# Patient Record
Sex: FEMALE | Race: BLACK OR AFRICAN AMERICAN | NOT HISPANIC OR LATINO | Employment: OTHER | URBAN - METROPOLITAN AREA
[De-identification: names, ages, dates, MRNs, and addresses within clinical notes are randomized per-mention and may not be internally consistent; named-entity substitution may affect disease eponyms.]

---

## 2017-01-23 ENCOUNTER — OFFICE VISIT (OUTPATIENT)
Dept: INTERNAL MEDICINE | Facility: CLINIC | Age: 47
End: 2017-01-23
Payer: COMMERCIAL

## 2017-01-23 VITALS
DIASTOLIC BLOOD PRESSURE: 84 MMHG | BODY MASS INDEX: 47.09 KG/M2 | HEIGHT: 66 IN | RESPIRATION RATE: 18 BRPM | SYSTOLIC BLOOD PRESSURE: 130 MMHG | WEIGHT: 293 LBS | HEART RATE: 64 BPM

## 2017-01-23 DIAGNOSIS — R60.0 BILATERAL EDEMA OF LOWER EXTREMITY: ICD-10-CM

## 2017-01-23 DIAGNOSIS — G47.33 OSA ON CPAP: ICD-10-CM

## 2017-01-23 DIAGNOSIS — E66.01 MORBID OBESITY DUE TO EXCESS CALORIES: ICD-10-CM

## 2017-01-23 DIAGNOSIS — M54.50 CHRONIC BILATERAL LOW BACK PAIN WITHOUT SCIATICA: ICD-10-CM

## 2017-01-23 DIAGNOSIS — Z23 NEED FOR INFLUENZA VACCINATION: ICD-10-CM

## 2017-01-23 DIAGNOSIS — Z13.29 SCREENING FOR THYROID DISORDER: ICD-10-CM

## 2017-01-23 DIAGNOSIS — F32.0 MILD SINGLE CURRENT EPISODE OF MAJOR DEPRESSIVE DISORDER: ICD-10-CM

## 2017-01-23 DIAGNOSIS — G89.29 CHRONIC BILATERAL LOW BACK PAIN WITHOUT SCIATICA: ICD-10-CM

## 2017-01-23 DIAGNOSIS — I10 ESSENTIAL HYPERTENSION: Primary | ICD-10-CM

## 2017-01-23 PROCEDURE — 90688 IIV4 VACCINE SPLT 0.5 ML IM: CPT | Mod: S$GLB,,, | Performed by: INTERNAL MEDICINE

## 2017-01-23 PROCEDURE — 90471 IMMUNIZATION ADMIN: CPT | Mod: S$GLB,,, | Performed by: INTERNAL MEDICINE

## 2017-01-23 PROCEDURE — 99999 PR PBB SHADOW E&M-EST. PATIENT-LVL III: CPT | Mod: PBBFAC,,, | Performed by: INTERNAL MEDICINE

## 2017-01-23 PROCEDURE — 3075F SYST BP GE 130 - 139MM HG: CPT | Mod: S$GLB,,, | Performed by: INTERNAL MEDICINE

## 2017-01-23 PROCEDURE — 3079F DIAST BP 80-89 MM HG: CPT | Mod: S$GLB,,, | Performed by: INTERNAL MEDICINE

## 2017-01-23 PROCEDURE — 1159F MED LIST DOCD IN RCRD: CPT | Mod: S$GLB,,, | Performed by: INTERNAL MEDICINE

## 2017-01-23 PROCEDURE — 99203 OFFICE O/P NEW LOW 30 MIN: CPT | Mod: 25,S$GLB,, | Performed by: INTERNAL MEDICINE

## 2017-01-23 RX ORDER — CYCLOBENZAPRINE HCL 10 MG
10 TABLET ORAL 2 TIMES DAILY
Refills: 2 | COMMUNITY
Start: 2017-01-16 | End: 2017-07-25 | Stop reason: SDUPTHER

## 2017-01-23 RX ORDER — NEBIVOLOL 10 MG/1
10 TABLET ORAL DAILY
COMMUNITY
End: 2017-02-20

## 2017-01-23 RX ORDER — MIRTAZAPINE 7.5 MG/1
7.5 TABLET, FILM COATED ORAL NIGHTLY
Qty: 30 TABLET | Refills: 11 | Status: SHIPPED | OUTPATIENT
Start: 2017-01-23 | End: 2018-06-11

## 2017-01-23 NOTE — PROGRESS NOTES
Subjective:       Patient ID: Arron Ron is a 46 y.o. female.    Chief Complaint: Establish Care and Back Pain (radiating to hips)      HPI:  Patient is new to clinic and presents to establish care and with back pain. She has HTN for which she takes losartan, nebivolol, and torsemide. She follows with Dr. Reyes in [place].     She reports h/o elevated cholesterol. Was on medicine but not taking currently. Changed her diet and reports her cholesterol has improved.    Back pain: she reports b/l low back pain that radiates into the b/l hips. Worse with bending forward. Feels sore in quality. Difficulty sleeping at night due to pain. She was given tramadol which was not effective. She has been prescribed Flexeril but makes her sleepy. Ibuprofen and Aleve is not helpful. Pain is intermittent. Has had pain for several years but getting worse. She has never had MRI.     She also reports depression. Has had depressed mood for several months. Denies SI. Having family stressors, son is in some trouble. She has never been on medicine for her mood. She is not sleeping well.     RODRI: diagnosed about 1 year ago. Using CPAP nightly. Denies issues    FH: HTN, CAD/MI, DM. No FH of cancers    Tobacco: never smoker  EtOH: denies  Illicit drugs: denies    NKDA      Past Medical History   Diagnosis Date    Anemia     Hyperlipidemia     Hypertension     Sleep apnea        Family History   Problem Relation Age of Onset    Hypertension Mother     Heart attack Mother     Hypertension Brother     Heart disease Maternal Grandmother     Hypertension Maternal Grandmother     Diabetes Maternal Aunt     Breast cancer Neg Hx     Colon cancer Neg Hx     Ovarian cancer Neg Hx        Social History     Social History    Marital status: Single     Spouse name: N/A    Number of children: N/A    Years of education: N/A     Occupational History    Not on file.     Social History Main Topics    Smoking status: Never Smoker     Smokeless tobacco: Never Used    Alcohol use No    Drug use: No    Sexual activity: Yes     Partners: Male     Birth control/ protection: Surgical, See Surgical Hx      Comment: --BTL     Other Topics Concern    Not on file     Social History Narrative       Review of Systems   Constitutional: Negative for activity change, fatigue, fever and unexpected weight change.   HENT: Negative for congestion, ear pain, hearing loss, rhinorrhea and sore throat.    Eyes: Negative for pain, redness and visual disturbance.   Respiratory: Negative for cough, shortness of breath and wheezing.    Cardiovascular: Negative for chest pain, palpitations and leg swelling.   Gastrointestinal: Negative for abdominal pain, blood in stool, constipation, diarrhea, nausea and vomiting.   Genitourinary: Negative for dysuria, frequency, pelvic pain and urgency.   Musculoskeletal: Positive for back pain. Negative for joint swelling and neck pain.   Skin: Negative for color change, rash and wound.   Neurological: Negative for dizziness, tremors, weakness, light-headedness and headaches.   Psychiatric/Behavioral: Positive for dysphoric mood and sleep disturbance. Negative for suicidal ideas.         Objective:      Physical Exam   Constitutional: She is oriented to person, place, and time. She appears well-developed and well-nourished. No distress.   HENT:   Head: Normocephalic and atraumatic.   Right Ear: External ear normal.   Left Ear: External ear normal.   Eyes: Conjunctivae and EOM are normal. Pupils are equal, round, and reactive to light. No scleral icterus.   Neck: Neck supple. No tracheal deviation present. No thyromegaly present.   Cardiovascular: Normal rate and regular rhythm.  Exam reveals no gallop and no friction rub.    No murmur heard.  Pulmonary/Chest: Effort normal and breath sounds normal. No respiratory distress. She has no wheezes. She has no rales.   Abdominal: Soft. Bowel sounds are normal. She exhibits no  distension. There is no tenderness.   Musculoskeletal: She exhibits edema (trace b/l LE edema) and tenderness (b/l SI joints and lumbar paraspoinal muscles).   Neurological: She is alert and oriented to person, place, and time. No cranial nerve deficit.   Skin: Skin is warm and dry.   Psychiatric: She has a normal mood and affect. Her behavior is normal.   Vitals reviewed.      Assessment:       1. Essential hypertension    2. Morbid obesity due to excess calories    3. Mild single current episode of major depressive disorder    4. RODRI on CPAP    5. Bilateral edema of lower extremity    6. Screening for thyroid disorder    7. Chronic bilateral low back pain without sciatica    8. Need for influenza vaccination        Plan:       Arron was seen today for establish care and back pain.    Diagnoses and all orders for this visit:    Essential hypertension  -     CBC auto differential; Future  -     Comprehensive metabolic panel; Future  -     Hemoglobin A1c; Future  -     Lipid panel; Future  Chronic, well controlled  Follows with Dr. Reyes  Continue losartan and bystolic at same dose  Check BP and keep log  Low Na diet  Exercise, weight loss    Morbid obesity due to excess calories  -     CBC auto differential; Future  -     Comprehensive metabolic panel; Future  -     Hemoglobin A1c; Future  -     Lipid panel; Future  -     TSH; Future  Diet and exercise encouraged    Mild single current episode of major depressive disorder  -     TSH; Future  -     mirtazapine (REMERON) 7.5 MG Tab; Take 1 tablet (7.5 mg total) by mouth every evening.  Start remeron for mood and sleep  Side effects discussed voiced understanding  Understanding may take a few weeks to take effects  Call for side effets. Follow up 4 weeks    RODRI on CPAP  Wearing CPAP nightly    Bilateral edema of lower extremity  -     CBC auto differential; Future  -     Comprehensive metabolic panel; Future  On torsemid  Likely due to venous insufficiency  Follows  with Dr. Reyes  Check labs    Screening for thyroid disorder  -     TSH; Future    Chronic bilateral low back pain without sciatica  -     MRI Lumbar Spine Without Contrast; Future  Has failed NSAIDs, tramadol, muscles relaxers  Do MRI to see if surgical or can refer to pain management  Printed info on back care and exercises  Will try to avoid narcotics for chronic pain    Need for influenza vaccination  Done today          Health Maintenance:  -CRC: not due  -PAP/MMG: follows Dr. Caputo; MMG 12/16 BR 1  -Bone Density: not odue  -tobacco: never smoker  -Vaccines    Flu 1/2017    RTC 4 weeks follow up depression and labs and PRN

## 2017-01-23 NOTE — MR AVS SNAPSHOT
[facility]  [address] [facility] [address]  Phone: [phone]  Fax: [phone]                  Arron Ron   2017 1:00 PM   Office Visit    Description:  Female : 1970   Provider:  Mahi Rojas MD   Department:  [facility]           Reason for Visit     Establish Care     Back Pain           Diagnoses this Visit        Comments    Essential hypertension    -  Primary     Morbid obesity due to excess calories         Mild single current episode of major depressive disorder         RODRI on CPAP         Bilateral edema of lower extremity         Screening for thyroid disorder         Chronic bilateral low back pain without sciatica         Need for influenza vaccination                To Do List           Future Appointments        Provider Department Dept Phone    2017 10:45 AM [facility] MRI1 [facility] [phone]    2017 1:00 PM Mahi Rojas MD [facility] [phone]      Goals (5 Years of Data)     None      Follow-Up and Disposition     Return in about 4 weeks (around 2017).    Follow-up and Disposition History       These Medications        Disp Refills Start End    mirtazapine (REMERON) 7.5 MG Tab 30 tablet 11 2017    Take 1 tablet (7.5 mg total) by mouth every evening. - Oral    Pharmacy: [facility]/pharmacy #[id number] - [place] - [address] AT [facility] Ph #: [phone]         [facility]     [facility] -  Assistance  Registered nurses in the [facility] provide clinical advisement, health education, appointment booking, and other advisory services.  Call for this free service at [phone].             Medications           Message regarding Medications     Verify the changes and/or additions to your medication regime listed below are the same as discussed with your clinician today.  If any of these  "changes or additions are incorrect, please notify your healthcare provider.        START taking these NEW medications        Refills    mirtazapine (REMERON) 7.5 MG Tab 11    Sig: Take 1 tablet (7.5 mg total) by mouth every evening.    Class: Normal    Route: Oral      STOP taking these medications     tramadol (ULTRAM) 50 mg tablet Take 1 tablet (50 mg total) by mouth every 6 (six) hours as needed for Pain.    ibuprofen (ADVIL,MOTRIN) 400 MG tablet Take 800 mg by mouth 3 (three) times daily.           Verify that the below list of medications is an accurate representation of the medications you are currently taking.  If none reported, the list may be blank. If incorrect, please contact your healthcare provider. Carry this list with you in case of emergency.           Current Medications     aspirin (ECOTRIN) 81 MG EC tablet Take 81 mg by mouth once daily.    cyclobenzaprine (FLEXERIL) 10 MG tablet Take 10 mg by mouth 2 (two) times daily.    levocetirizine (XYZAL) 5 MG tablet     losartan (COZAAR) 50 MG tablet     nebivolol (BYSTOLIC) 10 MG Tab Take 10 mg by mouth once daily.    torsemide (DEMADEX) 20 MG Tab Take 20 mg by mouth 2 (two) times daily.    mirtazapine (REMERON) 7.5 MG Tab Take 1 tablet (7.5 mg total) by mouth every evening.           Clinical Reference Information           Vital Signs - Last Recorded  Most recent update: 1/23/2017  1:03 PM by Sherice Suarez LPN    BP Pulse Resp Ht Wt LMP    130/84 64 18 5' 6" (1.676 m) 135.2 kg (298 lb 1 oz) 09/21/2015    BMI                48.11 kg/m2          Blood Pressure          Most Recent Value    BP  130/84      Allergies as of 1/23/2017     No Known Allergies      Immunizations Administered on Date of Encounter - 1/23/2017     None      Orders Placed During Today's Visit     Future Labs/Procedures Expected by Expires    MRI Lumbar Spine Without Contrast  1/23/2017 1/23/2018    CBC auto differential  2/21/2017 1/23/2018    Comprehensive metabolic panel  " 2/21/2017 1/23/2018    Hemoglobin A1c  2/21/2017 1/23/2018    Lipid panel  2/21/2017 1/23/2018    TSH  2/21/2017 1/23/2018      Instructions      Back Care Tips    Caring for your back  These are things you can do to prevent a recurrence of acute back pain and to reduce symptoms from chronic back pain:  · Maintain a healthy weight. If you are overweight, losing weight will help most types of back pain.  · Exercise is an important part of recovery from most types of back pain. The muscles behind and in front of the spine support the back. This means strengthening both the back muscles and the abdominal muscles will provide better support for your spine.   · Swimming and brisk walking are good overall exercises to improve your fitness level.  · Practice safe lifting methods (below).  · Practice good posture when sitting, standing and walking. Avoid prolonged sitting. This puts more stress on the lower back than standing or walking.  · Wear quality shoes with sufficient arch support. Foot and ankle alignment can affect back symptoms. Women should avoid wearing high heels.  · Therapeutic massage can help relax the back muscles without stretching them.  · During the first 24 to 72 hours after an acute injury or flare-up of chronic back pain, apply an ice pack to the painful area for 20 minutes and then remove it for 20 minutes, over a period of 60 to 90 minutes, or several times a day. As a safety precaution, do not use a heating pad at bedtime. Sleeping on a heating pad can lead to skin burns or tissue damage.  · You can alternate ice and heat therapies.  Medications  Talk to your healthcare provider before using medicines, especially if you have other medical problems or are taking other medicines.  · You may use acetaminophen or ibuprofen to control pain, unless your healthcare provider prescribed other pain medicine. If you have chronic conditions like diabetes, liver or kidney disease, stomach ulcers, or  gastrointestinal bleeding, or are taking blood thinners, talk with your healthcare provider before taking any medicines.  · Be careful if you are given prescription pain medicines, narcotics, or medicine for muscle spasm. They can cause drowsiness, affect your coordination, reflexes, and judgment. Do not drive or operate heavy machinery while taking these types of medicines. Take prescription pain medicine only as prescribed by your healthcare provider.  Lumbar stretch  Here is a simple stretching exercise that will help relax muscle spasm and keep your back more limber. If exercise makes your back pain worse, dont do it.  · Lie on your back with your knees bent and both feet on the ground.  · Slowly raise your left knee to your chest as you flatten your lower back against the floor. Hold for 5 seconds.  · Relax and repeat the exercise with your right knee.  · Do 10 of these exercises for each leg.  Safe lifting method  · Dont bend over at the waist to lift an object off the floor.  Instead, bend your knees and hips in a squat.   · Keep your back and head upright  · Hold the object close to your body, directly in front of you.  · Straighten your legs to lift the object.   · Lower the object to the floor in the reverse fashion.  · If you must slide something across the floor, push it.  Posture tips  Sitting  Sit in chairs with straight backs or low-back support. Keep your knees lower than your hips, with your feet flat on the floor.  When driving, sit up straight. Adjust the seat forward so you are not leaning toward the steering wheel.  A small pillow or rolled towel behind your lower back may help if you are driving long distances.   Standing  When standing for long periods, shift most of your weight to one leg at a time. Alternate legs every few minutes.   Sleeping  The best way to sleep is on your side with your knees bent. Put a low pillow under your head to support your neck in a neutral spine position. Avoid  thick pillows that bend your neck to one side. Put a pillow between your legs to further relax your lower back. If you sleep on your back, put pillows under your knees to support your legs in a slightly flexed position. Use a firm mattress. If your mattress sags, replace it, or use a 1/2-inch plywood board under the mattress to add support.  Follow-up care  Follow up with your healthcare provider, or as advised.  If X-rays, a CT scan or an MRI scan were taken, they will be reviewed by a radiologist. You will be notified of any new findings that may affect your care.  Call 911  Seek emergency medical care if any of the following occur:  · Trouble breathing  · Confusion  · Very drowsy  · Fainting or loss of consciousness  · Rapid or very slow heart rate  · Loss of  bowel or bladder control  When to seek medical care  Call your healthcare provider if any of the following occur:  · Pain becomes worse or spreads to your arms or legs  · Weakness or numbness in one or both arms or legs  · Numbness in the groin area  © 0555-7742 [employer]. [address] [place] [address]. All rights reserved. This information is not intended as a substitute for professional medical care. Always follow your healthcare professional's instructions.

## 2017-01-23 NOTE — LETTER
January 23, 2017      [facility]  [address] [facility] [address]  Phone: [phone]  Fax: [phone]       Patient: Arron Ron   YOB: 1970  Date of Visit: 01/23/2017    To Whom It May Concern:    Arron was at [facility] on 01/23/2017. She may return to work/school on 1/24/17 with no restrictions. If you have any questions or concerns, or if I can be of further assistance, please do not hesitate to contact me.    Sincerely,    Mahi Rojas MD

## 2017-01-30 ENCOUNTER — HOSPITAL ENCOUNTER (OUTPATIENT)
Dept: RADIOLOGY | Facility: HOSPITAL | Age: 47
Discharge: HOME | End: 2017-01-30
Attending: INTERNAL MEDICINE
Payer: COMMERCIAL

## 2017-01-30 DIAGNOSIS — G89.29 CHRONIC BILATERAL LOW BACK PAIN WITHOUT SCIATICA: ICD-10-CM

## 2017-01-30 DIAGNOSIS — M54.50 CHRONIC BILATERAL LOW BACK PAIN WITHOUT SCIATICA: ICD-10-CM

## 2017-01-30 PROCEDURE — 72148 MRI LUMBAR SPINE W/O DYE: CPT | Mod: 26,,, | Performed by: RADIOLOGY

## 2017-01-30 PROCEDURE — 72148 MRI LUMBAR SPINE W/O DYE: CPT | Mod: TC

## 2017-02-06 ENCOUNTER — TELEPHONE (OUTPATIENT)
Dept: INTERNAL MEDICINE | Facility: CLINIC | Age: 47
End: 2017-02-06

## 2017-02-06 NOTE — TELEPHONE ENCOUNTER
Sent message via results already. She has multiple bulging discs and arthritis. She can be referred to ortho or pain management, which ever she prefers.

## 2017-02-06 NOTE — TELEPHONE ENCOUNTER
----- Message from Cris Mcclain sent at 2017  8:26 AM CST -----  Contact: self  Arron Ron  MRN: [id number]  : 1970  PCP: Primary Doctor No  Home Phone      [phone]  Work Phone      Not on file.  Mobile          [phone]      MESSAGE:   Calling to get the results of her mri.    Phone: [phone]

## 2017-02-20 ENCOUNTER — OFFICE VISIT (OUTPATIENT)
Dept: INTERNAL MEDICINE | Facility: CLINIC | Age: 47
End: 2017-02-20
Payer: COMMERCIAL

## 2017-02-20 VITALS
DIASTOLIC BLOOD PRESSURE: 96 MMHG | WEIGHT: 293 LBS | BODY MASS INDEX: 47.09 KG/M2 | SYSTOLIC BLOOD PRESSURE: 188 MMHG | HEIGHT: 66 IN | HEART RATE: 88 BPM | RESPIRATION RATE: 20 BRPM

## 2017-02-20 DIAGNOSIS — F32.0 MILD SINGLE CURRENT EPISODE OF MAJOR DEPRESSIVE DISORDER: ICD-10-CM

## 2017-02-20 DIAGNOSIS — M54.50 CHRONIC BILATERAL LOW BACK PAIN WITHOUT SCIATICA: ICD-10-CM

## 2017-02-20 DIAGNOSIS — E66.01 MORBID OBESITY DUE TO EXCESS CALORIES: ICD-10-CM

## 2017-02-20 DIAGNOSIS — R60.0 BILATERAL EDEMA OF LOWER EXTREMITY: ICD-10-CM

## 2017-02-20 DIAGNOSIS — I10 ESSENTIAL HYPERTENSION: Primary | ICD-10-CM

## 2017-02-20 DIAGNOSIS — G62.9 NEUROPATHY: ICD-10-CM

## 2017-02-20 DIAGNOSIS — G89.29 CHRONIC BILATERAL LOW BACK PAIN WITHOUT SCIATICA: ICD-10-CM

## 2017-02-20 DIAGNOSIS — G47.33 OSA ON CPAP: ICD-10-CM

## 2017-02-20 PROCEDURE — 3077F SYST BP >= 140 MM HG: CPT | Mod: S$GLB,,, | Performed by: INTERNAL MEDICINE

## 2017-02-20 PROCEDURE — 3080F DIAST BP >= 90 MM HG: CPT | Mod: S$GLB,,, | Performed by: INTERNAL MEDICINE

## 2017-02-20 PROCEDURE — 99999 PR PBB SHADOW E&M-EST. PATIENT-LVL III: CPT | Mod: PBBFAC,,, | Performed by: INTERNAL MEDICINE

## 2017-02-20 PROCEDURE — 99214 OFFICE O/P EST MOD 30 MIN: CPT | Mod: S$GLB,,, | Performed by: INTERNAL MEDICINE

## 2017-02-20 RX ORDER — ATORVASTATIN CALCIUM 10 MG/1
10 TABLET, FILM COATED ORAL DAILY
COMMUNITY
End: 2017-07-21 | Stop reason: CLARIF

## 2017-02-20 RX ORDER — GABAPENTIN 300 MG/1
300 CAPSULE ORAL 3 TIMES DAILY
Qty: 90 CAPSULE | Refills: 11 | Status: SHIPPED | OUTPATIENT
Start: 2017-02-20 | End: 2017-03-10 | Stop reason: DRUGHIGH

## 2017-02-20 RX ORDER — METOPROLOL SUCCINATE 50 MG/1
50 TABLET, EXTENDED RELEASE ORAL DAILY
COMMUNITY
End: 2017-07-25 | Stop reason: SDUPTHER

## 2017-02-20 RX ORDER — VALSARTAN 320 MG/1
320 TABLET ORAL DAILY
COMMUNITY
End: 2017-07-25 | Stop reason: SDUPTHER

## 2017-02-20 RX ORDER — OMEPRAZOLE 40 MG/1
40 CAPSULE, DELAYED RELEASE ORAL DAILY
COMMUNITY
End: 2017-07-25

## 2017-02-20 RX ORDER — SPIRONOLACTONE 25 MG/1
25 TABLET ORAL DAILY
Qty: 30 TABLET | Refills: 11 | Status: SHIPPED | OUTPATIENT
Start: 2017-02-20 | End: 2017-03-10 | Stop reason: DRUGHIGH

## 2017-02-20 NOTE — PATIENT INSTRUCTIONS
Exercises to Strengthen Your Lower Back  Strong lower back and abdominal muscles work together to support your spine. The exercises below will help strengthen the lower back. It is important that you begin exercising slowly and increase levels gradually.  Always begin any exercise program with stretching. If you feel pain while doing any of these exercises, stop and talk to your doctor about a more specific exercise program that better suits your condition.   Low back stretch  The point of stretching is to make you more flexible and increase your range of motion. Stretch only as much as you are able. Stretch slowly. Do not push your stretch to the limit. If at any point you feel pain while stretching, this is your (temporary) limit.  · Lie on your back with your knees bent and both feet on the ground.  · Slowly raise your left knee to your chest as you flatten your lower back against the floor. Hold for 5 seconds.  · Relax and repeat the exercise with your right knee.  · Do 10 of these exercises for each leg.  · Repeat hugging both knees to your chest at the same time.  Building lower back strength  Start your exercise routine with 10 to 30 minutes a day, 1 to 3 times a day.  Initial exercises  Lying on your back:  1. Ankle pumps: Move your foot up and down, towards your head, and then away. Repeat 10 times with each foot.  2. Heel slides: Slowly bend your knee, drawing the heel of your foot towards you. Then slide your heel/foot from you, straightening your knee. Do not lift your foot off the floor (this is not a leg lift).  3. Abdominal contraction: Bend your knees and put your hands on your stomach. Tighten your stomach muscles. Hold for 5 seconds, then relax. Repeat 10 times.  4. Straight leg raise: Bend one leg at the knee and keep the other leg straight. Tighten your stomach muscles. Slowly lift your straight leg 6 to 12 inches off the floor and hold for up to 5 seconds. Repeat 10 times on each  side.  Standin. Wall squats: Stand with your back against the wall. Move your feet about 12 inches away from the wall. Tighten your stomach muscles, and slowly bend your knees until they are at about a 45 degree angle. Do not go down too far. Hold about 5 seconds. Then slowly return to your starting position. Repeat 10 times.  2. Heel raises: Stand facing the wall. Slowly raise the heels of your feet up and down, while keeping your toes on the floor. If you have trouble balancing, you can touch the wall with your hands. Repeat 10 times.  More advanced exercises  When you feel comfortable enough, try these exercises.  1. Kneeling lumbar extension: Begin on your hands and knees. At the same time, raise and straighten your right arm and left leg until they are parallel to the ground. Hold for 2 seconds and come back slowly to a starting position. Repeat with left arm and right leg, alternating 10 times.  2. Prone lumbar extension: Lie face down, arms extended overhead, palms on the floor. At the same time, raise your right arm and left leg as high as comfortably possible. Hold for 10 seconds and slowly return to start. Repeat with left arm and right leg, alternating 10 times. Gradually build up to 20 times. (Advanced: Repeat this exercise raising both arms and both legs a few inches off the floor at the same time. Hold for 5 seconds and release.)  3. Pelvic tilt: Lie on the floor on your back with your knees bent at 90 degrees. Your feet should be flat on the floor. Inhale, exhale, then slowly contract your abdominal muscles bringing your navel toward your spine. Let your pelvis rock back until your lower back is flat on the floor. Hold for 10 seconds while breathing smoothly.  4. Abdominal crunch: Perform a pelvic tilt (above) flattening your lower back against the floor. Holding the tension in your abdominal muscles, take another breath and raise your shoulder blades off the ground (this is not a full sit-up).  Keep your head in line with your body (dont bend your neck forward). Hold for 2 seconds, then slowly lower.  Date Last Reviewed: 6/1/2016 © 2000-2016 [employer]. [address] [place] [address], [place] [address]. All rights reserved. This information is not intended as a substitute for professional medical care. Always follow your healthcare professional's instructions.        Taking Your Blood Pressure  Blood pressure is the force of blood against the artery wall as it moves from the heart through the blood vessels. You can take your own blood pressure reading using a digital monitor. Take readings as often as your healthcare provider instructs. Take each reading at the same time of day.  Step 1. Relax    · Take your blood pressure at the same time every day, such as in the morning or evening, or at the time your healthcare provider recommends.  · Wait at least a half-hour after smoking, eating, drinking caffeinated beverages, or exercising.  · Sit comfortably at a table with both feet on the floor. Do not cross your legs or feet. Place the monitor near you.  · Rest for a few minutes before you begin.  Step 2. Wrap the cuff    · Place your arm on the table, palm up. Your arm should be at the level of your heart. Wrap the cuff around your upper arm, just above your elbow. Its best done on bare skin, not over clothing. Most cuffs will indicate where the brachial artery (the blood vessel in the middle of the arm at the inner side of the elbow) should line up with the cuff. Look in your monitor's instruction booklet for an illustration. You can also bring your cuff to your healthcare provider and have them show you how to correctly place the cuff.  · Make sure your cuff fits. If it doesnt wrap around your upper arm, order a larger cuff.  Step 3. Inflate the cuff    · Pump the cuff until the scale reads 160. If you have a self-inflating cuff, push the button that starts the pump.  · The cuff will tighten,  then loosen.  · The numbers will change. When they stop changing, your blood pressure reading will appear.  · Take 2 or 3 readings one minute apart.  Step 4. Write down the results of each reading    · Write down your blood pressure numbers for each reading. Note the date and time. Keep your results in one place, such as a notebook. Even if your monitor has a built-in memory, keep a hard copy of the readings.  · Remove the cuff from your arm. Turn off the machine.  · Share your blood pressure records with your healthcare providers at each visit.  Date Last Reviewed: 4/27/2016  © 1301-6409 [employer]. [address] [place] [address], [place] [address]. All rights reserved. This information is not intended as a substitute for professional medical care. Always follow your healthcare professional's instructions.

## 2017-02-20 NOTE — PROGRESS NOTES
"Subjective:       Patient ID: Arron Rno is a 46 y.o. female.    Chief Complaint: Follow-up      HPI:  Patient is known to me and presents for follow up HTN, back pain and depression.    Today she reports some nausea and vomiting and worsening acid reflux. The reflux makes her vomit. + belching. Mild epigastric pain. She went to Dr. Reyes for this because she was having chest pains and he diagnosed GERD and started on omeprazole 40mg. PPI has helped her sx      HTN: follows with Dr. Reyes. Was on losartan and bystolic. BP is elevated today. Saw Dr. Reyes and was changed to metoprolol and valsartan. Has been on these new meds for almost 1 weeks. She thinks it due to stress. Not checking BP at home.     Depression. Started remeron at last visit for mood and complaints of insomnia. She never started taking it. She wanted to try without but she will start taking. Still has depressed mood.      Low back pain: She has failed NSAIDs, tramadol and muscle relaxers. Ordered MRI L-spine which showed:    "Transitional anatomy at the lumbosacral junction with lumbarization of the first sacral vertebra.  Please see sagittal T2 weighted images for vertebral body labeling.  It surgical intervention is contemplated, correlation with fluoroscopy is recommended.    Broad-based posterior disc bulge with facet arthropathy at L5-S1 contributing to mild bilateral neural foraminal narrowing, worse on the left.    Edema-like signal about the posterior elements bilaterally at L5 which can be a source of pain.  This is more prominent on the left."    She is having burning and tingling in b/l feet. Worse with walking. Has never tried gabapenting for pain relief.     Past Medical History   Diagnosis Date    Anemia     Hyperlipidemia     Hypertension     Sleep apnea        Family History   Problem Relation Age of Onset    Hypertension Mother     Heart attack Mother     Hypertension Brother     Heart disease Maternal Grandmother  "    Hypertension Maternal Grandmother     Diabetes Maternal Aunt     Breast cancer Neg Hx     Colon cancer Neg Hx     Ovarian cancer Neg Hx        Social History     Social History    Marital status: Single     Spouse name: N/A    Number of children: N/A    Years of education: N/A     Occupational History    Not on file.     Social History Main Topics    Smoking status: Never Smoker    Smokeless tobacco: Never Used    Alcohol use No    Drug use: No    Sexual activity: Yes     Partners: Male     Birth control/ protection: Surgical, See Surgical Hx      Comment: --BTL     Other Topics Concern    Not on file     Social History Narrative       Review of Systems   Constitutional: Negative for activity change, fatigue, fever and unexpected weight change.   HENT: Negative for congestion, ear pain, hearing loss, rhinorrhea and sore throat.    Eyes: Negative for pain, redness and visual disturbance.   Respiratory: Negative for cough, shortness of breath and wheezing.    Cardiovascular: Positive for leg swelling. Negative for chest pain and palpitations.   Gastrointestinal: Negative for abdominal pain, constipation, diarrhea, nausea and vomiting.   Genitourinary: Negative for dysuria, frequency, pelvic pain and urgency.   Musculoskeletal: Negative for back pain, joint swelling and neck pain.   Skin: Negative for color change, rash and wound.   Neurological: Positive for numbness. Negative for dizziness, tremors, weakness, light-headedness and headaches.   Psychiatric/Behavioral: Positive for dysphoric mood and sleep disturbance. Negative for suicidal ideas.         Objective:      Physical Exam   Constitutional: She is oriented to person, place, and time. She appears well-developed and well-nourished. No distress.   HENT:   Head: Normocephalic and atraumatic.   Right Ear: External ear normal.   Left Ear: External ear normal.   Eyes: Conjunctivae and EOM are normal. Pupils are equal, round, and reactive  to light. Right eye exhibits no discharge. Left eye exhibits no discharge.   Neck: Neck supple. No tracheal deviation present.   Cardiovascular: Normal rate and regular rhythm.  Exam reveals no gallop and no friction rub.    No murmur heard.  Pulmonary/Chest: Effort normal and breath sounds normal. No respiratory distress. She has no wheezes. She has no rales.   Abdominal: Soft. Bowel sounds are normal. She exhibits no distension. There is no tenderness.   Musculoskeletal: She exhibits edema. Tenderness: 1+ b/l LE edema.   Neurological: She is alert and oriented to person, place, and time. No cranial nerve deficit.   Skin: Skin is warm and dry.   Psychiatric: She has a normal mood and affect. Her behavior is normal.   Vitals reviewed.      Assessment:       1. Essential hypertension    2. Chronic bilateral low back pain without sciatica    3. Mild single current episode of major depressive disorder    4. RODRI on CPAP    5. Morbid obesity due to excess calories    6. Bilateral edema of lower extremity    7. Neuropathy        Plan:       Arron was seen today for follow-up.    Diagnoses and all orders for this visit:    Essential hypertension  -     spironolactone (ALDACTONE) 25 MG tablet; Take 1 tablet (25 mg total) by mouth once daily.  Uncontrolled. Chronic  Stay on losartan and metoprolol  Add aldactone today (had worsening LE edema with norvasc in the past). Consider another CCB in the future if can't get under control but started aldactone as still has some LE edema so this may help  Low Na diet  Check BP dialy and keep log  See me in 2 weeks with BP log    Chronic bilateral low back pain without sciatica  -     gabapentin (NEURONTIN) 300 MG capsule; Take 1 capsule (300 mg total) by mouth 3 (three) times daily.  OTC Tyelnol/Ibuprofen. Prefer tylenol due to gERD  MRI reviewed    Mild single current episode of major depressive disorder  She will start her remeron    RODRI on CPAP  weraing CPAP nightly    Morbid  obesity due to excess calories  Working on diet and exercise    Bilateral edema of lower extremity  Continue torsemide  Added aldactone  Likely due to venous insufficiency    Neuropathy  -     gabapentin (NEURONTIN) 300 MG capsule; Take 1 capsule (300 mg total) by mouth 3 (three) times daily.  New problem  liekly due to lumbar pathology       Health Maintenance:  -CRC: not due  -PAP/MMG: follows Dr. Caputo; MMG 12/16 BR 1  -Bone Density: not odue  -tobacco: never smoker  -Vaccines   Flu 1/2017    RTC 2 weeks follow up HTN and PRN

## 2017-02-20 NOTE — MR AVS SNAPSHOT
[facility]  [address] [address]  Phone: [phone]  Fax: [phone]                  Arron Ron   2017 1:00 PM   Office Visit    Description:  Female : 1970   Provider:  Mahi Rojas MD   Department:  [facility]           Reason for Visit     Follow-up           Diagnoses this Visit        Comments    Essential hypertension    -  Primary     Chronic bilateral low back pain without sciatica         Mild single current episode of major depressive disorder         RODRI on CPAP         Morbid obesity due to excess calories         Bilateral edema of lower extremity         Neuropathy                To Do List           Future Appointments        Provider Department Dept Phone    3/10/2017 1:00 PM Mahi Rojas MD [facility] [phone]      Goals (5 Years of Data)     None      Follow-Up and Disposition     Return in about 2 weeks (around 3/6/2017).       These Medications        Disp Refills Start End    gabapentin (NEURONTIN) 300 MG capsule 90 capsule 11 2017    Take 1 capsule (300 mg total) by mouth 3 (three) times daily. - Oral    Pharmacy: [facility] #[id number] - [place] - [address] [facility] Ph #: [phone]       spironolactone (ALDACTONE) 25 MG tablet 30 tablet 11 2017    Take 1 tablet (25 mg total) by mouth once daily. - Oral    Pharmacy: [facility] #[id number] - [place] - [address] AT [facility] Ph #: [phone]         [facility]     [facility]  Assistance  Registered nurses in the [facility] provide clinical advisement, health education, appointment booking, and other advisory services.  Call for this free service at [phone].             Medications           Message regarding Medications     Verify the changes and/or additions to your medication regime listed below are the  "same as discussed with your clinician today.  If any of these changes or additions are incorrect, please notify your healthcare provider.        START taking these NEW medications        Refills    gabapentin (NEURONTIN) 300 MG capsule 11    Sig: Take 1 capsule (300 mg total) by mouth 3 (three) times daily.    Class: Normal    Route: Oral    spironolactone (ALDACTONE) 25 MG tablet 11    Sig: Take 1 tablet (25 mg total) by mouth once daily.    Class: Normal    Route: Oral      STOP taking these medications     losartan (COZAAR) 50 MG tablet     nebivolol (BYSTOLIC) 10 MG Tab Take 10 mg by mouth once daily.           Verify that the below list of medications is an accurate representation of the medications you are currently taking.  If none reported, the list may be blank. If incorrect, please contact your healthcare provider. Carry this list with you in case of emergency.           Current Medications     aspirin (ECOTRIN) 81 MG EC tablet Take 81 mg by mouth once daily.    atorvastatin (LIPITOR) 10 MG tablet Take 10 mg by mouth once daily.    cyclobenzaprine (FLEXERIL) 10 MG tablet Take 10 mg by mouth 2 (two) times daily.    levocetirizine (XYZAL) 5 MG tablet     metoprolol succinate (TOPROL-XL) 50 MG 24 hr tablet Take 50 mg by mouth once daily.    mirtazapine (REMERON) 7.5 MG Tab Take 1 tablet (7.5 mg total) by mouth every evening.    omeprazole (PRILOSEC) 40 MG capsule Take 40 mg by mouth once daily.    torsemide (DEMADEX) 20 MG Tab Take 20 mg by mouth 2 (two) times daily.    valsartan (DIOVAN) 320 MG tablet Take 320 mg by mouth once daily.    gabapentin (NEURONTIN) 300 MG capsule Take 1 capsule (300 mg total) by mouth 3 (three) times daily.    spironolactone (ALDACTONE) 25 MG tablet Take 1 tablet (25 mg total) by mouth once daily.           Clinical Reference Information           Your Vitals Were     BP Pulse Resp Height Weight Last Period    188/96 88 20 5' 6" (1.676 m) 134.9 kg (297 lb 6.4 oz) 09/21/2015    BMI "                48 kg/m2          Blood Pressure          Most Recent Value    BP  (!)  188/96      Allergies as of 2/20/2017     No Known Allergies      Immunizations Administered on Date of Encounter - 2/20/2017     None      Instructions      Exercises to Strengthen Your Lower Back  Strong lower back and abdominal muscles work together to support your spine. The exercises below will help strengthen the lower back. It is important that you begin exercising slowly and increase levels gradually.  Always begin any exercise program with stretching. If you feel pain while doing any of these exercises, stop and talk to your doctor about a more specific exercise program that better suits your condition.   Low back stretch  The point of stretching is to make you more flexible and increase your range of motion. Stretch only as much as you are able. Stretch slowly. Do not push your stretch to the limit. If at any point you feel pain while stretching, this is your (temporary) limit.  · Lie on your back with your knees bent and both feet on the ground.  · Slowly raise your left knee to your chest as you flatten your lower back against the floor. Hold for 5 seconds.  · Relax and repeat the exercise with your right knee.  · Do 10 of these exercises for each leg.  · Repeat hugging both knees to your chest at the same time.  Building lower back strength  Start your exercise routine with 10 to 30 minutes a day, 1 to 3 times a day.  Initial exercises  Lying on your back:  1. Ankle pumps: Move your foot up and down, towards your head, and then away. Repeat 10 times with each foot.  2. Heel slides: Slowly bend your knee, drawing the heel of your foot towards you. Then slide your heel/foot from you, straightening your knee. Do not lift your foot off the floor (this is not a leg lift).  3. Abdominal contraction: Bend your knees and put your hands on your stomach. Tighten your stomach muscles. Hold for 5 seconds, then relax. Repeat 10  times.  4. Straight leg raise: Bend one leg at the knee and keep the other leg straight. Tighten your stomach muscles. Slowly lift your straight leg 6 to 12 inches off the floor and hold for up to 5 seconds. Repeat 10 times on each side.  Standin. Wall squats: Stand with your back against the wall. Move your feet about 12 inches away from the wall. Tighten your stomach muscles, and slowly bend your knees until they are at about a 45 degree angle. Do not go down too far. Hold about 5 seconds. Then slowly return to your starting position. Repeat 10 times.  2. Heel raises: Stand facing the wall. Slowly raise the heels of your feet up and down, while keeping your toes on the floor. If you have trouble balancing, you can touch the wall with your hands. Repeat 10 times.  More advanced exercises  When you feel comfortable enough, try these exercises.  1. Kneeling lumbar extension: Begin on your hands and knees. At the same time, raise and straighten your right arm and left leg until they are parallel to the ground. Hold for 2 seconds and come back slowly to a starting position. Repeat with left arm and right leg, alternating 10 times.  2. Prone lumbar extension: Lie face down, arms extended overhead, palms on the floor. At the same time, raise your right arm and left leg as high as comfortably possible. Hold for 10 seconds and slowly return to start. Repeat with left arm and right leg, alternating 10 times. Gradually build up to 20 times. (Advanced: Repeat this exercise raising both arms and both legs a few inches off the floor at the same time. Hold for 5 seconds and release.)  3. Pelvic tilt: Lie on the floor on your back with your knees bent at 90 degrees. Your feet should be flat on the floor. Inhale, exhale, then slowly contract your abdominal muscles bringing your navel toward your spine. Let your pelvis rock back until your lower back is flat on the floor. Hold for 10 seconds while breathing  smoothly.  4. Abdominal crunch: Perform a pelvic tilt (above) flattening your lower back against the floor. Holding the tension in your abdominal muscles, take another breath and raise your shoulder blades off the ground (this is not a full sit-up). Keep your head in line with your body (dont bend your neck forward). Hold for 2 seconds, then slowly lower.  Date Last Reviewed: 6/1/2016 © 2000-2016 [employer]. [address] [place] [address]. All rights reserved. This information is not intended as a substitute for professional medical care. Always follow your healthcare professional's instructions.        Taking Your Blood Pressure  Blood pressure is the force of blood against the artery wall as it moves from the heart through the blood vessels. You can take your own blood pressure reading using a digital monitor. Take readings as often as your healthcare provider instructs. Take each reading at the same time of day.  Step 1. Relax    · Take your blood pressure at the same time every day, such as in the morning or evening, or at the time your healthcare provider recommends.  · Wait at least a half-hour after smoking, eating, drinking caffeinated beverages, or exercising.  · Sit comfortably at a table with both feet on the floor. Do not cross your legs or feet. Place the monitor near you.  · Rest for a few minutes before you begin.  Step 2. Wrap the cuff    · Place your arm on the table, palm up. Your arm should be at the level of your heart. Wrap the cuff around your upper arm, just above your elbow. Its best done on bare skin, not over clothing. Most cuffs will indicate where the brachial artery (the blood vessel in the middle of the arm at the inner side of the elbow) should line up with the cuff. Look in your monitor's instruction booklet for an illustration. You can also bring your cuff to your healthcare provider and have them show you how to correctly place the cuff.  · Make sure  your cuff fits. If it doesnt wrap around your upper arm, order a larger cuff.  Step 3. Inflate the cuff    · Pump the cuff until the scale reads 160. If you have a self-inflating cuff, push the button that starts the pump.  · The cuff will tighten, then loosen.  · The numbers will change. When they stop changing, your blood pressure reading will appear.  · Take 2 or 3 readings one minute apart.  Step 4. Write down the results of each reading    · Write down your blood pressure numbers for each reading. Note the date and time. Keep your results in one place, such as a notebook. Even if your monitor has a built-in memory, keep a hard copy of the readings.  · Remove the cuff from your arm. Turn off the machine.  · Share your blood pressure records with your healthcare providers at each visit.  Date Last Reviewed: 4/27/2016  © 0185-3514 [employer]. [address] [place] [address]. All rights reserved. This information is not intended as a substitute for professional medical care. Always follow your healthcare professional's instructions.             Language Assistance Services     ATTENTION: Language assistance services are available, free of charge. Please call [phone].      ATENCIÓN: Si habla destineyañol, tiene a king disposición servicios gratuitos de asistencia lingüística. Llame al [phone].     KAITLYNN Ý: N?u b?n nói Ti?ng Vi?t, có các d?ch v? h? tr? ngôn ng? mi?n phí dành cho b?n. G?i s? [phone].         [facility] complies with applicable Federal civil rights laws and does not discriminate on the basis of race, color, national origin, age, disability, or sex.

## 2017-03-10 ENCOUNTER — OFFICE VISIT (OUTPATIENT)
Dept: INTERNAL MEDICINE | Facility: CLINIC | Age: 47
End: 2017-03-10
Payer: COMMERCIAL

## 2017-03-10 VITALS
RESPIRATION RATE: 18 BRPM | HEART RATE: 80 BPM | WEIGHT: 293 LBS | HEIGHT: 66 IN | BODY MASS INDEX: 47.09 KG/M2 | DIASTOLIC BLOOD PRESSURE: 94 MMHG | SYSTOLIC BLOOD PRESSURE: 128 MMHG

## 2017-03-10 DIAGNOSIS — I10 ESSENTIAL HYPERTENSION: Primary | ICD-10-CM

## 2017-03-10 DIAGNOSIS — M54.50 CHRONIC BILATERAL LOW BACK PAIN WITHOUT SCIATICA: ICD-10-CM

## 2017-03-10 DIAGNOSIS — G89.29 CHRONIC BILATERAL LOW BACK PAIN WITHOUT SCIATICA: ICD-10-CM

## 2017-03-10 DIAGNOSIS — L30.9 DERMATITIS: ICD-10-CM

## 2017-03-10 DIAGNOSIS — R60.0 BILATERAL EDEMA OF LOWER EXTREMITY: ICD-10-CM

## 2017-03-10 DIAGNOSIS — F32.0 MILD SINGLE CURRENT EPISODE OF MAJOR DEPRESSIVE DISORDER: ICD-10-CM

## 2017-03-10 PROCEDURE — 1160F RVW MEDS BY RX/DR IN RCRD: CPT | Mod: S$GLB,,, | Performed by: INTERNAL MEDICINE

## 2017-03-10 PROCEDURE — 99999 PR PBB SHADOW E&M-EST. PATIENT-LVL III: CPT | Mod: PBBFAC,,, | Performed by: INTERNAL MEDICINE

## 2017-03-10 PROCEDURE — 3080F DIAST BP >= 90 MM HG: CPT | Mod: S$GLB,,, | Performed by: INTERNAL MEDICINE

## 2017-03-10 PROCEDURE — 99214 OFFICE O/P EST MOD 30 MIN: CPT | Mod: S$GLB,,, | Performed by: INTERNAL MEDICINE

## 2017-03-10 PROCEDURE — 3074F SYST BP LT 130 MM HG: CPT | Mod: S$GLB,,, | Performed by: INTERNAL MEDICINE

## 2017-03-10 RX ORDER — SPIRONOLACTONE 50 MG/1
50 TABLET, FILM COATED ORAL DAILY
Qty: 30 TABLET | Refills: 11 | Status: SHIPPED | OUTPATIENT
Start: 2017-03-10 | End: 2018-02-19 | Stop reason: DRUGHIGH

## 2017-03-10 RX ORDER — TRIAMCINOLONE ACETONIDE 1 MG/G
CREAM TOPICAL 2 TIMES DAILY
Qty: 45 G | Refills: 1 | Status: SHIPPED | OUTPATIENT
Start: 2017-03-10 | End: 2018-02-06 | Stop reason: SDUPTHER

## 2017-03-10 RX ORDER — GABAPENTIN 600 MG/1
600 TABLET ORAL 3 TIMES DAILY
Qty: 90 TABLET | Refills: 11 | Status: SHIPPED | OUTPATIENT
Start: 2017-03-10 | End: 2018-01-23

## 2017-03-10 NOTE — PATIENT INSTRUCTIONS
Exercises to Strengthen Your Lower Back  Strong lower back and abdominal muscles work together to support your spine. The exercises below will help strengthen the lower back. It is important that you begin exercising slowly and increase levels gradually.  Always begin any exercise program with stretching. If you feel pain while doing any of these exercises, stop and talk to your doctor about a more specific exercise program that better suits your condition.   Low back stretch  The point of stretching is to make you more flexible and increase your range of motion. Stretch only as much as you are able. Stretch slowly. Do not push your stretch to the limit. If at any point you feel pain while stretching, this is your (temporary) limit.  · Lie on your back with your knees bent and both feet on the ground.  · Slowly raise your left knee to your chest as you flatten your lower back against the floor. Hold for 5 seconds.  · Relax and repeat the exercise with your right knee.  · Do 10 of these exercises for each leg.  · Repeat hugging both knees to your chest at the same time.  Building lower back strength  Start your exercise routine with 10 to 30 minutes a day, 1 to 3 times a day.  Initial exercises  Lying on your back:  1. Ankle pumps: Move your foot up and down, towards your head, and then away. Repeat 10 times with each foot.  2. Heel slides: Slowly bend your knee, drawing the heel of your foot towards you. Then slide your heel/foot from you, straightening your knee. Do not lift your foot off the floor (this is not a leg lift).  3. Abdominal contraction: Bend your knees and put your hands on your stomach. Tighten your stomach muscles. Hold for 5 seconds, then relax. Repeat 10 times.  4. Straight leg raise: Bend one leg at the knee and keep the other leg straight. Tighten your stomach muscles. Slowly lift your straight leg 6 to 12 inches off the floor and hold for up to 5 seconds. Repeat 10 times on each  side.  Standin. Wall squats: Stand with your back against the wall. Move your feet about 12 inches away from the wall. Tighten your stomach muscles, and slowly bend your knees until they are at about a 45 degree angle. Do not go down too far. Hold about 5 seconds. Then slowly return to your starting position. Repeat 10 times.  2. Heel raises: Stand facing the wall. Slowly raise the heels of your feet up and down, while keeping your toes on the floor. If you have trouble balancing, you can touch the wall with your hands. Repeat 10 times.  More advanced exercises  When you feel comfortable enough, try these exercises.  1. Kneeling lumbar extension: Begin on your hands and knees. At the same time, raise and straighten your right arm and left leg until they are parallel to the ground. Hold for 2 seconds and come back slowly to a starting position. Repeat with left arm and right leg, alternating 10 times.  2. Prone lumbar extension: Lie face down, arms extended overhead, palms on the floor. At the same time, raise your right arm and left leg as high as comfortably possible. Hold for 10 seconds and slowly return to start. Repeat with left arm and right leg, alternating 10 times. Gradually build up to 20 times. (Advanced: Repeat this exercise raising both arms and both legs a few inches off the floor at the same time. Hold for 5 seconds and release.)  3. Pelvic tilt: Lie on the floor on your back with your knees bent at 90 degrees. Your feet should be flat on the floor. Inhale, exhale, then slowly contract your abdominal muscles bringing your navel toward your spine. Let your pelvis rock back until your lower back is flat on the floor. Hold for 10 seconds while breathing smoothly.  4. Abdominal crunch: Perform a pelvic tilt (above) flattening your lower back against the floor. Holding the tension in your abdominal muscles, take another breath and raise your shoulder blades off the ground (this is not a full sit-up).  Keep your head in line with your body (dont bend your neck forward). Hold for 2 seconds, then slowly lower.  Date Last Reviewed: 6/1/2016 © 2000-2016 [employer]. [address] [place] [address], [place] [address]. All rights reserved. This information is not intended as a substitute for professional medical care. Always follow your healthcare professional's instructions.        Established High Blood Pressure    High blood pressure (hypertension) is a chronic disease. Often health care providers dont know what causes it. But it can be caused by certain health conditions and medicines.  If you have high blood pressure, you may not have any symptoms. If you do have symptoms, they may include headache, dizziness, changes in your vision, chest pain, and shortness of breath. But even without symptoms, high blood pressure thats not treated raises your risk for heart attack and stroke. High blood pressure is a serious health risk and shouldnt be ignored.  A blood pressure reading is made up of two numbers: a higher number over a lower number. The top number is the systolic pressure. The bottom number is the diastolic pressure. A normal blood pressure is less than 120 over less than 80.  High blood pressure is when either the top number is 140 or higher, or the bottom number is 90 or higher. This must be the result when taking your blood pressure a number of times. The blood pressures between normal and high are called prehypertension.  Home care  If you have high blood pressure, you should do what is listed below to lower your blood pressure. If you are taking medicines for high blood pressure, these methods may reduce or end your need for medicines in the future.  · Begin a weight-loss program if you are overweight.  · Cut back on how much salt you get in your diet. Heres how to do this:  ¨ Dont eat foods that have a lot of salt. These include olives, pickles, smoked meats, and salted potato chips.  ¨ Dont  add salt to your food at the table.  ¨ Use only small amounts of salt when cooking.  · Begin an exercise program. Talk with your health care provider about the type of exercise program that would be best for you. It doesn't have to be hard. Even brisk walking for 20 minutes 3 times a week is a good form of exercise.  · Dont take medicines that have heart stimulants. This includes many cold and sinus decongestant pills and sprays, as well as diet pills. Check the warnings about hypertension on the label. Stimulants such as amphetamine or cocaine could be lethal for someone with high blood pressure. Never take these.  · Limit how much caffeine you get in your diet. Switch to caffeine-free products.  · Stop smoking. If you are a long-time smoker, this can be hard. Enroll in a stop-smoking program to make it more likely that you will quit for good.  · Learn how to handle stress. This is an important part of any program to lower blood pressure. Learn about relaxation methods like meditation, yoga, or biofeedback.  · If your provider prescribed medicines, take them exactly as directed. Missing doses may cause your blood pressure get out of control.  · Consider buying an automatic blood pressure machine. You can get one of these at most pharmacies. Use this to watch your blood pressure at home. Give the results to your provider.  Follow-up care  You will need to make regular visits to your health care provider. This is to check your blood pressure and to make changes to your medicines. Make a follow-up appointment as directed.  When to seek medical advice  Call your health care provider right away if any of these occur:  · Chest pain or shortness of breath  · Severe headache  · Throbbing or rushing sound in the ears  · Nosebleed  · Sudden severe pain in your belly (abdomen)  · Extreme drowsiness, confusion, or fainting  · Dizziness or dizziness with a spinning sensation (vertigo)  · Weakness of an arm or leg or one side  of the face  · You have problems speaking or seeing   Date Last Reviewed: 11/25/2014  © 5302-3595 [employer]. [address] [place] [address], [place] [address]. All rights reserved. This information is not intended as a substitute for professional medical care. Always follow your healthcare professional's instructions.

## 2017-03-10 NOTE — PROGRESS NOTES
"Subjective:       Patient ID: Arron Ron is a 46 y.o. female.    Chief Complaint: Follow-up      HPI:  Patient is known to me and presents for follow up HTN, back pain and depression.    HTN: follows with Dr. Reyes. Was on losartan and bystolic. BP was elevated and added alcatone at last visit. Home /70s-140s/90s. Denies side effects. She does note she is still swelling in her legs b/l.      Depression. Started remeron at last visit for mood and complaints of insomnia. She started taking it at last visit. She does find it helps her sleep better. Pain sometimes wakes her up.     Low back pain: She has failed NSAIDs, tramadol and muscle relaxers. Ordered MRI L-spine which showed:     "Transitional anatomy at the lumbosacral junction with lumbarization of the first sacral vertebra.  Please see sagittal T2 weighted images for vertebral body labeling.  It surgical intervention is contemplated, correlation with fluoroscopy is recommended.    Broad-based posterior disc bulge with facet arthropathy at L5-S1 contributing to mild bilateral neural foraminal narrowing, worse on the left.    Edema-like signal about the posterior elements bilaterally at L5 which can be a source of pain.  This is more prominent on the left."     She is having burning and tingling in b/l feet. Worse with walking. We started gabapentin at last visit, not helping very much.       Past Medical History:   Diagnosis Date    Anemia     Hyperlipidemia     Hypertension     Sleep apnea        Family History   Problem Relation Age of Onset    Hypertension Mother     Heart attack Mother     Hypertension Brother     Heart disease Maternal Grandmother     Hypertension Maternal Grandmother     Diabetes Maternal Aunt     Breast cancer Neg Hx     Colon cancer Neg Hx     Ovarian cancer Neg Hx        Social History     Social History    Marital status: Single     Spouse name: N/A    Number of children: N/A    Years of education: N/A "     Occupational History    Not on file.     Social History Main Topics    Smoking status: Never Smoker    Smokeless tobacco: Never Used    Alcohol use No    Drug use: No    Sexual activity: Yes     Partners: Male     Birth control/ protection: Surgical, See Surgical Hx      Comment: --BTL     Other Topics Concern    Not on file     Social History Narrative       Review of Systems   Constitutional: Negative for activity change, fatigue, fever and unexpected weight change.   HENT: Negative for congestion, ear pain, hearing loss, rhinorrhea and sore throat.    Eyes: Negative for pain, redness and visual disturbance.   Respiratory: Negative for cough, shortness of breath and wheezing.    Cardiovascular: Negative for chest pain, palpitations and leg swelling.   Gastrointestinal: Negative for abdominal pain, constipation, diarrhea, nausea and vomiting.   Genitourinary: Negative for dysuria, frequency and urgency.   Musculoskeletal: Positive for back pain. Negative for joint swelling and neck pain.   Skin: Positive for rash. Negative for color change and wound.   Neurological: Positive for numbness. Negative for dizziness, tremors, weakness, light-headedness and headaches.         Objective:      Physical Exam   Constitutional: She is oriented to person, place, and time. She appears well-developed and well-nourished. No distress.   HENT:   Head: Normocephalic and atraumatic.   Right Ear: External ear normal.   Left Ear: External ear normal.   Eyes: Conjunctivae and EOM are normal. Pupils are equal, round, and reactive to light. Right eye exhibits no discharge. Left eye exhibits no discharge.   Neck: Neck supple. No tracheal deviation present.   Cardiovascular: Normal rate and regular rhythm.  Exam reveals no gallop and no friction rub.    No murmur heard.  Pulmonary/Chest: Effort normal and breath sounds normal. No respiratory distress. She has no wheezes. She has no rales.   Abdominal: Soft. Bowel sounds  are normal. She exhibits no distension. There is no tenderness.   Musculoskeletal: She exhibits edema (1+ b/l edema).   Neurological: She is alert and oriented to person, place, and time. No cranial nerve deficit.   Skin: Skin is warm and dry. Rash (scan amcular papuler rash on b/l LE) noted.   Psychiatric: She has a normal mood and affect. Her behavior is normal.   Vitals reviewed.      Assessment:       1. Essential hypertension    2. Chronic bilateral low back pain without sciatica    3. Mild single current episode of major depressive disorder    4. Bilateral edema of lower extremity    5. Dermatitis        Plan:       Arron was seen today for follow-up.    Diagnoses and all orders for this visit:    Essential hypertension  -     spironolactone (ALDACTONE) 50 MG tablet; Take 1 tablet (50 mg total) by mouth once daily.  Uncontrolled  Increase aldactone 25 to 50mg daily  Continue motoprolol and valsartan and toresemide  Low Na diet  Exercise, weight loss    Chronic bilateral low back pain without sciatica  -     gabapentin (NEURONTIN) 600 MG tablet; Take 1 tablet (600 mg total) by mouth 3 (three) times daily.  -     Ambulatory Referral to Orthopedics  Uncontrolled  Increase dose gabapentin  See if ortho has anything to offer  Consider pain management  Has failed PT    Mild single current episode of major depressive disorder  On remeron  Working well, mood controlled  sleepiong much better    Bilateral edema of lower extremity  -     spironolactone (ALDACTONE) 50 MG tablet; Take 1 tablet (50 mg total) by mouth once daily.  Uncontrolled  Dose increased  Sees cardiology, no CHF that we know of, likely venous insufficiency  Will get records to ensure    Dermatitis  -     triamcinolone acetonide 0.1% (KENALOG) 0.1 % cream; Apply topically 2 (two) times daily.  New problem  Call if not getting better       Health Maintenance:  -CRC: not due  -PAP/MMG: follows Dr. Caputo; MMG 12/16 BR 1  -Bone Density: not odue  -tobacco:  never smoker  -Vaccines   Flu 1/2017    RTC 4 weeks for HTN and PNR

## 2017-03-10 NOTE — MR AVS SNAPSHOT
[facility]  [address] [facility] [address]  Phone: [phone]  Fax: [phone]                  Arron Ron   3/10/2017 1:00 PM   Office Visit    Description:  Female : 1970   Provider:  Mahi Rojas MD   Department:  [facility]           Reason for Visit     Follow-up           Diagnoses this Visit        Comments    Essential hypertension    -  Primary     Chronic bilateral low back pain without sciatica         Mild single current episode of major depressive disorder         Bilateral edema of lower extremity         Dermatitis                To Do List           Future Appointments        Provider Department Dept Phone    2017 1:00 PM Mahi Rojas MD [facility] [phone]      Goals (5 Years of Data)     None      Follow-Up and Disposition     Return in about 4 weeks (around 2017).       These Medications        Disp Refills Start End    spironolactone (ALDACTONE) 50 MG tablet 30 tablet 11 3/10/2017 3/10/2018    Take 1 tablet (50 mg total) by mouth once daily. - Oral    Pharmacy: [facility]/pharmacy #[id number] - [place] - [address] AT [facility] Ph #: [phone]       gabapentin (NEURONTIN) 600 MG tablet 90 tablet 11 3/10/2017 3/10/2018    Take 1 tablet (600 mg total) by mouth 3 (three) times daily. - Oral    Pharmacy: [facility]/pharmacy #[id number] - [place] - [address] AT [facility] Ph #: [phone]       triamcinolone acetonide 0.1% (KENALOG) 0.1 % cream 45 g 1 3/10/2017 3/20/2017    Apply topically 2 (two) times daily. - Topical (Top)    Pharmacy: [facility]/pharmacy #[id number] - [place] - [address] AT [facility] Ph #: [phone]         [facility]     [facility] On Call Nurse Care [facility] -  Assistance  Registered nurses in the [facility] provide clinical advisement, health education, appointment booking, and other advisory services.  Call for this free  service at [phone].             Medications           Message regarding Medications     Verify the changes and/or additions to your medication regime listed below are the same as discussed with your clinician today.  If any of these changes or additions are incorrect, please notify your healthcare provider.        START taking these NEW medications        Refills    spironolactone (ALDACTONE) 50 MG tablet 11    Sig: Take 1 tablet (50 mg total) by mouth once daily.    Class: Normal    Route: Oral    gabapentin (NEURONTIN) 600 MG tablet 11    Sig: Take 1 tablet (600 mg total) by mouth 3 (three) times daily.    Class: Normal    Route: Oral    triamcinolone acetonide 0.1% (KENALOG) 0.1 % cream 1    Sig: Apply topically 2 (two) times daily.    Class: Normal    Route: Topical (Top)      STOP taking these medications     gabapentin (NEURONTIN) 300 MG capsule Take 1 capsule (300 mg total) by mouth 3 (three) times daily.           Verify that the below list of medications is an accurate representation of the medications you are currently taking.  If none reported, the list may be blank. If incorrect, please contact your healthcare provider. Carry this list with you in case of emergency.           Current Medications     aspirin (ECOTRIN) 81 MG EC tablet Take 81 mg by mouth once daily.    atorvastatin (LIPITOR) 10 MG tablet Take 10 mg by mouth once daily.    cyclobenzaprine (FLEXERIL) 10 MG tablet Take 10 mg by mouth 2 (two) times daily.    levocetirizine (XYZAL) 5 MG tablet     metoprolol succinate (TOPROL-XL) 50 MG 24 hr tablet Take 50 mg by mouth once daily.    mirtazapine (REMERON) 7.5 MG Tab Take 1 tablet (7.5 mg total) by mouth every evening.    omeprazole (PRILOSEC) 40 MG capsule Take 40 mg by mouth once daily.    torsemide (DEMADEX) 20 MG Tab Take 20 mg by mouth 2 (two) times daily.    valsartan (DIOVAN) 320 MG tablet Take 320 mg by mouth once daily.    gabapentin (NEURONTIN) 600 MG tablet Take 1 tablet (600 mg  "total) by mouth 3 (three) times daily.    spironolactone (ALDACTONE) 50 MG tablet Take 1 tablet (50 mg total) by mouth once daily.    triamcinolone acetonide 0.1% (KENALOG) 0.1 % cream Apply topically 2 (two) times daily.           Clinical Reference Information           Your Vitals Were     BP Pulse Resp Height Weight Last Period    128/94 80 18 5' 6" (1.676 m) 135.4 kg (298 lb 8.1 oz) 09/21/2015    BMI                48.18 kg/m2          Blood Pressure          Most Recent Value    BP  (!)  128/94      Allergies as of 3/10/2017     No Known Allergies      Immunizations Administered on Date of Encounter - 3/10/2017     None      Orders Placed During Today's Visit      Normal Orders This Visit    Ambulatory Referral to Orthopedics       Instructions      Exercises to Strengthen Your Lower Back  Strong lower back and abdominal muscles work together to support your spine. The exercises below will help strengthen the lower back. It is important that you begin exercising slowly and increase levels gradually.  Always begin any exercise program with stretching. If you feel pain while doing any of these exercises, stop and talk to your doctor about a more specific exercise program that better suits your condition.   Low back stretch  The point of stretching is to make you more flexible and increase your range of motion. Stretch only as much as you are able. Stretch slowly. Do not push your stretch to the limit. If at any point you feel pain while stretching, this is your (temporary) limit.  · Lie on your back with your knees bent and both feet on the ground.  · Slowly raise your left knee to your chest as you flatten your lower back against the floor. Hold for 5 seconds.  · Relax and repeat the exercise with your right knee.  · Do 10 of these exercises for each leg.  · Repeat hugging both knees to your chest at the same time.  Building lower back strength  Start your exercise routine with 10 to 30 minutes a day, 1 to 3 " times a day.  Initial exercises  Lying on your back:  1. Ankle pumps: Move your foot up and down, towards your head, and then away. Repeat 10 times with each foot.  2. Heel slides: Slowly bend your knee, drawing the heel of your foot towards you. Then slide your heel/foot from you, straightening your knee. Do not lift your foot off the floor (this is not a leg lift).  3. Abdominal contraction: Bend your knees and put your hands on your stomach. Tighten your stomach muscles. Hold for 5 seconds, then relax. Repeat 10 times.  4. Straight leg raise: Bend one leg at the knee and keep the other leg straight. Tighten your stomach muscles. Slowly lift your straight leg 6 to 12 inches off the floor and hold for up to 5 seconds. Repeat 10 times on each side.  Standin. Wall squats: Stand with your back against the wall. Move your feet about 12 inches away from the wall. Tighten your stomach muscles, and slowly bend your knees until they are at about a 45 degree angle. Do not go down too far. Hold about 5 seconds. Then slowly return to your starting position. Repeat 10 times.  2. Heel raises: Stand facing the wall. Slowly raise the heels of your feet up and down, while keeping your toes on the floor. If you have trouble balancing, you can touch the wall with your hands. Repeat 10 times.  More advanced exercises  When you feel comfortable enough, try these exercises.  1. Kneeling lumbar extension: Begin on your hands and knees. At the same time, raise and straighten your right arm and left leg until they are parallel to the ground. Hold for 2 seconds and come back slowly to a starting position. Repeat with left arm and right leg, alternating 10 times.  2. Prone lumbar extension: Lie face down, arms extended overhead, palms on the floor. At the same time, raise your right arm and left leg as high as comfortably possible. Hold for 10 seconds and slowly return to start. Repeat with left arm and right leg, alternating 10 times.  Gradually build up to 20 times. (Advanced: Repeat this exercise raising both arms and both legs a few inches off the floor at the same time. Hold for 5 seconds and release.)  3. Pelvic tilt: Lie on the floor on your back with your knees bent at 90 degrees. Your feet should be flat on the floor. Inhale, exhale, then slowly contract your abdominal muscles bringing your navel toward your spine. Let your pelvis rock back until your lower back is flat on the floor. Hold for 10 seconds while breathing smoothly.  4. Abdominal crunch: Perform a pelvic tilt (above) flattening your lower back against the floor. Holding the tension in your abdominal muscles, take another breath and raise your shoulder blades off the ground (this is not a full sit-up). Keep your head in line with your body (dont bend your neck forward). Hold for 2 seconds, then slowly lower.  Date Last Reviewed: 6/1/2016 © 2000-2016 [employer]. [address] [place] [address], [place] [address]. All rights reserved. This information is not intended as a substitute for professional medical care. Always follow your healthcare professional's instructions.        Established High Blood Pressure    High blood pressure (hypertension) is a chronic disease. Often health care providers dont know what causes it. But it can be caused by certain health conditions and medicines.  If you have high blood pressure, you may not have any symptoms. If you do have symptoms, they may include headache, dizziness, changes in your vision, chest pain, and shortness of breath. But even without symptoms, high blood pressure thats not treated raises your risk for heart attack and stroke. High blood pressure is a serious health risk and shouldnt be ignored.  A blood pressure reading is made up of two numbers: a higher number over a lower number. The top number is the systolic pressure. The bottom number is the diastolic pressure. A normal blood pressure is less than 120 over  less than 80.  High blood pressure is when either the top number is 140 or higher, or the bottom number is 90 or higher. This must be the result when taking your blood pressure a number of times. The blood pressures between normal and high are called prehypertension.  Home care  If you have high blood pressure, you should do what is listed below to lower your blood pressure. If you are taking medicines for high blood pressure, these methods may reduce or end your need for medicines in the future.  · Begin a weight-loss program if you are overweight.  · Cut back on how much salt you get in your diet. Heres how to do this:  ¨ Dont eat foods that have a lot of salt. These include olives, pickles, smoked meats, and salted potato chips.  ¨ Dont add salt to your food at the table.  ¨ Use only small amounts of salt when cooking.  · Begin an exercise program. Talk with your health care provider about the type of exercise program that would be best for you. It doesn't have to be hard. Even brisk walking for 20 minutes 3 times a week is a good form of exercise.  · Dont take medicines that have heart stimulants. This includes many cold and sinus decongestant pills and sprays, as well as diet pills. Check the warnings about hypertension on the label. Stimulants such as amphetamine or cocaine could be lethal for someone with high blood pressure. Never take these.  · Limit how much caffeine you get in your diet. Switch to caffeine-free products.  · Stop smoking. If you are a long-time smoker, this can be hard. Enroll in a stop-smoking program to make it more likely that you will quit for good.  · Learn how to handle stress. This is an important part of any program to lower blood pressure. Learn about relaxation methods like meditation, yoga, or biofeedback.  · If your provider prescribed medicines, take them exactly as directed. Missing doses may cause your blood pressure get out of control.  · Consider buying an automatic  blood pressure machine. You can get one of these at most pharmacies. Use this to watch your blood pressure at home. Give the results to your provider.  Follow-up care  You will need to make regular visits to your health care provider. This is to check your blood pressure and to make changes to your medicines. Make a follow-up appointment as directed.  When to seek medical advice  Call your health care provider right away if any of these occur:  · Chest pain or shortness of breath  · Severe headache  · Throbbing or rushing sound in the ears  · Nosebleed  · Sudden severe pain in your belly (abdomen)  · Extreme drowsiness, confusion, or fainting  · Dizziness or dizziness with a spinning sensation (vertigo)  · Weakness of an arm or leg or one side of the face  · You have problems speaking or seeing   Date Last Reviewed: 11/25/2014  © 3738-9022 [employer]. [address] [place] [address]. All rights reserved. This information is not intended as a substitute for professional medical care. Always follow your healthcare professional's instructions.             Language Assistance Services     ATTENTION: Language assistance services are available, free of charge. Please call [phone].      ATENCIÓN: Si habla español, tiene a king disposición servicios gratuitos de asistencia lingüística. Llbrent al [phone].     KAITLYNN Ý: N?u b?n nói Ti?ng Vi?t, có các d?ch v? h? tr? ngôn ng? mi?n phí dành cho b?n. G?i s? [phone].         [facility] complies with applicable Federal civil rights laws and does not discriminate on the basis of race, color, national origin, age, disability, or sex.

## 2017-03-15 ENCOUNTER — HOSPITAL ENCOUNTER (OUTPATIENT)
Dept: RADIOLOGY | Facility: HOSPITAL | Age: 47
Discharge: HOME | End: 2017-03-15
Attending: ORTHOPAEDIC SURGERY
Payer: COMMERCIAL

## 2017-03-15 DIAGNOSIS — M54.50 LOW BACK PAIN: ICD-10-CM

## 2017-03-15 PROCEDURE — 72110 X-RAY EXAM L-2 SPINE 4/>VWS: CPT | Mod: TC

## 2017-03-15 PROCEDURE — 72110 X-RAY EXAM L-2 SPINE 4/>VWS: CPT | Mod: 26,,, | Performed by: RADIOLOGY

## 2017-03-15 PROCEDURE — 72170 X-RAY EXAM OF PELVIS: CPT | Mod: 26,,, | Performed by: RADIOLOGY

## 2017-03-15 PROCEDURE — 72170 X-RAY EXAM OF PELVIS: CPT | Mod: TC

## 2017-07-14 PROBLEM — R11.2 VOMITING WITH NAUSEA, NOT INTRACTABLE: Status: ACTIVE | Noted: 2017-07-14

## 2017-07-24 PROBLEM — R11.11 VOMITING WITHOUT NAUSEA: Status: ACTIVE | Noted: 2017-07-24

## 2017-07-25 ENCOUNTER — OFFICE VISIT (OUTPATIENT)
Dept: INTERNAL MEDICINE | Facility: CLINIC | Age: 47
End: 2017-07-25
Payer: COMMERCIAL

## 2017-07-25 VITALS
DIASTOLIC BLOOD PRESSURE: 102 MMHG | BODY MASS INDEX: 47.09 KG/M2 | HEIGHT: 66 IN | WEIGHT: 293 LBS | HEART RATE: 86 BPM | RESPIRATION RATE: 18 BRPM | SYSTOLIC BLOOD PRESSURE: 152 MMHG

## 2017-07-25 DIAGNOSIS — Z76.0 MEDICATION REFILL: ICD-10-CM

## 2017-07-25 DIAGNOSIS — I10 ESSENTIAL HYPERTENSION: Primary | ICD-10-CM

## 2017-07-25 DIAGNOSIS — G89.29 CHRONIC BILATERAL LOW BACK PAIN WITHOUT SCIATICA: ICD-10-CM

## 2017-07-25 DIAGNOSIS — M54.50 CHRONIC BILATERAL LOW BACK PAIN WITHOUT SCIATICA: ICD-10-CM

## 2017-07-25 DIAGNOSIS — R11.11 VOMITING WITHOUT NAUSEA, INTRACTABILITY OF VOMITING NOT SPECIFIED, UNSPECIFIED VOMITING TYPE: ICD-10-CM

## 2017-07-25 PROCEDURE — 99999 PR PBB SHADOW E&M-EST. PATIENT-LVL III: CPT | Mod: PBBFAC,,, | Performed by: INTERNAL MEDICINE

## 2017-07-25 PROCEDURE — 99214 OFFICE O/P EST MOD 30 MIN: CPT | Mod: S$GLB,,, | Performed by: INTERNAL MEDICINE

## 2017-07-25 RX ORDER — PANTOPRAZOLE SODIUM 40 MG/1
40 TABLET, DELAYED RELEASE ORAL DAILY
Qty: 30 TABLET | Refills: 11 | Status: SHIPPED | OUTPATIENT
Start: 2017-07-25 | End: 2018-01-23

## 2017-07-25 RX ORDER — CYCLOBENZAPRINE HCL 10 MG
10 TABLET ORAL 2 TIMES DAILY
Qty: 90 TABLET | Refills: 2 | Status: SHIPPED | OUTPATIENT
Start: 2017-07-25 | End: 2019-09-05

## 2017-07-25 RX ORDER — METOPROLOL SUCCINATE 50 MG/1
50 TABLET, EXTENDED RELEASE ORAL DAILY
Qty: 30 TABLET | Refills: 5 | Status: SHIPPED | OUTPATIENT
Start: 2017-07-25 | End: 2018-02-19

## 2017-07-25 RX ORDER — VALSARTAN 320 MG/1
320 TABLET ORAL DAILY
Qty: 30 TABLET | Refills: 5 | Status: SHIPPED | OUTPATIENT
Start: 2017-07-25 | End: 2018-02-28 | Stop reason: SDUPTHER

## 2017-07-25 RX ORDER — ONDANSETRON 4 MG/1
4 TABLET, ORALLY DISINTEGRATING ORAL EVERY 8 HOURS PRN
Qty: 30 TABLET | Refills: 0 | Status: SHIPPED | OUTPATIENT
Start: 2017-07-25 | End: 2018-01-23

## 2017-07-25 NOTE — PROGRESS NOTES
"Subjective:       Patient ID: Arron Ron is a 46 y.o. female.    Chief Complaint: Follow-up      HPI:  Patient is known to me and presents for HTN follow up.     Was on aldactone, valsartan and bystolic previously. Dr. Reyes manages most of her BP meds. She is not taking naything right now due to persistent nausea nad vomiting. She has apparently had this for quite some time but never told me about it. Told Dr. Reyes who started prilosec in the past but she can't afford right now. Yesterday she had EGD for persistent vomiting. Was found to have "hernia"--is suspect hiatal hernia but I don't see results just yet. Pathology is pending.     She is taking her torsemide for her swelling and taking her atorvastatin. Otherwise she is not taking any of her medications. BP is elevated today in clinic.         Past Medical History:   Diagnosis Date    Acid reflux     Anemia     Back pain     was under therapy    Constipation     Elevated cholesterol     Encounter for blood transfusion 1987    Endometriosis     Hyperlipidemia     Hypertension     Nausea & vomiting     Sleep apnea        Family History   Problem Relation Age of Onset    Hypertension Mother     Heart attack Mother     Hypertension Brother     Heart disease Maternal Grandmother     Hypertension Maternal Grandmother     Diabetes Maternal Aunt     Breast cancer Neg Hx     Colon cancer Neg Hx     Ovarian cancer Neg Hx        Social History     Social History    Marital status: Single     Spouse name: N/A    Number of children: N/A    Years of education: N/A     Occupational History    Not on file.     Social History Main Topics    Smoking status: Never Smoker    Smokeless tobacco: Never Used    Alcohol use Yes      Comment: rare    Drug use: No    Sexual activity: Yes     Partners: Male     Birth control/ protection: Surgical, See Surgical Hx      Comment: --BTL     Other Topics Concern    Not on file     Social History " Narrative    No narrative on file       Review of Systems   Constitutional: Positive for fatigue. Negative for activity change, fever and unexpected weight change.   HENT: Negative for congestion, ear pain, hearing loss, rhinorrhea and sore throat.    Eyes: Negative for pain, redness and visual disturbance.   Respiratory: Negative for cough, shortness of breath and wheezing.    Cardiovascular: Negative for chest pain, palpitations and leg swelling.   Gastrointestinal: Positive for nausea and vomiting. Negative for abdominal pain, constipation and diarrhea.   Genitourinary: Negative for dysuria, frequency and urgency.   Musculoskeletal: Negative for back pain, joint swelling and neck pain.   Skin: Negative for color change, rash and wound.   Neurological: Negative for dizziness, tremors, weakness, light-headedness and headaches.         Objective:      Physical Exam   Constitutional: She is oriented to person, place, and time. She appears well-developed and well-nourished. No distress.   HENT:   Head: Normocephalic and atraumatic.   Right Ear: External ear normal.   Left Ear: External ear normal.   Eyes: Conjunctivae and EOM are normal. Pupils are equal, round, and reactive to light.   Neck: Neck supple. No tracheal deviation present.   Cardiovascular: Normal rate and regular rhythm.  Exam reveals no gallop and no friction rub.    No murmur heard.  Pulmonary/Chest: Effort normal and breath sounds normal. No respiratory distress. She has no wheezes. She has no rales.   Abdominal: Soft. Bowel sounds are normal. She exhibits no distension. There is no tenderness.   Neurological: She is alert and oriented to person, place, and time. No cranial nerve deficit.   Skin: Skin is warm and dry.   Psychiatric: She has a normal mood and affect. Her behavior is normal.   Vitals reviewed.      Assessment:       1. Essential hypertension    2. Vomiting without nausea, intractability of vomiting not specified, unspecified vomiting  type    3. Chronic bilateral low back pain without sciatica    4. Medication refill        Plan:       Arron was seen today for follow-up.    Diagnoses and all orders for this visit:    Essential hypertension  -     valsartan (DIOVAN) 320 MG tablet; Take 1 tablet (320 mg total) by mouth once daily.  -     metoprolol succinate (TOPROL-XL) 50 MG 24 hr tablet; Take 1 tablet (50 mg total) by mouth once daily.  Chronic uncontrolled  Right now not able to keep down her meds  Discussed I'd like to control her sx and restart her PO  meds if possible first. If not, can start clonidine patch to get BP down while working on the vomiting issue  Restart valsartan and metoprolol for now  Cont torsemide  Hold aldactone for now and will see me in 4 weeks for BP check  Check BP at home and keep log    Vomiting without nausea, intractability of vomiting not specified, unspecified vomiting type  -     pantoprazole (PROTONIX) 40 MG tablet; Take 1 tablet (40 mg total) by mouth once daily.  -     ondansetron (ZOFRAN-ODT) 4 MG TbDL; Take 1 tablet (4 mg total) by mouth every 8 (eight) hours as needed.  New problem  Sounds like could be GERD with regurgitation  S/p EGD yesterday, full results not yet available in EPIC. Will follow up  Restart PPI as had success with this in the past  Restart PO BP meds    Chronic bilateral low back pain without sciatica  -     cyclobenzaprine (FLEXERIL) 10 MG tablet; Take 1 tablet (10 mg total) by mouth 2 (two) times daily.  Chronic, controlled  Cont meds at same dose    Medication refill  -     valsartan (DIOVAN) 320 MG tablet; Take 1 tablet (320 mg total) by mouth once daily.  -     metoprolol succinate (TOPROL-XL) 50 MG 24 hr tablet; Take 1 tablet (50 mg total) by mouth once daily.  -     cyclobenzaprine (FLEXERIL) 10 MG tablet; Take 1 tablet (10 mg total) by mouth 2 (two) times daily.         Health Maintenance:  -CRC: not due  -PAP/MMG: follows Dr. Caputo; MMG 12/16 BR 1  -Bone Density: not  odue  -tobacco: never smoker  -Vaccines   Flu 1/2017    RTC 4 weeks for follow up BP. Call if can't control vomiting and restart BP meds

## 2017-07-25 NOTE — PATIENT INSTRUCTIONS
Taking Your Blood Pressure  Blood pressure is the force of blood against the artery wall as it moves from the heart through the blood vessels. You can take your own blood pressure reading using a digital monitor. Take readings as often as your healthcare provider instructs. Take each reading at the same time of day.  Step 1. Relax    · Take your blood pressure at the same time every day, such as in the morning or evening, or at the time your healthcare provider recommends.  · Wait at least a half-hour after smoking, eating, drinking caffeinated beverages, or exercising.  · Sit comfortably at a table with both feet on the floor. Do not cross your legs or feet. Place the monitor near you.  · Rest for a few minutes before you begin.  Step 2. Wrap the cuff    · Place your arm on the table, palm up. Your arm should be at the level of your heart. Wrap the cuff around your upper arm, just above your elbow. Its best done on bare skin, not over clothing. Most cuffs will indicate where the brachial artery (the blood vessel in the middle of the arm at the inner side of the elbow) should line up with the cuff. Look in your monitor's instruction booklet for an illustration. You can also bring your cuff to your healthcare provider and have them show you how to correctly place the cuff.  · Make sure your cuff fits. If it doesnt wrap around your upper arm, order a larger cuff.  Step 3. Inflate the cuff    · Pump the cuff until the scale reads 160. If you have a self-inflating cuff, push the button that starts the pump.  · The cuff will tighten, then loosen.  · The numbers will change. When they stop changing, your blood pressure reading will appear.  · Take 2 or 3 readings one minute apart.  Step 4. Write down the results of each reading    · Write down your blood pressure numbers for each reading. Note the date and time. Keep your results in one place, such as a notebook. Even if your monitor has a built-in memory, keep a hard  copy of the readings.  · Remove the cuff from your arm. Turn off the machine.  · Share your blood pressure records with your healthcare providers at each visit.  Date Last Reviewed: 4/27/2016  © 2433-9520 [employer]. [address] [place] [address], [place] [address]. All rights reserved. This information is not intended as a substitute for professional medical care. Always follow your healthcare professional's instructions.

## 2017-08-22 ENCOUNTER — OFFICE VISIT (OUTPATIENT)
Dept: INTERNAL MEDICINE | Facility: CLINIC | Age: 47
End: 2017-08-22
Payer: COMMERCIAL

## 2017-08-22 VITALS
BODY MASS INDEX: 47.09 KG/M2 | SYSTOLIC BLOOD PRESSURE: 144 MMHG | HEIGHT: 66 IN | DIASTOLIC BLOOD PRESSURE: 94 MMHG | RESPIRATION RATE: 18 BRPM | WEIGHT: 293 LBS | HEART RATE: 75 BPM

## 2017-08-22 DIAGNOSIS — Z23 NEED FOR TETANUS BOOSTER: ICD-10-CM

## 2017-08-22 DIAGNOSIS — K21.9 GASTROESOPHAGEAL REFLUX DISEASE, ESOPHAGITIS PRESENCE NOT SPECIFIED: ICD-10-CM

## 2017-08-22 DIAGNOSIS — I10 ESSENTIAL HYPERTENSION: Primary | ICD-10-CM

## 2017-08-22 DIAGNOSIS — K44.9 HIATAL HERNIA: ICD-10-CM

## 2017-08-22 DIAGNOSIS — G89.29 CHRONIC BILATERAL LOW BACK PAIN WITHOUT SCIATICA: ICD-10-CM

## 2017-08-22 DIAGNOSIS — E66.01 MORBID OBESITY DUE TO EXCESS CALORIES: ICD-10-CM

## 2017-08-22 DIAGNOSIS — M54.50 CHRONIC BILATERAL LOW BACK PAIN WITHOUT SCIATICA: ICD-10-CM

## 2017-08-22 PROBLEM — R11.2 VOMITING WITH NAUSEA, NOT INTRACTABLE: Status: RESOLVED | Noted: 2017-07-14 | Resolved: 2017-08-22

## 2017-08-22 PROBLEM — R11.11 VOMITING WITHOUT NAUSEA: Status: RESOLVED | Noted: 2017-07-24 | Resolved: 2017-08-22

## 2017-08-22 PROCEDURE — 99214 OFFICE O/P EST MOD 30 MIN: CPT | Mod: 25,S$GLB,, | Performed by: INTERNAL MEDICINE

## 2017-08-22 PROCEDURE — 3008F BODY MASS INDEX DOCD: CPT | Mod: S$GLB,,, | Performed by: INTERNAL MEDICINE

## 2017-08-22 PROCEDURE — 3080F DIAST BP >= 90 MM HG: CPT | Mod: S$GLB,,, | Performed by: INTERNAL MEDICINE

## 2017-08-22 PROCEDURE — 90471 IMMUNIZATION ADMIN: CPT | Mod: S$GLB,,, | Performed by: INTERNAL MEDICINE

## 2017-08-22 PROCEDURE — 3077F SYST BP >= 140 MM HG: CPT | Mod: S$GLB,,, | Performed by: INTERNAL MEDICINE

## 2017-08-22 PROCEDURE — 90714 TD VACC NO PRESV 7 YRS+ IM: CPT | Mod: S$GLB,,, | Performed by: INTERNAL MEDICINE

## 2017-08-22 PROCEDURE — 99999 PR PBB SHADOW E&M-EST. PATIENT-LVL III: CPT | Mod: PBBFAC,,, | Performed by: INTERNAL MEDICINE

## 2017-08-22 NOTE — PROGRESS NOTES
Subjective:       Patient ID: Arron Ron is a 46 y.o. female.    Chief Complaint: Follow-up      HPI:  Patient is known to me and presents for HTN follow up. She is taking her valsartan 320mg, aldactone 50mg, torsemide 20mg, metoprolol 50mg daily.   She is back on her protonix for her GERD. Her vomiting is better and she is keeping down her blood pressure pills. Not checking BP regularly at home. Denies chest pains, SOB, LE edema. Had EGD which showed hiatal hernia.     She has been seen by Dr. Herbert for her low back pain. She was suggested to have surgery but declined and did PT instead. It did improve her pain some but pain is returning. She is working on weight loss.     Past Medical History:   Diagnosis Date    Acid reflux     Anemia     Back pain     was under therapy    Constipation     Elevated cholesterol     Encounter for blood transfusion 1987    Endometriosis     Hyperlipidemia     Hypertension     Nausea & vomiting     Sleep apnea        Family History   Problem Relation Age of Onset    Hypertension Mother     Heart attack Mother     Hypertension Brother     Heart disease Maternal Grandmother     Hypertension Maternal Grandmother     Diabetes Maternal Aunt     Breast cancer Neg Hx     Colon cancer Neg Hx     Ovarian cancer Neg Hx        Social History     Social History    Marital status: Single     Spouse name: N/A    Number of children: N/A    Years of education: N/A     Occupational History    Not on file.     Social History Main Topics    Smoking status: Never Smoker    Smokeless tobacco: Never Used    Alcohol use Yes      Comment: rare    Drug use: No    Sexual activity: Yes     Partners: Male     Birth control/ protection: Surgical, See Surgical Hx      Comment: --BTL     Other Topics Concern    Not on file     Social History Narrative    No narrative on file       Review of Systems   Constitutional: Negative for activity change, fatigue, fever and  unexpected weight change.   HENT: Negative for congestion, ear pain, hearing loss, rhinorrhea and sore throat.    Eyes: Negative for pain, redness and visual disturbance.   Respiratory: Negative for cough, shortness of breath and wheezing.    Cardiovascular: Negative for chest pain, palpitations and leg swelling.   Gastrointestinal: Negative for abdominal pain, constipation, diarrhea, nausea and vomiting.   Genitourinary: Negative for dysuria, frequency, pelvic pain and urgency.   Musculoskeletal: Positive for back pain. Negative for joint swelling and neck pain.   Skin: Negative for color change, rash and wound.   Neurological: Negative for dizziness, tremors, weakness, light-headedness and headaches.         Objective:      Physical Exam   Constitutional: She is oriented to person, place, and time. She appears well-developed and well-nourished. No distress.   HENT:   Head: Normocephalic and atraumatic.   Right Ear: External ear normal.   Left Ear: External ear normal.   Eyes: Conjunctivae and EOM are normal. Pupils are equal, round, and reactive to light. Right eye exhibits no discharge. Left eye exhibits no discharge.   Neck: Neck supple. No tracheal deviation present.   Cardiovascular: Normal rate and regular rhythm.  Exam reveals no gallop and no friction rub.    No murmur heard.  Pulmonary/Chest: Effort normal and breath sounds normal. No respiratory distress. She has no wheezes. She has no rales.   Abdominal: Soft. Bowel sounds are normal. She exhibits no distension. There is no tenderness.   Musculoskeletal: She exhibits no deformity.   Neurological: She is alert and oriented to person, place, and time. No cranial nerve deficit.   Skin: Skin is warm and dry.   Psychiatric: She has a normal mood and affect. Her behavior is normal.   Vitals reviewed.      Assessment:       1. Essential hypertension    2. Morbid obesity due to excess calories    3. Gastroesophageal reflux disease, esophagitis presence not  specified    4. Chronic bilateral low back pain without sciatica    5. Need for tetanus booster    6. Hiatal hernia        Plan:       Arron was seen today for follow-up.    Diagnoses and all orders for this visit:    Essential hypertension  -     CBC auto differential; Future  -     Comprehensive metabolic panel; Future  -     TSH; Future  -     Lipid panel; Future  Chronic, better controlled  Cont meds at same dose  Low Na diet  Exercise, weight loss  Check BP and keep log    Morbid obesity due to excess calories  -     CBC auto differential; Future  -     Comprehensive metabolic panel; Future  -     TSH; Future  -     Lipid panel; Future  Diet and exercise discussed    Gastroesophageal reflux disease, esophagitis presence not specified  Chronic controlled  Cont PPI at same dose    Chronic bilateral low back pain without sciatica  Saw Dr. Herbert, recommended surgery but she declines for now  Did PT  Cont meds at same dose  Exercise  Weight loss    Need for tetanus booster  -     (In Office Administered) Td Vaccine - Preservative Free    Hiatal hernia  Chronic controlled  Cont PPI at same dose       Health Maintenance:  -CRC: not due  -PAP/MMG: follows Dr. Caputo; MMG 12/16 BR 1  -Bone Density: not odue  -tobacco: never smoker  -Vaccines   Flu 1/2017    RTC 6 months with labs and PRN

## 2017-11-08 ENCOUNTER — HOSPITAL ENCOUNTER (OUTPATIENT)
Dept: PULMONOLOGY | Facility: HOSPITAL | Age: 47
Discharge: HOME | End: 2017-11-08
Attending: INTERNAL MEDICINE
Payer: COMMERCIAL

## 2017-11-08 ENCOUNTER — HOSPITAL ENCOUNTER (OUTPATIENT)
Dept: RADIOLOGY | Facility: HOSPITAL | Age: 47
Discharge: HOME | End: 2017-11-08
Attending: INTERNAL MEDICINE
Payer: COMMERCIAL

## 2017-11-08 DIAGNOSIS — R06.02 SHORTNESS OF BREATH: Primary | ICD-10-CM

## 2017-11-08 DIAGNOSIS — R06.02 SHORTNESS OF BREATH: ICD-10-CM

## 2017-11-08 PROCEDURE — 71020 XR CHEST PA AND LATERAL: CPT | Mod: TC

## 2017-11-08 PROCEDURE — 94729 DIFFUSING CAPACITY: CPT

## 2017-11-08 PROCEDURE — 94060 EVALUATION OF WHEEZING: CPT

## 2017-11-08 PROCEDURE — 99900031 HC PATIENT EDUCATION (STAT)

## 2017-11-08 PROCEDURE — 71020 XR CHEST PA AND LATERAL: CPT | Mod: 26,,, | Performed by: RADIOLOGY

## 2017-11-08 PROCEDURE — 94727 GAS DIL/WSHOT DETER LNG VOL: CPT

## 2018-01-23 ENCOUNTER — OFFICE VISIT (OUTPATIENT)
Dept: FAMILY MEDICINE | Facility: CLINIC | Age: 48
End: 2018-01-23
Payer: COMMERCIAL

## 2018-01-23 VITALS
BODY MASS INDEX: 47.09 KG/M2 | HEIGHT: 66 IN | RESPIRATION RATE: 18 BRPM | DIASTOLIC BLOOD PRESSURE: 70 MMHG | HEART RATE: 72 BPM | WEIGHT: 293 LBS | SYSTOLIC BLOOD PRESSURE: 120 MMHG

## 2018-01-23 DIAGNOSIS — M17.12 PRIMARY OSTEOARTHRITIS OF LEFT KNEE: ICD-10-CM

## 2018-01-23 DIAGNOSIS — K21.9 GASTROESOPHAGEAL REFLUX DISEASE, ESOPHAGITIS PRESENCE NOT SPECIFIED: Primary | ICD-10-CM

## 2018-01-23 DIAGNOSIS — K44.9 HIATAL HERNIA: ICD-10-CM

## 2018-01-23 PROCEDURE — 99213 OFFICE O/P EST LOW 20 MIN: CPT | Mod: S$GLB,,, | Performed by: FAMILY MEDICINE

## 2018-01-23 PROCEDURE — 99999 PR PBB SHADOW E&M-EST. PATIENT-LVL III: CPT | Mod: PBBFAC,,, | Performed by: FAMILY MEDICINE

## 2018-01-23 RX ORDER — ATORVASTATIN CALCIUM 10 MG/1
TABLET, FILM COATED ORAL
COMMUNITY
Start: 2018-01-12 | End: 2020-01-23

## 2018-01-23 RX ORDER — HYDROCODONE BITARTRATE AND ACETAMINOPHEN 5; 325 MG/1; MG/1
TABLET ORAL
COMMUNITY
Start: 2017-12-28 | End: 2018-08-17

## 2018-01-23 RX ORDER — CIPROFLOXACIN 500 MG/1
TABLET ORAL
COMMUNITY
Start: 2018-01-12 | End: 2018-01-23

## 2018-01-23 RX ORDER — METOCLOPRAMIDE 10 MG/1
10 TABLET ORAL 4 TIMES DAILY
Qty: 60 TABLET | Refills: 2 | Status: SHIPPED | OUTPATIENT
Start: 2018-01-23 | End: 2018-02-19

## 2018-01-23 RX ORDER — PROMETHAZINE HYDROCHLORIDE AND CODEINE PHOSPHATE 6.25; 1 MG/5ML; MG/5ML
SOLUTION ORAL
COMMUNITY
Start: 2017-12-14 | End: 2018-02-19

## 2018-01-23 RX ORDER — AMOXICILLIN 500 MG/1
CAPSULE ORAL
COMMUNITY
Start: 2018-01-03 | End: 2018-01-23

## 2018-01-23 RX ORDER — AMLODIPINE BESYLATE 5 MG/1
TABLET ORAL
COMMUNITY
Start: 2018-01-17 | End: 2018-02-06 | Stop reason: SDUPTHER

## 2018-01-23 RX ORDER — DEXLANSOPRAZOLE 60 MG/1
60 CAPSULE, DELAYED RELEASE ORAL DAILY
Qty: 30 CAPSULE | Refills: 2 | Status: SHIPPED | OUTPATIENT
Start: 2018-01-23 | End: 2018-03-05 | Stop reason: SDUPTHER

## 2018-01-23 RX ORDER — CELECOXIB 200 MG/1
200 CAPSULE ORAL DAILY
Qty: 30 CAPSULE | Refills: 5 | Status: SHIPPED | OUTPATIENT
Start: 2018-01-23 | End: 2018-02-22

## 2018-01-23 RX ORDER — ALPRAZOLAM 2 MG/1
2 TABLET ORAL NIGHTLY
COMMUNITY
Start: 2018-01-15

## 2018-01-23 RX ORDER — METHYLPREDNISOLONE 4 MG/1
TABLET ORAL
COMMUNITY
Start: 2018-01-16 | End: 2018-01-23

## 2018-01-23 RX ORDER — SULFAMETHOXAZOLE AND TRIMETHOPRIM 800; 160 MG/1; MG/1
TABLET ORAL
COMMUNITY
Start: 2017-11-03 | End: 2018-01-23

## 2018-01-23 RX ORDER — AZITHROMYCIN 250 MG/1
TABLET, FILM COATED ORAL
COMMUNITY
Start: 2017-10-31 | End: 2018-01-23

## 2018-01-23 NOTE — PROGRESS NOTES
Subjective:       Patient ID: Arron Ron is a 47 y.o. female.    Chief Complaint: Gastroesophageal Reflux    Patient has a history of GERD, hiatal hernia diagnosed by EGD last year.  She also has obstructive sleep apnea.  She is morbidly obese.  Over the past week she's been complaining of water brash type symptoms.  She is no longer taking her heartburn medication.  Food easily comes back up after she swallows.  She is also complaining of left knee pain.  She is trying to exercise to lose weight.  Patient was seen by Dr. Tello last week for an upper respiratory infection.  She finished a course of azithromycin.      Review of Systems   Constitutional: Negative for activity change, chills, fatigue, fever and unexpected weight change.   HENT: Negative for sore throat and trouble swallowing.    Respiratory: Negative for cough, chest tightness and shortness of breath.    Cardiovascular: Negative for chest pain and leg swelling.   Gastrointestinal: Positive for abdominal pain and vomiting. Negative for constipation and diarrhea.   Endocrine: Negative for cold intolerance and heat intolerance.   Genitourinary: Negative for difficulty urinating.   Musculoskeletal: Positive for arthralgias. Negative for back pain and joint swelling.   Skin: Negative for rash.   Neurological: Negative for numbness.   Hematological: Negative for adenopathy.   Psychiatric/Behavioral: Negative for decreased concentration.       Objective:      Vitals:    01/23/18 1553   BP: 120/70   Pulse: 72   Resp: 18     Physical Exam   Constitutional:   Obese   HENT:   Head: Normocephalic and atraumatic.   Right Ear: Tympanic membrane normal.   Left Ear: Tympanic membrane normal.   Nose: Nose normal. No mucosal edema or rhinorrhea.   Mouth/Throat: Uvula is midline, oropharynx is clear and moist and mucous membranes are normal.   Eyes: EOM are normal. Pupils are equal, round, and reactive to light.   Neck: Normal range of motion. Neck supple.    Cardiovascular: Normal rate, regular rhythm, normal heart sounds and intact distal pulses.  Exam reveals no gallop and no friction rub.    No murmur heard.  Pulmonary/Chest: Effort normal and breath sounds normal.   Abdominal: Bowel sounds are normal. There is tenderness in the epigastric area.   Musculoskeletal: Normal range of motion. She exhibits tenderness. She exhibits no edema.        Left knee: She exhibits normal range of motion, no swelling, no ecchymosis, no deformity, no erythema, normal alignment, no LCL laxity, normal patellar mobility, no bony tenderness, normal meniscus and no MCL laxity. Tenderness found. No medial joint line, no lateral joint line, no MCL, no LCL and no patellar tendon tenderness noted.       Assessment:       1. Gastroesophageal reflux disease, esophagitis presence not specified    2. Primary osteoarthritis of left knee    3. Hiatal hernia        Plan:   Arron was seen today for gastroesophageal reflux.    Diagnoses and all orders for this visit:    Gastroesophageal reflux disease, esophagitis presence not specified  -     celecoxib (CELEBREX) 200 MG capsule; Take 1 capsule (200 mg total) by mouth once daily.    Primary osteoarthritis of left knee  -     dexlansoprazole (DEXILANT) 60 mg capsule; Take 1 capsule (60 mg total) by mouth once daily.    Hiatal hernia  -     dexlansoprazole (DEXILANT) 60 mg capsule; Take 1 capsule (60 mg total) by mouth once daily.  -     metoclopramide HCl (REGLAN) 10 MG tablet; Take 1 tablet (10 mg total) by mouth 4 (four) times daily.    I will try patient on Dexilant and metoclopramide for her GERD and water brash.  Explained the importance of weight loss.  I will try patient on Celebrex for her knee pain.  I'm sure she has osteoarthritis because she so heavy.  I will send her back to her PCP in 1 or 2 weeks to see how these medications are doing.  Appointment made with Dr. Rojas.

## 2018-01-23 NOTE — LETTER
January 23, 2018      Arron Ron   [address] [facility] [address]             [address] [address]  Phone: [phone]  Fax: [phone] Arron Ruthcata Asaf    Was treated here on 01/23/2018    May Return to work/school on 1/24/2018                 Mehdi Smith MD

## 2018-01-24 ENCOUNTER — HOSPITAL ENCOUNTER (EMERGENCY)
Facility: HOSPITAL | Age: 48
Discharge: HOME | End: 2018-01-24
Attending: EMERGENCY MEDICINE
Payer: COMMERCIAL

## 2018-01-24 VITALS
TEMPERATURE: 97 F | DIASTOLIC BLOOD PRESSURE: 74 MMHG | SYSTOLIC BLOOD PRESSURE: 126 MMHG | RESPIRATION RATE: 16 BRPM | HEART RATE: 74 BPM

## 2018-01-24 DIAGNOSIS — Z04.1 MOTOR VEHICLE ACCIDENT WITH NO SIGNIFICANT INJURY: ICD-10-CM
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
## 2018-01-24 PROCEDURE — 99283 EMERGENCY DEPT VISIT LOW MDM: CPT | Mod: 25

## 2018-01-24 PROCEDURE — 96372 THER/PROPH/DIAG INJ SC/IM: CPT

## 2018-01-24 PROCEDURE — 63600175 PHARM REV CODE 636 W HCPCS: Performed by: EMERGENCY MEDICINE

## 2018-01-24 RX ORDER — KETOROLAC TROMETHAMINE 10 MG/1
10 TABLET, FILM COATED ORAL EVERY 6 HOURS PRN
Qty: 30 TABLET | Refills: 2 | Status: SHIPPED | OUTPATIENT
Start: 2018-01-24 | End: 2018-06-11

## 2018-01-24 RX ORDER — KETOROLAC TROMETHAMINE 30 MG/ML
30 INJECTION, SOLUTION INTRAMUSCULAR; INTRAVENOUS
Status: COMPLETED | OUTPATIENT
Start: 2018-01-24 | End: 2018-01-24

## 2018-01-24 RX ADMIN — KETOROLAC TROMETHAMINE 30 MG: 30 INJECTION, SOLUTION INTRAMUSCULAR at 10:01
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
## 2018-02-06 ENCOUNTER — OFFICE VISIT (OUTPATIENT)
Dept: INTERNAL MEDICINE | Facility: CLINIC | Age: 48
End: 2018-02-06
Payer: COMMERCIAL

## 2018-02-06 VITALS
SYSTOLIC BLOOD PRESSURE: 146 MMHG | DIASTOLIC BLOOD PRESSURE: 90 MMHG | WEIGHT: 293 LBS | HEIGHT: 66 IN | BODY MASS INDEX: 47.09 KG/M2 | HEART RATE: 69 BPM | RESPIRATION RATE: 18 BRPM

## 2018-02-06 DIAGNOSIS — K44.9 HIATAL HERNIA: ICD-10-CM

## 2018-02-06 DIAGNOSIS — I10 ESSENTIAL HYPERTENSION: ICD-10-CM

## 2018-02-06 DIAGNOSIS — K21.9 GASTROESOPHAGEAL REFLUX DISEASE, ESOPHAGITIS PRESENCE NOT SPECIFIED: Primary | ICD-10-CM
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
## 2018-02-06 PROCEDURE — 3008F BODY MASS INDEX DOCD: CPT | Mod: S$GLB,,, | Performed by: INTERNAL MEDICINE

## 2018-02-06 PROCEDURE — 99214 OFFICE O/P EST MOD 30 MIN: CPT | Mod: S$GLB,,, | Performed by: INTERNAL MEDICINE

## 2018-02-06 PROCEDURE — 99999 PR PBB SHADOW E&M-EST. PATIENT-LVL III: CPT | Mod: PBBFAC,,, | Performed by: INTERNAL MEDICINE

## 2018-02-06 RX ORDER — TRIAMCINOLONE ACETONIDE 1 MG/G
CREAM TOPICAL 2 TIMES DAILY
Qty: 45 G | Refills: 1 | Status: SHIPPED | OUTPATIENT
Start: 2018-02-06 | End: 2018-06-11 | Stop reason: ALTCHOICE

## 2018-02-06 RX ORDER — AMLODIPINE BESYLATE 5 MG/1
10 TABLET ORAL DAILY
Qty: 60 TABLET | Refills: 1 | Status: SHIPPED | OUTPATIENT
Start: 2018-02-06 | End: 2018-02-06 | Stop reason: SDUPTHER

## 2018-02-06 RX ORDER — AMLODIPINE BESYLATE 5 MG/1
10 TABLET ORAL DAILY
Qty: 60 TABLET | Refills: 1 | Status: SHIPPED | OUTPATIENT
Start: 2018-02-06 | End: 2018-07-25 | Stop reason: ALTCHOICE

## 2018-02-06 NOTE — PROGRESS NOTES
Subjective:       Patient ID: Arron Ron is a 47 y.o. female.    Chief Complaint: Follow-up      HPI:  Patient is known to me and presents for follow up GERD and HTN. She saw Dr. Harding for reflux sx, she had stopped taking her Prilosec. She was started on dexilant and reglan. She reports significant improvement in her sx. She is not taking reglan daily. Denies abd pain, n/v/d/c, frequent belching.     She reports elevated BP for several weeks. As high as 200 systolic. Never notified me but was sent home from work. She is taking valsartan, metoprolol, amlodipine and torsemide. Unsure if she is taking the aldactone. Her SBP is now down to 140s more consistently but having intermittent headaches.     She also has rash on b/l feet and ankle. + itching. Has been there for a few weeks. Not getting better. Has not tried anything topical. No one else in the home with the rash.     Past Medical History:   Diagnosis Date    Acid reflux     Anemia     Back pain     was under therapy    Constipation     Elevated cholesterol     Encounter for blood transfusion 1987    Endometriosis     Hyperlipidemia     Hypertension     Nausea & vomiting     Sleep apnea        Family History   Problem Relation Age of Onset    Hypertension Mother     Heart attack Mother     Hypertension Brother     Heart disease Maternal Grandmother     Hypertension Maternal Grandmother     Diabetes Maternal Aunt     Breast cancer Neg Hx     Colon cancer Neg Hx     Ovarian cancer Neg Hx        Social History     Social History    Marital status: Single     Spouse name: N/A    Number of children: N/A    Years of education: N/A     Occupational History    Not on file.     Social History Main Topics    Smoking status: Never Smoker    Smokeless tobacco: Never Used    Alcohol use Yes      Comment: rare    Drug use: No    Sexual activity: Yes     Partners: Male     Birth control/ protection: Surgical, See Surgical Hx      Comment:  --BTL     Other Topics Concern    Not on file     Social History Narrative    No narrative on file       Review of Systems   Constitutional: Negative for activity change, fatigue, fever and unexpected weight change.   HENT: Negative for congestion, ear pain, hearing loss, rhinorrhea and sore throat.    Eyes: Negative for pain, redness and visual disturbance.   Respiratory: Negative for cough, shortness of breath and wheezing.    Cardiovascular: Negative for chest pain, palpitations and leg swelling.   Gastrointestinal: Negative for abdominal pain, blood in stool, constipation, diarrhea, nausea and vomiting.   Genitourinary: Negative for dysuria, frequency, pelvic pain and urgency.   Musculoskeletal: Negative for back pain, joint swelling and neck pain.   Skin: Positive for rash. Negative for color change and wound.   Neurological: Positive for headaches. Negative for dizziness, tremors, weakness and light-headedness.         Objective:      Physical Exam   Constitutional: She is oriented to person, place, and time. She appears well-developed and well-nourished. No distress.   HENT:   Head: Normocephalic and atraumatic.   Right Ear: External ear normal.   Left Ear: External ear normal.   Eyes: Conjunctivae and EOM are normal. Pupils are equal, round, and reactive to light. Right eye exhibits no discharge. Left eye exhibits no discharge.   Neck: Neck supple. No tracheal deviation present.   Cardiovascular: Normal rate and regular rhythm.    Pulmonary/Chest: Effort normal. No respiratory distress.   Abdominal: Soft. Bowel sounds are normal. She exhibits no distension. There is no tenderness.   Lymphadenopathy:     She has no cervical adenopathy.   Neurological: She is alert and oriented to person, place, and time. No cranial nerve deficit.   Skin: Skin is warm and dry.   Bug bites b/l feet and ankles   Psychiatric: She has a normal mood and affect. Her behavior is normal.   Vitals reviewed.      Assessment:        1. Gastroesophageal reflux disease, esophagitis presence not specified    2. Hiatal hernia    3. Bug bite, initial encounter    4. Essential hypertension        Plan:       Arron was seen today for follow-up.    Diagnoses and all orders for this visit:    Gastroesophageal reflux disease, esophagitis presence not specified  Hiatal hernia  Chronic controlled  Cont dexilant at same dose  Discussed weaning down off reglan due to side effect profile, she is not taking daily right now but doesn't want to stop. Will readdress again net visit    Bug bite, initial encounter  -     triamcinolone acetonide 0.1% (KENALOG) 0.1 % cream; Apply topically 2 (two) times daily.  New problem  Possibly bed bug  Discussed hygiene and cleaning techniques    Essential hypertension  -     amLODIPine (NORVASC) 5 MG tablet; Take 2 tablets (10 mg total) by mouth once daily.  Chronic uncontrolled  Increase amlodipoine from 5 to 10mg  Will see if she has the aldactone at home  Cont valsartan, metoprolol and torsemide  Compliance has been an issue in the past with her       Health Maintenance:  -CRC: not due  -PAP/MMG: follows Dr. Caputo; MMG 12/16 BR 1  -Bone Density: not odue  -tobacco: never smoker  -Vaccines   Flu 1/2017    RTC as scheduled with labs next months and PRN

## 2018-02-14 ENCOUNTER — TELEPHONE (OUTPATIENT)
Dept: INTERNAL MEDICINE | Facility: CLINIC | Age: 48
End: 2018-02-14

## 2018-02-14 NOTE — TELEPHONE ENCOUNTER
----- Message from Bee Hurst sent at 2018  8:54 AM CST -----  Contact: SELF  Arron Ron  MRN: [id number]  : 1970  PCP: Mahi Rojas  Home Phone      [phone]  Work Phone      Not on file.  Mobile          [phone]      MESSAGE: PT WOULD LIKE TO SPEAK WITH NURSE REGARDING BP AND MEDICATIONS.     PHONE: [phone]

## 2018-02-16 ENCOUNTER — LAB VISIT (OUTPATIENT)
Dept: LAB | Facility: HOSPITAL | Age: 48
End: 2018-02-16
Attending: INTERNAL MEDICINE
Payer: COMMERCIAL

## 2018-02-16 DIAGNOSIS — I10 ESSENTIAL HYPERTENSION: ICD-10-CM

## 2018-02-16 DIAGNOSIS — E66.01 MORBID OBESITY DUE TO EXCESS CALORIES: ICD-10-CM

## 2018-02-16 LAB
ALBUMIN SERPL BCP-MCNC: 3.6 G/DL
ALP SERPL-CCNC: 110 U/L
ALT SERPL W/O P-5'-P-CCNC: 14 U/L
ANION GAP SERPL CALC-SCNC: 10 MMOL/L
AST SERPL-CCNC: 15 U/L
BASOPHILS # BLD AUTO: 0.06 K/UL
BASOPHILS NFR BLD: 0.7 %
BILIRUB SERPL-MCNC: 0.3 MG/DL
BUN SERPL-MCNC: 17 MG/DL
CALCIUM SERPL-MCNC: 9.4 MG/DL
CHLORIDE SERPL-SCNC: 100 MMOL/L
CHOLEST SERPL-MCNC: 183 MG/DL
CHOLEST/HDLC SERPL: 3.3 {RATIO}
CO2 SERPL-SCNC: 31 MMOL/L
CREAT SERPL-MCNC: 1 MG/DL
DIFFERENTIAL METHOD: ABNORMAL
EOSINOPHIL # BLD AUTO: 0.1 K/UL
EOSINOPHIL NFR BLD: 1.2 %
ERYTHROCYTE [DISTWIDTH] IN BLOOD BY AUTOMATED COUNT: 15.2 %
EST. GFR  (AFRICAN AMERICAN): >60 ML/MIN/1.73 M^2
EST. GFR  (NON AFRICAN AMERICAN): >60 ML/MIN/1.73 M^2
GLUCOSE SERPL-MCNC: 95 MG/DL
HCT VFR BLD AUTO: 34.3 %
HDLC SERPL-MCNC: 56 MG/DL
HDLC SERPL: 30.6 %
HGB BLD-MCNC: 11.3 G/DL
LDLC SERPL CALC-MCNC: 117.8 MG/DL
LYMPHOCYTES # BLD AUTO: 2.9 K/UL
LYMPHOCYTES NFR BLD: 33.4 %
MCH RBC QN AUTO: 28 PG
MCHC RBC AUTO-ENTMCNC: 32.9 G/DL
MCV RBC AUTO: 85 FL
MONOCYTES # BLD AUTO: 0.7 K/UL
MONOCYTES NFR BLD: 7.9 %
NEUTROPHILS # BLD AUTO: 4.9 K/UL
NEUTROPHILS NFR BLD: 56.8 %
NONHDLC SERPL-MCNC: 127 MG/DL
PLATELET # BLD AUTO: 361 K/UL
PMV BLD AUTO: 10.2 FL
POTASSIUM SERPL-SCNC: 3.6 MMOL/L
PROT SERPL-MCNC: 7.9 G/DL
RBC # BLD AUTO: 4.03 M/UL
SODIUM SERPL-SCNC: 141 MMOL/L
TRIGL SERPL-MCNC: 46 MG/DL
TSH SERPL DL<=0.005 MIU/L-ACNC: 1.54 UIU/ML
WBC # BLD AUTO: 8.59 K/UL

## 2018-02-16 PROCEDURE — 80053 COMPREHEN METABOLIC PANEL: CPT

## 2018-02-16 PROCEDURE — 84443 ASSAY THYROID STIM HORMONE: CPT

## 2018-02-16 PROCEDURE — 36415 COLL VENOUS BLD VENIPUNCTURE: CPT

## 2018-02-16 PROCEDURE — 80061 LIPID PANEL: CPT

## 2018-02-16 PROCEDURE — 85025 COMPLETE CBC W/AUTO DIFF WBC: CPT

## 2018-02-19 ENCOUNTER — OFFICE VISIT (OUTPATIENT)
Dept: INTERNAL MEDICINE | Facility: CLINIC | Age: 48
End: 2018-02-19
Payer: COMMERCIAL

## 2018-02-19 VITALS
DIASTOLIC BLOOD PRESSURE: 90 MMHG | HEIGHT: 66 IN | WEIGHT: 293 LBS | SYSTOLIC BLOOD PRESSURE: 130 MMHG | HEART RATE: 82 BPM | RESPIRATION RATE: 20 BRPM | BODY MASS INDEX: 47.09 KG/M2

## 2018-02-19 DIAGNOSIS — I10 ESSENTIAL HYPERTENSION: Primary | ICD-10-CM

## 2018-02-19 DIAGNOSIS — E66.01 MORBID OBESITY: ICD-10-CM

## 2018-02-19 DIAGNOSIS — E78.5 HYPERLIPIDEMIA, UNSPECIFIED HYPERLIPIDEMIA TYPE: ICD-10-CM

## 2018-02-19 DIAGNOSIS — K21.9 GASTROESOPHAGEAL REFLUX DISEASE, ESOPHAGITIS PRESENCE NOT SPECIFIED: ICD-10-CM

## 2018-02-19 PROCEDURE — 3008F BODY MASS INDEX DOCD: CPT | Mod: S$GLB,,, | Performed by: INTERNAL MEDICINE

## 2018-02-19 PROCEDURE — 99214 OFFICE O/P EST MOD 30 MIN: CPT | Mod: S$GLB,,, | Performed by: INTERNAL MEDICINE

## 2018-02-19 PROCEDURE — 99999 PR PBB SHADOW E&M-EST. PATIENT-LVL III: CPT | Mod: PBBFAC,,, | Performed by: INTERNAL MEDICINE

## 2018-02-19 RX ORDER — CARVEDILOL 25 MG/1
TABLET ORAL
COMMUNITY
Start: 2018-02-12 | End: 2019-02-26

## 2018-02-19 RX ORDER — SPIRONOLACTONE 50 MG/1
50 TABLET, FILM COATED ORAL DAILY
Qty: 30 TABLET | Refills: 11 | Status: SHIPPED | OUTPATIENT
Start: 2018-02-19 | End: 2018-09-10

## 2018-02-19 NOTE — PATIENT INSTRUCTIONS
Taking Your Blood Pressure  Blood pressure is the force of blood against the artery wall as it moves from the heart through the blood vessels. You can take your own blood pressure reading using a digital monitor. Take your readings the same each time, using the same arm. Take readings as often as your healthcare provider instructs.  About blood pressure monitors  Blood pressure monitors are designed for certain ages and cases. You can find monitors for older adults, for pregnant women, and for children. Make sure the one you choose is the right one for your age and situation.  The American Heart Association recommends an automatic cuff monitor that fits on your upper arm (bicep). The cuff should fit your arm size. A cuff thats too large or too small will not give an accurate reading. Measure around your upper arm to find your size.  Monitors that attach to your finger or wrist are not as accurate as monitors for your upper arm.  Ask your healthcare provider for help in choosing a monitor. Bring your monitor to your next provider visit if you need help in using it the correct way.  The steps below are general instructions for using an automatic digital monitor.  Step 1. Relax    · Take your blood pressure at the same time every day, such as in the morning or evening, or at the time your healthcare provider recommends.  · Wait at least a half-hour after smoking, eating, or exercising. Don't drink coffee, tea, soda, or other caffeinated beverages before checking your blood pressure.  · Sit comfortably at a table with both feet on the floor. Do not cross your legs or feet. Place the monitor near you.  · Rest for a few minutes before you begin.  Step 2. Wrap the cuff    · Place your arm on the table, palm up. Your arm should be at the level of your heart. Wrap the cuff around your upper arm, just above your elbow. Its best done on bare skin, not over clothing. Most cuffs will indicate where the brachial artery (the  blood vessel in the middle of the arm at the inner side of the elbow) should line up with the cuff. Look in your monitor's instruction booklet for an illustration. You can also bring your cuff to your healthcare provider and have them show you how to correctly place the cuff.  Step 3. Inflate the cuff    · Push the button that starts the pump.  · The cuff will tighten, then loosen.  · The numbers will change. When they stop changing, your blood pressure reading will appear.  · Take 2 or 3 readings one minute apart.  Step 4. Write down the results of each reading    · Write down your blood pressure numbers for each reading. Note the date and time. Keep your results in one place, such as a notebook. Even if your monitor has a built-in memory, keep a hard copy of the readings.  · Remove the cuff from your arm. Turn off the machine.  · Bring your blood pressure records with your healthcare providers at each visit.  · If you start a new blood pressure medicine, note the day you started the new medicine. Also note the day if you change the dose of your medicine. This information goes on your blood pressure recording sheet. This will help your healthcare provider monitor how well the medicine changes are working.  · Ask your healthcare provider what numbers should prompt you to call him or her. Also ask what numbers should prompt you to get help right away.  Date Last Reviewed: 11/1/2016  © 1431-2613 [employer]. [address] [place] [address], [place] [address]. All rights reserved. This information is not intended as a substitute for professional medical care. Always follow your healthcare professional's instructions.

## 2018-02-20 ENCOUNTER — LAB VISIT (OUTPATIENT)
Dept: LAB | Facility: HOSPITAL | Age: 48
End: 2018-02-20
Attending: INTERNAL MEDICINE
Payer: COMMERCIAL

## 2018-02-20 DIAGNOSIS — I10 ESSENTIAL HYPERTENSION: ICD-10-CM

## 2018-02-20 LAB
ANION GAP SERPL CALC-SCNC: 7 MMOL/L
BUN SERPL-MCNC: 16 MG/DL
CALCIUM SERPL-MCNC: 9.3 MG/DL
CHLORIDE SERPL-SCNC: 105 MMOL/L
CO2 SERPL-SCNC: 30 MMOL/L
CREAT SERPL-MCNC: 1 MG/DL
EST. GFR  (AFRICAN AMERICAN): >60 ML/MIN/1.73 M^2
EST. GFR  (NON AFRICAN AMERICAN): >60 ML/MIN/1.73 M^2
GLUCOSE SERPL-MCNC: 91 MG/DL
POTASSIUM SERPL-SCNC: 4 MMOL/L
SODIUM SERPL-SCNC: 142 MMOL/L

## 2018-02-20 PROCEDURE — 82088 ASSAY OF ALDOSTERONE: CPT

## 2018-02-20 PROCEDURE — 36415 COLL VENOUS BLD VENIPUNCTURE: CPT

## 2018-02-20 PROCEDURE — 80048 BASIC METABOLIC PNL TOTAL CA: CPT

## 2018-02-23 LAB
ALDOST SERPL-MCNC: 10.9 NG/DL
ALDOST/RENIN PLAS-RTO: 1.9 RATIO
RENIN PLAS-CCNC: 5.7 NG/ML/HR

## 2018-02-28 ENCOUNTER — TELEPHONE (OUTPATIENT)
Dept: INTERNAL MEDICINE | Facility: CLINIC | Age: 48
End: 2018-02-28

## 2018-02-28 DIAGNOSIS — I10 ESSENTIAL HYPERTENSION: ICD-10-CM

## 2018-02-28 DIAGNOSIS — Z76.0 MEDICATION REFILL: ICD-10-CM

## 2018-02-28 RX ORDER — VALSARTAN 320 MG/1
320 TABLET ORAL DAILY
Qty: 30 TABLET | Refills: 5 | Status: SHIPPED | OUTPATIENT
Start: 2018-02-28 | End: 2018-08-06 | Stop reason: RX

## 2018-03-05 ENCOUNTER — OFFICE VISIT (OUTPATIENT)
Dept: INTERNAL MEDICINE | Facility: CLINIC | Age: 48
End: 2018-03-05
Payer: COMMERCIAL

## 2018-03-05 VITALS
DIASTOLIC BLOOD PRESSURE: 76 MMHG | WEIGHT: 293 LBS | HEIGHT: 66 IN | HEART RATE: 85 BPM | RESPIRATION RATE: 20 BRPM | SYSTOLIC BLOOD PRESSURE: 124 MMHG | BODY MASS INDEX: 47.09 KG/M2

## 2018-03-05 DIAGNOSIS — I10 ESSENTIAL HYPERTENSION: Primary | ICD-10-CM

## 2018-03-05 DIAGNOSIS — M17.12 PRIMARY OSTEOARTHRITIS OF LEFT KNEE: ICD-10-CM

## 2018-03-05 DIAGNOSIS — G47.33 OSA ON CPAP: ICD-10-CM

## 2018-03-05 DIAGNOSIS — K21.9 GASTROESOPHAGEAL REFLUX DISEASE, ESOPHAGITIS PRESENCE NOT SPECIFIED: ICD-10-CM

## 2018-03-05 DIAGNOSIS — K44.9 HIATAL HERNIA: ICD-10-CM

## 2018-03-05 PROCEDURE — 99999 PR PBB SHADOW E&M-EST. PATIENT-LVL III: CPT | Mod: PBBFAC,,, | Performed by: INTERNAL MEDICINE

## 2018-03-05 PROCEDURE — 3078F DIAST BP <80 MM HG: CPT | Mod: S$GLB,,, | Performed by: INTERNAL MEDICINE

## 2018-03-05 PROCEDURE — 3074F SYST BP LT 130 MM HG: CPT | Mod: S$GLB,,, | Performed by: INTERNAL MEDICINE

## 2018-03-05 PROCEDURE — 99214 OFFICE O/P EST MOD 30 MIN: CPT | Mod: S$GLB,,, | Performed by: INTERNAL MEDICINE

## 2018-03-05 RX ORDER — DEXLANSOPRAZOLE 60 MG/1
60 CAPSULE, DELAYED RELEASE ORAL DAILY
Qty: 30 CAPSULE | Refills: 2 | Status: SHIPPED | OUTPATIENT
Start: 2018-03-05 | End: 2018-06-11

## 2018-03-05 NOTE — PROGRESS NOTES
Subjective:       Patient ID: Arron Ron is a 47 y.o. female.    Chief Complaint: Follow-up      HPI:  Patient is known to me and presents for follow up HTN, HLD and GERD. At last visit she was still uncontrolled. She was on valsartan, coreg, amlodipine and torsemide. Aldactone added. Renin/aldosterone ratio < 20. K normal at F. Cr normal at 1; GFR >60. Her BP is much better today. Her home BP is < 140/90. She is wearing her CPAP for RODRI.    She needs more dexilant for her GERD. Without it she feels her sx are worsening. She did stop the reglan as I suggested due to concern for side effects.       HLD: on atorvastatin but both 20mg and 10mg doses listed. She is unsure which she is taking. Does have meds with her today. LDL is good    Past Medical History:   Diagnosis Date    Acid reflux     Anemia     Back pain     was under therapy    Constipation     Elevated cholesterol     Encounter for blood transfusion 1987    Endometriosis     Hyperlipidemia     Hypertension     Nausea & vomiting     Sleep apnea        Family History   Problem Relation Age of Onset    Hypertension Mother     Heart attack Mother     Hypertension Brother     Heart disease Maternal Grandmother     Hypertension Maternal Grandmother     Diabetes Maternal Aunt     Breast cancer Neg Hx     Colon cancer Neg Hx     Ovarian cancer Neg Hx        Social History     Social History    Marital status: Single     Spouse name: N/A    Number of children: N/A    Years of education: N/A     Occupational History    Not on file.     Social History Main Topics    Smoking status: Never Smoker    Smokeless tobacco: Never Used    Alcohol use Yes      Comment: rare    Drug use: No    Sexual activity: Yes     Partners: Male     Birth control/ protection: Surgical, See Surgical Hx      Comment: --BTL     Other Topics Concern    Not on file     Social History Narrative    No narrative on file       Review of Systems    Constitutional: Negative for activity change, fatigue, fever and unexpected weight change.   HENT: Negative for congestion, ear pain, hearing loss, rhinorrhea and sore throat.    Eyes: Negative for redness and visual disturbance.   Respiratory: Negative for cough, shortness of breath and wheezing.    Cardiovascular: Negative for chest pain, palpitations and leg swelling.   Gastrointestinal: Positive for nausea. Negative for abdominal pain, blood in stool, constipation, diarrhea and vomiting.        Reflex   Genitourinary: Negative for dysuria, frequency and urgency.   Musculoskeletal: Negative for back pain, joint swelling and neck pain.   Skin: Negative for color change, rash and wound.   Neurological: Negative for dizziness, light-headedness and headaches.         Objective:      Physical Exam   Constitutional: She is oriented to person, place, and time. She appears well-developed and well-nourished. No distress.   HENT:   Head: Normocephalic and atraumatic.   Right Ear: External ear normal.   Left Ear: External ear normal.   Eyes: Conjunctivae and EOM are normal. Pupils are equal, round, and reactive to light. Right eye exhibits no discharge. Left eye exhibits no discharge.   Neck: Neck supple. No tracheal deviation present.   Cardiovascular: Normal rate and regular rhythm.    No murmur heard.  Pulmonary/Chest: Effort normal and breath sounds normal. No respiratory distress.   Abdominal: Soft. Bowel sounds are normal. She exhibits no distension. There is no tenderness.   Neurological: She is alert and oriented to person, place, and time. No cranial nerve deficit.   Skin: Skin is warm and dry.   Psychiatric: She has a normal mood and affect. Her behavior is normal.   Vitals reviewed.      Assessment:       1. Essential hypertension    2. Gastroesophageal reflux disease, esophagitis presence not specified    3. RODRI on CPAP    4. Primary osteoarthritis of left knee    5. Hiatal hernia        Plan:       Arron  was seen today for follow-up.    Diagnoses and all orders for this visit:    Essential hypertension  -     CBC auto differential; Future  -     Comprehensive metabolic panel; Future  -     TSH; Future  -     Lipid panel; Future  Chronic controlled  Cont current regimen-seeing Dr. Mays this week. Asked her to BRING ALL HER MEDS TO HIM. Voiced understanding  Renin/mao ratio noraml  Low Na diet  Discussed need for medication and diet compliance    Gastroesophageal reflux disease, esophagitis presence not specified  Hiatal hernia  -     dexlansoprazole (DEXILANT) 60 mg capsule; Take 1 capsule (60 mg total) by mouth once daily.  meds refilled  Should help her sx settle back down again, call if not working but iwll have to send back to GI at that point    RODRI on CPAP  wearing CPAP nightly          RTC 6 months with labs and PNR

## 2018-03-05 NOTE — PATIENT INSTRUCTIONS
Taking Your Blood Pressure  Blood pressure is the force of blood against the artery wall as it moves from the heart through the blood vessels. You can take your own blood pressure reading using a digital monitor. Take your readings the same each time, using the same arm. Take readings as often as your healthcare provider instructs.  About blood pressure monitors  Blood pressure monitors are designed for certain ages and cases. You can find monitors for older adults, for pregnant women, and for children. Make sure the one you choose is the right one for your age and situation.  The American Heart Association recommends an automatic cuff monitor that fits on your upper arm (bicep). The cuff should fit your arm size. A cuff thats too large or too small will not give an accurate reading. Measure around your upper arm to find your size.  Monitors that attach to your finger or wrist are not as accurate as monitors for your upper arm.  Ask your healthcare provider for help in choosing a monitor. Bring your monitor to your next provider visit if you need help in using it the correct way.  The steps below are general instructions for using an automatic digital monitor.  Step 1. Relax    · Take your blood pressure at the same time every day, such as in the morning or evening, or at the time your healthcare provider recommends.  · Wait at least a half-hour after smoking, eating, or exercising. Don't drink coffee, tea, soda, or other caffeinated beverages before checking your blood pressure.  · Sit comfortably at a table with both feet on the floor. Do not cross your legs or feet. Place the monitor near you.  · Rest for a few minutes before you begin.  Step 2. Wrap the cuff    · Place your arm on the table, palm up. Your arm should be at the level of your heart. Wrap the cuff around your upper arm, just above your elbow. Its best done on bare skin, not over clothing. Most cuffs will indicate where the brachial artery (the  blood vessel in the middle of the arm at the inner side of the elbow) should line up with the cuff. Look in your monitor's instruction booklet for an illustration. You can also bring your cuff to your healthcare provider and have them show you how to correctly place the cuff.  Step 3. Inflate the cuff    · Push the button that starts the pump.  · The cuff will tighten, then loosen.  · The numbers will change. When they stop changing, your blood pressure reading will appear.  · Take 2 or 3 readings one minute apart.  Step 4. Write down the results of each reading    · Write down your blood pressure numbers for each reading. Note the date and time. Keep your results in one place, such as a notebook. Even if your monitor has a built-in memory, keep a hard copy of the readings.  · Remove the cuff from your arm. Turn off the machine.  · Bring your blood pressure records with your healthcare providers at each visit.  · If you start a new blood pressure medicine, note the day you started the new medicine. Also note the day if you change the dose of your medicine. This information goes on your blood pressure recording sheet. This will help your healthcare provider monitor how well the medicine changes are working.  · Ask your healthcare provider what numbers should prompt you to call him or her. Also ask what numbers should prompt you to get help right away.  Date Last Reviewed: 11/1/2016  © 3653-6512 [employer]. [address] [place] [address], [place] [address]. All rights reserved. This information is not intended as a substitute for professional medical care. Always follow your healthcare professional's instructions.

## 2018-06-11 ENCOUNTER — OFFICE VISIT (OUTPATIENT)
Dept: INTERNAL MEDICINE | Facility: CLINIC | Age: 48
End: 2018-06-11
Payer: MEDICAID

## 2018-06-11 VITALS
BODY MASS INDEX: 47.09 KG/M2 | SYSTOLIC BLOOD PRESSURE: 118 MMHG | HEIGHT: 66 IN | OXYGEN SATURATION: 96 % | DIASTOLIC BLOOD PRESSURE: 68 MMHG | TEMPERATURE: 97 F | RESPIRATION RATE: 18 BRPM | HEART RATE: 82 BPM | WEIGHT: 293 LBS

## 2018-06-11 DIAGNOSIS — K21.00 GASTROESOPHAGEAL REFLUX DISEASE WITH ESOPHAGITIS: Primary | ICD-10-CM

## 2018-06-11 DIAGNOSIS — K44.9 HIATAL HERNIA: ICD-10-CM

## 2018-06-11 DIAGNOSIS — K05.6 PERIODONTAL DISEASE: ICD-10-CM

## 2018-06-11 DIAGNOSIS — K08.9 POOR DENTITION: ICD-10-CM

## 2018-06-11 DIAGNOSIS — Z87.19 HISTORY OF RECTAL BLEEDING: ICD-10-CM

## 2018-06-11 DIAGNOSIS — K29.31 CHRONIC SUPERFICIAL GASTRITIS WITH BLEEDING: ICD-10-CM

## 2018-06-11 DIAGNOSIS — D64.9 ANEMIA, UNSPECIFIED TYPE: ICD-10-CM

## 2018-06-11 PROBLEM — K29.51 CHRONIC GASTRITIS WITH BLEEDING: Status: ACTIVE | Noted: 2018-06-11

## 2018-06-11 PROCEDURE — 99214 OFFICE O/P EST MOD 30 MIN: CPT | Mod: S$PBB,,, | Performed by: NURSE PRACTITIONER

## 2018-06-11 PROCEDURE — 99215 OFFICE O/P EST HI 40 MIN: CPT | Mod: PBBFAC | Performed by: NURSE PRACTITIONER

## 2018-06-11 PROCEDURE — 99999 PR PBB SHADOW E&M-EST. PATIENT-LVL V: CPT | Mod: PBBFAC,,, | Performed by: NURSE PRACTITIONER

## 2018-06-11 RX ORDER — PANTOPRAZOLE SODIUM 40 MG/1
40 TABLET, DELAYED RELEASE ORAL DAILY
Qty: 30 TABLET | Refills: 5 | Status: SHIPPED | OUTPATIENT
Start: 2018-06-11 | End: 2019-09-05

## 2018-06-11 NOTE — PATIENT INSTRUCTIONS
"    GERD (Adult)    The esophagus is a tube that carries food from the mouth to the stomach. A valve at the lower end of the esophagus prevents stomach acid from flowing upward. When this valve doesn't work properly, stomach contents may repeatedly flow back up (reflux) into the esophagus. This is called gastroesophageal reflux disease (GERD). GERD can irritate the esophagus. It can cause problems with swallowing or breathing. In severe cases, GERD can cause recurrent pneumonia or other serious problems.  Symptoms of reflux include burning, pressure or sharp pain in the upper abdomen or mid to lower chest. The pain can spread to the neck, back, or shoulder. There may be belching, an acid taste in the back of the throat, chronic cough, or sore throat or hoarseness. GERD symptoms often occur during the day after a big meal. They can also occur at night when lying down.   Home care  Lifestyle changes can help reduce symptoms. If needed, medicines may be prescribed. Symptoms often improve with treatment, but if treatment is stopped, the symptoms often return after a few months. So most persons with GERD will need to continue treatment.  Lifestyle changes  · Limit or avoid fatty, fried, and spicy foods, as well as coffee, chocolate, mint, and foods with high acid content such as tomatoes and citrus fruit and juices (orange, grapefruit, lemon).  · Dont eat large meals, especially at night. Frequent, smaller meals are best. Do not lie down right after eating. And dont eat anything 3 hours before going to bed.  · Avoid drinking alcohol and smoking. As much as possible, stay away from second hand smoke.  · If you are overweight, losing weight will reduce symptoms.   · Avoid wearing tight clothing around your stomach area.  · If your symptoms occur during sleep, use a foam wedge to elevate your upper body (not just your head.) Or, place 4" blocks under the head of your bed.  Medicines  If needed, medicines can help relieve " the symptoms of GERD and prevent damage to the esophagus. Discuss a medicine plan with your healthcare provider. This may include one or more of the following medicines:  · Antacids to help neutralize the normal acids in your stomach.  · Acid blockers (H2 blockers) to decrease acid production.  · Acid inhibitors (PPIs) to decrease acid production in a different way than the blockers. They may work better, but can take a little longer to take effect.  Take an antacid 30-60 minutes after eating and at bedtime, but not at the same time as an acid blocker.  Try not to take medicines such as ibuprofen and aspirin. If you are taking aspirin for your heart or other medical reasons, talk to your healthcare provider about stopping it.  Follow-up care  Follow up with your healthcare provider or as advised by our staff.  When to seek medical advice  Call your healthcare provider if any of the following occur:  · Stomach pain gets worse or moves to the lower right abdomen (appendix area)  · Chest pain appears or gets worse, or spreads to the back, neck, shoulder, or arm  · Frequent vomiting (cant keep down liquids)  · Blood in the stool or vomit (red or black in color)  · Feeling weak or dizzy  · Fever of 100.4ºF (38ºC) or higher, or as directed by your healthcare provider  Date Last Reviewed: 6/23/2015 © 2000-2017 [employer]. [address] [place] [address], [place] [address]. All rights reserved. This information is not intended as a substitute for professional medical care. Always follow your healthcare professional's instructions.        Discharge Instructions for Gastroesophageal Reflux Disease (GERD)  Gastroesophageal reflux disease (GERD) is a backflow of acid from the stomach into the swallowing tube (esophagus).  Home care  These home care steps can help you manage GERD:  · Maintain a healthy weight. Get help to lose any extra pounds.  · Avoid lying down after meals.  · Avoid eating late at night.  · Elevate  the head of your bed by 6 inches. You can do this by placing wooden blocks or bed risers under the head of your bed.  · Avoid wearing tight-fitting clothes.  · Avoid foods that might irritate your stomach, such as the following:  ¨ Alcohol  ¨ Fat  ¨ Chocolate  ¨ Caffeine  ¨ Spearmint or peppermint  · Talk to your healthcare provider if you are taking any of the following medicines. These medicines can make GERD symptoms worse:  ¨ Calcium channel blockers  ¨ Theophylline  ¨ Anticholinergic medicines, such as oxybutynin and benzatropine  · Begin an exercise program. Ask your healthcare provider how to get started. You can benefit from simple activities, such as walking or gardening.  · Break the smoking habit. Enroll in a stop-smoking program to improve your chances of success.  · Limit alcohol intake to no more than 2 drinks a day.  · Take your medicines exactly as directed. Dont skip doses.  · Avoid over-the-counter nonsteroidal anti-inflammatory medicines, such as aspirin and ibuprofen, unless recommended by your healthcare provider for certain conditions.   · If possible, avoid nitrates (heart medicines, such as nitroglycerin and isosorbide dinitrate ).  Follow-up care  Make a follow-up appointment as directed by our staff.     When to call the healthcare provider  Call your healthcare provider immediately if you have any of the following:  · Trouble swallowing  · Pain when swallowing  · Feeling of food caught in your chest or throat  · Pain in the neck, chest, or back  · Heartburn that causes you to vomit  · Vomiting blood  · Black or tarry stools (from digested blood)  · More saliva (watering of the mouth) than usual  · Weight loss of more than 3% to 5% of your total body weight in a month  · Hoarseness or sore throat that wont go away  · Choking, coughing, or wheezing   Date Last Reviewed: 7/1/2016  © 8151-9293 [employer]. [address] [place] [address], [place] [address]. All rights reserved. This  information is not intended as a substitute for professional medical care. Always follow your healthcare professional's instructions.          How Acid Reflux Affects Your Throat    Do you have to clear your throat or cough often? Are you hoarse? Do you have trouble swallowing? If you have these or other throat symptoms, you may have acid reflux. This occurs when stomach acid flows back up and irritates your throat.  Why you have throat symptoms  There are muscles (esophageal sphincters) at both ends of the tube that carries food to your stomach (the esophagus). These muscles relax to let food pass. Then they tighten to keep stomach acid down. When the lower esophageal sphincter (LES) doesnt tighten enough, acid can flow back (reflux) from your stomach into your esophagus. This may cause heartburn. In some cases the upper esophageal sphincter (UES) also doesnt work well. Then acid can travel higher and enter your throat (pharynx). In many cases, this causes throat symptoms.  Common throat symptoms  · Need to clear your throat often  · Feeling like youre choking  · Long-term (chronic) cough  · Hoarseness  · Trouble swallowing  · Feel like you have a lump in your throat  · Sour or acid taste  · Sore throat that keeps coming back   Date Last Reviewed: 7/1/2016  © 0763-5243 [employer]. [address] [place] [address], [place] [address]. All rights reserved. This information is not intended as a substitute for professional medical care. Always follow your healthcare professional's instructions.

## 2018-06-11 NOTE — PROGRESS NOTES
Subjective:       Patient ID: Arron Ron is a 47 y.o. female.    Chief Complaint: Follow-up (er)    New to me but seen previously in clinic by a fellow provider. Here for ED f/u. Reports long hx of GERD with gastritis.  Reports worsening essophageal tightness and throat fullness which prompted ED visit on 6/6/18. Today reports mild improvement after starting omeprazole. She was previously under the care of  for this but can no longer see him due to change of insurance. She would like to see GI again. She is also requesting dental referral for chronic gum disease and multiple broken teeth.       Gastroesophageal Reflux   She complains of belching, chest pain, choking, dysphagia, early satiety, globus sensation, heartburn, nausea, a sore throat, tooth decay and water brash. She reports no abdominal pain, no coughing, no hoarse voice, no stridor or no wheezing. This is a chronic problem. The problem occurs constantly. The problem has been waxing and waning. The heartburn is located in the substernum. The heartburn is of severe intensity. The heartburn wakes her from sleep. The heartburn limits her activity. The heartburn doesn't change with position. The symptoms are aggravated by tight clothes, stress, caffeine, certain foods, lying down, medications and exertion. Associated symptoms include anemia, fatigue and melena. Pertinent negatives include no muscle weakness, orthopnea or weight loss. Risk factors include hiatal hernia, lack of exercise, NSAIDs and obesity. She has tried a PPI for the symptoms. The treatment provided mild relief. Past procedures include an EGD and a UGI. Past procedures do not include an abdominal ultrasound, esophageal manometry, esophageal pH monitoring or H. pylori antibody titer.   Dental Pain    This is a chronic problem. The problem occurs constantly. The problem has been gradually worsening. The pain is at a severity of 6/10. The pain is moderate. Associated symptoms  include difficulty swallowing, facial pain and thermal sensitivity. Pertinent negatives include no fever, oral bleeding or sinus pressure. She has tried rest for the symptoms. The treatment provided no relief.     Review of Systems   Constitutional: Positive for appetite change (decreased) and fatigue. Negative for activity change, chills, diaphoresis, fever, unexpected weight change and weight loss.   HENT: Positive for dental problem, sore throat and trouble swallowing. Negative for congestion, drooling, facial swelling, hoarse voice, nosebleeds, postnasal drip, rhinorrhea, sinus pain, sinus pressure, sneezing, tinnitus and voice change.    Respiratory: Positive for choking. Negative for cough, chest tightness, shortness of breath, wheezing and stridor.    Cardiovascular: Positive for chest pain. Negative for palpitations and leg swelling.   Gastrointestinal: Positive for abdominal distention, blood in stool, dysphagia, heartburn, melena and nausea. Negative for abdominal pain, anal bleeding, constipation, diarrhea, rectal pain and vomiting.   Genitourinary: Negative for difficulty urinating and dysuria.   Musculoskeletal: Positive for myalgias. Negative for arthralgias, back pain, gait problem, joint swelling, muscle weakness, neck pain and neck stiffness.   Skin: Negative for rash.   Neurological: Negative for headaches.       Objective:      Physical Exam   Constitutional: She is oriented to person, place, and time. Vital signs are normal. She appears well-developed and well-nourished. She is active and cooperative.   HENT:   Head: Normocephalic and atraumatic.   Right Ear: Tympanic membrane, external ear and ear canal normal.   Left Ear: Tympanic membrane, external ear and ear canal normal.   Nose: Nose normal.   Mouth/Throat: Uvula is midline and mucous membranes are normal. No oral lesions. No trismus in the jaw. Abnormal dentition. Dental caries present. No dental abscesses or uvula swelling. Posterior  oropharyngeal erythema present. No oropharyngeal exudate, posterior oropharyngeal edema or tonsillar abscesses. No tonsillar exudate.   Multiple broken and absent teeth   Eyes: Conjunctivae and lids are normal. Pupils are equal, round, and reactive to light.   Neck: Trachea normal, normal range of motion and phonation normal. Neck supple.   Cardiovascular: Normal rate, regular rhythm, normal heart sounds and intact distal pulses.    Pulmonary/Chest: Effort normal and breath sounds normal. She has no wheezes. She has no rales.   Abdominal: Soft. Bowel sounds are normal. She exhibits no distension and no mass. There is no tenderness. There is no rigidity, no rebound, no guarding, no CVA tenderness, no tenderness at McBurney's point and negative Hughes's sign.   Neurological: She is alert and oriented to person, place, and time. No cranial nerve deficit.   Skin: Skin is warm, dry and intact. No rash noted.   Psychiatric: She has a normal mood and affect. Her speech is normal and behavior is normal. Judgment and thought content normal. Cognition and memory are normal.   Nursing note and vitals reviewed.      Assessment:       1. Gastroesophageal reflux disease with esophagitis    2. Chronic superficial gastritis with bleeding    3. Hiatal hernia    4. Anemia, unspecified type    5. History of rectal bleeding    6. Periodontal disease    7. Poor dentition        Plan:       1. Gastroesophageal reflux disease with esophagitis  The pathophysiology of reflux is discussed.  Anti-reflux measures such as raising the head of the bed, avoiding tight clothing or belts, avoiding eating late at night and not lying down shortly after mealtime and achieving weight loss are discussed. Avoid ASA, NSAID's, caffeine, peppermints, alcohol and tobacco. OTC H2 blockers and/or antacids are often very helpful for PRN use. However, for persisting chronic or daily symptoms, prescription strength H2 blockers or a trial of PPI's are often used.  Further recommendations to her: Rx for PPI is written, medication side effects fully discussed, GI consult requested to consider EGD. She should alert me if there are persistent symptoms, dysphagia, weight loss or GI bleeding.  - pantoprazole (PROTONIX) 40 MG tablet; Take 1 tablet (40 mg total) by mouth once daily.  Dispense: 30 tablet; Refill: 5  - Ambulatory Referral to Gastroenterology  - EGD: Marty (GI)  - Screening Colonoscopy: Marty (GI)    2. Chronic superficial gastritis with bleeding  Hx of GI bleed with worsening anemia s/p hysterectomy. No active bleeding noted. Pt to GI for EGD  - pantoprazole (PROTONIX) 40 MG tablet; Take 1 tablet (40 mg total) by mouth once daily.  Dispense: 30 tablet; Refill: 5  - Ambulatory Referral to Gastroenterology  - EGD: Marty (GI)  - Screening Colonoscopy: Marty (GI)    3. Hiatal hernia  Same as above  - pantoprazole (PROTONIX) 40 MG tablet; Take 1 tablet (40 mg total) by mouth once daily.  Dispense: 30 tablet; Refill: 5  - Ambulatory Referral to Gastroenterology  - EGD: Marty (GI)  - Screening Colonoscopy: Marty (GI)    4. Anemia, unspecified type  The diagnosis was discussed with the patient.  Arrange EGD  Follow up in 1 month  Arrange Colonoscopy   - pantoprazole (PROTONIX) 40 MG tablet; Take 1 tablet (40 mg total) by mouth once daily.  Dispense: 30 tablet; Refill: 5  - Ambulatory Referral to Gastroenterology  - EGD: Marty (GI)  - Screening Colonoscopy: Marty (GI)    5. History of rectal bleeding  Schedule for colonoscopy.   Follow up in 1 month, or sooner should symptoms worsen  - pantoprazole (PROTONIX) 40 MG tablet; Take 1 tablet (40 mg total) by mouth once daily.  Dispense: 30 tablet; Refill: 5  - Ambulatory Referral to Gastroenterology  - EGD: Marty (GI)  - Screening Colonoscopy: Marty (GI)    6. Periodontal disease  No active abscess or infection noted  - Ambulatory referral to Dentistry (Marty)    7. Poor dentition  Same as above  - Ambulatory  referral to Dentistry (Marty)       Follow-up if symptoms worsen or fail to improve.  JERRY Knapp, FNP-C  Family/Internal Medicine  [facility]

## 2018-07-02 ENCOUNTER — TELEPHONE (OUTPATIENT)
Dept: INTERNAL MEDICINE | Facility: CLINIC | Age: 48
End: 2018-07-02

## 2018-07-02 NOTE — TELEPHONE ENCOUNTER
----- Message from Bee Hurst sent at 2018 12:14 PM CDT -----  Contact: SELF  Arron Ron  MRN: [id number]  : 1970  PCP: Mahi Rojas  Home Phone      [phone]  Work Phone      Not on file.  Mobile          [phone]      MESSAGE: PT SAID SHE IS WAITING TO HEAR FROM NURSE ABOUT SCHEDULING SCOPE PROCEDURE AT [facility]. PLEASE CALL      PHONE: [phone]

## 2018-07-02 NOTE — TELEPHONE ENCOUNTER
Notified pt explained to her referral was sent on 6/2/2018 for gastroenterology they will contact her with appt she did received phone number to follow up with suly with appt I told pt if she have any problems please call office back verbalized understanding

## 2018-07-10 ENCOUNTER — OFFICE VISIT (OUTPATIENT)
Dept: INTERNAL MEDICINE | Facility: CLINIC | Age: 48
End: 2018-07-10
Payer: MEDICAID

## 2018-07-10 VITALS
BODY MASS INDEX: 47.09 KG/M2 | WEIGHT: 293 LBS | SYSTOLIC BLOOD PRESSURE: 138 MMHG | DIASTOLIC BLOOD PRESSURE: 80 MMHG | HEART RATE: 87 BPM | RESPIRATION RATE: 20 BRPM | HEIGHT: 66 IN

## 2018-07-10 DIAGNOSIS — M54.42 CHRONIC BILATERAL LOW BACK PAIN WITH BILATERAL SCIATICA: ICD-10-CM

## 2018-07-10 DIAGNOSIS — R60.0 BILATERAL EDEMA OF LOWER EXTREMITY: ICD-10-CM

## 2018-07-10 DIAGNOSIS — I10 ESSENTIAL HYPERTENSION: Primary | ICD-10-CM

## 2018-07-10 DIAGNOSIS — M54.41 CHRONIC BILATERAL LOW BACK PAIN WITH BILATERAL SCIATICA: ICD-10-CM

## 2018-07-10 DIAGNOSIS — G89.29 CHRONIC BILATERAL LOW BACK PAIN WITH BILATERAL SCIATICA: ICD-10-CM

## 2018-07-10 PROCEDURE — 99999 PR PBB SHADOW E&M-EST. PATIENT-LVL III: CPT | Mod: PBBFAC,,, | Performed by: INTERNAL MEDICINE

## 2018-07-10 PROCEDURE — 99213 OFFICE O/P EST LOW 20 MIN: CPT | Mod: PBBFAC | Performed by: INTERNAL MEDICINE

## 2018-07-10 PROCEDURE — 99214 OFFICE O/P EST MOD 30 MIN: CPT | Mod: S$PBB,,, | Performed by: INTERNAL MEDICINE

## 2018-07-10 RX ORDER — FERROUS SULFATE 325(65) MG
325 TABLET, DELAYED RELEASE (ENTERIC COATED) ORAL
COMMUNITY
End: 2019-08-27

## 2018-07-10 NOTE — PROGRESS NOTES
Subjective:       Patient ID: Arron Ron is a 47 y.o. female.    Chief Complaint: Follow-up (SS Paperwork)      HPI:  Patient is known to me and presents for chronic low back pain and LE edema. She had lumbar DDD. Sx are worsening and now havin gmore sciatic symptoms. Bl LE numbness and tingling to the feet. Sx are limiting her ability to sit or stand for prolonged periods without pain and tingling. MRI reviewed.     She also reports b/l LE edema. BNP recently checked on ER visit, normal. No h/o cardiomyopathy. Renal fxn was noramal. She follows with Dr. Mays who was managing her BP meds. She is taking amlodipine, coreg aldactone and valsartan. BP controlled today      Past Medical History:   Diagnosis Date    Acid reflux     Anemia     Back pain     was under therapy    Chronic back pain     Constipation     Elevated cholesterol     Encounter for blood transfusion 1987    Endometriosis     Hyperlipidemia     Hypertension     Nausea & vomiting     Sleep apnea        Family History   Problem Relation Age of Onset    Hypertension Mother     Heart attack Mother     Hypertension Brother     Heart disease Maternal Grandmother     Hypertension Maternal Grandmother     Diabetes Maternal Aunt     Breast cancer Neg Hx     Colon cancer Neg Hx     Ovarian cancer Neg Hx        Social History     Social History    Marital status: Single     Spouse name: N/A    Number of children: N/A    Years of education: N/A     Occupational History    Not on file.     Social History Main Topics    Smoking status: Never Smoker    Smokeless tobacco: Never Used    Alcohol use Yes      Comment: rare    Drug use: No    Sexual activity: Yes     Partners: Male     Birth control/ protection: Surgical, See Surgical Hx      Comment: --BTL     Other Topics Concern    Not on file     Social History Narrative    No narrative on file       Review of Systems   Constitutional: Negative for activity change,  fatigue, fever and unexpected weight change.   HENT: Negative for congestion, ear pain, hearing loss, rhinorrhea and sore throat.    Eyes: Negative for pain, redness and visual disturbance.   Respiratory: Negative for cough, shortness of breath and wheezing.    Cardiovascular: Positive for leg swelling. Negative for chest pain and palpitations.   Gastrointestinal: Negative for abdominal pain, constipation, diarrhea, nausea and vomiting.   Genitourinary: Negative for dysuria, frequency, pelvic pain and urgency.   Musculoskeletal: Positive for back pain. Negative for joint swelling and neck pain.   Skin: Negative for color change, rash and wound.   Neurological: Negative for dizziness, tremors, weakness, light-headedness and headaches.         Objective:      Physical Exam   Constitutional: She is oriented to person, place, and time. She appears well-developed and well-nourished. No distress.   HENT:   Head: Normocephalic and atraumatic.   Right Ear: External ear normal.   Left Ear: External ear normal.   Eyes: Conjunctivae and EOM are normal. Pupils are equal, round, and reactive to light. Right eye exhibits no discharge. Left eye exhibits no discharge.   Neck: Neck supple. No tracheal deviation present.   Cardiovascular: Normal rate and regular rhythm.  Exam reveals no gallop and no friction rub.    No murmur heard.  Pulmonary/Chest: Effort normal and breath sounds normal. No respiratory distress. She has no wheezes. She has no rales.   Abdominal: Soft. Bowel sounds are normal. She exhibits no distension. There is no tenderness.   Musculoskeletal: She exhibits edema (1+ b/l LE edema) and tenderness (b/l lubmar paraspoinal and SI joints).   Neurological: She is alert and oriented to person, place, and time. No cranial nerve deficit.   Skin: Skin is warm and dry.   Psychiatric: She has a normal mood and affect. Her behavior is normal.   Vitals reviewed.      Assessment:       1. Essential hypertension    2. Bilateral  edema of lower extremity    3. Chronic bilateral low back pain with bilateral sciatica        Plan:       Arron was seen today for follow-up.    Diagnoses and all orders for this visit:    Essential hypertension  Bilateral edema of lower extremity  New problem  Stop amlodipine due to LE edema  Return 2 weeks for f/u edema nad BP check  Recent labs reviewed, BNP noraml, renal fxn noraml  Cont other BP meds    Chronic bilateral low back pain with bilateral sciatica  Worsening  Paperwork filled out  MRI reviewed    RTC 2 weeks and PRN

## 2018-07-25 PROBLEM — K59.00 CONSTIPATION: Status: ACTIVE | Noted: 2018-07-25

## 2018-08-03 ENCOUNTER — OFFICE VISIT (OUTPATIENT)
Dept: INTERNAL MEDICINE | Facility: CLINIC | Age: 48
End: 2018-08-03
Payer: MEDICAID

## 2018-08-03 VITALS
WEIGHT: 293 LBS | BODY MASS INDEX: 47.09 KG/M2 | DIASTOLIC BLOOD PRESSURE: 78 MMHG | RESPIRATION RATE: 20 BRPM | HEIGHT: 66 IN | HEART RATE: 79 BPM | SYSTOLIC BLOOD PRESSURE: 124 MMHG

## 2018-08-03 DIAGNOSIS — R60.0 BILATERAL EDEMA OF LOWER EXTREMITY: ICD-10-CM

## 2018-08-03 DIAGNOSIS — E78.5 HYPERLIPIDEMIA, UNSPECIFIED HYPERLIPIDEMIA TYPE: ICD-10-CM

## 2018-08-03 DIAGNOSIS — I10 ESSENTIAL HYPERTENSION: Primary | ICD-10-CM

## 2018-08-03 DIAGNOSIS — Z12.39 BREAST CANCER SCREENING: ICD-10-CM

## 2018-08-03 PROCEDURE — 99999 PR PBB SHADOW E&M-EST. PATIENT-LVL III: CPT | Mod: PBBFAC,,, | Performed by: INTERNAL MEDICINE

## 2018-08-03 PROCEDURE — 99213 OFFICE O/P EST LOW 20 MIN: CPT | Mod: PBBFAC | Performed by: INTERNAL MEDICINE

## 2018-08-03 PROCEDURE — 99213 OFFICE O/P EST LOW 20 MIN: CPT | Mod: S$PBB,,, | Performed by: INTERNAL MEDICINE

## 2018-08-03 RX ORDER — FLUTICASONE FUROATE AND VILANTEROL 100; 25 UG/1; UG/1
1 POWDER RESPIRATORY (INHALATION) DAILY
COMMUNITY
End: 2019-08-27

## 2018-08-03 NOTE — PROGRESS NOTES
Subjective:       Patient ID: Arron Ron is a 47 y.o. female.    Chief Complaint: Follow-up      HPI:  Patient is known to me and presents for follow up HTN and LE edema. At last visit we stopped amlodipine and remains on aldactone, valsartan and demadex and coreg. Today she still has some LE edema but worsneing. She had labs since I saw her last and her renal fxn is worsening. GFR in the 30s, when I last checked in February it was normal.      Past Medical History:   Diagnosis Date    Acid reflux     Anemia     Back pain     was under therapy    Chronic back pain     Constipation     Elevated cholesterol     Encounter for blood transfusion 1987    Endometriosis     Hyperlipidemia     Hypertension     Nausea & vomiting     Sleep apnea        Family History   Problem Relation Age of Onset    Hypertension Mother     Heart attack Mother     Hypertension Brother     Heart disease Maternal Grandmother     Hypertension Maternal Grandmother     Diabetes Maternal Aunt     Breast cancer Neg Hx     Colon cancer Neg Hx     Ovarian cancer Neg Hx        Social History     Social History    Marital status: Single     Spouse name: N/A    Number of children: N/A    Years of education: N/A     Occupational History    Not on file.     Social History Main Topics    Smoking status: Never Smoker    Smokeless tobacco: Never Used    Alcohol use Yes      Comment: rare    Drug use: No    Sexual activity: Yes     Partners: Male     Birth control/ protection: Surgical, See Surgical Hx      Comment: --BTL     Other Topics Concern    Not on file     Social History Narrative    No narrative on file       Review of Systems   Constitutional: Negative for activity change, fatigue, fever and unexpected weight change.   HENT: Negative for congestion, ear pain, hearing loss, rhinorrhea and sore throat.    Eyes: Negative for pain, redness and visual disturbance.   Respiratory: Negative for cough,  shortness of breath and wheezing.    Cardiovascular: Positive for leg swelling. Negative for chest pain and palpitations.   Gastrointestinal: Negative for abdominal pain, constipation, diarrhea, nausea and vomiting.   Genitourinary: Negative for dysuria, frequency, pelvic pain and urgency.   Musculoskeletal: Negative for back pain, joint swelling and neck pain.   Skin: Negative for color change, rash and wound.   Neurological: Negative for dizziness, tremors, weakness, light-headedness and headaches.         Objective:      Physical Exam   Constitutional: She is oriented to person, place, and time. She appears well-developed and well-nourished. No distress.   HENT:   Head: Normocephalic and atraumatic.   Right Ear: External ear normal.   Left Ear: External ear normal.   Eyes: Conjunctivae and EOM are normal. Pupils are equal, round, and reactive to light. Right eye exhibits no discharge. Left eye exhibits no discharge.   Neck: Neck supple. No tracheal deviation present.   Cardiovascular: Normal rate and regular rhythm.  Exam reveals no gallop and no friction rub.    No murmur heard.  Pulmonary/Chest: Effort normal and breath sounds normal. No respiratory distress. She has no wheezes. She has no rales.   Abdominal: Soft. Bowel sounds are normal. She exhibits no distension. There is no tenderness.   Neurological: She is alert and oriented to person, place, and time. No cranial nerve deficit.   Skin: Skin is warm and dry.   Psychiatric: She has a normal mood and affect. Her behavior is normal.   Vitals reviewed.      Assessment:       1. Essential hypertension    2. Bilateral edema of lower extremity    3. Hyperlipidemia, unspecified hyperlipidemia type    4. Breast cancer screening        Plan:       Arron was seen today for follow-up.    Diagnoses and all orders for this visit:    Essential hypertension  -     Basic metabolic panel; Future    Bilateral edema of lower extremity  -     Basic metabolic panel;  Future    Hyperlipidemia, unspecified hyperlipidemia type    Breast cancer screening  -     Mammo Digital Screening Bilat with Michael without CAD; Future        She is no longer following with Dr. Tello She understands I am not prescribing narcotics for chronic pain. She is OK with getting off [place]. MRI scanned to chart, DDD of C-spine is noted.     Her BP is well controlled today  I'd like her to cut back on the demedex, stop it for now and see if GFR recovers  She does have mild LE edema but I think we are overdoing it with medications and she has been seeing multiple MDs who are adjusting and adding and taking away and we need to just find a good regimen for her.    She is feeling sad today as it is almost the 1 year anniversary of her son's death. She was prescribed Xanax by Dr. Tello and if she stays with me as PCP I will assume responsibility for that prescription given her circumstances.

## 2018-08-06 ENCOUNTER — TELEPHONE (OUTPATIENT)
Dept: INTERNAL MEDICINE | Facility: CLINIC | Age: 48
End: 2018-08-06

## 2018-08-06 RX ORDER — LOSARTAN POTASSIUM 100 MG/1
100 TABLET ORAL DAILY
Qty: 90 TABLET | Refills: 3 | Status: SHIPPED | OUTPATIENT
Start: 2018-08-06 | End: 2018-09-10

## 2018-08-06 NOTE — TELEPHONE ENCOUNTER
----- Message from Bee Hurst sent at 2018  2:25 PM CDT -----  Contact: SELF  Arron Ron  MRN: [id number]  : 1970  PCP: Mahi Rojas  Home Phone      [phone]  Work Phone      Not on file.  Mobile          [phone]      MESSAGE: SAID SHE FOUND OUT THAT HER VALSARTAN NEEDS TO BE CHANGED    PHONE: [phone]    PHARMACY: [facility]

## 2018-08-10 ENCOUNTER — TELEPHONE (OUTPATIENT)
Dept: INTERNAL MEDICINE | Facility: CLINIC | Age: 48
End: 2018-08-10

## 2018-08-10 NOTE — TELEPHONE ENCOUNTER
Pt c/o still holding fluid with spironolactone and demadex. She is requesting something more be sent in. Please advise.

## 2018-08-10 NOTE — TELEPHONE ENCOUNTER
She needs to repeat her BMP before I can decide what medication to start as her kidney function was not good last check.

## 2018-08-10 NOTE — TELEPHONE ENCOUNTER
----- Message from Bee Hurst sent at 8/10/2018  9:06 AM CDT -----  Contact: SELF  Arron Ron  MRN: [id number]  : 1970  PCP: Mahi Rojas  Home Phone      [phone]  Work Phone      Not on file.  Mobile          [phone]      MESSAGE: NEEDS TO SPEAK WITH THE NURSE ABOUT FLUID PILL. SAID SHE IS HAVING LOT OF ISSUES WITH SWELLING. MAY NEED SOMETHING ELSE CALLED IN. PLEASE CALL     PHONE: [phone]    PHARMACY: [facility]

## 2018-08-14 ENCOUNTER — LAB VISIT (OUTPATIENT)
Dept: LAB | Facility: HOSPITAL | Age: 48
End: 2018-08-14
Attending: INTERNAL MEDICINE
Payer: MEDICAID

## 2018-08-14 DIAGNOSIS — I10 ESSENTIAL HYPERTENSION: ICD-10-CM

## 2018-08-14 DIAGNOSIS — R60.0 BILATERAL EDEMA OF LOWER EXTREMITY: ICD-10-CM

## 2018-08-14 LAB
ANION GAP SERPL CALC-SCNC: 6 MMOL/L
BUN SERPL-MCNC: 14 MG/DL
CALCIUM SERPL-MCNC: 9.4 MG/DL
CHLORIDE SERPL-SCNC: 106 MMOL/L
CO2 SERPL-SCNC: 28 MMOL/L
CREAT SERPL-MCNC: 1.2 MG/DL
EST. GFR  (AFRICAN AMERICAN): >60 ML/MIN/1.73 M^2
EST. GFR  (NON AFRICAN AMERICAN): 54 ML/MIN/1.73 M^2
GLUCOSE SERPL-MCNC: 100 MG/DL
POTASSIUM SERPL-SCNC: 4.1 MMOL/L
SODIUM SERPL-SCNC: 140 MMOL/L

## 2018-08-14 PROCEDURE — 36415 COLL VENOUS BLD VENIPUNCTURE: CPT

## 2018-08-14 PROCEDURE — 80048 BASIC METABOLIC PNL TOTAL CA: CPT

## 2018-08-17 ENCOUNTER — HOSPITAL ENCOUNTER (OUTPATIENT)
Dept: RADIOLOGY | Facility: HOSPITAL | Age: 48
Discharge: HOME | End: 2018-08-17
Attending: INTERNAL MEDICINE
Payer: MEDICAID

## 2018-08-17 ENCOUNTER — OFFICE VISIT (OUTPATIENT)
Dept: INTERNAL MEDICINE | Facility: CLINIC | Age: 48
End: 2018-08-17
Payer: MEDICAID

## 2018-08-17 VITALS — WEIGHT: 293 LBS | HEIGHT: 66 IN | BODY MASS INDEX: 47.09 KG/M2

## 2018-08-17 VITALS
HEART RATE: 82 BPM | WEIGHT: 293 LBS | DIASTOLIC BLOOD PRESSURE: 80 MMHG | RESPIRATION RATE: 16 BRPM | HEIGHT: 66 IN | OXYGEN SATURATION: 98 % | BODY MASS INDEX: 47.09 KG/M2 | SYSTOLIC BLOOD PRESSURE: 128 MMHG

## 2018-08-17 DIAGNOSIS — E78.5 HYPERLIPIDEMIA, UNSPECIFIED HYPERLIPIDEMIA TYPE: ICD-10-CM

## 2018-08-17 DIAGNOSIS — G89.29 CHRONIC BILATERAL LOW BACK PAIN WITH BILATERAL SCIATICA: ICD-10-CM

## 2018-08-17 DIAGNOSIS — R60.0 BILATERAL EDEMA OF LOWER EXTREMITY: ICD-10-CM

## 2018-08-17 DIAGNOSIS — Z12.39 BREAST CANCER SCREENING: ICD-10-CM

## 2018-08-17 DIAGNOSIS — I10 ESSENTIAL HYPERTENSION: Primary | ICD-10-CM

## 2018-08-17 DIAGNOSIS — F32.0 MILD SINGLE CURRENT EPISODE OF MAJOR DEPRESSIVE DISORDER: ICD-10-CM

## 2018-08-17 DIAGNOSIS — M54.42 CHRONIC BILATERAL LOW BACK PAIN WITH BILATERAL SCIATICA: ICD-10-CM

## 2018-08-17 DIAGNOSIS — M54.41 CHRONIC BILATERAL LOW BACK PAIN WITH BILATERAL SCIATICA: ICD-10-CM

## 2018-08-17 DIAGNOSIS — E66.01 MORBID OBESITY: ICD-10-CM

## 2018-08-17 PROCEDURE — 77067 SCR MAMMO BI INCL CAD: CPT | Mod: 26,,, | Performed by: RADIOLOGY

## 2018-08-17 PROCEDURE — 77063 BREAST TOMOSYNTHESIS BI: CPT | Mod: TC

## 2018-08-17 PROCEDURE — 99214 OFFICE O/P EST MOD 30 MIN: CPT | Mod: S$PBB,,, | Performed by: INTERNAL MEDICINE

## 2018-08-17 PROCEDURE — 99999 PR PBB SHADOW E&M-EST. PATIENT-LVL III: CPT | Mod: PBBFAC,,, | Performed by: INTERNAL MEDICINE

## 2018-08-17 PROCEDURE — 99213 OFFICE O/P EST LOW 20 MIN: CPT | Mod: PBBFAC | Performed by: INTERNAL MEDICINE

## 2018-08-17 PROCEDURE — 77063 BREAST TOMOSYNTHESIS BI: CPT | Mod: 26,,, | Performed by: RADIOLOGY

## 2018-08-17 NOTE — Clinical Note
Epic is showing she has not filled her BP meds since May. Can we call [facility] and verify if she has been filling her meds. I supsect she has since her BP is controlled today but want to confirm. Thanks!

## 2018-08-17 NOTE — PROGRESS NOTES
Subjective:       Patient ID: Arron Ron is a 47 y.o. female.    Chief Complaint: Hypertension (2 week f/u)      HPI:  Patient is known to me and presents for follow up HTN and LE edema. Last visit she also had LULU with GFR in 30s, previously normal. We stopped amlodipine and demadex due to her GFR at last visit. She remains on aldactone, losartan, coreg for BP control. Today Cr back up to 1.2 and GFR 54 (baseline Cr 1 and GFR > 60). Since I saw her last saw [facility] who did LE US, trying to get results but she reports no DVT.     Review of her medicatoin adherence however shows that aldactone has not been filled since 5/2018, losartan has not been filled at all, coreg has not been filled since 5/2018. She reports she has all 3 meds. I suspect she is taking something as her BP is perfect so will call pharmacy to verify.     She will establish with pain management of her chronic low back pain to discuss narcotic use and possibly do injections for pain management. She knows her weight is contributing to her pain and working on weight loss    Anxiety sx under control Using Xanax PRN. The anniversary of her son's death just passed which is causing some sadness and stress.   Past Medical History:   Diagnosis Date    Acid reflux     Anemia     Back pain     was under therapy    Chronic back pain     Constipation     Elevated cholesterol     Encounter for blood transfusion 1987    Endometriosis     Hyperlipidemia     Hypertension     Nausea & vomiting     Sleep apnea        Family History   Problem Relation Age of Onset    Hypertension Mother     Heart attack Mother     Hypertension Brother     Heart disease Maternal Grandmother     Hypertension Maternal Grandmother     Diabetes Maternal Aunt     Breast cancer Neg Hx     Colon cancer Neg Hx     Ovarian cancer Neg Hx        Social History     Socioeconomic History    Marital status: Single     Spouse name: Not on file    Number of children: Not  on file    Years of education: Not on file    Highest education level: Not on file   Social Needs    Financial resource strain: Not on file    Food insecurity - worry: Not on file    Food insecurity - inability: Not on file    Transportation needs - medical: Not on file    Transportation needs - non-medical: Not on file   Occupational History    Not on file   Tobacco Use    Smoking status: Never Smoker    Smokeless tobacco: Never Used   Substance and Sexual Activity    Alcohol use: Yes     Comment: rare    Drug use: No    Sexual activity: Yes     Partners: Male     Birth control/protection: Surgical, See Surgical Hx     Comment: --BTL   Other Topics Concern    Not on file   Social History Narrative    Not on file       Review of Systems   Constitutional: Negative for activity change, fatigue, fever and unexpected weight change.   HENT: Negative for congestion, ear pain, hearing loss, rhinorrhea and sore throat.    Eyes: Negative for pain, redness and visual disturbance.   Respiratory: Negative for cough, shortness of breath and wheezing.    Cardiovascular: Positive for leg swelling. Negative for chest pain and palpitations.   Gastrointestinal: Negative for abdominal pain, constipation, diarrhea, nausea and vomiting.   Genitourinary: Negative for dysuria, frequency and urgency.   Musculoskeletal: Positive for back pain. Negative for joint swelling and neck pain.   Skin: Negative for color change, rash and wound.   Neurological: Negative for dizziness, tremors, weakness, light-headedness and headaches.         Objective:      Physical Exam   Constitutional: She is oriented to person, place, and time. She appears well-developed and well-nourished. No distress.   HENT:   Head: Normocephalic and atraumatic.   Right Ear: External ear normal.   Left Ear: External ear normal.   Eyes: Conjunctivae and EOM are normal. Pupils are equal, round, and reactive to light. Right eye exhibits no discharge. Left  eye exhibits no discharge.   Neck: Neck supple. No tracheal deviation present.   Cardiovascular: Normal rate and regular rhythm.   No murmur heard.  Pulmonary/Chest: Effort normal and breath sounds normal. No respiratory distress. She has no wheezes. She has no rales.   Abdominal: Soft. Bowel sounds are normal. She exhibits no distension. There is no tenderness.   Musculoskeletal: She exhibits edema (trace b/l LE edema, improved).   Neurological: She is alert and oriented to person, place, and time. No cranial nerve deficit.   Skin: Skin is warm and dry.   Psychiatric: She has a normal mood and affect. Her behavior is normal.   Vitals reviewed.      Assessment:       1. Essential hypertension    2. Morbid obesity    3. Bilateral edema of lower extremity    4. Chronic bilateral low back pain with bilateral sciatica    5. Mild single current episode of major depressive disorder    6. Hyperlipidemia, unspecified hyperlipidemia type        Plan:       Arron was seen today for hypertension.    Diagnoses and all orders for this visit:    Essential hypertension  -     CBC auto differential; Future  -     Comprehensive metabolic panel; Future  -     TSH; Future  -     Lipid panel; Future  Chronic controlled  Will confirm with [facility] she is taking these medications  Continue medications at same dose  Low Na diet  Exercise, weight loss  Check BP and keep log for next visit    Morbid obesity  -     CBC auto differential; Future  -     Comprehensive metabolic panel; Future  -     TSH; Future  -     Lipid panel; Future  Spent > 10 minutes on diet and exercise counseling    Bilateral edema of lower extremity  -     CBC auto differential; Future  -     Comprehensive metabolic panel; Future  -     TSH; Future  -     Lipid panel; Future  Improved  Stay off demadex and amlodipine  Cont losartan, aldactone and coreg  Having TTE with Dr. Mays, will follow up    Chronic bilateral low back pain with bilateral  sciatica  Made herself an appoitment with outside pain management. Great job being proactive!  Has DDD    Mild single current episode of major depressive disorder  -     TSH; Future  Chronic controlled  Uses Xanax PRN for panic attacks, will refill for now. If worsening sx will need to discuss SSRI    Hyperlipidemia, unspecified hyperlipidemia type  -     CBC auto differential; Future  -     Comprehensive metabolic panel; Future  -     Lipid panel; Future  Chronic controlled  Cont statin at same dose    Note: sees Dr. Ocampo for astham who prescribed the Breo recently.     RTC 6 months with labs and PRN

## 2018-08-29 ENCOUNTER — HOSPITAL ENCOUNTER (EMERGENCY)
Facility: HOSPITAL | Age: 48
Discharge: HOME | End: 2018-08-29
Attending: SURGERY
Payer: MEDICAID

## 2018-08-29 VITALS
HEART RATE: 73 BPM | WEIGHT: 293 LBS | OXYGEN SATURATION: 98 % | RESPIRATION RATE: 18 BRPM | SYSTOLIC BLOOD PRESSURE: 143 MMHG | TEMPERATURE: 98 F | BODY MASS INDEX: 48.1 KG/M2 | DIASTOLIC BLOOD PRESSURE: 92 MMHG

## 2018-08-29 DIAGNOSIS — R51.9 NONINTRACTABLE EPISODIC HEADACHE, UNSPECIFIED HEADACHE TYPE: Primary | ICD-10-CM

## 2018-08-29 PROCEDURE — 63600175 PHARM REV CODE 636 W HCPCS: Performed by: SURGERY

## 2018-08-29 PROCEDURE — 96372 THER/PROPH/DIAG INJ SC/IM: CPT

## 2018-08-29 PROCEDURE — 25000003 PHARM REV CODE 250: Performed by: SURGERY

## 2018-08-29 PROCEDURE — 99283 EMERGENCY DEPT VISIT LOW MDM: CPT | Mod: 25

## 2018-08-29 RX ORDER — PROMETHAZINE HYDROCHLORIDE 25 MG/ML
25 INJECTION, SOLUTION INTRAMUSCULAR; INTRAVENOUS
Status: COMPLETED | OUTPATIENT
Start: 2018-08-29 | End: 2018-08-29

## 2018-08-29 RX ORDER — KETOROLAC TROMETHAMINE 30 MG/ML
60 INJECTION, SOLUTION INTRAMUSCULAR; INTRAVENOUS
Status: COMPLETED | OUTPATIENT
Start: 2018-08-29 | End: 2018-08-29

## 2018-08-29 RX ORDER — CLONIDINE HYDROCHLORIDE 0.1 MG/1
0.2 TABLET ORAL
Status: DISCONTINUED | OUTPATIENT
Start: 2018-08-29 | End: 2018-08-29 | Stop reason: HOSPADM

## 2018-08-29 RX ORDER — LORAZEPAM 1 MG/1
2 TABLET ORAL
Status: COMPLETED | OUTPATIENT
Start: 2018-08-29 | End: 2018-08-29

## 2018-08-29 RX ADMIN — LORAZEPAM 2 MG: 1 TABLET ORAL at 09:08

## 2018-08-29 RX ADMIN — PROMETHAZINE HYDROCHLORIDE 25 MG: 25 INJECTION INTRAMUSCULAR; INTRAVENOUS at 09:08

## 2018-08-29 RX ADMIN — KETOROLAC TROMETHAMINE 60 MG: 30 INJECTION, SOLUTION INTRAMUSCULAR at 09:08

## 2018-08-29 NOTE — ED PROVIDER NOTES
Encounter Date: 8/29/2018       History     Chief Complaint   Patient presents with    Headache     Patient is 48-year-old black female who presents with complaint of frontal headache, which she has had previously, but which has continued for 3 days without relief.  She has tried over-the-counter medications.  She also has history of hypertension, and was noted to have elevated blood pressure it in triage to date.  She also tells me that she has had some unfortunate events in the family causing a lot of stress and anxiety, which may be contributing to her headache.          Review of patient's allergies indicates:  No Known Allergies  Past Medical History:   Diagnosis Date    Acid reflux     Anemia     Back pain     was under therapy    Chronic back pain     Constipation     Elevated cholesterol     Encounter for blood transfusion 1987    Endometriosis     Hyperlipidemia     Hypertension     Nausea & vomiting     Sleep apnea      Past Surgical History:   Procedure Laterality Date    BREAST BIOPSY      left , benign    CHOLECYSTECTOMY      COLONOSCOPY  2017        DILATION AND CURETTAGE OF UTERUS      ENDOMETRIAL ABLATION      gallstone      HYSTERECTOMY  10/1/15    [facility]- bleeding/ endometriosis    OOPHORECTOMY      TUBAL LIGATION      UPPER GASTROINTESTINAL ENDOSCOPY  2017     hh      Family History   Problem Relation Age of Onset    Hypertension Mother     Heart attack Mother     Hypertension Brother     Heart disease Maternal Grandmother     Hypertension Maternal Grandmother     Diabetes Maternal Aunt     Breast cancer Neg Hx     Colon cancer Neg Hx     Ovarian cancer Neg Hx      Social History     Tobacco Use    Smoking status: Never Smoker    Smokeless tobacco: Never Used   Substance Use Topics    Alcohol use: Yes     Comment: rare    Drug use: No     Review of Systems   Neurological: Positive for headaches. Negative for numbness.   All other systems reviewed and are  negative.      Physical Exam     Initial Vitals   BP Pulse Resp Temp SpO2   08/29/18 0920 08/29/18 0920 08/29/18 0920 08/29/18 0920 08/29/18 0922   (!) 193/106 77 18 97.6 °F (36.4 °C) 98 %      MAP       --                Physical Exam    Nursing note and vitals reviewed.  Constitutional: She appears well-developed.   Morbid Obesity.   HENT:   Head: Normocephalic and atraumatic.   Eyes: EOM are normal. Pupils are equal, round, and reactive to light.   Neck: Normal range of motion. Neck supple.   Cardiovascular: Normal rate.   Pulmonary/Chest: No respiratory distress. She has no wheezes.   Abdominal: She exhibits no distension. There is no tenderness.   Musculoskeletal: Normal range of motion.   Neurological: She is alert and oriented to person, place, and time.   Psychiatric:   Depressed Affect.         ED Course   Procedures  Labs Reviewed - No data to display       Imaging Results    None         BP improved.                       Clinical Impression:   The encounter diagnosis was Nonintractable episodic headache, unspecified headache type.      Disposition:   Disposition: Discharged  Condition: Stable                        Bora Trujillo Jr., MD  08/29/18 1020

## 2018-09-10 ENCOUNTER — OFFICE VISIT (OUTPATIENT)
Dept: OBSTETRICS AND GYNECOLOGY | Facility: CLINIC | Age: 48
End: 2018-09-10
Payer: MEDICAID

## 2018-09-10 VITALS
RESPIRATION RATE: 10 BRPM | HEIGHT: 66 IN | SYSTOLIC BLOOD PRESSURE: 126 MMHG | DIASTOLIC BLOOD PRESSURE: 82 MMHG | WEIGHT: 293 LBS | BODY MASS INDEX: 47.09 KG/M2 | HEART RATE: 88 BPM

## 2018-09-10 DIAGNOSIS — Z90.722 HISTORY OF TOTAL HYSTERECTOMY WITH BILATERAL SALPINGO-OOPHORECTOMY (BSO): ICD-10-CM

## 2018-09-10 DIAGNOSIS — Z78.0 POSTMENOPAUSAL STATUS: ICD-10-CM

## 2018-09-10 DIAGNOSIS — Z90.710 HISTORY OF TOTAL HYSTERECTOMY WITH BILATERAL SALPINGO-OOPHORECTOMY (BSO): ICD-10-CM

## 2018-09-10 DIAGNOSIS — Z01.419 WELL WOMAN EXAM WITH ROUTINE GYNECOLOGICAL EXAM: Primary | ICD-10-CM

## 2018-09-10 DIAGNOSIS — Z90.79 HISTORY OF TOTAL HYSTERECTOMY WITH BILATERAL SALPINGO-OOPHORECTOMY (BSO): ICD-10-CM

## 2018-09-10 PROCEDURE — 99396 PREV VISIT EST AGE 40-64: CPT | Mod: S$PBB,,, | Performed by: OBSTETRICS & GYNECOLOGY

## 2018-09-10 PROCEDURE — 99213 OFFICE O/P EST LOW 20 MIN: CPT | Mod: PBBFAC | Performed by: OBSTETRICS & GYNECOLOGY

## 2018-09-10 PROCEDURE — 99999 PR PBB SHADOW E&M-EST. PATIENT-LVL III: CPT | Mod: PBBFAC,,, | Performed by: OBSTETRICS & GYNECOLOGY

## 2018-09-11 NOTE — PROGRESS NOTES
Subjective:    Patient ID: Arron Ron is a 48 y.o. y.o. female.     Chief Complaint: Annual Well Woman Exam     History of Present Illness:  Arron presents today for Annual Well Woman exam.  She has had hysterectomy with BSO.    She is currently using no hormone replacement therapy and she reports that she does experience menopausal symptoms but not bothersome enough for medication.  She denies breast tenderness, masses, nipple discharge. She had negative screening mammogram last month.  She denies difficulty with urination. She occasionally has constipation.  She is current with colonoscopy.  She takes supplemental calcium and vitamin D.  She has no complaints today.    Past Medical History:   Diagnosis Date    Acid reflux     Anemia     Back pain     was under therapy    Chronic back pain     Constipation     Elevated cholesterol     Encounter for blood transfusion 1987    Endometriosis     Hyperlipidemia     Hypertension     Nausea & vomiting     Sleep apnea      Past Surgical History:   Procedure Laterality Date    BREAST BIOPSY      left , benign    CHOLECYSTECTOMY      COLONOSCOPY  2017        CYSTOSCOPY N/A 10/1/2015    Per[facility] at [facility] OR    DILATION AND CURETTAGE OF UTERUS      ENDOMETRIAL ABLATION      ESOPHAGOGASTRODUODENOSCOPY (EGD) N/A 7/24/2017    Per[facility] Sr. Miah Tello [facility] at [facility] ENDO    gallstone      HYSTERECTOMY  10/1/15    [facility]- bleeding/ endometriosis    HYSTERECTOMY-TOTAL LAPAROSCOPIC ([facility]) N/A 10/1/2015    Per[facility] at [facility] OR    KJRAK-PAFFTLJJN-RTNVXRZWTEGT N/A 10/1/2015    Performed by [facility] at [facility] OR    OOPHORECTOMY      IYAHTEGL-FWXRLPEULFNL-WAIAEYMTBXIS Bilateral 10/1/2015    Performed by [facility] at [facility] OR    TUBAL LIGATION      UPPER GASTROINTESTINAL ENDOSCOPY  2017    [facility]      Review of patient's allergies indicates:  No Known Allergies  Current Outpatient Medications on  File Prior to Visit   Medication Sig Dispense Refill    ALPRAZolam (XANAX) 2 MG Tab       aspirin (ECOTRIN) 81 MG EC tablet Take 81 mg by mouth once daily.      atorvastatin (LIPITOR) 10 MG tablet       carvedilol (COREG) 25 MG tablet       cyclobenzaprine (FLEXERIL) 10 MG tablet Take 1 tablet (10 mg total) by mouth 2 (two) times daily. 90 tablet 2    ergocalciferol, vitamin D2, (VITAMIN D ORAL) Take by mouth.      ferrous sulfate 325 (65 FE) MG EC tablet Take 325 mg by mouth 3 (three) times daily with meals.      fish oil-omega-3 fatty acids 300-1,000 mg capsule Take by mouth once daily.      fluticasone-vilanterol (BREO ELLIPTA) 100-25 mcg/dose diskus inhaler Inhale 1 puff into the lungs once daily. Controller      pantoprazole (PROTONIX) 40 MG tablet Take 1 tablet (40 mg total) by mouth once daily. 30 tablet 5    [DISCONTINUED] losartan (COZAAR) 100 MG tablet Take 1 tablet (100 mg total) by mouth once daily. 90 tablet 3    [DISCONTINUED] spironolactone (ALDACTONE) 50 MG tablet Take 1 tablet (50 mg total) by mouth once daily. 30 tablet 11     No current facility-administered medications on file prior to visit.      Social History     Socioeconomic History    Marital status: Single     Spouse name: Not on file    Number of children: Not on file    Years of education: Not on file    Highest education level: Not on file   Social Needs    Financial resource strain: Not on file    Food insecurity - worry: Not on file    Food insecurity - inability: Not on file    Transportation needs - medical: Not on file    Transportation needs - non-medical: Not on file   Occupational History    Not on file   Tobacco Use    Smoking status: Never Smoker    Smokeless tobacco: Never Used   Substance and Sexual Activity    Alcohol use: Yes     Comment: rare    Drug use: No    Sexual activity: Yes     Partners: Male     Birth control/protection: Surgical, See Surgical Hx     Comment: --BTL   Other  Topics Concern    Not on file   Social History Narrative    Not on file     Family History   Problem Relation Age of Onset    Hypertension Mother     Heart attack Mother     Hypertension Brother     Heart disease Maternal Grandmother     Hypertension Maternal Grandmother     Diabetes Maternal Aunt     Breast cancer Neg Hx     Colon cancer Neg Hx     Ovarian cancer Neg Hx      The following portions of the patient's history were reviewed and updated as appropriate: allergies, current medications, past family history, past medical history, past social history, past surgical history and problem list.      Menstrual History:   Patient's last menstrual period was 2015.    OB History      Para Term  AB Living    6 5 5   1 4    SAB TAB Ectopic Multiple Live Births    1                    ROS:   CONSTITUTIONAL: Negative for fever, chills, diaphoresis, weakness, fatigue, weight loss, weight gain  ENT: negative for sore throat, nasal congestion, nasal discharge, epistaxis, tinnitus, hearing loss  EYES: negative for blurry vision, decreased vision, loss of vision, eye pain, diplopia, photophobia, discharge  SKIN: Negative for rash, itching, hives  RESPIRATORY: negative for cough, hemoptysis, shortness of breath, pleuritic chest pain, wheezing  CARDIOVASCULAR: negative for chest pain, dyspnea on exertion, orthopnea, paroxysmal nocturnal dyspnea, edema, palpitations  BREAST: negative for breast pain, mass, nipple changes, nipple discharge  GASTROINTESTINAL: negative for abdominal pain, flank pain, nausea, vomiting, diarrhea, constipation, black stool, blood in stool  GENITOURINARY: negative for abnormal vaginal bleeding, amenorrhea, decreased libido, dysuria, genital sores, hematuria, incontinence, menorrhagia, pelvic pain, urinary frequency, vaginal discharge  HEMATOLOGIC/LYMPHATIC: negative for swollen lymph nodes, bleeding, bruising  MUSCULOSKELETAL: negative for back pain, joint pain, joint  stiffness, joint swelling, muscle pain, muscle weakness  NEUROLOGICAL: negative for dizzy/vertigo, headache, focal weakness, numbness/tingling, speech problems, loss of consciousness, confusion, memory loss  BEHAVORIAL/PSYCH: negative for anxiety, depression, psychosis  ENDOCRINE: negative for polydipsia/polyuria, palpitations, skin changes, temperature intolerance, unexpected weight changes  ALLERGIC/IMMUNOLOGIC: negative for urticaria, hay fever, angioedema      Objective:    Vital Signs:  Vitals:    09/10/18 1246   BP: 126/82   Pulse: 88   Resp: 10       Physical Exam:  General:  alert; oriented x 4; well-nourished female   Skin:  Skin color, texture, turgor normal. No rashes or lesions   HEENT:  conjunctivae/corneas clear..   Neck: supple, trachea midline   Respiratory:  clear to auscultation bilaterally   Heart:  regular rate and rhythm   Breasts:  Symmetrical; no palpable masses or lymphadenopathy; nipples protruding bilaterally; no discharge, erythema, or tenderness   Abdomen:  soft, non-tender; bowel sounds normal   Pelvis: External genitalia: normal general appearance  Urinary system: urethral meatus normal, bladder nontender  Vaginal: atrophic mucosa without prolapse or lesions  Cervix: removed surgically  Uterus: removed surgically  Adnexa: nontender; no palpable masses   Extremities: Normal ROM; no edema, no cyanosis   Neurologial: Normal strength and tone. No focal numbness or weakness. Reflexes 2+ and equal.   Psychiatric: normal mood, speech, dress, and thought processes         Assessment:      1. Well woman exam with routine gynecological exam    2. History of total hysterectomy with bilateral salpingo-oophorectomy (BSO)    3. Postmenopausal status          Plan:      Well woman exam with routine gynecological exam    History of total hysterectomy with bilateral salpingo-oophorectomy (BSO)    Postmenopausal status        COUNSELING:  Arron was counseled on A.C.O.G. Pap guidelines and  recommendations for yearly pelvic exams in addition to recommendations for yearly mammograms and monthly self breast exams. In addition she was counseled on recommendations regarding adequate intake of calcium and vitamin D.  She is to see her PCP for other health maintenance.

## 2019-01-14 ENCOUNTER — OFFICE VISIT (OUTPATIENT)
Dept: INTERNAL MEDICINE | Facility: CLINIC | Age: 49
End: 2019-01-14
Payer: MEDICAID

## 2019-01-14 VITALS
WEIGHT: 293 LBS | RESPIRATION RATE: 18 BRPM | HEIGHT: 66 IN | BODY MASS INDEX: 47.09 KG/M2 | SYSTOLIC BLOOD PRESSURE: 140 MMHG | DIASTOLIC BLOOD PRESSURE: 88 MMHG | HEART RATE: 100 BPM

## 2019-01-14 DIAGNOSIS — M54.42 CHRONIC BILATERAL LOW BACK PAIN WITH BILATERAL SCIATICA: Primary | ICD-10-CM

## 2019-01-14 DIAGNOSIS — M54.41 CHRONIC BILATERAL LOW BACK PAIN WITH BILATERAL SCIATICA: Primary | ICD-10-CM

## 2019-01-14 DIAGNOSIS — G89.29 CHRONIC BILATERAL LOW BACK PAIN WITH BILATERAL SCIATICA: Primary | ICD-10-CM

## 2019-01-14 DIAGNOSIS — E66.01 MORBID OBESITY: ICD-10-CM

## 2019-01-14 DIAGNOSIS — I10 ESSENTIAL HYPERTENSION: ICD-10-CM

## 2019-01-14 DIAGNOSIS — M25.562 ACUTE PAIN OF LEFT KNEE: ICD-10-CM

## 2019-01-14 PROCEDURE — 99214 PR OFFICE/OUTPT VISIT, EST, LEVL IV, 30-39 MIN: ICD-10-PCS | Mod: S$PBB,,, | Performed by: INTERNAL MEDICINE

## 2019-01-14 PROCEDURE — 99999 PR PBB SHADOW E&M-EST. PATIENT-LVL IV: CPT | Mod: PBBFAC,,, | Performed by: INTERNAL MEDICINE

## 2019-01-14 PROCEDURE — 99214 OFFICE O/P EST MOD 30 MIN: CPT | Mod: PBBFAC | Performed by: INTERNAL MEDICINE

## 2019-01-14 PROCEDURE — 99214 OFFICE O/P EST MOD 30 MIN: CPT | Mod: S$PBB,,, | Performed by: INTERNAL MEDICINE

## 2019-01-14 PROCEDURE — 99999 PR PBB SHADOW E&M-EST. PATIENT-LVL IV: ICD-10-PCS | Mod: PBBFAC,,, | Performed by: INTERNAL MEDICINE

## 2019-01-14 RX ORDER — SPIRONOLACTONE 25 MG/1
TABLET ORAL
COMMUNITY
Start: 2019-01-09 | End: 2019-06-24 | Stop reason: SDUPTHER

## 2019-01-14 RX ORDER — AMLODIPINE BESYLATE 5 MG/1
TABLET ORAL
COMMUNITY
Start: 2018-11-02 | End: 2019-02-26

## 2019-01-14 RX ORDER — IRBESARTAN 300 MG/1
TABLET ORAL
COMMUNITY
Start: 2019-01-09 | End: 2019-08-27

## 2019-01-14 RX ORDER — ALBUTEROL SULFATE 0.83 MG/ML
2.5 SOLUTION RESPIRATORY (INHALATION) EVERY 6 HOURS PRN
COMMUNITY
End: 2019-08-27

## 2019-01-14 RX ORDER — TORSEMIDE 20 MG/1
TABLET ORAL
COMMUNITY
Start: 2019-01-09 | End: 2019-08-27

## 2019-01-14 RX ORDER — TRAMADOL HYDROCHLORIDE 50 MG/1
TABLET ORAL
COMMUNITY
Start: 2019-01-09 | End: 2019-02-26

## 2019-01-14 RX ORDER — DOXAZOSIN 2 MG/1
TABLET ORAL
COMMUNITY
Start: 2018-11-08 | End: 2020-10-12

## 2019-01-14 NOTE — PROGRESS NOTES
Subjective:       Patient ID: Arron Ron is a 48 y.o. female.    Chief Complaint: Follow-up (ER); Leg Pain; and Back Pain      HPI:  Patient is known to me and presents for ER follow up for left leg/knee pain. She has chronic low back pain for which she sees Dr. Tello who prescribed tramadol. She thinks her leg/knee pain is coming from her back pain. Had x-ray showing mild spur of the knee. She knows her weight is contributing ot her pain issues and she is working on weight loss by changing her diet. Not seeing much success yet. Would be interested in surgical management.     Past Medical History:   Diagnosis Date    Acid reflux     Anemia     Back pain     was under therapy    Chronic back pain     Constipation     Elevated cholesterol     Encounter for blood transfusion 1987    Endometriosis     Hyperlipidemia     Hypertension     Nausea & vomiting     Sleep apnea        Family History   Problem Relation Age of Onset    Hypertension Mother     Heart attack Mother     Hypertension Brother     Heart disease Maternal Grandmother     Hypertension Maternal Grandmother     Diabetes Maternal Aunt     Breast cancer Neg Hx     Colon cancer Neg Hx     Ovarian cancer Neg Hx        Social History     Socioeconomic History    Marital status: Single     Spouse name: Not on file    Number of children: Not on file    Years of education: Not on file    Highest education level: Not on file   Social Needs    Financial resource strain: Not on file    Food insecurity - worry: Not on file    Food insecurity - inability: Not on file    Transportation needs - medical: Not on file    Transportation needs - non-medical: Not on file   Occupational History    Not on file   Tobacco Use    Smoking status: Never Smoker    Smokeless tobacco: Never Used   Substance and Sexual Activity    Alcohol use: Yes     Comment: rare    Drug use: No    Sexual activity: Yes     Partners: Male     Birth  control/protection: Surgical, See Surgical Hx     Comment: --BTL   Other Topics Concern    Not on file   Social History Narrative    Not on file       Review of Systems   Constitutional: Negative for activity change, fatigue, fever and unexpected weight change.   HENT: Negative for congestion, ear pain, hearing loss, rhinorrhea and sore throat.    Eyes: Negative for pain, redness and visual disturbance.   Respiratory: Negative for cough, shortness of breath and wheezing.    Cardiovascular: Negative for chest pain, palpitations and leg swelling.   Gastrointestinal: Negative for abdominal pain, constipation, diarrhea, nausea and vomiting.   Genitourinary: Negative for dysuria, frequency and urgency.   Musculoskeletal: Positive for arthralgias (left knee) and back pain. Negative for joint swelling and neck pain.   Skin: Negative for color change, rash and wound.   Neurological: Negative for dizziness, tremors, weakness, light-headedness and headaches.         Objective:      Physical Exam   Constitutional: She is oriented to person, place, and time. She appears well-developed and well-nourished. No distress.   HENT:   Head: Normocephalic and atraumatic.   Right Ear: External ear normal.   Left Ear: External ear normal.   Eyes: Conjunctivae and EOM are normal. Pupils are equal, round, and reactive to light.   Neck: Neck supple. No tracheal deviation present.   Cardiovascular: Normal rate and regular rhythm.   No murmur heard.  Pulmonary/Chest: Effort normal and breath sounds normal. No respiratory distress. She has no wheezes.   Abdominal: Soft. Bowel sounds are normal. She exhibits no distension. There is no tenderness.   Musculoskeletal: She exhibits tenderness (left knee).   Neurological: She is alert and oriented to person, place, and time. No cranial nerve deficit.   Skin: Skin is warm and dry.   Psychiatric: She has a normal mood and affect. Her behavior is normal.   Vitals reviewed.      Assessment:        1. Chronic bilateral low back pain with bilateral sciatica    2. Acute pain of left knee    3. Morbid obesity    4. Essential hypertension        Plan:       Arron was seen today for follow-up, leg pain and back pain.    Diagnoses and all orders for this visit:    Chronic bilateral low back pain with bilateral sciatica  -     Ambulatory Referral to Physical/Occupational Therapy    Acute pain of left knee  -     Ambulatory Referral to Physical/Occupational Therapy    Understands I donot prescribe chronic narcotics for pain  Agreeable to PT  PRN tyelnol/ibuprofen  Work on weight loss and likely a big contributor to her pain    Morbid obesity  -     Ambulatory Referral to Bariatric Surgery ([facility])  Spent >10 mins discussing diet and exercise  Would like to speak with surgeon as well to explore all her optoins    Essential hypertension  Chronic, improved  Back on most of her medications  Follow up next month as dennis

## 2019-02-11 ENCOUNTER — LAB VISIT (OUTPATIENT)
Dept: LAB | Facility: HOSPITAL | Age: 49
End: 2019-02-11
Attending: INTERNAL MEDICINE
Payer: MEDICAID

## 2019-02-11 DIAGNOSIS — E78.5 HYPERLIPIDEMIA, UNSPECIFIED HYPERLIPIDEMIA TYPE: ICD-10-CM

## 2019-02-11 DIAGNOSIS — I10 ESSENTIAL HYPERTENSION: ICD-10-CM

## 2019-02-11 DIAGNOSIS — R60.0 BILATERAL EDEMA OF LOWER EXTREMITY: ICD-10-CM

## 2019-02-11 DIAGNOSIS — E66.01 MORBID OBESITY: ICD-10-CM

## 2019-02-11 DIAGNOSIS — F32.0 MILD SINGLE CURRENT EPISODE OF MAJOR DEPRESSIVE DISORDER: ICD-10-CM

## 2019-02-11 LAB
ALBUMIN SERPL BCP-MCNC: 3.7 G/DL
ALBUMIN SERPL BCP-MCNC: 3.7 G/DL
ALP SERPL-CCNC: 99 U/L
ALP SERPL-CCNC: 99 U/L
ALT SERPL W/O P-5'-P-CCNC: 10 U/L
ALT SERPL W/O P-5'-P-CCNC: 10 U/L
ANION GAP SERPL CALC-SCNC: 10 MMOL/L
ANION GAP SERPL CALC-SCNC: 10 MMOL/L
AST SERPL-CCNC: 16 U/L
AST SERPL-CCNC: 16 U/L
BASOPHILS # BLD AUTO: 0.08 K/UL
BASOPHILS # BLD AUTO: 0.08 K/UL
BASOPHILS NFR BLD: 0.9 %
BASOPHILS NFR BLD: 0.9 %
BILIRUB SERPL-MCNC: 0.2 MG/DL
BILIRUB SERPL-MCNC: 0.2 MG/DL
BUN SERPL-MCNC: 16 MG/DL
BUN SERPL-MCNC: 16 MG/DL
CALCIUM SERPL-MCNC: 9.7 MG/DL
CALCIUM SERPL-MCNC: 9.7 MG/DL
CHLORIDE SERPL-SCNC: 104 MMOL/L
CHLORIDE SERPL-SCNC: 104 MMOL/L
CHOLEST SERPL-MCNC: 153 MG/DL
CHOLEST SERPL-MCNC: 153 MG/DL
CHOLEST/HDLC SERPL: 3.8 {RATIO}
CHOLEST/HDLC SERPL: 3.8 {RATIO}
CO2 SERPL-SCNC: 25 MMOL/L
CO2 SERPL-SCNC: 25 MMOL/L
CREAT SERPL-MCNC: 1.1 MG/DL
CREAT SERPL-MCNC: 1.1 MG/DL
DIFFERENTIAL METHOD: ABNORMAL
DIFFERENTIAL METHOD: ABNORMAL
EOSINOPHIL # BLD AUTO: 0.2 K/UL
EOSINOPHIL # BLD AUTO: 0.2 K/UL
EOSINOPHIL NFR BLD: 2.7 %
EOSINOPHIL NFR BLD: 2.7 %
ERYTHROCYTE [DISTWIDTH] IN BLOOD BY AUTOMATED COUNT: 15.5 %
ERYTHROCYTE [DISTWIDTH] IN BLOOD BY AUTOMATED COUNT: 15.5 %
EST. GFR  (AFRICAN AMERICAN): >60 ML/MIN/1.73 M^2
EST. GFR  (AFRICAN AMERICAN): >60 ML/MIN/1.73 M^2
EST. GFR  (NON AFRICAN AMERICAN): 60 ML/MIN/1.73 M^2
EST. GFR  (NON AFRICAN AMERICAN): 60 ML/MIN/1.73 M^2
GLUCOSE SERPL-MCNC: 104 MG/DL
GLUCOSE SERPL-MCNC: 104 MG/DL
HCT VFR BLD AUTO: 33.6 %
HCT VFR BLD AUTO: 33.6 %
HDLC SERPL-MCNC: 40 MG/DL
HDLC SERPL-MCNC: 40 MG/DL
HDLC SERPL: 26.1 %
HDLC SERPL: 26.1 %
HGB BLD-MCNC: 10.8 G/DL
HGB BLD-MCNC: 10.8 G/DL
LDLC SERPL CALC-MCNC: 100 MG/DL
LDLC SERPL CALC-MCNC: 100 MG/DL
LYMPHOCYTES # BLD AUTO: 2.5 K/UL
LYMPHOCYTES # BLD AUTO: 2.5 K/UL
LYMPHOCYTES NFR BLD: 29 %
LYMPHOCYTES NFR BLD: 29 %
MCH RBC QN AUTO: 28 PG
MCH RBC QN AUTO: 28 PG
MCHC RBC AUTO-ENTMCNC: 32.1 G/DL
MCHC RBC AUTO-ENTMCNC: 32.1 G/DL
MCV RBC AUTO: 87 FL
MCV RBC AUTO: 87 FL
MONOCYTES # BLD AUTO: 0.6 K/UL
MONOCYTES # BLD AUTO: 0.6 K/UL
MONOCYTES NFR BLD: 7.4 %
MONOCYTES NFR BLD: 7.4 %
NEUTROPHILS # BLD AUTO: 5.1 K/UL
NEUTROPHILS # BLD AUTO: 5.1 K/UL
NEUTROPHILS NFR BLD: 60 %
NEUTROPHILS NFR BLD: 60 %
NONHDLC SERPL-MCNC: 113 MG/DL
NONHDLC SERPL-MCNC: 113 MG/DL
PLATELET # BLD AUTO: 331 K/UL
PLATELET # BLD AUTO: 331 K/UL
PMV BLD AUTO: 11.6 FL
PMV BLD AUTO: 11.6 FL
POTASSIUM SERPL-SCNC: 4.3 MMOL/L
POTASSIUM SERPL-SCNC: 4.3 MMOL/L
PROT SERPL-MCNC: 8 G/DL
PROT SERPL-MCNC: 8 G/DL
RBC # BLD AUTO: 3.86 M/UL
RBC # BLD AUTO: 3.86 M/UL
SODIUM SERPL-SCNC: 139 MMOL/L
SODIUM SERPL-SCNC: 139 MMOL/L
TRIGL SERPL-MCNC: 65 MG/DL
TRIGL SERPL-MCNC: 65 MG/DL
TSH SERPL DL<=0.005 MIU/L-ACNC: 2.17 UIU/ML
TSH SERPL DL<=0.005 MIU/L-ACNC: 2.17 UIU/ML
WBC # BLD AUTO: 8.47 K/UL
WBC # BLD AUTO: 8.47 K/UL

## 2019-02-11 PROCEDURE — 85025 COMPLETE CBC W/AUTO DIFF WBC: CPT

## 2019-02-11 PROCEDURE — 80061 LIPID PANEL: CPT

## 2019-02-11 PROCEDURE — 36415 COLL VENOUS BLD VENIPUNCTURE: CPT

## 2019-02-11 PROCEDURE — 84443 ASSAY THYROID STIM HORMONE: CPT

## 2019-02-11 PROCEDURE — 80053 COMPREHEN METABOLIC PANEL: CPT

## 2019-02-26 ENCOUNTER — OFFICE VISIT (OUTPATIENT)
Dept: INTERNAL MEDICINE | Facility: CLINIC | Age: 49
End: 2019-02-26
Payer: MEDICAID

## 2019-02-26 VITALS
HEIGHT: 66 IN | HEART RATE: 88 BPM | RESPIRATION RATE: 16 BRPM | BODY MASS INDEX: 44.72 KG/M2 | DIASTOLIC BLOOD PRESSURE: 86 MMHG | WEIGHT: 278.25 LBS | SYSTOLIC BLOOD PRESSURE: 130 MMHG

## 2019-02-26 DIAGNOSIS — G47.33 OSA ON CPAP: ICD-10-CM

## 2019-02-26 DIAGNOSIS — M54.42 CHRONIC BILATERAL LOW BACK PAIN WITH BILATERAL SCIATICA: ICD-10-CM

## 2019-02-26 DIAGNOSIS — E78.5 HYPERLIPIDEMIA, UNSPECIFIED HYPERLIPIDEMIA TYPE: ICD-10-CM

## 2019-02-26 DIAGNOSIS — E66.01 MORBID OBESITY: ICD-10-CM

## 2019-02-26 DIAGNOSIS — I10 ESSENTIAL HYPERTENSION: Primary | ICD-10-CM

## 2019-02-26 DIAGNOSIS — E55.9 VITAMIN D DEFICIENCY: ICD-10-CM

## 2019-02-26 DIAGNOSIS — G89.29 CHRONIC BILATERAL LOW BACK PAIN WITH BILATERAL SCIATICA: ICD-10-CM

## 2019-02-26 DIAGNOSIS — F32.0 MILD SINGLE CURRENT EPISODE OF MAJOR DEPRESSIVE DISORDER: ICD-10-CM

## 2019-02-26 DIAGNOSIS — M54.41 CHRONIC BILATERAL LOW BACK PAIN WITH BILATERAL SCIATICA: ICD-10-CM

## 2019-02-26 DIAGNOSIS — K21.00 GASTROESOPHAGEAL REFLUX DISEASE WITH ESOPHAGITIS: ICD-10-CM

## 2019-02-26 PROCEDURE — 99999 PR PBB SHADOW E&M-EST. PATIENT-LVL III: CPT | Mod: PBBFAC,,, | Performed by: INTERNAL MEDICINE

## 2019-02-26 PROCEDURE — 99214 PR OFFICE/OUTPT VISIT, EST, LEVL IV, 30-39 MIN: ICD-10-PCS | Mod: S$PBB,,, | Performed by: INTERNAL MEDICINE

## 2019-02-26 PROCEDURE — 99214 OFFICE O/P EST MOD 30 MIN: CPT | Mod: S$PBB,,, | Performed by: INTERNAL MEDICINE

## 2019-02-26 PROCEDURE — 99213 OFFICE O/P EST LOW 20 MIN: CPT | Mod: PBBFAC | Performed by: INTERNAL MEDICINE

## 2019-02-26 PROCEDURE — 99999 PR PBB SHADOW E&M-EST. PATIENT-LVL III: ICD-10-PCS | Mod: PBBFAC,,, | Performed by: INTERNAL MEDICINE

## 2019-02-26 NOTE — PROGRESS NOTES
Subjective:       Patient ID: Arron Ron is a 48 y.o. female.    Chief Complaint: Follow-up (6 mo); Knee Pain; and Back Pain      HPI:  Patient is known to me and presents for follow up HTN, obesity, chronic low back and knee pain, depression/anxiety, HLD, asthma. Labs from 2/11/19 personally reviewed and discussed with the patient today.     HTN: on demedex, aldactone, irbesartan, doxazosin; also sees Dr. Mays ; GFR is back to normal. BP is controlled today. Does not check regularly at home. Denies chest pains, SOB, LE edema    Depression/anxiety: on xanax PRN prescribed by Dr. Tello. Not on SSRI. Sx reported as controlled    HLD: On atorvastatin; LDL controlled at 100. Denies medication side effects.     Asthma: diagnosed by Dr. Ocampo who started the Breo. She denies SOB, wheezing or cough today.     Chronic back and knee pain: She is doing PT, She is pushing herself through the pain. She is following Dr. Tello who is prescribing pain medication but she self stopped taking her pain medications due to concern for side effects. Still using Flexeril.     RODRI; using CPAP every night    Past Medical History:   Diagnosis Date    Acid reflux     Anemia     Back pain     was under therapy    Chronic back pain     Constipation     Elevated cholesterol     Encounter for blood transfusion 1987    Endometriosis     Hyperlipidemia     Hypertension     Nausea & vomiting     Sleep apnea        Family History   Problem Relation Age of Onset    Hypertension Mother     Heart attack Mother     Hypertension Brother     Heart disease Maternal Grandmother     Hypertension Maternal Grandmother     Diabetes Maternal Aunt     Breast cancer Neg Hx     Colon cancer Neg Hx     Ovarian cancer Neg Hx        Social History     Socioeconomic History    Marital status: Single     Spouse name: Not on file    Number of children: Not on file    Years of education: Not on file    Highest education level: Not on  file   Social Needs    Financial resource strain: Not on file    Food insecurity - worry: Not on file    Food insecurity - inability: Not on file    Transportation needs - medical: Not on file    Transportation needs - non-medical: Not on file   Occupational History    Not on file   Tobacco Use    Smoking status: Never Smoker    Smokeless tobacco: Never Used   Substance and Sexual Activity    Alcohol use: Yes     Comment: rare    Drug use: No    Sexual activity: Yes     Partners: Male     Birth control/protection: Surgical, See Surgical Hx     Comment: --BTL   Other Topics Concern    Not on file   Social History Narrative    Not on file       Review of Systems   Constitutional: Negative for activity change, fatigue, fever and unexpected weight change.   HENT: Negative for congestion, ear pain, hearing loss, rhinorrhea and sore throat.    Eyes: Negative for pain, redness and visual disturbance.   Respiratory: Negative for cough, shortness of breath and wheezing.    Cardiovascular: Negative for chest pain, palpitations and leg swelling.   Gastrointestinal: Negative for abdominal pain, constipation, diarrhea, nausea and vomiting.   Genitourinary: Negative for dysuria, frequency, pelvic pain and urgency.   Musculoskeletal: Positive for arthralgias (knee) and back pain. Negative for joint swelling and neck pain.   Skin: Negative for color change, rash and wound.   Neurological: Negative for dizziness, tremors, weakness, light-headedness and headaches.         Objective:      Physical Exam   Constitutional: She is oriented to person, place, and time. She appears well-developed and well-nourished. No distress.   HENT:   Head: Normocephalic and atraumatic.   Right Ear: External ear normal.   Left Ear: External ear normal.   Eyes: Conjunctivae and EOM are normal. Pupils are equal, round, and reactive to light. Right eye exhibits no discharge. Left eye exhibits no discharge.   Neck: Neck supple. No  tracheal deviation present.   Cardiovascular: Normal rate and regular rhythm. Exam reveals no gallop and no friction rub.   No murmur heard.  Pulmonary/Chest: Effort normal and breath sounds normal. No respiratory distress. She has no wheezes. She has no rales.   Abdominal: Soft. Bowel sounds are normal. She exhibits no distension. There is no tenderness.   Neurological: She is alert and oriented to person, place, and time. No cranial nerve deficit.   Skin: Skin is warm and dry.   Psychiatric: She has a normal mood and affect. Her behavior is normal.   Vitals reviewed.      Assessment:       1. Essential hypertension    2. Hyperlipidemia, unspecified hyperlipidemia type    3. Morbid obesity    4. Gastroesophageal reflux disease with esophagitis    5. Mild single current episode of major depressive disorder    6. Chronic bilateral low back pain with bilateral sciatica    7. RODRI on CPAP    8. Vitamin D deficiency        Plan:       Arron was seen today for follow-up, knee pain and back pain.    Diagnoses and all orders for this visit:    Essential hypertension  -     CBC auto differential; Future  -     Comprehensive metabolic panel; Future  -     Lipid panel; Future  Chronic controlled  Continue medications at same dose  Low Na diet  Exercise, weight loss  Check BP and keep log for next visit  GFR > 60 so current regimen seems effective. Monitor labs and stay hydrated    Hyperlipidemia, unspecified hyperlipidemia type  -     CBC auto differential; Future  -     Comprehensive metabolic panel; Future  -     Lipid panel; Future  Chronic controlled  Cont atorvastatin at same dose    Morbid obesity  -     CBC auto differential; Future  -     Comprehensive metabolic panel; Future  -     Lipid panel; Future  Diet and exercise  She has had difficulty with weigh tloss and chronic pain issues  Referred to Dr. Camacho to discuss weight loss surgery    Gastroesophageal reflux disease with esophagitis  Chronic stable  Cont PPI  same dose    Mild single current episode of major depressive disorder  Prescribed Xanax by outside MD  Eliu controlled    Chronic bilateral low back pain with bilateral sciatica  Took her self on tramadol, concerned aobut pascual effects  Doing PT  Trying to exercise  Weight loss will help, referred to discuss weight loss surgery    RODRI on CPAP  weraing CPAP nightly    Vitamin D deficiency  -     Vitamin D; Future      RTC 6 months with labs and PRN

## 2019-05-01 ENCOUNTER — TELEPHONE (OUTPATIENT)
Dept: INTERNAL MEDICINE | Facility: CLINIC | Age: 49
End: 2019-05-01

## 2019-05-01 NOTE — TELEPHONE ENCOUNTER
----- Message from Gemma Garrido sent at 2019  9:38 AM CDT -----  Contact: Self  Arron Ron  MRN: [id number]  : 1970  PCP: Mahi Rojas  Home Phone      [phone]  Work Phone      Not on file.  Mobile          [phone]    MESSAGE:     Would like to speak to someone about the spur in her left knee.  It's been hurting for the last three weeks.  She has taken OTC Aleve but this is not helping.  Please call to advise.    Pharmacy: [facility]    Phone: [phone]

## 2019-06-10 ENCOUNTER — HOSPITAL ENCOUNTER (OUTPATIENT)
Dept: RADIOLOGY | Facility: HOSPITAL | Age: 49
Discharge: HOME | End: 2019-06-10
Attending: INTERNAL MEDICINE
Payer: MEDICAID

## 2019-06-10 DIAGNOSIS — R79.89 ELEVATED D-DIMER: ICD-10-CM

## 2019-06-10 PROCEDURE — 71275 CT ANGIOGRAPHY CHEST: CPT | Mod: TC

## 2019-06-10 PROCEDURE — 71275 CTA CHEST NON CORONARY: ICD-10-PCS | Mod: 26,,, | Performed by: RADIOLOGY

## 2019-06-10 PROCEDURE — 25500020 PHARM REV CODE 255: Performed by: INTERNAL MEDICINE

## 2019-06-10 PROCEDURE — 71275 CT ANGIOGRAPHY CHEST: CPT | Mod: 26,,, | Performed by: RADIOLOGY

## 2019-06-10 RX ADMIN — IOHEXOL 100 ML: 350 INJECTION, SOLUTION INTRAVENOUS at 03:06

## 2019-06-24 ENCOUNTER — OFFICE VISIT (OUTPATIENT)
Dept: INTERNAL MEDICINE | Facility: CLINIC | Age: 49
End: 2019-06-24
Payer: MEDICAID

## 2019-06-24 ENCOUNTER — TELEPHONE (OUTPATIENT)
Dept: INTERNAL MEDICINE | Facility: CLINIC | Age: 49
End: 2019-06-24

## 2019-06-24 VITALS
HEART RATE: 94 BPM | BODY MASS INDEX: 47.09 KG/M2 | DIASTOLIC BLOOD PRESSURE: 70 MMHG | WEIGHT: 293 LBS | RESPIRATION RATE: 16 BRPM | SYSTOLIC BLOOD PRESSURE: 110 MMHG | HEIGHT: 66 IN

## 2019-06-24 DIAGNOSIS — R22.32 ARM MASS, LEFT: Primary | ICD-10-CM

## 2019-06-24 DIAGNOSIS — M17.12 PRIMARY OSTEOARTHRITIS OF LEFT KNEE: ICD-10-CM

## 2019-06-24 PROCEDURE — 99214 OFFICE O/P EST MOD 30 MIN: CPT | Mod: S$PBB,,, | Performed by: INTERNAL MEDICINE

## 2019-06-24 PROCEDURE — 99214 PR OFFICE/OUTPT VISIT, EST, LEVL IV, 30-39 MIN: ICD-10-PCS | Mod: S$PBB,,, | Performed by: INTERNAL MEDICINE

## 2019-06-24 PROCEDURE — 99999 PR PBB SHADOW E&M-EST. PATIENT-LVL III: CPT | Mod: PBBFAC,,, | Performed by: INTERNAL MEDICINE

## 2019-06-24 PROCEDURE — 99213 OFFICE O/P EST LOW 20 MIN: CPT | Mod: PBBFAC | Performed by: INTERNAL MEDICINE

## 2019-06-24 PROCEDURE — 99999 PR PBB SHADOW E&M-EST. PATIENT-LVL III: ICD-10-PCS | Mod: PBBFAC,,, | Performed by: INTERNAL MEDICINE

## 2019-06-24 RX ORDER — AMLODIPINE BESYLATE 5 MG/1
TABLET ORAL
COMMUNITY
End: 2019-06-24

## 2019-06-24 RX ORDER — CARVEDILOL 25 MG/1
TABLET ORAL
COMMUNITY
End: 2019-06-24

## 2019-06-24 RX ORDER — SPIRONOLACTONE 50 MG/1
TABLET, FILM COATED ORAL
COMMUNITY

## 2019-06-24 NOTE — TELEPHONE ENCOUNTER
"----- Message from Halima Wilson sent at 2019  8:49 AM CDT -----  Contact: self   Arron Ron  MRN: [id number]  : 1970  PCP: Mahi Rojas  Home Phone      [phone]  Work Phone      Not on file.  Mobile          [phone]    MESSAGE:   Patient requests same day appointment. She c/o "knot" and pain to left arm with brusing to area.. The symptoms began "knot" for years with pain and brusing began today.    Phone # [phone]    [facility], [facility] [address] [address] [address]  "

## 2019-06-24 NOTE — PROGRESS NOTES
"Subjective:       Patient ID: Arron Ron is a 48 y.o. female.    Chief Complaint: Mass (on left arm) and Knee Pain (left )      HPI:  Patient is known to me and presents with continued left knee pain. No new injuries. Using OTC 'patch" for pain from the dollar tree. It is helping but does not take it away. Xray 1/2019 : "Minimal medial marginal spurring left knee."    She has a "knot" on her left arm She denies trauma to her arm but has what looks like a bruise. She denies injury to the area.     Past Medical History:   Diagnosis Date    Acid reflux     Anemia     Back pain     was under therapy    Chronic back pain     Constipation     Elevated cholesterol     Encounter for blood transfusion 1987    Endometriosis     Hyperlipidemia     Hypertension     Nausea & vomiting     Sleep apnea        Family History   Problem Relation Age of Onset    Hypertension Mother     Heart attack Mother     Hypertension Brother     Heart disease Maternal Grandmother     Hypertension Maternal Grandmother     Diabetes Maternal Aunt     Breast cancer Neg Hx     Colon cancer Neg Hx     Ovarian cancer Neg Hx        Social History     Socioeconomic History    Marital status: Single     Spouse name: Not on file    Number of children: Not on file    Years of education: Not on file    Highest education level: Not on file   Occupational History    Not on file   Social Needs    Financial resource strain: Not on file    Food insecurity:     Worry: Not on file     Inability: Not on file    Transportation needs:     Medical: Not on file     Non-medical: Not on file   Tobacco Use    Smoking status: Never Smoker    Smokeless tobacco: Never Used   Substance and Sexual Activity    Alcohol use: Yes     Comment: rare    Drug use: No    Sexual activity: Yes     Partners: Male     Birth control/protection: Surgical, See Surgical Hx     Comment: --BTL   Lifestyle    Physical activity:     Days per week: " Not on file     Minutes per session: Not on file    Stress: Not on file   Relationships    Social connections:     Talks on phone: Not on file     Gets together: Not on file     Attends Jainism service: Not on file     Active member of club or organization: Not on file     Attends meetings of clubs or organizations: Not on file     Relationship status: Not on file   Other Topics Concern    Not on file   Social History Narrative    Not on file       Review of Systems   Constitutional: Negative for activity change, fatigue, fever and unexpected weight change.   HENT: Negative for congestion, ear pain, hearing loss, rhinorrhea and sore throat.    Eyes: Negative for pain, redness and visual disturbance.   Respiratory: Negative for cough, shortness of breath and wheezing.    Cardiovascular: Negative for chest pain, palpitations and leg swelling.   Gastrointestinal: Negative for abdominal pain, constipation, diarrhea, nausea and vomiting.   Genitourinary: Negative for dysuria, frequency and urgency.   Musculoskeletal: Positive for arthralgias (left knee). Negative for back pain, joint swelling and neck pain.   Skin: Negative for color change, rash and wound.        Bump left arm     Neurological: Negative for dizziness, tremors, weakness, light-headedness and headaches.         Objective:      Physical Exam   Constitutional: She is oriented to person, place, and time. She appears well-developed and well-nourished. No distress.   HENT:   Head: Normocephalic and atraumatic.   Right Ear: External ear normal.   Left Ear: External ear normal.   Eyes: Pupils are equal, round, and reactive to light. Conjunctivae and EOM are normal. Right eye exhibits no discharge. Left eye exhibits no discharge.   Neck: Neck supple. No tracheal deviation present.   Cardiovascular: Normal rate and regular rhythm.   No murmur heard.  Pulmonary/Chest: Effort normal and breath sounds normal. No respiratory distress. She has no wheezes.    Abdominal: Soft. Bowel sounds are normal. She exhibits no distension. There is no tenderness.   Musculoskeletal: She exhibits tenderness (patellar pressure, + crepitus).   Neurological: She is alert and oriented to person, place, and time. No cranial nerve deficit.   Skin: Skin is warm and dry.   Small mobile mass to left medial forearm with surrounding bruising   Psychiatric: She has a normal mood and affect. Her behavior is normal.   Vitals reviewed.      Assessment:       1. Arm mass, left    2. Primary osteoarthritis of left knee        Plan:       Arron was seen today for mass and knee pain.    Diagnoses and all orders for this visit:    Arm mass, left  -     US Soft Tissue Misc; Future  I suspect lipoma but has surrounding brusing  She reports changing in size and sometimes painful  Will US to determine what this mass it  Does not look like abscess/infection today. No warmth. No fevers    Primary osteoarthritis of left knee  Chronic  Still wit pain  Will see Dr. Camacho this week to discuss weight loss surgery  She is hesitant for intra-articular steroids as had in the past and was painful  Cont PT, exercise  Cont tyelnol  PRN NSAIDs but careful use due to HTN, cardiac history

## 2019-06-27 ENCOUNTER — HOSPITAL ENCOUNTER (OUTPATIENT)
Dept: RADIOLOGY | Facility: HOSPITAL | Age: 49
Discharge: HOME | End: 2019-06-27
Attending: INTERNAL MEDICINE
Payer: MEDICAID

## 2019-06-27 DIAGNOSIS — R22.32 ARM MASS, LEFT: ICD-10-CM

## 2019-06-27 PROCEDURE — 76882 US LMTD JT/FCL EVL NVASC XTR: CPT | Mod: TC

## 2019-06-27 PROCEDURE — 76882 PR  US,EXTREMITY,NONVASCULAR,REAL-TIME IMAGE,LIMITED: ICD-10-PCS | Mod: 26,LT,, | Performed by: RADIOLOGY

## 2019-06-27 PROCEDURE — 76882 US LMTD JT/FCL EVL NVASC XTR: CPT | Mod: 26,LT,, | Performed by: RADIOLOGY

## 2019-07-03 ENCOUNTER — TELEPHONE (OUTPATIENT)
Dept: INTERNAL MEDICINE | Facility: CLINIC | Age: 49
End: 2019-07-03

## 2019-07-03 DIAGNOSIS — D17.9 ATYPICAL LIPOMA OF SOFT TISSUE: Primary | ICD-10-CM

## 2019-07-03 NOTE — TELEPHONE ENCOUNTER
----- Message from Gemma Garrido sent at 7/3/2019 12:23 PM CDT -----  Contact: Self  Arron Ron  MRN: [id number]  : 1970  PCP: Mahi Rojas  Home Phone      [phone]  Work Phone      Not on file.  Mobile          [phone]    MESSAGE:   Left message for nurse to return her call regarding paperwork to be faxed to PT @ [phone].  Please call       Phone: [phone]

## 2019-07-16 ENCOUNTER — OFFICE VISIT (OUTPATIENT)
Dept: SURGERY | Facility: CLINIC | Age: 49
End: 2019-07-16
Payer: MEDICAID

## 2019-07-16 VITALS
OXYGEN SATURATION: 95 % | BODY MASS INDEX: 47.09 KG/M2 | HEART RATE: 88 BPM | HEIGHT: 66 IN | SYSTOLIC BLOOD PRESSURE: 110 MMHG | DIASTOLIC BLOOD PRESSURE: 78 MMHG | WEIGHT: 293 LBS

## 2019-07-16 DIAGNOSIS — R22.32 MASS OF ARM, LEFT: ICD-10-CM

## 2019-07-16 PROCEDURE — 99999 PR PBB SHADOW E&M-EST. PATIENT-LVL III: ICD-10-PCS | Mod: PBBFAC,,, | Performed by: SURGERY

## 2019-07-16 PROCEDURE — 99203 OFFICE O/P NEW LOW 30 MIN: CPT | Mod: S$PBB,,, | Performed by: SURGERY

## 2019-07-16 PROCEDURE — 99203 PR OFFICE/OUTPT VISIT, NEW, LEVL III, 30-44 MIN: ICD-10-PCS | Mod: S$PBB,,, | Performed by: SURGERY

## 2019-07-16 PROCEDURE — 99999 PR PBB SHADOW E&M-EST. PATIENT-LVL III: CPT | Mod: PBBFAC,,, | Performed by: SURGERY

## 2019-07-16 PROCEDURE — 99213 OFFICE O/P EST LOW 20 MIN: CPT | Mod: PBBFAC | Performed by: SURGERY

## 2019-07-16 NOTE — PROGRESS NOTES
Subjective:       Patient ID: Arron Ron is a 48 y.o. female.    Chief Complaint: Mass (left arm)    Review of patient's allergies indicates:  No Known Allergies  48-year-old morbidly obese female with a left forearm mass that has been present for many years but recently she noticed about 2 weeks ago it got a little bit larger and had some surrounding bruising.  She denies any recent trauma but I suspect she did hit the area at some point but just does not remember.  She went see her internal medicine doctor ordered an ultrasound which showed 2 cm hypoechoic mass consistent with hematoma versus atypical lipoma.  It really does not bother her or cause her much pain at this time.  I gave her the option of surgical excision to send to pathology versus watchful waiting.  Patient is not really interested in having surgery at this time.  She will call back if symptoms persist are it starts to get larger or more painful.    Past Medical History:   Diagnosis Date    Acid reflux     Anemia     Back pain     was under therapy    Chronic back pain     Constipation     Elevated cholesterol     Encounter for blood transfusion 1987    Endometriosis     Hyperlipidemia     Hypertension     Nausea & vomiting     Sleep apnea      Past Surgical History:   Procedure Laterality Date    BREAST BIOPSY      left , benign    CHOLECYSTECTOMY      COLONOSCOPY  2017    [facility]    CYSTOSCOPY N/A 10/1/2015    [facility]    DILATION AND CURETTAGE OF UTERUS      ENDOMETRIAL ABLATION      ESOPHAGOGASTRODUODENOSCOPY (EGD) N/A 7/24/2017    [facility] at [facility]    gallstone      HYSTERECTOMY  10/1/15    [facility]- bleeding/ endometriosis    HYSTERECTOMY-TOTAL LAPAROSCOPIC ([facility]) N/A 10/1/2015    [facility]    ASVYQ-PMPOBNHQE-CUDCIHUYQVLO N/A 10/1/2015    [facility] OR    OOPHORECTOMY       KDJKTLOX-[id number]-[id number] Bilateral 10/1/2015    [facility]    TUBAL LIGATION      UPPER GASTROINTESTINAL ENDOSCOPY  2017    [facility]      Family History   Problem Relation Age of Onset    Hypertension Mother     Heart attack Mother     Hypertension Brother     Heart disease Maternal Grandmother     Hypertension Maternal Grandmother     Diabetes Maternal Aunt     Breast cancer Neg Hx     Colon cancer Neg Hx     Ovarian cancer Neg Hx      Social History     Socioeconomic History    Marital status: Single     Spouse name: Not on file    Number of children: Not on file    Years of education: Not on file    Highest education level: Not on file   Occupational History    Not on file   Social Needs    Financial resource strain: Not on file    Food insecurity:     Worry: Not on file     Inability: Not on file    Transportation needs:     Medical: Not on file     Non-medical: Not on file   Tobacco Use    Smoking status: Never Smoker    Smokeless tobacco: Never Used   Substance and Sexual Activity    Alcohol use: Yes     Comment: rare    Drug use: No    Sexual activity: Yes     Partners: Male     Birth control/protection: Surgical, See Surgical Hx     Comment: --BTL   Lifestyle    Physical activity:     Days per week: Not on file     Minutes per session: Not on file    Stress: Not on file   Relationships    Social connections:     Talks on phone: Not on file     Gets together: Not on file     Attends Taoist service: Not on file     Active member of club or organization: Not on file     Attends meetings of clubs or organizations: Not on file     Relationship status: Not on file   Other Topics Concern    Not on file   Social History Narrative    Not on file     Vitals:    07/16/19 0954   BP: 110/78   Pulse: 88       Review of Systems   All other systems reviewed and are negative.      Objective:      Physical Exam   Constitutional: She is oriented to  person, place, and time. She appears well-developed and well-nourished.   HENT:   Head: Normocephalic.   Eyes: Pupils are equal, round, and reactive to light.   Neck: Normal range of motion.   Pulmonary/Chest: Effort normal.   Abdominal: Soft.   Musculoskeletal: Normal range of motion.   Neurological: She is alert and oriented to person, place, and time.   Skin: Skin is warm and dry.   Patient with a 2 cm mobile superficial mass near the left forearm posteriorly.  There is no current surrounding bruising like she states was present when she initially noticed it was larger.  She denies any trauma but I suspect she hit the area at some point.   Psychiatric: She has a normal mood and affect.       Assessment:       1. Mass of arm, left        Plan:         Arron was seen today for mass.    Diagnoses and all orders for this visit:    Mass of arm, left     Patient will call back if symptoms persist or the mass starts to get large or painful.    No follow-ups on file.          Rhett Sullivan Jr, MD

## 2019-08-12 ENCOUNTER — TELEPHONE (OUTPATIENT)
Dept: INTERNAL MEDICINE | Facility: CLINIC | Age: 49
End: 2019-08-12

## 2019-08-12 DIAGNOSIS — G47.33 OSA (OBSTRUCTIVE SLEEP APNEA): Primary | ICD-10-CM

## 2019-08-12 NOTE — TELEPHONE ENCOUNTER
----- Message from Sherice Whitfield sent at 2019 10:35 AM CDT -----  Contact: pt    Arron Ron  MRN: [id number]  : 1970  PCP: Mahi Rojas  Home Phone      [phone]  Work Phone      Not on file.  [employer]          [phone]      MESSAGE:     Pt asked to speak to a nurse about her Cpap     [phone]

## 2019-08-27 ENCOUNTER — OFFICE VISIT (OUTPATIENT)
Dept: INTERNAL MEDICINE | Facility: CLINIC | Age: 49
End: 2019-08-27
Payer: MEDICAID

## 2019-08-27 VITALS
HEART RATE: 98 BPM | DIASTOLIC BLOOD PRESSURE: 84 MMHG | BODY MASS INDEX: 47.09 KG/M2 | SYSTOLIC BLOOD PRESSURE: 120 MMHG | RESPIRATION RATE: 18 BRPM | WEIGHT: 293 LBS | HEIGHT: 66 IN

## 2019-08-27 DIAGNOSIS — J45.909 ASTHMA, UNSPECIFIED ASTHMA SEVERITY, UNSPECIFIED WHETHER COMPLICATED, UNSPECIFIED WHETHER PERSISTENT: ICD-10-CM

## 2019-08-27 DIAGNOSIS — G47.33 OSA ON CPAP: ICD-10-CM

## 2019-08-27 DIAGNOSIS — E78.5 HYPERLIPIDEMIA, UNSPECIFIED HYPERLIPIDEMIA TYPE: ICD-10-CM

## 2019-08-27 DIAGNOSIS — G89.29 CHRONIC PAIN OF RIGHT KNEE: ICD-10-CM

## 2019-08-27 DIAGNOSIS — M25.561 CHRONIC PAIN OF RIGHT KNEE: ICD-10-CM

## 2019-08-27 DIAGNOSIS — F32.0 MILD SINGLE CURRENT EPISODE OF MAJOR DEPRESSIVE DISORDER: ICD-10-CM

## 2019-08-27 DIAGNOSIS — E66.01 MORBID OBESITY: ICD-10-CM

## 2019-08-27 DIAGNOSIS — Z12.39 BREAST CANCER SCREENING: ICD-10-CM

## 2019-08-27 DIAGNOSIS — I10 ESSENTIAL HYPERTENSION: Primary | ICD-10-CM

## 2019-08-27 PROCEDURE — 99214 OFFICE O/P EST MOD 30 MIN: CPT | Mod: S$PBB,,, | Performed by: INTERNAL MEDICINE

## 2019-08-27 PROCEDURE — 99213 OFFICE O/P EST LOW 20 MIN: CPT | Mod: PBBFAC | Performed by: INTERNAL MEDICINE

## 2019-08-27 PROCEDURE — 99999 PR PBB SHADOW E&M-EST. PATIENT-LVL III: CPT | Mod: PBBFAC,,, | Performed by: INTERNAL MEDICINE

## 2019-08-27 PROCEDURE — 99214 PR OFFICE/OUTPT VISIT, EST, LEVL IV, 30-39 MIN: ICD-10-PCS | Mod: S$PBB,,, | Performed by: INTERNAL MEDICINE

## 2019-08-27 PROCEDURE — 99999 PR PBB SHADOW E&M-EST. PATIENT-LVL III: ICD-10-PCS | Mod: PBBFAC,,, | Performed by: INTERNAL MEDICINE

## 2019-08-27 RX ORDER — VALSARTAN 320 MG/1
160 TABLET ORAL DAILY
Refills: 11 | COMMUNITY
Start: 2019-08-08 | End: 2024-01-26

## 2019-08-27 RX ORDER — ALBUTEROL SULFATE 90 UG/1
AEROSOL, METERED RESPIRATORY (INHALATION)
Refills: 5 | COMMUNITY
Start: 2019-08-08 | End: 2021-03-08 | Stop reason: SDUPTHER

## 2019-08-27 NOTE — PROGRESS NOTES
Subjective:       Patient ID: Arron Ron is a 49 y.o. female.    Chief Complaint: Follow-up (6 mo) and Knee Pain (left knee)      HPI:  Patient is known to me and presents for follow up HTN, obesity, chronic low back and knee pain, depression/anxiety, HLD, asthma. Did not update labs prior to today's visit.      HTN: on demedex, aldactone, valsartan, doxazosin; also sees Dr. Mays ; GFR is back to normal. BP is controlled today. Does not check regularly at home. Denies chest pains, SOB. Mild LE edema that is intermittent     Depression/anxiety: on xanax PRN prescribed by Dr. Tello. Not on SSRI. Sx reported as controlled     HLD: On atorvastatin; last LDL controlled at 100. Denies medication side effects.      Asthma: diagnosed by Dr. Ocampo who started the Breo. She denies SOB, wheezing or cough today.      Chronic back and knee pain: She is doing PT, She is pushing herself through the pain. She is following Dr. Tello who is prescribing pain medication but she self stopped taking her pain medications due to concern for side effects. Still using Flexeril. Her knee pain is getting worse. She is trying to exercise but pain limits her. Xray 1/2019 showed a spur.      RODRI; using CPAP every night    She was referred to Dr. Camacho. She is enrolled in his program. She reports she is eating healthier; no more fast foods or soft drinks. She is feeling frustrated because her weight did not decrease very much.    Past Medical History:   Diagnosis Date    Acid reflux     Anemia     Back pain     was under therapy    Chronic back pain     Constipation     Elevated cholesterol     Encounter for blood transfusion 1987    Endometriosis     Hyperlipidemia     Hypertension     Nausea & vomiting     Sleep apnea        Family History   Problem Relation Age of Onset    Hypertension Mother     Heart attack Mother     Hypertension Brother     Heart disease Maternal Grandmother     Hypertension Maternal Grandmother      Diabetes Maternal Aunt     Breast cancer Neg Hx     Colon cancer Neg Hx     Ovarian cancer Neg Hx        Social History     Socioeconomic History    Marital status: Single     Spouse name: Not on file    Number of children: Not on file    Years of education: Not on file    Highest education level: Not on file   Occupational History    Not on file   Social Needs    Financial resource strain: Not on file    Food insecurity:     Worry: Not on file     Inability: Not on file    Transportation needs:     Medical: Not on file     Non-medical: Not on file   Tobacco Use    Smoking status: Never Smoker    Smokeless tobacco: Never Used   Substance and Sexual Activity    Alcohol use: Yes     Comment: rare    Drug use: No    Sexual activity: Yes     Partners: Male     Birth control/protection: Surgical, See Surgical Hx     Comment: --BTL   Lifestyle    Physical activity:     Days per week: Not on file     Minutes per session: Not on file    Stress: Not on file   Relationships    Social connections:     Talks on phone: Not on file     Gets together: Not on file     Attends Shinto service: Not on file     Active member of club or organization: Not on file     Attends meetings of clubs or organizations: Not on file     Relationship status: Not on file   Other Topics Concern    Not on file   Social History Narrative    Not on file       Review of Systems   Constitutional: Negative for activity change, fatigue, fever and unexpected weight change.   HENT: Negative for congestion, ear pain, hearing loss, rhinorrhea and sore throat.    Eyes: Negative for pain, redness and visual disturbance.   Respiratory: Negative for cough, shortness of breath and wheezing.    Cardiovascular: Negative for chest pain, palpitations and leg swelling.   Gastrointestinal: Negative for abdominal pain, constipation, diarrhea, nausea and vomiting.   Genitourinary: Negative for dysuria, frequency and urgency.    Musculoskeletal: Positive for arthralgias (right knee pain). Negative for back pain, joint swelling and neck pain.   Skin: Negative for color change, rash and wound.   Neurological: Negative for dizziness, tremors, weakness, light-headedness and headaches.         Objective:      Physical Exam   Constitutional: She is oriented to person, place, and time. She appears well-developed and well-nourished. No distress.   HENT:   Head: Normocephalic and atraumatic.   Right Ear: External ear normal.   Left Ear: External ear normal.   Eyes: Pupils are equal, round, and reactive to light. Conjunctivae and EOM are normal. Right eye exhibits no discharge. Left eye exhibits no discharge.   Neck: Neck supple. No tracheal deviation present.   Cardiovascular: Normal rate and regular rhythm.   No murmur heard.  Pulmonary/Chest: Effort normal and breath sounds normal. No respiratory distress. She has no wheezes. She has no rales.   Abdominal: Soft. Bowel sounds are normal. She exhibits no distension. There is no tenderness.   Musculoskeletal: She exhibits edema (trace b/l pedal edema) and tenderness (right knee medial joint line).   Neurological: She is alert and oriented to person, place, and time. No cranial nerve deficit.   Skin: Skin is warm and dry.   Psychiatric: She has a normal mood and affect. Her behavior is normal.   Vitals reviewed.      Assessment:       1. Essential hypertension    2. Hyperlipidemia, unspecified hyperlipidemia type    3. Morbid obesity    4. RODRI on CPAP    5. Mild single current episode of major depressive disorder    6. Breast cancer screening    7. Chronic pain of right knee        Plan:       Arron was seen today for follow-up and knee pain.    Diagnoses and all orders for this visit:    Essential hypertension  -     CBC auto differential; Future  -     Comprehensive metabolic panel; Future  -     TSH; Future  -     Lipid panel; Future  Chronic controlled  Continue medications at same dose  Low  Na diet  Exercise, weight loss  Check BP and keep log for next visit    Hyperlipidemia, unspecified hyperlipidemia type  -     CBC auto differential; Future  -     Comprehensive metabolic panel; Future  -     TSH; Future  -     Lipid panel; Future  Chronic stable  Cont meds same dose    Morbid obesity  -     CBC auto differential; Future  -     Comprehensive metabolic panel; Future  -     TSH; Future  -     Lipid panel; Future  Spent > 10 mins on diet and exercise counseling  She is enrolled with Dr. Camacho for weight loss program and surgery  She is feeling frustrated as had lost weight when saw Dr. Mays but not on my scale today.  Encouraged her to continue to follow her diet plan    RODRI on CPAP  Uses CPAP nightly    Mild single current episode of major depressive disorder  Chronic controlled  Cont meds same dose    Breast cancer screening  -     Mammo Digital Screening Bilat; Future    Chronic pain of right knee  -     Ambulatory Referral to Orthopedics  Worsening  + spur medial knee  Working on weight loss, sheis motivated. Will help  Insurance precluded further procedure with pain management  Will see if ortho can offer help    RTC 6 months with labs and PRN

## 2019-09-11 ENCOUNTER — TELEPHONE (OUTPATIENT)
Dept: INTERNAL MEDICINE | Facility: CLINIC | Age: 49
End: 2019-09-11

## 2019-09-11 DIAGNOSIS — M17.12 PRIMARY OSTEOARTHRITIS OF LEFT KNEE: Primary | ICD-10-CM

## 2019-09-11 NOTE — TELEPHONE ENCOUNTER
----- Message from Gemma Garrido sent at 2019  9:02 AM CDT -----  Contact: Self  Arron Ron  MRN: [id number]  : 1970  PCP: Mahi Rojas  Home Phone      [phone]  Work Phone      Not on file.  Mobile          [phone]      MESSAGE:   Was seen in ER last week for her left leg and knee.  She states that she is still having problems and would like to speak to nurse about getting an order for an MRI and possible referral to specialist. Please call to advise.    Phone: [phone]

## 2019-09-11 NOTE — TELEPHONE ENCOUNTER
Left knee MRI ordered. She has done and failed at lest 6 weeks of PT and NSAIDs with worsening of sx. She should know it is possible insurance will deny the scan but we will work on that if needed.

## 2019-09-13 ENCOUNTER — HOSPITAL ENCOUNTER (OUTPATIENT)
Dept: RADIOLOGY | Facility: HOSPITAL | Age: 49
Discharge: HOME | End: 2019-09-13
Attending: INTERNAL MEDICINE
Payer: MEDICAID

## 2019-09-13 VITALS — HEIGHT: 66 IN | BODY MASS INDEX: 47.09 KG/M2 | WEIGHT: 293 LBS

## 2019-09-13 DIAGNOSIS — Z12.39 BREAST CANCER SCREENING: ICD-10-CM

## 2019-09-13 DIAGNOSIS — M17.12 PRIMARY OSTEOARTHRITIS OF LEFT KNEE: ICD-10-CM

## 2019-09-13 PROCEDURE — 77067 MAMMO DIGITAL SCREENING BILAT WITH TOMOSYNTHESIS_CAD: ICD-10-PCS | Mod: 26,,, | Performed by: RADIOLOGY

## 2019-09-13 PROCEDURE — 77067 SCR MAMMO BI INCL CAD: CPT | Mod: 26,,, | Performed by: RADIOLOGY

## 2019-09-13 PROCEDURE — 73721 MRI JNT OF LWR EXTRE W/O DYE: CPT | Mod: TC,LT

## 2019-09-13 PROCEDURE — 77063 BREAST TOMOSYNTHESIS BI: CPT | Mod: 26,,, | Performed by: RADIOLOGY

## 2019-09-13 PROCEDURE — 77067 SCR MAMMO BI INCL CAD: CPT | Mod: TC

## 2019-09-13 PROCEDURE — 77063 MAMMO DIGITAL SCREENING BILAT WITH TOMOSYNTHESIS_CAD: ICD-10-PCS | Mod: 26,,, | Performed by: RADIOLOGY

## 2019-09-16 ENCOUNTER — TELEPHONE (OUTPATIENT)
Dept: INTERNAL MEDICINE | Facility: CLINIC | Age: 49
End: 2019-09-16

## 2019-09-16 DIAGNOSIS — S83.241A TEAR OF MEDIAL MENISCUS OF RIGHT KNEE, CURRENT, UNSPECIFIED TEAR TYPE, INITIAL ENCOUNTER: Primary | ICD-10-CM

## 2019-09-16 NOTE — TELEPHONE ENCOUNTER
She has a good bit of arthritis in the knee and a tear of the meniscus. Would need to see ortho. Referral in. Hopefully can get in at [facility].

## 2019-09-16 NOTE — TELEPHONE ENCOUNTER
----- Message from Aysha Soares sent at 2019  8:09 AM CDT -----  Contact: Self  Arron Ron  MRN: [id number]  : 1970  PCP: Mahi Rojas  Home Phone      [phone]  Work Phone      Not on file.  Mobile          [phone]      MESSAGE:   Patient would like to get test results.    Name of test (lab, mammo, etc.):  MRI  Date of test:  2019  Ordering provider: Dr. Rojas  Where was the test performed:   [facility]  Comments:      Phone: [phone]

## 2019-09-16 NOTE — TELEPHONE ENCOUNTER
Spoke with patient and informed her of the MRI results. Verbalized understanding. Referral message sent to [facility] to schedule appt with patient.

## 2019-09-30 ENCOUNTER — TELEPHONE (OUTPATIENT)
Dept: INTERNAL MEDICINE | Facility: CLINIC | Age: 49
End: 2019-09-30

## 2019-09-30 NOTE — TELEPHONE ENCOUNTER
Sent a second referral message to [facility]. Told pt to give it to the end of the week and if not contacted instructed her to call the office back.

## 2019-09-30 NOTE — TELEPHONE ENCOUNTER
----- Message from Aysha Soares sent at 2019  2:12 PM CDT -----  Contact: self  Arron Ron  MRN: [id number]  : 1970  PCP: Mahi Rojas  Home Phone      [phone]  Work Phone      Not on file.  Mobile          [phone]      MESSAGE:   Patient states a referral was sent to an orthopedic and the patient never received a call to schedule the patient would like us to call the other provider to schedule her an appt.    Phone: [phone]

## 2019-10-31 ENCOUNTER — TELEPHONE (OUTPATIENT)
Dept: INTERNAL MEDICINE | Facility: CLINIC | Age: 49
End: 2019-10-31

## 2019-10-31 NOTE — TELEPHONE ENCOUNTER
----- Message from Gemma Garrido sent at 10/31/2019  9:28 AM CDT -----  Contact: Self  Arron Ron  MRN: [id number]  : 1970  PCP: Mahi Rojas  Home Phone      [phone]  Work Phone      Not on file.  Mobile          [phone]    MESSAGE:     Calling to speak to nurse about her referral to Orthopedic specialist. Please call to advise.    Phone: [phone]

## 2019-10-31 NOTE — TELEPHONE ENCOUNTER
Spoke with pt and informed her that [facility] will not see her as a patient because they don't think her knee problem is severe enough for PT. Informed pt that [facility] is the only [facility] that we know that accepts her insurance. States she will call her insurance to find out who is in network because she can't handle the knee problem anymore.

## 2020-01-23 ENCOUNTER — TELEPHONE (OUTPATIENT)
Dept: INTERNAL MEDICINE | Facility: CLINIC | Age: 50
End: 2020-01-23

## 2020-01-23 ENCOUNTER — OFFICE VISIT (OUTPATIENT)
Dept: INTERNAL MEDICINE | Facility: CLINIC | Age: 50
End: 2020-01-23
Payer: MEDICAID

## 2020-01-23 VITALS
HEART RATE: 80 BPM | HEIGHT: 66 IN | WEIGHT: 293 LBS | DIASTOLIC BLOOD PRESSURE: 85 MMHG | RESPIRATION RATE: 20 BRPM | OXYGEN SATURATION: 96 % | SYSTOLIC BLOOD PRESSURE: 137 MMHG | BODY MASS INDEX: 47.09 KG/M2

## 2020-01-23 DIAGNOSIS — R06.09 DOE (DYSPNEA ON EXERTION): Primary | ICD-10-CM

## 2020-01-23 DIAGNOSIS — M25.562 ACUTE PAIN OF LEFT KNEE: ICD-10-CM

## 2020-01-23 DIAGNOSIS — I10 ESSENTIAL HYPERTENSION: ICD-10-CM

## 2020-01-23 DIAGNOSIS — S83.241A TEAR OF MEDIAL MENISCUS OF RIGHT KNEE, CURRENT, UNSPECIFIED TEAR TYPE, INITIAL ENCOUNTER: ICD-10-CM

## 2020-01-23 DIAGNOSIS — G47.33 OSA ON CPAP: ICD-10-CM

## 2020-01-23 DIAGNOSIS — E66.01 MORBID OBESITY: ICD-10-CM

## 2020-01-23 DIAGNOSIS — J45.909 ASTHMA, UNSPECIFIED ASTHMA SEVERITY, UNSPECIFIED WHETHER COMPLICATED, UNSPECIFIED WHETHER PERSISTENT: ICD-10-CM

## 2020-01-23 PROCEDURE — 99999 PR PBB SHADOW E&M-EST. PATIENT-LVL V: ICD-10-PCS | Mod: PBBFAC,,, | Performed by: NURSE PRACTITIONER

## 2020-01-23 PROCEDURE — 99215 OFFICE O/P EST HI 40 MIN: CPT | Mod: PBBFAC | Performed by: NURSE PRACTITIONER

## 2020-01-23 PROCEDURE — 99214 PR OFFICE/OUTPT VISIT, EST, LEVL IV, 30-39 MIN: ICD-10-PCS | Mod: S$PBB,,, | Performed by: NURSE PRACTITIONER

## 2020-01-23 PROCEDURE — 99999 PR PBB SHADOW E&M-EST. PATIENT-LVL V: CPT | Mod: PBBFAC,,, | Performed by: NURSE PRACTITIONER

## 2020-01-23 PROCEDURE — 99214 OFFICE O/P EST MOD 30 MIN: CPT | Mod: S$PBB,,, | Performed by: NURSE PRACTITIONER

## 2020-01-23 RX ORDER — KETOROLAC TROMETHAMINE 30 MG/ML
30 INJECTION, SOLUTION INTRAMUSCULAR; INTRAVENOUS
Status: COMPLETED | OUTPATIENT
Start: 2020-01-23 | End: 2020-01-23

## 2020-01-23 RX ORDER — CYCLOBENZAPRINE HCL 10 MG
10 TABLET ORAL 2 TIMES DAILY
COMMUNITY
Start: 2020-01-03 | End: 2022-01-05 | Stop reason: SDUPTHER

## 2020-01-23 RX ORDER — DOXYCYCLINE 100 MG/1
CAPSULE ORAL
COMMUNITY
End: 2020-10-12

## 2020-01-23 RX ORDER — TRAMADOL HYDROCHLORIDE 50 MG/1
50 TABLET ORAL 4 TIMES DAILY
COMMUNITY
Start: 2020-01-03 | End: 2021-03-01

## 2020-01-23 RX ORDER — ATORVASTATIN CALCIUM 20 MG/1
TABLET, FILM COATED ORAL
COMMUNITY
Start: 2020-01-03

## 2020-01-23 RX ORDER — PANTOPRAZOLE SODIUM 40 MG/1
40 TABLET, DELAYED RELEASE ORAL DAILY
COMMUNITY
Start: 2020-01-03 | End: 2020-06-11

## 2020-01-23 RX ORDER — OMEPRAZOLE 20 MG/1
20 CAPSULE, DELAYED RELEASE ORAL DAILY
COMMUNITY
Start: 2020-01-03 | End: 2022-04-27

## 2020-01-23 RX ORDER — FLUTICASONE FUROATE AND VILANTEROL 200; 25 UG/1; UG/1
1 POWDER RESPIRATORY (INHALATION) DAILY
Qty: 60 EACH | Refills: 0 | Status: SHIPPED | OUTPATIENT
Start: 2020-01-23 | End: 2020-01-23

## 2020-01-23 RX ORDER — PROMETHAZINE HYDROCHLORIDE AND DEXTROMETHORPHAN HYDROBROMIDE 6.25; 15 MG/5ML; MG/5ML
SYRUP ORAL
COMMUNITY
End: 2020-06-11

## 2020-01-23 RX ORDER — TORSEMIDE 20 MG/1
10 TABLET ORAL
COMMUNITY
Start: 2020-01-03

## 2020-01-23 RX ORDER — CIPROFLOXACIN 500 MG/1
TABLET ORAL
COMMUNITY
End: 2020-06-11

## 2020-01-23 RX ORDER — BUDESONIDE AND FORMOTEROL FUMARATE DIHYDRATE 160; 4.5 UG/1; UG/1
2 AEROSOL RESPIRATORY (INHALATION) EVERY 12 HOURS
Qty: 10.2 G | Refills: 2 | Status: SHIPPED | OUTPATIENT
Start: 2020-01-23 | End: 2020-04-14 | Stop reason: SDUPTHER

## 2020-01-23 RX ADMIN — KETOROLAC TROMETHAMINE 30 MG: 30 INJECTION, SOLUTION INTRAMUSCULAR; INTRAVENOUS at 10:01

## 2020-01-23 NOTE — PROGRESS NOTES
Subjective:           Patient ID: Arron Ron is a 49 y.o. female.    Chief Complaint: Referral    Arron Ron is a 49 y.o. Female, new to me; known to fellow providers; with PMHX of HTN, obesity, chronic low back and knee pain, depression/anxiety, HLD, asthma, RODRI- CPAP , known to Dr. Ocampo  Last PFTs noted 2017 - demonstrating mild obstruction, moderate restriction, DLCO mildly decreased, O2 sat 99% on RA    Here with c/o noting worsening SOB and MERCER, notes she has to rest every 20 feet, States she cant climb stairs or do minimal housework without getting SOB. Notes wheezing, especially if she gets overheated. Requesting referral to Dr. Ocampo.   She is due for F/U with Dr. Mays; Notes she has followed with him.   States she has fluid in legs and feet. Has f/U with him on Monday. States he is concerned about wait time for ortho eval         Notes she has torn meniscus to left knee; she was referred to [facility] [place]. Still waiting for appnt in March.   Standing during visit due to pain in left knee  9/2019 MRI of left knee   Intact ACL and PCL.    Moderate chondral degeneration with joint space narrowing, subchondral T2 edema and marginal spurring insistent with moderate primary arthrosis of the medial knee compartment.  Associated meniscal degeneration with medial meniscal expulsion and free edge tear of the body of the medial meniscus.    Grade 1 MCL sprain injury.  Large popliteal fossa cyst.  Low-grade chondromalacia patella.  Moderate joint effusion.  Nonspecific pre patellar edema.        Review of Systems   Constitutional: Negative for activity change, fatigue, fever and unexpected weight change.   HENT: Negative for congestion, ear pain, hearing loss, rhinorrhea and sore throat.    Eyes: Negative for pain, redness and visual disturbance.   Respiratory: Positive for shortness of breath and wheezing. Negative for cough.    Cardiovascular: Negative for chest pain, palpitations and leg  swelling.   Gastrointestinal: Negative for abdominal pain, constipation, diarrhea, nausea and vomiting.   Genitourinary: Negative for dysuria, frequency and urgency.   Musculoskeletal: Positive for arthralgias (right knee pain). Negative for back pain, joint swelling and neck pain.   Skin: Negative for color change, rash and wound.   Neurological: Negative for dizziness, tremors, weakness, light-headedness and headaches.       Objective:      Physical Exam   Constitutional: She is oriented to person, place, and time. She appears well-developed and well-nourished. No distress.   HENT:   Head: Normocephalic and atraumatic.   Right Ear: External ear normal.   Left Ear: External ear normal.   Eyes: Pupils are equal, round, and reactive to light. Conjunctivae and EOM are normal. Right eye exhibits no discharge. Left eye exhibits no discharge.   Neck: Neck supple. No tracheal deviation present.   Cardiovascular: Normal rate and regular rhythm.   No murmur heard.  Pulmonary/Chest: Effort normal and breath sounds normal. No respiratory distress. She has no wheezes. She has no rales.   Abdominal: Soft. Bowel sounds are normal. She exhibits no distension. There is no tenderness.   Musculoskeletal: She exhibits edema (trace b/l pedal edema) and tenderness (left knee medial joint line ).   Neurological: She is alert and oriented to person, place, and time. No cranial nerve deficit.   Skin: Skin is warm and dry.   Psychiatric: She has a normal mood and affect. Her behavior is normal. Judgment and thought content normal.   Vitals reviewed.      Assessment:       1. MERCER (dyspnea on exertion)    2. Asthma, unspecified asthma severity, unspecified whether complicated, unspecified whether persistent    3. Essential hypertension    4. Morbid obesity    5. RODRI on CPAP    6. Tear of medial meniscus of right knee, current, unspecified tear type, initial encounter    7. Acute pain of left knee        Plan:   Arron was seen today for  referral.    Diagnoses and all orders for this visit:    MERCER (dyspnea on exertion)  -     Ambulatory Referral to Pulmonology  -     budesonide-formoterol 160-4.5 mcg (SYMBICORT) 160-4.5 mcg/actuation HFAA; Inhale 2 puffs into the lungs every 12 (twelve) hours. Controller  Rinse mouth after use    Asthma, unspecified asthma severity, unspecified whether complicated, unspecified whether persistent  -     Ambulatory Referral to Pulmonology  -     Discontinue: fluticasone furoate-vilanterol (BREO ELLIPTA) 200-25 mcg/dose DsDv diskus inhaler; Inhale 1 puff into the lungs once daily. Controller  -     budesonide-formoterol 160-4.5 mcg (SYMBICORT) 160-4.5 mcg/actuation HFAA; Inhale 2 puffs into the lungs every 12 (twelve) hours. Controller  Rinse mouth after use    Essential hypertension    Morbid obesity    RODRI on CPAP    Tear of medial meniscus of right knee, current, unspecified tear type, initial encounter  -     ketorolac injection 30 mg    Acute pain of left knee  -     ketorolac injection 30 mg      Problem List Items Addressed This Visit        Cardiac/Vascular    HTN (hypertension)       Endocrine    Morbid obesity       Other    RODRI on CPAP      Other Visit Diagnoses     MERCRE (dyspnea on exertion)    -  Primary    Relevant Medications    budesonide-formoterol 160-4.5 mcg (SYMBICORT) 160-4.5 mcg/actuation HFAA    Other Relevant Orders    Ambulatory Referral to Pulmonology    Asthma, unspecified asthma severity, unspecified whether complicated, unspecified whether persistent        Relevant Medications    budesonide-formoterol 160-4.5 mcg (SYMBICORT) 160-4.5 mcg/actuation HFAA    Other Relevant Orders    Ambulatory Referral to Pulmonology    Tear of medial meniscus of right knee, current, unspecified tear type, initial encounter        Relevant Medications    ketorolac injection 30 mg (Completed)    Acute pain of left knee        Relevant Medications    ketorolac injection 30 mg (Completed)

## 2020-01-29 ENCOUNTER — TELEPHONE (OUTPATIENT)
Dept: INTERNAL MEDICINE | Facility: CLINIC | Age: 50
End: 2020-01-29

## 2020-01-29 NOTE — TELEPHONE ENCOUNTER
Called Mary. Explained that claim was from mid 2018. Pt was unsure why it would be getting addressed now. I explained that we would not be filling out paperwork.

## 2020-01-29 NOTE — TELEPHONE ENCOUNTER
----- Message from Gemma Garrido sent at 2020  3:49 PM CST -----  Contact: Mary[facility]  Arron Ron  MRN: [id number]  : 1970  PCP: Mahi Rojas  Home Phone      [phone]  Work Phone      Not on file.  Mobile          [phone]    MESSAGE:     Calling to check status of the paperwork that was faxed over for Dr. Rojas to fill out. Please call to give status.    Phone: [phone] Ext [phone]

## 2020-03-24 ENCOUNTER — TELEPHONE (OUTPATIENT)
Dept: INTERNAL MEDICINE | Facility: CLINIC | Age: 50
End: 2020-03-24

## 2020-03-24 NOTE — TELEPHONE ENCOUNTER
----- Message from Brittany Narayan sent at 3/24/2020 10:01 AM CDT -----  Contact: Self  Arron Ron  MRN: [id number]  : 1970  PCP: Mahi Rojas  Home Phone      [phone]  Work Phone      Not on file.  Mobile          [phone]      MESSAGE:   Pt states she may have been exposed to Corona virus at a [employer] function, she states a person that was reported having it on the news was around her at the function. Pt denies having any symptoms. Please return call @ [phone]. Thanks.

## 2020-03-24 NOTE — TELEPHONE ENCOUNTER
Pt states he has a temp of 99F. Pt states she has shortness of breath. She was exposed to someone with the Corona virus. Advised pt to self quarantine for up to 14 days and not expose others. Advised pt she can come in for an appointment. Advised pt that [facility] in [place] is designated for screenings. Pt verbalized understanding.

## 2020-04-14 DIAGNOSIS — J45.909 ASTHMA, UNSPECIFIED ASTHMA SEVERITY, UNSPECIFIED WHETHER COMPLICATED, UNSPECIFIED WHETHER PERSISTENT: ICD-10-CM

## 2020-04-14 DIAGNOSIS — R06.09 DOE (DYSPNEA ON EXERTION): ICD-10-CM

## 2020-04-15 RX ORDER — BUDESONIDE AND FORMOTEROL FUMARATE DIHYDRATE 160; 4.5 UG/1; UG/1
2 AEROSOL RESPIRATORY (INHALATION) EVERY 12 HOURS
Qty: 10.2 G | Refills: 2 | Status: SHIPPED | OUTPATIENT
Start: 2020-04-15 | End: 2020-12-07 | Stop reason: CLARIF

## 2020-04-15 NOTE — TELEPHONE ENCOUNTER
Med refilled. She is past due for her 6 month follow up with labs. Please schedule for next 4-6 weeks. Thanks

## 2020-06-11 ENCOUNTER — OFFICE VISIT (OUTPATIENT)
Dept: INTERNAL MEDICINE | Facility: CLINIC | Age: 50
End: 2020-06-11
Payer: MEDICAID

## 2020-06-11 VITALS
HEIGHT: 66 IN | DIASTOLIC BLOOD PRESSURE: 82 MMHG | SYSTOLIC BLOOD PRESSURE: 126 MMHG | BODY MASS INDEX: 42.13 KG/M2 | WEIGHT: 262.13 LBS | OXYGEN SATURATION: 96 % | RESPIRATION RATE: 16 BRPM | TEMPERATURE: 97 F | HEART RATE: 71 BPM

## 2020-06-11 DIAGNOSIS — M25.562 ACUTE PAIN OF LEFT KNEE: Primary | ICD-10-CM

## 2020-06-11 DIAGNOSIS — M17.12 PRIMARY OSTEOARTHRITIS OF LEFT KNEE: ICD-10-CM

## 2020-06-11 DIAGNOSIS — E66.01 MORBID OBESITY: ICD-10-CM

## 2020-06-11 DIAGNOSIS — G47.33 OSA ON CPAP: ICD-10-CM

## 2020-06-11 DIAGNOSIS — I10 ESSENTIAL HYPERTENSION: ICD-10-CM

## 2020-06-11 PROCEDURE — 99214 OFFICE O/P EST MOD 30 MIN: CPT | Mod: PBBFAC | Performed by: NURSE PRACTITIONER

## 2020-06-11 PROCEDURE — 99999 PR PBB SHADOW E&M-EST. PATIENT-LVL IV: CPT | Mod: PBBFAC,,, | Performed by: NURSE PRACTITIONER

## 2020-06-11 PROCEDURE — 99213 OFFICE O/P EST LOW 20 MIN: CPT | Mod: S$PBB,,, | Performed by: NURSE PRACTITIONER

## 2020-06-11 PROCEDURE — 99999 PR PBB SHADOW E&M-EST. PATIENT-LVL IV: ICD-10-PCS | Mod: PBBFAC,,, | Performed by: NURSE PRACTITIONER

## 2020-06-11 PROCEDURE — 99213 PR OFFICE/OUTPT VISIT, EST, LEVL III, 20-29 MIN: ICD-10-PCS | Mod: S$PBB,,, | Performed by: NURSE PRACTITIONER

## 2020-06-11 RX ORDER — KETOROLAC TROMETHAMINE 30 MG/ML
60 INJECTION, SOLUTION INTRAMUSCULAR; INTRAVENOUS
Status: COMPLETED | OUTPATIENT
Start: 2020-06-11 | End: 2020-06-11

## 2020-06-11 RX ORDER — DICLOFENAC SODIUM 50 MG/1
50 TABLET, DELAYED RELEASE ORAL 2 TIMES DAILY PRN
Qty: 60 TABLET | Refills: 0 | Status: SHIPPED | OUTPATIENT
Start: 2020-06-11 | End: 2021-03-01

## 2020-06-11 RX ADMIN — KETOROLAC TROMETHAMINE 60 MG: 60 INJECTION, SOLUTION INTRAMUSCULAR at 12:06

## 2020-06-11 NOTE — PROGRESS NOTES
Subjective:           Patient ID: Arron Ron is a 49 y.o. female.    Chief Complaint: Knee Pain (recent falls)    Arron Ron is a 49 y.o. Female, new to me; known to fellow providers; with PMHX of HTN, obesity, chronic low back and knee pain, depression/anxiety, HLD, asthma, RODRI- CPAP , known to Dr. Ocampo.     Hx of chronic knee pain; here with c/o left knee pain after falls over the weekend. + Bruising noted below left knee.   Finally had ortho  eval and was given Knee injection. Notes minimal improvement. Told she will likely need TKR. Ortho Advised on weight loss.   Discussed weight loss - she has had bariatric surgery and expects to continue to lose weight. She would need to lose 16 lbs to bring her BMI to 40.  - F/u 3 mo   Pt notes she was > 300lbs and now down to 262 since bariatric sx.     notes lots of wear and tear due to playing softball, notes prior injuries, + obesity  Hx torn meniscus to left knee; she was referred to [facility] [place]opaedist. 9/2019 MRI of left knee   Intact ACL and PCL.   Moderate chondral degeneration with joint space narrowing, subchondral T2 edema and marginal spurring insistent with moderate primary arthrosis of the medial knee compartment.  Associated meniscal degeneration with medial meniscal expulsion and free edge tear of the body of the medial meniscus.   Grade 1 MCL sprain injury.  Large popliteal fossa cyst.  Low-grade chondromalacia patella.  Moderate joint effusion.  Nonspecific pre patellar edema.       Review of Systems   Constitutional: Negative for chills, fatigue and fever.   HENT: Negative for congestion, ear pain, postnasal drip, sinus pressure, sneezing and sore throat.    Eyes: Negative for discharge.   Respiratory: Negative for cough, chest tightness and shortness of breath.    Cardiovascular: Negative.    Gastrointestinal: Negative for abdominal pain, constipation, diarrhea, nausea and vomiting.   Genitourinary: Negative for difficulty urinating,  flank pain and hematuria.   Musculoskeletal: Positive for arthralgias (knee pain). Negative for joint swelling.   Skin: Negative.    Neurological: Negative for dizziness and headaches.   Psychiatric/Behavioral: Negative for behavioral problems and confusion.       Objective:      Physical Exam   Constitutional: She is oriented to person, place, and time. She appears well-developed and well-nourished. No distress.   HENT:   Head: Normocephalic and atraumatic.   Right Ear: External ear normal.   Left Ear: External ear normal.   Eyes: Pupils are equal, round, and reactive to light. Conjunctivae and EOM are normal. Right eye exhibits no discharge. Left eye exhibits no discharge.   Neck: Neck supple. No tracheal deviation present.   Cardiovascular: Normal rate and regular rhythm.   No murmur heard.  Pulmonary/Chest: Effort normal and breath sounds normal. No respiratory distress. She has no wheezes. She has no rales.   Abdominal: Soft. Bowel sounds are normal. She exhibits no distension. There is no tenderness.   Musculoskeletal: She exhibits edema (trace b/l pedal edema) and tenderness (left knee medial joint line  ).   Crepitus to left knee, no significant fluid.   + ecchymosis to anterior tibia    Neurological: She is alert and oriented to person, place, and time. No cranial nerve deficit.   Skin: Skin is warm and dry.   Psychiatric: She has a normal mood and affect. Her behavior is normal. Judgment and thought content normal.   Vitals reviewed.      Assessment:       1. Acute pain of left knee    2. Essential hypertension    3. Morbid obesity    4. RODRI on CPAP    5. Primary osteoarthritis of left knee        Plan:   Arron was seen today for knee pain.    Diagnoses and all orders for this visit:    Acute pain of left knee  Comments:  acute on chronic pain - left knee  Orders:  -     ketorolac injection 60 mg  -     diclofenac (VOLTAREN) 50 MG EC tablet; Take 1 tablet (50 mg total) by mouth 2 (two) times daily as  needed.    Essential hypertension    Morbid obesity    RODRI on CPAP    Primary osteoarthritis of left knee  -     diclofenac (VOLTAREN) 50 MG EC tablet; Take 1 tablet (50 mg total) by mouth 2 (two) times daily as needed.      Problem List Items Addressed This Visit        Cardiac/Vascular    HTN (hypertension)       Endocrine    Morbid obesity       Orthopedic    Primary osteoarthritis of left knee    Relevant Medications    diclofenac (VOLTAREN) 50 MG EC tablet       Other    RODRI on CPAP      Other Visit Diagnoses     Acute pain of left knee    -  Primary    acute on chronic pain - left knee    Relevant Medications    ketorolac injection 60 mg (Completed)    diclofenac (VOLTAREN) 50 MG EC tablet

## 2020-06-11 NOTE — PATIENT INSTRUCTIONS
Reducing Knee Pain and Swelling    Many treatments can help reduce pain and swelling in your knee. Your healthcare provider or physical therapist may suggest one or more of the following treatments:  · Icing your knee helps reduce swelling. You may be asked to ice your knee once a day or more. Apply ice for about 15 to 20 minutes at a time, with at least 40 minutes between sessions. Always keep a towel between the ice and your skin.   · Keeping your leg raised above your heart helps excess fluid flow out of your knee joint. This reduces swelling.  · Compression means wrapping an elastic bandage or neoprene sleeve snugly around your knees. This keeps fluid from collecting in your knee joint.  · Electrical stimulation, done by a physical therapist or , can help reduce excess fluid in your knee joint.  · Anti-inflammatory medicines may be prescribed by your healthcare provider. You may take pills or receive injections in your knee.  · Isometric (ranulfo) exercises strengthen the muscles that support your knee joint. They also help reduce excess fluid in your knee.  · Massage helps fluid drain away from your knee.  Date Last Reviewed: 10/13/2015  © 3953-6599 [employer]. [address] [place] [address], [place] [address]. All rights reserved. This information is not intended as a substitute for professional medical care. Always follow your healthcare professional's instructions.

## 2020-06-12 ENCOUNTER — TELEPHONE (OUTPATIENT)
Dept: INTERNAL MEDICINE | Facility: CLINIC | Age: 50
End: 2020-06-12

## 2020-06-12 NOTE — TELEPHONE ENCOUNTER
----- Message from Anum Burgos MA sent at 6/12/2020  1:25 PM CDT -----  Contact: [facility]   Request status of medical record release for pt.         [phone]  Ext/case number [id number]

## 2020-06-15 ENCOUNTER — LAB VISIT (OUTPATIENT)
Dept: LAB | Facility: HOSPITAL | Age: 50
End: 2020-06-15
Attending: INTERNAL MEDICINE
Payer: MEDICAID

## 2020-06-15 DIAGNOSIS — I10 ESSENTIAL HYPERTENSION: ICD-10-CM

## 2020-06-15 DIAGNOSIS — E78.5 HYPERLIPIDEMIA, UNSPECIFIED HYPERLIPIDEMIA TYPE: ICD-10-CM

## 2020-06-15 DIAGNOSIS — E66.01 MORBID OBESITY: ICD-10-CM

## 2020-06-15 LAB
ALBUMIN SERPL BCP-MCNC: 3.8 G/DL (ref 3.5–5.2)
ALP SERPL-CCNC: 93 U/L (ref 55–135)
ALT SERPL W/O P-5'-P-CCNC: 13 U/L (ref 10–44)
ANION GAP SERPL CALC-SCNC: 7 MMOL/L (ref 8–16)
AST SERPL-CCNC: 15 U/L (ref 10–40)
BASOPHILS # BLD AUTO: 0.08 K/UL (ref 0–0.2)
BASOPHILS NFR BLD: 1.6 % (ref 0–1.9)
BILIRUB SERPL-MCNC: 0.5 MG/DL (ref 0.1–1)
BUN SERPL-MCNC: 12 MG/DL (ref 6–20)
CALCIUM SERPL-MCNC: 9.7 MG/DL (ref 8.7–10.5)
CHLORIDE SERPL-SCNC: 107 MMOL/L (ref 95–110)
CHOLEST SERPL-MCNC: 157 MG/DL (ref 120–199)
CHOLEST/HDLC SERPL: 3.7 {RATIO} (ref 2–5)
CO2 SERPL-SCNC: 28 MMOL/L (ref 23–29)
CREAT SERPL-MCNC: 1.1 MG/DL (ref 0.5–1.4)
DIFFERENTIAL METHOD: ABNORMAL
EOSINOPHIL # BLD AUTO: 0.2 K/UL (ref 0–0.5)
EOSINOPHIL NFR BLD: 3.7 % (ref 0–8)
ERYTHROCYTE [DISTWIDTH] IN BLOOD BY AUTOMATED COUNT: 16.4 % (ref 11.5–14.5)
EST. GFR  (AFRICAN AMERICAN): >60 ML/MIN/1.73 M^2
EST. GFR  (NON AFRICAN AMERICAN): 59 ML/MIN/1.73 M^2
GLUCOSE SERPL-MCNC: 94 MG/DL (ref 70–110)
HCT VFR BLD AUTO: 34.4 % (ref 37–48.5)
HDLC SERPL-MCNC: 43 MG/DL (ref 40–75)
HDLC SERPL: 27.4 % (ref 20–50)
HGB BLD-MCNC: 11.1 G/DL (ref 12–16)
IMM GRANULOCYTES # BLD AUTO: 0.01 K/UL (ref 0–0.04)
IMM GRANULOCYTES NFR BLD AUTO: 0.2 % (ref 0–0.5)
LDLC SERPL CALC-MCNC: 103 MG/DL (ref 63–159)
LYMPHOCYTES # BLD AUTO: 1.9 K/UL (ref 1–4.8)
LYMPHOCYTES NFR BLD: 39.4 % (ref 18–48)
MCH RBC QN AUTO: 29.1 PG (ref 27–31)
MCHC RBC AUTO-ENTMCNC: 32.3 G/DL (ref 32–36)
MCV RBC AUTO: 90 FL (ref 82–98)
MONOCYTES # BLD AUTO: 0.4 K/UL (ref 0.3–1)
MONOCYTES NFR BLD: 7.8 % (ref 4–15)
NEUTROPHILS # BLD AUTO: 2.3 K/UL (ref 1.8–7.7)
NEUTROPHILS NFR BLD: 47.3 % (ref 38–73)
NONHDLC SERPL-MCNC: 114 MG/DL
NRBC BLD-RTO: 0 /100 WBC
PLATELET # BLD AUTO: 264 K/UL (ref 150–350)
PMV BLD AUTO: 11.2 FL (ref 9.2–12.9)
POTASSIUM SERPL-SCNC: 3.9 MMOL/L (ref 3.5–5.1)
PROT SERPL-MCNC: 7.4 G/DL (ref 6–8.4)
RBC # BLD AUTO: 3.82 M/UL (ref 4–5.4)
SODIUM SERPL-SCNC: 142 MMOL/L (ref 136–145)
TRIGL SERPL-MCNC: 55 MG/DL (ref 30–150)
TSH SERPL DL<=0.005 MIU/L-ACNC: 1.74 UIU/ML (ref 0.4–4)
WBC # BLD AUTO: 4.85 K/UL (ref 3.9–12.7)

## 2020-06-15 PROCEDURE — 84443 ASSAY THYROID STIM HORMONE: CPT

## 2020-06-15 PROCEDURE — 36415 COLL VENOUS BLD VENIPUNCTURE: CPT

## 2020-06-15 PROCEDURE — 80061 LIPID PANEL: CPT

## 2020-06-15 PROCEDURE — 80053 COMPREHEN METABOLIC PANEL: CPT

## 2020-06-15 PROCEDURE — 85025 COMPLETE CBC W/AUTO DIFF WBC: CPT

## 2020-09-17 DIAGNOSIS — Z12.11 COLON CANCER SCREENING: ICD-10-CM

## 2020-10-06 ENCOUNTER — TELEPHONE (OUTPATIENT)
Dept: INTERNAL MEDICINE | Facility: CLINIC | Age: 50
End: 2020-10-06

## 2020-10-06 DIAGNOSIS — I10 ESSENTIAL HYPERTENSION: Primary | ICD-10-CM

## 2020-10-06 DIAGNOSIS — E78.5 HYPERLIPIDEMIA, UNSPECIFIED HYPERLIPIDEMIA TYPE: ICD-10-CM

## 2020-10-06 NOTE — TELEPHONE ENCOUNTER
Pt's MMG is scheduled for 11/18/2020 at 3:20 and pt is scheduled for 6 mo follow up on 10/12/2020. Please place orders for the labs that you want her to have drawn. Pt was informed that she will need to get the labs done a couple of days before her appt. The orders just need to be placed.

## 2020-10-06 NOTE — TELEPHONE ENCOUNTER
----- Message from Gemma Garrido sent at 10/6/2020  9:45 AM CDT -----  Contact: Self  Arron Ron  MRN: [id number]  : 1970  PCP: Mahi Rojas  Home Phone      [phone]  Work Phone      Not on file.  Mobile          [phone]  Mobile          [phone]      MESSAGE:     Would like to speak to nurse to order and schedule mammogram. Please call to assist and schedule.    Phone: [phone]

## 2020-10-10 ENCOUNTER — LAB VISIT (OUTPATIENT)
Dept: LAB | Facility: HOSPITAL | Age: 50
End: 2020-10-10
Attending: INTERNAL MEDICINE
Payer: MEDICAID

## 2020-10-10 DIAGNOSIS — E78.5 HYPERLIPIDEMIA, UNSPECIFIED HYPERLIPIDEMIA TYPE: ICD-10-CM

## 2020-10-10 DIAGNOSIS — I10 ESSENTIAL HYPERTENSION: ICD-10-CM

## 2020-10-10 LAB
ALBUMIN SERPL BCP-MCNC: 3.9 G/DL (ref 3.5–5.2)
ALP SERPL-CCNC: 111 U/L (ref 55–135)
ALT SERPL W/O P-5'-P-CCNC: 12 U/L (ref 10–44)
ANION GAP SERPL CALC-SCNC: 10 MMOL/L (ref 8–16)
AST SERPL-CCNC: 13 U/L (ref 10–40)
BASOPHILS # BLD AUTO: 0.06 K/UL (ref 0–0.2)
BASOPHILS NFR BLD: 0.9 % (ref 0–1.9)
BILIRUB SERPL-MCNC: 0.5 MG/DL (ref 0.1–1)
BUN SERPL-MCNC: 12 MG/DL (ref 6–20)
CALCIUM SERPL-MCNC: 9.6 MG/DL (ref 8.7–10.5)
CHLORIDE SERPL-SCNC: 104 MMOL/L (ref 95–110)
CHOLEST SERPL-MCNC: 162 MG/DL (ref 120–199)
CHOLEST/HDLC SERPL: 3.6 {RATIO} (ref 2–5)
CO2 SERPL-SCNC: 27 MMOL/L (ref 23–29)
CREAT SERPL-MCNC: 1.2 MG/DL (ref 0.5–1.4)
DIFFERENTIAL METHOD: ABNORMAL
EOSINOPHIL # BLD AUTO: 0.2 K/UL (ref 0–0.5)
EOSINOPHIL NFR BLD: 2.5 % (ref 0–8)
ERYTHROCYTE [DISTWIDTH] IN BLOOD BY AUTOMATED COUNT: 14.5 % (ref 11.5–14.5)
EST. GFR  (AFRICAN AMERICAN): >60 ML/MIN/1.73 M^2
EST. GFR  (NON AFRICAN AMERICAN): 53 ML/MIN/1.73 M^2
GLUCOSE SERPL-MCNC: 99 MG/DL (ref 70–110)
HCT VFR BLD AUTO: 31.6 % (ref 37–48.5)
HDLC SERPL-MCNC: 45 MG/DL (ref 40–75)
HDLC SERPL: 27.8 % (ref 20–50)
HGB BLD-MCNC: 10.5 G/DL (ref 12–16)
IMM GRANULOCYTES # BLD AUTO: 0.01 K/UL (ref 0–0.04)
IMM GRANULOCYTES NFR BLD AUTO: 0.1 % (ref 0–0.5)
LDLC SERPL CALC-MCNC: 104.2 MG/DL (ref 63–159)
LYMPHOCYTES # BLD AUTO: 3.3 K/UL (ref 1–4.8)
LYMPHOCYTES NFR BLD: 48.5 % (ref 18–48)
MCH RBC QN AUTO: 29.1 PG (ref 27–31)
MCHC RBC AUTO-ENTMCNC: 33.2 G/DL (ref 32–36)
MCV RBC AUTO: 88 FL (ref 82–98)
MONOCYTES # BLD AUTO: 0.5 K/UL (ref 0.3–1)
MONOCYTES NFR BLD: 7.1 % (ref 4–15)
NEUTROPHILS # BLD AUTO: 2.7 K/UL (ref 1.8–7.7)
NEUTROPHILS NFR BLD: 40.9 % (ref 38–73)
NONHDLC SERPL-MCNC: 117 MG/DL
NRBC BLD-RTO: 0 /100 WBC
PLATELET # BLD AUTO: 300 K/UL (ref 150–350)
PMV BLD AUTO: 10.2 FL (ref 9.2–12.9)
POTASSIUM SERPL-SCNC: 3.9 MMOL/L (ref 3.5–5.1)
PROT SERPL-MCNC: 7.5 G/DL (ref 6–8.4)
RBC # BLD AUTO: 3.61 M/UL (ref 4–5.4)
SODIUM SERPL-SCNC: 141 MMOL/L (ref 136–145)
TRIGL SERPL-MCNC: 64 MG/DL (ref 30–150)
TSH SERPL DL<=0.005 MIU/L-ACNC: 1.68 UIU/ML (ref 0.4–4)
WBC # BLD AUTO: 6.72 K/UL (ref 3.9–12.7)

## 2020-10-10 PROCEDURE — 85025 COMPLETE CBC W/AUTO DIFF WBC: CPT

## 2020-10-10 PROCEDURE — 36415 COLL VENOUS BLD VENIPUNCTURE: CPT

## 2020-10-10 PROCEDURE — 80061 LIPID PANEL: CPT

## 2020-10-10 PROCEDURE — 80053 COMPREHEN METABOLIC PANEL: CPT

## 2020-10-10 PROCEDURE — 84443 ASSAY THYROID STIM HORMONE: CPT

## 2020-10-12 ENCOUNTER — OFFICE VISIT (OUTPATIENT)
Dept: INTERNAL MEDICINE | Facility: CLINIC | Age: 50
End: 2020-10-12
Payer: MEDICAID

## 2020-10-12 VITALS
SYSTOLIC BLOOD PRESSURE: 118 MMHG | BODY MASS INDEX: 42.24 KG/M2 | OXYGEN SATURATION: 96 % | TEMPERATURE: 98 F | RESPIRATION RATE: 19 BRPM | DIASTOLIC BLOOD PRESSURE: 78 MMHG | HEART RATE: 85 BPM | HEIGHT: 66 IN | WEIGHT: 262.81 LBS

## 2020-10-12 DIAGNOSIS — M54.42 CHRONIC BILATERAL LOW BACK PAIN WITH BILATERAL SCIATICA: ICD-10-CM

## 2020-10-12 DIAGNOSIS — I10 ESSENTIAL HYPERTENSION: Primary | ICD-10-CM

## 2020-10-12 DIAGNOSIS — E78.5 HYPERLIPIDEMIA, UNSPECIFIED HYPERLIPIDEMIA TYPE: ICD-10-CM

## 2020-10-12 DIAGNOSIS — J45.20 MILD INTERMITTENT ASTHMA WITHOUT COMPLICATION: ICD-10-CM

## 2020-10-12 DIAGNOSIS — G89.29 CHRONIC BILATERAL LOW BACK PAIN WITH BILATERAL SCIATICA: ICD-10-CM

## 2020-10-12 DIAGNOSIS — M54.41 CHRONIC BILATERAL LOW BACK PAIN WITH BILATERAL SCIATICA: ICD-10-CM

## 2020-10-12 DIAGNOSIS — E66.01 MORBID OBESITY: ICD-10-CM

## 2020-10-12 DIAGNOSIS — G47.33 OSA ON CPAP: ICD-10-CM

## 2020-10-12 DIAGNOSIS — F32.0 MILD SINGLE CURRENT EPISODE OF MAJOR DEPRESSIVE DISORDER: ICD-10-CM

## 2020-10-12 DIAGNOSIS — M17.12 PRIMARY OSTEOARTHRITIS OF LEFT KNEE: ICD-10-CM

## 2020-10-12 PROCEDURE — 99999 PR PBB SHADOW E&M-EST. PATIENT-LVL V: ICD-10-PCS | Mod: PBBFAC,,, | Performed by: INTERNAL MEDICINE

## 2020-10-12 PROCEDURE — 99214 PR OFFICE/OUTPT VISIT, EST, LEVL IV, 30-39 MIN: ICD-10-PCS | Mod: S$PBB,,, | Performed by: INTERNAL MEDICINE

## 2020-10-12 PROCEDURE — 99215 OFFICE O/P EST HI 40 MIN: CPT | Mod: PBBFAC | Performed by: INTERNAL MEDICINE

## 2020-10-12 PROCEDURE — 99999 PR PBB SHADOW E&M-EST. PATIENT-LVL V: CPT | Mod: PBBFAC,,, | Performed by: INTERNAL MEDICINE

## 2020-10-12 PROCEDURE — 99214 OFFICE O/P EST MOD 30 MIN: CPT | Mod: S$PBB,,, | Performed by: INTERNAL MEDICINE

## 2020-10-12 RX ORDER — CELECOXIB 200 MG/1
200 CAPSULE ORAL DAILY
COMMUNITY
Start: 2020-10-07 | End: 2022-07-05

## 2020-10-12 RX ORDER — DOXAZOSIN 1 MG/1
1 TABLET ORAL DAILY
COMMUNITY
Start: 2020-09-21

## 2020-10-12 NOTE — PROGRESS NOTES
Subjective:       Patient ID: Arron Ron is a 50 y.o. female.    Chief Complaint: Follow-up (6 mo), Leg Pain, and Back Pain      HPI:  Patient is known to me and presents for follow up HTN, obesity, chronic low back and knee pain, depression/anxiety, HLD, asthma. labs from 10/10/2020 personally reviewed and discussed with the patient today.     HTN: on demedex every other day, aldactone, valsartan, doxazosin; also sees Dr. Mays ; GFR is back to normal. BP is controlled today. Does not check regularly at home. Denies chest pains, SOB. Mild LE edema that is intermittent     Depression/anxiety: on xanax PRN prescribed by Dr. Tello. Not on SSRI. Sx reported as controlled     HLD: On atorvastatin; last LDL controlled at 100. Denies medication side effects.      Asthma: diagnosed by Dr. Ocampo who started the Breo and now on symbicort. She denies  wheezing or cough today but has been feeling more SOB recently. Using albuterol more often. She has lost weight since I saw her last s/p bariatric surgery. No fevers.      Chronic back and knee pain: She has done PT.  She was following Dr. Tello who was prescribing pain medication but she self stopped taking her pain medications due to concern for side effects. Still using Flexeril. Her knee pain is getting worse. She is trying to exercise but pain limits her. Last MRI and xray reviewed and consistent with arthritis. She saw ortho, injection was not helpful. Using cane now as pain worsening and having falls. Knee is giving out on her. She was started on celebrex recently hasn't started taking yet.    RODRI; using CPAP every night     She was referred to Dr. Camacho. She is s/p braiatric surgery. She reports she is eating healthier; no more fast foods or soft drinks. She is feeling frustrated because her weight did not decrease very much since last visit. But has lost weight since I saw her 1 year ago.     Past Medical History:   Diagnosis Date    Acid reflux     Anemia      Back pain     was under therapy    Chronic back pain     Constipation     Elevated cholesterol     Encounter for blood transfusion 1987    Endometriosis     Hyperlipidemia     Hypertension     Nausea & vomiting     Sleep apnea        Family History   Problem Relation Age of Onset    Hypertension Mother     Heart attack Mother     Hypertension Brother     Heart disease Maternal Grandmother     Hypertension Maternal Grandmother     Diabetes Maternal Aunt     Breast cancer Neg Hx     Colon cancer Neg Hx     Ovarian cancer Neg Hx        Social History     Socioeconomic History    Marital status: Single     Spouse name: Not on file    Number of children: Not on file    Years of education: Not on file    Highest education level: Not on file   Occupational History    Not on file   Social Needs    Financial resource strain: Not on file    Food insecurity     Worry: Not on file     Inability: Not on file    Transportation needs     Medical: Not on file     Non-medical: Not on file   Tobacco Use    Smoking status: Never Smoker    Smokeless tobacco: Never Used   Substance and Sexual Activity    Alcohol use: Yes     Comment: rare    Drug use: No    Sexual activity: Yes     Partners: Male     Birth control/protection: Surgical, See Surgical Hx     Comment: --BTL   Lifestyle    Physical activity     Days per week: Not on file     Minutes per session: Not on file    Stress: Not on file   Relationships    Social connections     Talks on phone: Not on file     Gets together: Not on file     Attends [employer] service: Not on file     Active member of club or organization: Not on file     Attends meetings of clubs or organizations: Not on file     Relationship status: Not on file   Other Topics Concern    Not on file   Social History Narrative    Not on file       Review of Systems   Constitutional: Negative for activity change, fatigue, fever and unexpected weight change.   HENT:  Negative for congestion, ear pain, hearing loss, rhinorrhea, sore throat and tinnitus.    Eyes: Negative for pain, redness and visual disturbance.   Respiratory: Positive for shortness of breath (MERCER). Negative for cough and wheezing.    Cardiovascular: Negative for chest pain, palpitations and leg swelling.   Gastrointestinal: Negative for abdominal pain, blood in stool, constipation, diarrhea, nausea and vomiting.   Genitourinary: Negative for dysuria, frequency, pelvic pain and urgency.   Musculoskeletal: Positive for arthralgias (left knee) and back pain. Negative for joint swelling and neck pain.   Skin: Negative for color change, rash and wound.   Neurological: Negative for dizziness, tremors, weakness, light-headedness and headaches.         Objective:      Physical Exam  Vitals signs reviewed.   Constitutional:       General: She is not in acute distress.     Appearance: She is well-developed.   HENT:      Head: Normocephalic and atraumatic.      Right Ear: External ear normal.      Left Ear: External ear normal.      Nose: Nose normal.      Mouth/Throat:      Mouth: Mucous membranes are moist.      Pharynx: Oropharynx is clear.   Eyes:      General:         Right eye: No discharge.         Left eye: No discharge.      Conjunctiva/sclera: Conjunctivae normal.      Pupils: Pupils are equal, round, and reactive to light.   Neck:      Musculoskeletal: Neck supple.      Thyroid: No thyromegaly.   Cardiovascular:      Rate and Rhythm: Normal rate and regular rhythm.      Heart sounds: No murmur.   Pulmonary:      Effort: Pulmonary effort is normal. No respiratory distress.      Breath sounds: Normal breath sounds. No wheezing or rales.   Abdominal:      General: Bowel sounds are normal. There is no distension.      Palpations: Abdomen is soft.      Tenderness: There is no abdominal tenderness.   Musculoskeletal:         General: No deformity.   Lymphadenopathy:      Cervical: No cervical adenopathy.   Skin:      General: Skin is warm and dry.   Neurological:      Mental Status: She is alert and oriented to person, place, and time.      Cranial Nerves: No cranial nerve deficit.   Psychiatric:         Mood and Affect: Mood normal.         Behavior: Behavior normal.         Assessment:       1. Essential hypertension    2. Hyperlipidemia, unspecified hyperlipidemia type    3. Mild single current episode of major depressive disorder    4. RODRI on CPAP    5. Morbid obesity    6. Primary osteoarthritis of left knee    7. Chronic bilateral low back pain with bilateral sciatica    8. Mild intermittent asthma without complication        Plan:       Arron was seen today for follow-up, leg pain and back pain.    Diagnoses and all orders for this visit:    Essential hypertension  -     CBC auto differential; Future  -     Comprehensive Metabolic Panel; Future  -     TSH; Future  -     Lipid Panel; Future  Chronic controlled  Continue medications at same dose  Low Na diet  Exercise, weight loss  Check BP and keep log for next visit    Hyperlipidemia, unspecified hyperlipidemia type  -     CBC auto differential; Future  -     Comprehensive Metabolic Panel; Future  -     TSH; Future  -     Lipid Panel; Future  Chronic controlled  Cont meds same dose    Mild single current episode of major depressive disorder  -     TSH; Future  Chronic stable  Cont meds same dose per outside MD  Denies need for SSRI right now    RODRI on CPAP  Using CPAP nightly    Morbid obesity  -     CBC auto differential; Future  -     Comprehensive Metabolic Panel; Future  -     TSH; Future  -     Lipid Panel; Future  Chronic improving  S/p bariatric surgery  Weight has plateaus over last 3-4 months  Continue healthy, low calorie, low carb diet; higher protein  Exercise    Primary osteoarthritis of left knee  Chronic bilateral low back pain with bilateral sciatica  Follows with ortho, cont follow up  Has failed NSAIDs, tramadol, physical therapy and injections  I  wonder if a surgical candidate now that loosing weight  She has significant pain and failing conservative management    Mild intermittent asthma without complication  -     Ambulatory referral/consult to Pulmonology; Future  Chronic worsening  Diagnosed by Dr. Ocampo but sx worsening  Will refer to pulm at [facility] for another opinion---likely needs repeat PFTs  Lungs clear on exam today  Cont symbicort pending pulm eval      RTC as scheudled with labs and PRN

## 2020-10-23 ENCOUNTER — TELEPHONE (OUTPATIENT)
Dept: INTERNAL MEDICINE | Facility: CLINIC | Age: 50
End: 2020-10-23

## 2020-10-23 NOTE — TELEPHONE ENCOUNTER
----- Message from Halima Wilson sent at 10/23/2020  2:12 PM CDT -----  Regarding: Lab Results - Dr. Mays / MARY BETH Carroll  Contact: self  Arron Ron  MRN: [id number]  : 1970  PCP: Mahi Rojas  Home Phone      [phone]  Work Phone      Not on file.  Mobile          [phone]  Mobile          [phone]      MESSAGE:    Request to speak to a nurse regarding lab results per Dr. Mays / MARY BETH Carroll.     Phone # [phone]     Pharmacy - [facility], [place], [place] - [facility] [address] [place] [address] [address]

## 2020-10-23 NOTE — TELEPHONE ENCOUNTER
Spoke to pt. Dr blum did labs after ours and one of her thyroid labs were abnormal. tsh was perfect with us. She asked us to check it out. Labs were faxed to us, but no one at  clinic today. Sending to myself to check on Monday.

## 2020-10-23 NOTE — TELEPHONE ENCOUNTER
----- Message from Halima Wilson sent at 10/23/2020  8:58 AM CDT -----  Regarding: Lab Results  Contact: erum Ron  MRN: [id number]  : 1970  PCP: Mahi Rojas  Home Phone      [phone]  Work Phone      Not on file.  Mobile          [phone]  Mobile          [phone]      MESSAGE:    Request lab results, and a copy of lab results.     Phone # [phone]    [place] [facility], [place] [address]

## 2020-10-26 NOTE — TELEPHONE ENCOUNTER
No concerns if her TSH was again normal. Would no recommend any changes. Would just repeat labs in about 6 months or so to trend.

## 2020-10-26 NOTE — TELEPHONE ENCOUNTER
T4 was abnormal when Dr Mays checked on 10/22/2020.    T4- 4.2 (range 5.0-12.0).    T-Uptake- 31 (normal)    TSH- 3.98 (normal)    Please advise.

## 2020-10-26 NOTE — TELEPHONE ENCOUNTER
Pt states that Dr. Mays wants her to go for an appt on 10/29/2020 to follow up concerning the blood work.     Informed pt of your recommendation, but she states that she is still going to follow up with Dr. Mays's office.

## 2020-10-26 NOTE — TELEPHONE ENCOUNTER
No problem. He may have more information I am not aware of so that is totally fine. She can let me know what he says!

## 2020-11-16 ENCOUNTER — TELEPHONE (OUTPATIENT)
Dept: ADMINISTRATIVE | Facility: HOSPITAL | Age: 50
End: 2020-11-16

## 2020-11-23 ENCOUNTER — HOSPITAL ENCOUNTER (OUTPATIENT)
Dept: RADIOLOGY | Facility: HOSPITAL | Age: 50
Discharge: HOME | End: 2020-11-23
Attending: OBSTETRICS & GYNECOLOGY
Payer: MEDICAID

## 2020-11-23 VITALS — WEIGHT: 260 LBS | HEIGHT: 66 IN | BODY MASS INDEX: 41.78 KG/M2

## 2020-11-23 DIAGNOSIS — Z12.31 ENCOUNTER FOR SCREENING MAMMOGRAM FOR BREAST CANCER: ICD-10-CM

## 2020-11-23 PROCEDURE — 77067 MAMMO DIGITAL SCREENING BILAT WITH TOMOSYNTHESIS_CAD: ICD-10-PCS | Mod: 26,,, | Performed by: RADIOLOGY

## 2020-11-23 PROCEDURE — 77063 BREAST TOMOSYNTHESIS BI: CPT | Mod: 26,,, | Performed by: RADIOLOGY

## 2020-11-23 PROCEDURE — 77063 MAMMO DIGITAL SCREENING BILAT WITH TOMOSYNTHESIS_CAD: ICD-10-PCS | Mod: 26,,, | Performed by: RADIOLOGY

## 2020-11-23 PROCEDURE — 77067 SCR MAMMO BI INCL CAD: CPT | Mod: TC

## 2020-11-23 PROCEDURE — 77067 SCR MAMMO BI INCL CAD: CPT | Mod: 26,,, | Performed by: RADIOLOGY

## 2021-02-25 ENCOUNTER — TELEPHONE (OUTPATIENT)
Dept: INTERNAL MEDICINE | Facility: CLINIC | Age: 51
End: 2021-02-25

## 2021-03-01 ENCOUNTER — OFFICE VISIT (OUTPATIENT)
Dept: INTERNAL MEDICINE | Facility: CLINIC | Age: 51
End: 2021-03-01
Payer: MEDICAID

## 2021-03-01 VITALS
HEART RATE: 89 BPM | DIASTOLIC BLOOD PRESSURE: 82 MMHG | SYSTOLIC BLOOD PRESSURE: 120 MMHG | BODY MASS INDEX: 43.47 KG/M2 | WEIGHT: 270.5 LBS | OXYGEN SATURATION: 96 % | HEIGHT: 66 IN | RESPIRATION RATE: 20 BRPM | TEMPERATURE: 97 F

## 2021-03-01 DIAGNOSIS — M17.12 PRIMARY OSTEOARTHRITIS OF LEFT KNEE: ICD-10-CM

## 2021-03-01 DIAGNOSIS — G89.29 CHRONIC BILATERAL LOW BACK PAIN WITH BILATERAL SCIATICA: ICD-10-CM

## 2021-03-01 DIAGNOSIS — E78.5 HYPERLIPIDEMIA, UNSPECIFIED HYPERLIPIDEMIA TYPE: ICD-10-CM

## 2021-03-01 DIAGNOSIS — Z12.11 COLON CANCER SCREENING: ICD-10-CM

## 2021-03-01 DIAGNOSIS — G47.33 OSA ON CPAP: ICD-10-CM

## 2021-03-01 DIAGNOSIS — E66.01 MORBID OBESITY: ICD-10-CM

## 2021-03-01 DIAGNOSIS — M54.42 CHRONIC BILATERAL LOW BACK PAIN WITH BILATERAL SCIATICA: ICD-10-CM

## 2021-03-01 DIAGNOSIS — J45.20 MILD INTERMITTENT ASTHMA WITHOUT COMPLICATION: ICD-10-CM

## 2021-03-01 DIAGNOSIS — M54.41 CHRONIC BILATERAL LOW BACK PAIN WITH BILATERAL SCIATICA: ICD-10-CM

## 2021-03-01 DIAGNOSIS — I10 ESSENTIAL HYPERTENSION: Primary | ICD-10-CM

## 2021-03-01 DIAGNOSIS — N28.9 ACUTE RENAL INSUFFICIENCY: ICD-10-CM

## 2021-03-01 PROCEDURE — 99215 OFFICE O/P EST HI 40 MIN: CPT | Mod: PBBFAC | Performed by: INTERNAL MEDICINE

## 2021-03-01 PROCEDURE — 99999 PR PBB SHADOW E&M-EST. PATIENT-LVL V: CPT | Mod: PBBFAC,,, | Performed by: INTERNAL MEDICINE

## 2021-03-01 PROCEDURE — 99214 PR OFFICE/OUTPT VISIT, EST, LEVL IV, 30-39 MIN: ICD-10-PCS | Mod: S$PBB,,, | Performed by: INTERNAL MEDICINE

## 2021-03-01 PROCEDURE — 99999 PR PBB SHADOW E&M-EST. PATIENT-LVL V: ICD-10-PCS | Mod: PBBFAC,,, | Performed by: INTERNAL MEDICINE

## 2021-03-01 PROCEDURE — 99214 OFFICE O/P EST MOD 30 MIN: CPT | Mod: S$PBB,,, | Performed by: INTERNAL MEDICINE

## 2021-03-01 RX ORDER — DICLOFENAC SODIUM 10 MG/G
2 GEL TOPICAL 2 TIMES DAILY
Qty: 100 G | Refills: 5 | Status: SHIPPED | OUTPATIENT
Start: 2021-03-01 | End: 2021-08-11

## 2021-03-25 ENCOUNTER — ANESTHESIA EVENT (OUTPATIENT)
Dept: ENDOSCOPY | Facility: HOSPITAL | Age: 51
End: 2021-03-25
Payer: MEDICAID

## 2021-04-26 DIAGNOSIS — Z11.59 SPECIAL SCREENING EXAMINATION FOR UNSPECIFIED VIRAL DISEASE: ICD-10-CM

## 2021-04-26 DIAGNOSIS — Z12.11 ENCOUNTER FOR SCREENING COLONOSCOPY: Primary | ICD-10-CM

## 2021-04-26 RX ORDER — SODIUM, POTASSIUM,MAG SULFATES 17.5-3.13G
2 SOLUTION, RECONSTITUTED, ORAL ORAL DAILY
Qty: 354 ML | Refills: 0 | Status: ON HOLD | OUTPATIENT
Start: 2021-04-28 | End: 2021-04-29 | Stop reason: HOSPADM

## 2021-04-26 RX ORDER — BISACODYL 5 MG
20 TABLET, DELAYED RELEASE (ENTERIC COATED) ORAL ONCE
Qty: 4 TABLET | Refills: 0 | Status: ON HOLD | OUTPATIENT
Start: 2021-04-28 | End: 2021-04-29 | Stop reason: HOSPADM

## 2021-04-27 ENCOUNTER — HOSPITAL ENCOUNTER (OUTPATIENT)
Dept: PREADMISSION TESTING | Facility: HOSPITAL | Age: 51
Discharge: HOME | End: 2021-04-27
Attending: COLON & RECTAL SURGERY
Payer: MEDICAID

## 2021-04-27 DIAGNOSIS — Z01.818 PRE-OP TESTING: Primary | ICD-10-CM

## 2021-04-27 PROCEDURE — U0005 INFEC AGEN DETEC AMPLI PROBE: HCPCS | Performed by: COLON & RECTAL SURGERY

## 2021-04-27 PROCEDURE — U0003 INFECTIOUS AGENT DETECTION BY NUCLEIC ACID (DNA OR RNA); SEVERE ACUTE RESPIRATORY SYNDROME CORONAVIRUS 2 (SARS-COV-2) (CORONAVIRUS DISEASE [COVID-19]), AMPLIFIED PROBE TECHNIQUE, MAKING USE OF HIGH THROUGHPUT TECHNOLOGIES AS DESCRIBED BY CMS-2020-01-R: HCPCS | Performed by: COLON & RECTAL SURGERY

## 2021-04-28 LAB — SARS-COV-2 RNA RESP QL NAA+PROBE: NOT DETECTED

## 2021-04-29 ENCOUNTER — ANESTHESIA (OUTPATIENT)
Dept: ENDOSCOPY | Facility: HOSPITAL | Age: 51
End: 2021-04-29
Payer: MEDICAID

## 2021-04-29 ENCOUNTER — HOSPITAL ENCOUNTER (OUTPATIENT)
Facility: HOSPITAL | Age: 51
Discharge: HOME | End: 2021-04-29
Attending: COLON & RECTAL SURGERY | Admitting: COLON & RECTAL SURGERY
Payer: MEDICAID

## 2021-04-29 VITALS
OXYGEN SATURATION: 96 % | RESPIRATION RATE: 18 BRPM | HEART RATE: 77 BPM | SYSTOLIC BLOOD PRESSURE: 144 MMHG | DIASTOLIC BLOOD PRESSURE: 72 MMHG

## 2021-04-29 DIAGNOSIS — Z12.11 COLON CANCER SCREENING: Primary | ICD-10-CM

## 2021-04-29 PROCEDURE — 45380 COLONOSCOPY AND BIOPSY: CPT | Performed by: COLON & RECTAL SURGERY

## 2021-04-29 PROCEDURE — 25000003 PHARM REV CODE 250: Performed by: NURSE ANESTHETIST, CERTIFIED REGISTERED

## 2021-04-29 PROCEDURE — 88342 IMHCHEM/IMCYTCHM 1ST ANTB: CPT | Mod: 26,,, | Performed by: PATHOLOGY

## 2021-04-29 PROCEDURE — 81261 IGH GENE REARRANGE AMP METH: CPT | Performed by: PATHOLOGY

## 2021-04-29 PROCEDURE — 88342 CHG IMMUNOCYTOCHEMISTRY: ICD-10-PCS | Mod: 26,,, | Performed by: PATHOLOGY

## 2021-04-29 PROCEDURE — 37000008 HC ANESTHESIA 1ST 15 MINUTES: Performed by: COLON & RECTAL SURGERY

## 2021-04-29 PROCEDURE — 88341 IMHCHEM/IMCYTCHM EA ADD ANTB: CPT | Mod: 59 | Performed by: PATHOLOGY

## 2021-04-29 PROCEDURE — 37000009 HC ANESTHESIA EA ADD 15 MINS: Performed by: COLON & RECTAL SURGERY

## 2021-04-29 PROCEDURE — 00811 ANES LWR INTST NDSC NOS: CPT | Mod: QZ | Performed by: NURSE ANESTHETIST, CERTIFIED REGISTERED

## 2021-04-29 PROCEDURE — 45380 PR COLONOSCOPY,BIOPSY: ICD-10-PCS | Mod: ,,, | Performed by: COLON & RECTAL SURGERY

## 2021-04-29 PROCEDURE — 88341 IMHCHEM/IMCYTCHM EA ADD ANTB: CPT | Mod: 26,,, | Performed by: PATHOLOGY

## 2021-04-29 PROCEDURE — 88305 TISSUE EXAM BY PATHOLOGIST: ICD-10-PCS | Mod: 26,,, | Performed by: PATHOLOGY

## 2021-04-29 PROCEDURE — 88305 TISSUE EXAM BY PATHOLOGIST: CPT | Performed by: PATHOLOGY

## 2021-04-29 PROCEDURE — 88305 TISSUE EXAM BY PATHOLOGIST: CPT | Mod: 26,,, | Performed by: PATHOLOGY

## 2021-04-29 PROCEDURE — 00811 ANES LWR INTST NDSC NOS: CPT | Performed by: COLON & RECTAL SURGERY

## 2021-04-29 PROCEDURE — 27201012 HC FORCEPS, HOT/COLD, DISP: Performed by: COLON & RECTAL SURGERY

## 2021-04-29 PROCEDURE — 63600175 PHARM REV CODE 636 W HCPCS: Performed by: NURSE ANESTHETIST, CERTIFIED REGISTERED

## 2021-04-29 PROCEDURE — 81264 IGK REARRANGEABN CLONAL POP: CPT | Performed by: PATHOLOGY

## 2021-04-29 PROCEDURE — 88342 IMHCHEM/IMCYTCHM 1ST ANTB: CPT | Performed by: PATHOLOGY

## 2021-04-29 PROCEDURE — 88341 PR IHC OR ICC EACH ADD'L SINGLE ANTIBODY  STAINPR: ICD-10-PCS | Mod: 26,,, | Performed by: PATHOLOGY

## 2021-04-29 PROCEDURE — 45380 COLONOSCOPY AND BIOPSY: CPT | Mod: ,,, | Performed by: COLON & RECTAL SURGERY

## 2021-04-29 RX ORDER — SODIUM CHLORIDE, SODIUM LACTATE, POTASSIUM CHLORIDE, CALCIUM CHLORIDE 600; 310; 30; 20 MG/100ML; MG/100ML; MG/100ML; MG/100ML
INJECTION, SOLUTION INTRAVENOUS CONTINUOUS
Status: DISCONTINUED | OUTPATIENT
Start: 2021-04-29 | End: 2021-04-29 | Stop reason: HOSPADM

## 2021-04-29 RX ORDER — LIDOCAINE HCL/PF 100 MG/5ML
SYRINGE (ML) INTRAVENOUS
Status: DISCONTINUED | OUTPATIENT
Start: 2021-04-29 | End: 2021-04-29

## 2021-04-29 RX ORDER — SODIUM CHLORIDE, SODIUM LACTATE, POTASSIUM CHLORIDE, CALCIUM CHLORIDE 600; 310; 30; 20 MG/100ML; MG/100ML; MG/100ML; MG/100ML
INJECTION, SOLUTION INTRAVENOUS CONTINUOUS PRN
Status: DISCONTINUED | OUTPATIENT
Start: 2021-04-29 | End: 2021-04-29

## 2021-04-29 RX ORDER — PROPOFOL 10 MG/ML
INJECTION, EMULSION INTRAVENOUS
Status: DISCONTINUED | OUTPATIENT
Start: 2021-04-29 | End: 2021-04-29

## 2021-04-29 RX ADMIN — PROPOFOL 30 MG: 10 INJECTION, EMULSION INTRAVENOUS at 11:04

## 2021-04-29 RX ADMIN — PROPOFOL 20 MG: 10 INJECTION, EMULSION INTRAVENOUS at 11:04

## 2021-04-29 RX ADMIN — PROPOFOL 50 MG: 10 INJECTION, EMULSION INTRAVENOUS at 11:04

## 2021-04-29 RX ADMIN — LIDOCAINE HYDROCHLORIDE 50 MG: 20 INJECTION, SOLUTION INTRAVENOUS at 11:04

## 2021-04-29 RX ADMIN — SODIUM CHLORIDE, SODIUM LACTATE, POTASSIUM CHLORIDE, AND CALCIUM CHLORIDE: .6; .31; .03; .02 INJECTION, SOLUTION INTRAVENOUS at 11:04

## 2021-04-29 RX ADMIN — PROPOFOL 150 MG: 10 INJECTION, EMULSION INTRAVENOUS at 11:04

## 2021-05-04 ENCOUNTER — OFFICE VISIT (OUTPATIENT)
Dept: INTERNAL MEDICINE | Facility: CLINIC | Age: 51
End: 2021-05-04
Payer: MEDICAID

## 2021-05-04 VITALS
HEART RATE: 92 BPM | RESPIRATION RATE: 20 BRPM | SYSTOLIC BLOOD PRESSURE: 124 MMHG | BODY MASS INDEX: 44.79 KG/M2 | WEIGHT: 278.69 LBS | DIASTOLIC BLOOD PRESSURE: 76 MMHG | OXYGEN SATURATION: 96 % | HEIGHT: 66 IN

## 2021-05-04 DIAGNOSIS — G89.29 CHRONIC BILATERAL LOW BACK PAIN WITH BILATERAL SCIATICA: Primary | ICD-10-CM

## 2021-05-04 DIAGNOSIS — M54.42 CHRONIC BILATERAL LOW BACK PAIN WITH BILATERAL SCIATICA: Primary | ICD-10-CM

## 2021-05-04 DIAGNOSIS — M17.12 PRIMARY OSTEOARTHRITIS OF LEFT KNEE: ICD-10-CM

## 2021-05-04 DIAGNOSIS — E66.01 MORBID OBESITY: ICD-10-CM

## 2021-05-04 DIAGNOSIS — M54.41 CHRONIC BILATERAL LOW BACK PAIN WITH BILATERAL SCIATICA: Primary | ICD-10-CM

## 2021-05-04 PROCEDURE — 99213 OFFICE O/P EST LOW 20 MIN: CPT | Mod: S$PBB,,, | Performed by: INTERNAL MEDICINE

## 2021-05-04 PROCEDURE — 99999 PR PBB SHADOW E&M-EST. PATIENT-LVL IV: ICD-10-PCS | Mod: PBBFAC,,, | Performed by: INTERNAL MEDICINE

## 2021-05-04 PROCEDURE — 99213 PR OFFICE/OUTPT VISIT, EST, LEVL III, 20-29 MIN: ICD-10-PCS | Mod: S$PBB,,, | Performed by: INTERNAL MEDICINE

## 2021-05-04 PROCEDURE — 99214 OFFICE O/P EST MOD 30 MIN: CPT | Mod: PBBFAC | Performed by: INTERNAL MEDICINE

## 2021-05-04 PROCEDURE — 99999 PR PBB SHADOW E&M-EST. PATIENT-LVL IV: CPT | Mod: PBBFAC,,, | Performed by: INTERNAL MEDICINE

## 2021-05-11 LAB
FINAL PATHOLOGIC DIAGNOSIS: NORMAL
GROSS: NORMAL
Lab: NORMAL
MICROSCOPIC EXAM: NORMAL
SUPPLEMENTAL DIAGNOSIS: NORMAL

## 2021-05-24 ENCOUNTER — HOSPITAL ENCOUNTER (OUTPATIENT)
Dept: PREADMISSION TESTING | Facility: HOSPITAL | Age: 51
Discharge: HOME | End: 2021-05-24
Attending: SURGERY
Payer: MEDICAID

## 2021-05-24 DIAGNOSIS — Z11.59 SPECIAL SCREENING EXAMINATION FOR UNSPECIFIED VIRAL DISEASE: ICD-10-CM

## 2021-05-24 LAB — SARS-COV-2 RNA RESP QL NAA+PROBE: NOT DETECTED

## 2021-05-24 PROCEDURE — U0003 INFECTIOUS AGENT DETECTION BY NUCLEIC ACID (DNA OR RNA); SEVERE ACUTE RESPIRATORY SYNDROME CORONAVIRUS 2 (SARS-COV-2) (CORONAVIRUS DISEASE [COVID-19]), AMPLIFIED PROBE TECHNIQUE, MAKING USE OF HIGH THROUGHPUT TECHNOLOGIES AS DESCRIBED BY CMS-2020-01-R: HCPCS | Performed by: SURGERY

## 2021-05-24 PROCEDURE — U0005 INFEC AGEN DETEC AMPLI PROBE: HCPCS | Performed by: SURGERY

## 2021-05-26 ENCOUNTER — HOSPITAL ENCOUNTER (OUTPATIENT)
Dept: SLEEP MEDICINE | Facility: HOSPITAL | Age: 51
Discharge: HOME | End: 2021-05-26
Attending: NURSE PRACTITIONER
Payer: MEDICAID

## 2021-05-26 DIAGNOSIS — G47.33 OBSTRUCTIVE SLEEP APNEA (ADULT) (PEDIATRIC): ICD-10-CM

## 2021-05-26 PROCEDURE — 95811 POLYSOM 6/>YRS CPAP 4/> PARM: CPT

## 2021-06-18 ENCOUNTER — TELEPHONE (OUTPATIENT)
Dept: FAMILY MEDICINE | Facility: CLINIC | Age: 51
End: 2021-06-18

## 2021-06-18 DIAGNOSIS — G47.33 OSA ON CPAP: Primary | ICD-10-CM

## 2021-06-25 DIAGNOSIS — G47.33 OSA ON CPAP: Primary | ICD-10-CM

## 2021-07-05 ENCOUNTER — PATIENT MESSAGE (OUTPATIENT)
Dept: INTERNAL MEDICINE | Facility: CLINIC | Age: 51
End: 2021-07-05

## 2021-07-06 ENCOUNTER — TELEPHONE (OUTPATIENT)
Dept: SURGERY | Facility: CLINIC | Age: 51
End: 2021-07-06

## 2021-07-07 ENCOUNTER — PATIENT MESSAGE (OUTPATIENT)
Dept: ADMINISTRATIVE | Facility: HOSPITAL | Age: 51
End: 2021-07-07

## 2021-07-07 ENCOUNTER — PATIENT MESSAGE (OUTPATIENT)
Dept: INTERNAL MEDICINE | Facility: CLINIC | Age: 51
End: 2021-07-07

## 2021-07-19 ENCOUNTER — PATIENT OUTREACH (OUTPATIENT)
Dept: ADMINISTRATIVE | Facility: HOSPITAL | Age: 51
End: 2021-07-19

## 2021-09-22 ENCOUNTER — HOSPITAL ENCOUNTER (OUTPATIENT)
Dept: RADIOLOGY | Facility: HOSPITAL | Age: 51
Discharge: HOME | End: 2021-09-22
Attending: NURSE PRACTITIONER
Payer: MEDICAID

## 2021-09-22 ENCOUNTER — OFFICE VISIT (OUTPATIENT)
Dept: INTERNAL MEDICINE | Facility: CLINIC | Age: 51
End: 2021-09-22
Payer: MEDICAID

## 2021-09-22 VITALS
SYSTOLIC BLOOD PRESSURE: 104 MMHG | HEART RATE: 68 BPM | OXYGEN SATURATION: 98 % | DIASTOLIC BLOOD PRESSURE: 62 MMHG | HEIGHT: 66 IN | WEIGHT: 283.75 LBS | BODY MASS INDEX: 45.6 KG/M2 | RESPIRATION RATE: 16 BRPM

## 2021-09-22 DIAGNOSIS — M17.12 PRIMARY OSTEOARTHRITIS OF LEFT KNEE: ICD-10-CM

## 2021-09-22 DIAGNOSIS — M25.531 RIGHT WRIST PAIN: Primary | ICD-10-CM

## 2021-09-22 DIAGNOSIS — M25.531 RIGHT WRIST PAIN: ICD-10-CM

## 2021-09-22 PROCEDURE — 99214 PR OFFICE/OUTPT VISIT, EST, LEVL IV, 30-39 MIN: ICD-10-PCS | Mod: S$PBB,,, | Performed by: NURSE PRACTITIONER

## 2021-09-22 PROCEDURE — 99215 OFFICE O/P EST HI 40 MIN: CPT | Mod: PBBFAC | Performed by: NURSE PRACTITIONER

## 2021-09-22 PROCEDURE — 73110 XR WRIST COMPLETE 3 VIEWS RIGHT: ICD-10-PCS | Mod: 26,RT,, | Performed by: RADIOLOGY

## 2021-09-22 PROCEDURE — 99999 PR PBB SHADOW E&M-EST. PATIENT-LVL V: ICD-10-PCS | Mod: PBBFAC,,, | Performed by: NURSE PRACTITIONER

## 2021-09-22 PROCEDURE — 99214 OFFICE O/P EST MOD 30 MIN: CPT | Mod: S$PBB,,, | Performed by: NURSE PRACTITIONER

## 2021-09-22 PROCEDURE — 73110 X-RAY EXAM OF WRIST: CPT | Mod: TC,RT

## 2021-09-22 PROCEDURE — 99999 PR PBB SHADOW E&M-EST. PATIENT-LVL V: CPT | Mod: PBBFAC,,, | Performed by: NURSE PRACTITIONER

## 2021-09-22 PROCEDURE — 73110 X-RAY EXAM OF WRIST: CPT | Mod: 26,RT,, | Performed by: RADIOLOGY

## 2021-09-23 ENCOUNTER — PATIENT OUTREACH (OUTPATIENT)
Dept: ADMINISTRATIVE | Facility: OTHER | Age: 51
End: 2021-09-23

## 2021-09-24 ENCOUNTER — OFFICE VISIT (OUTPATIENT)
Dept: ORTHOPEDICS | Facility: CLINIC | Age: 51
End: 2021-09-24
Payer: MEDICAID

## 2021-09-24 VITALS
DIASTOLIC BLOOD PRESSURE: 78 MMHG | OXYGEN SATURATION: 97 % | RESPIRATION RATE: 18 BRPM | HEART RATE: 84 BPM | BODY MASS INDEX: 45.53 KG/M2 | SYSTOLIC BLOOD PRESSURE: 110 MMHG | HEIGHT: 66 IN | WEIGHT: 283.31 LBS

## 2021-09-24 DIAGNOSIS — M25.562 CHRONIC PAIN OF BOTH KNEES: ICD-10-CM

## 2021-09-24 DIAGNOSIS — M25.539 PAIN IN WRIST, UNSPECIFIED LATERALITY: ICD-10-CM

## 2021-09-24 DIAGNOSIS — M25.561 CHRONIC PAIN OF BOTH KNEES: ICD-10-CM

## 2021-09-24 DIAGNOSIS — M25.531 RIGHT WRIST PAIN: Primary | ICD-10-CM

## 2021-09-24 DIAGNOSIS — G89.29 CHRONIC PAIN OF BOTH KNEES: ICD-10-CM

## 2021-09-24 PROCEDURE — 99204 OFFICE O/P NEW MOD 45 MIN: CPT | Mod: S$PBB,,, | Performed by: PHYSICIAN ASSISTANT

## 2021-09-24 PROCEDURE — 99999 PR PBB SHADOW E&M-EST. PATIENT-LVL V: CPT | Mod: PBBFAC,,, | Performed by: PHYSICIAN ASSISTANT

## 2021-09-24 PROCEDURE — 99999 PR PBB SHADOW E&M-EST. PATIENT-LVL V: ICD-10-PCS | Mod: PBBFAC,,, | Performed by: PHYSICIAN ASSISTANT

## 2021-09-24 PROCEDURE — 99215 OFFICE O/P EST HI 40 MIN: CPT | Mod: PBBFAC | Performed by: PHYSICIAN ASSISTANT

## 2021-09-24 PROCEDURE — 99204 PR OFFICE/OUTPT VISIT, NEW, LEVL IV, 45-59 MIN: ICD-10-PCS | Mod: S$PBB,,, | Performed by: PHYSICIAN ASSISTANT

## 2021-09-24 RX ORDER — IBUPROFEN 800 MG/1
800 TABLET ORAL 3 TIMES DAILY PRN
Status: ON HOLD | COMMUNITY
End: 2022-04-01 | Stop reason: HOSPADM

## 2021-09-27 ENCOUNTER — HOSPITAL ENCOUNTER (OUTPATIENT)
Dept: RADIOLOGY | Facility: HOSPITAL | Age: 51
Discharge: HOME | End: 2021-09-27
Attending: PHYSICIAN ASSISTANT
Payer: MEDICAID

## 2021-09-27 DIAGNOSIS — M25.561 CHRONIC PAIN OF BOTH KNEES: ICD-10-CM

## 2021-09-27 DIAGNOSIS — M25.562 CHRONIC PAIN OF BOTH KNEES: ICD-10-CM

## 2021-09-27 DIAGNOSIS — G89.29 CHRONIC PAIN OF BOTH KNEES: ICD-10-CM

## 2021-09-27 PROCEDURE — 73564 X-RAY EXAM KNEE 4 OR MORE: CPT | Mod: 26,50,, | Performed by: RADIOLOGY

## 2021-09-27 PROCEDURE — 73564 XR KNEE ORTHO BILAT WITH FLEXION: ICD-10-PCS | Mod: 26,50,, | Performed by: RADIOLOGY

## 2021-09-27 PROCEDURE — 73564 X-RAY EXAM KNEE 4 OR MORE: CPT | Mod: TC,50

## 2021-10-01 ENCOUNTER — HOSPITAL ENCOUNTER (OUTPATIENT)
Dept: RADIOLOGY | Facility: HOSPITAL | Age: 51
Discharge: HOME | End: 2021-10-01
Attending: PHYSICIAN ASSISTANT
Payer: MEDICAID

## 2021-10-01 ENCOUNTER — OFFICE VISIT (OUTPATIENT)
Dept: ORTHOPEDICS | Facility: CLINIC | Age: 51
End: 2021-10-01
Payer: MEDICAID

## 2021-10-01 VITALS
HEIGHT: 66 IN | OXYGEN SATURATION: 98 % | HEART RATE: 87 BPM | SYSTOLIC BLOOD PRESSURE: 104 MMHG | DIASTOLIC BLOOD PRESSURE: 68 MMHG | WEIGHT: 289.44 LBS | BODY MASS INDEX: 46.52 KG/M2 | RESPIRATION RATE: 18 BRPM

## 2021-10-01 DIAGNOSIS — M25.539 PAIN IN WRIST, UNSPECIFIED LATERALITY: ICD-10-CM

## 2021-10-01 DIAGNOSIS — M25.531 RIGHT WRIST PAIN: Primary | ICD-10-CM

## 2021-10-01 PROCEDURE — 73221 MRI WRIST WITHOUT CONTRAST RIGHT: ICD-10-PCS | Mod: 26,RT,, | Performed by: RADIOLOGY

## 2021-10-01 PROCEDURE — G1004 CDSM NDSC: HCPCS

## 2021-10-01 PROCEDURE — 99215 OFFICE O/P EST HI 40 MIN: CPT | Mod: PBBFAC,25 | Performed by: PHYSICIAN ASSISTANT

## 2021-10-01 PROCEDURE — 99999 PR PBB SHADOW E&M-EST. PATIENT-LVL V: CPT | Mod: PBBFAC,,, | Performed by: PHYSICIAN ASSISTANT

## 2021-10-01 PROCEDURE — 99213 PR OFFICE/OUTPT VISIT, EST, LEVL III, 20-29 MIN: ICD-10-PCS | Mod: S$PBB,,, | Performed by: PHYSICIAN ASSISTANT

## 2021-10-01 PROCEDURE — 99999 PR PBB SHADOW E&M-EST. PATIENT-LVL V: ICD-10-PCS | Mod: PBBFAC,,, | Performed by: PHYSICIAN ASSISTANT

## 2021-10-01 PROCEDURE — 73221 MRI JOINT UPR EXTREM W/O DYE: CPT | Mod: 26,RT,, | Performed by: RADIOLOGY

## 2021-10-01 PROCEDURE — 99213 OFFICE O/P EST LOW 20 MIN: CPT | Mod: S$PBB,,, | Performed by: PHYSICIAN ASSISTANT

## 2021-12-09 DIAGNOSIS — Z12.31 OTHER SCREENING MAMMOGRAM: ICD-10-CM

## 2021-12-27 DIAGNOSIS — M25.552 LEFT HIP PAIN: Primary | ICD-10-CM

## 2021-12-27 DIAGNOSIS — M25.562 LEFT KNEE PAIN, UNSPECIFIED CHRONICITY: ICD-10-CM

## 2021-12-28 ENCOUNTER — HOSPITAL ENCOUNTER (OUTPATIENT)
Dept: RADIOLOGY | Facility: HOSPITAL | Age: 51
Discharge: HOME | End: 2021-12-28
Attending: PHYSICIAN ASSISTANT
Payer: MEDICAID

## 2021-12-28 DIAGNOSIS — M25.562 LEFT KNEE PAIN, UNSPECIFIED CHRONICITY: ICD-10-CM

## 2021-12-28 DIAGNOSIS — M25.552 LEFT HIP PAIN: ICD-10-CM

## 2021-12-28 PROCEDURE — 73502 XR HIP WITH PELVIS WHEN PERFORMED, 2 OR 3 VIEWS LEFT: ICD-10-PCS | Mod: 26,LT,, | Performed by: RADIOLOGY

## 2021-12-28 PROCEDURE — 73502 X-RAY EXAM HIP UNI 2-3 VIEWS: CPT | Mod: TC,LT

## 2021-12-28 PROCEDURE — 73564 X-RAY EXAM KNEE 4 OR MORE: CPT | Mod: 26,LT,, | Performed by: RADIOLOGY

## 2021-12-28 PROCEDURE — 73564 X-RAY EXAM KNEE 4 OR MORE: CPT | Mod: TC,LT

## 2021-12-28 PROCEDURE — 73502 X-RAY EXAM HIP UNI 2-3 VIEWS: CPT | Mod: 26,LT,, | Performed by: RADIOLOGY

## 2021-12-28 PROCEDURE — 73564 XR KNEE ORTHO LEFT WITH FLEXION: ICD-10-PCS | Mod: 26,LT,, | Performed by: RADIOLOGY

## 2021-12-28 PROCEDURE — 73562 XR KNEE ORTHO LEFT WITH FLEXION: ICD-10-PCS | Mod: 26,RT,, | Performed by: RADIOLOGY

## 2021-12-28 PROCEDURE — 73562 X-RAY EXAM OF KNEE 3: CPT | Mod: 26,RT,, | Performed by: RADIOLOGY

## 2021-12-29 ENCOUNTER — OFFICE VISIT (OUTPATIENT)
Dept: ORTHOPEDICS | Facility: CLINIC | Age: 51
End: 2021-12-29
Payer: MEDICAID

## 2021-12-29 VITALS
SYSTOLIC BLOOD PRESSURE: 104 MMHG | HEART RATE: 88 BPM | DIASTOLIC BLOOD PRESSURE: 68 MMHG | RESPIRATION RATE: 18 BRPM | WEIGHT: 286.63 LBS | BODY MASS INDEX: 46.06 KG/M2 | HEIGHT: 66 IN

## 2021-12-29 DIAGNOSIS — M17.12 PRIMARY OSTEOARTHRITIS OF LEFT KNEE: Primary | ICD-10-CM

## 2021-12-29 PROCEDURE — 4010F PR ACE/ARB THEARPY RXD/TAKEN: ICD-10-PCS | Mod: CPTII,,, | Performed by: PHYSICIAN ASSISTANT

## 2021-12-29 PROCEDURE — 1159F PR MEDICATION LIST DOCUMENTED IN MEDICAL RECORD: ICD-10-PCS | Mod: CPTII,,, | Performed by: PHYSICIAN ASSISTANT

## 2021-12-29 PROCEDURE — 1160F RVW MEDS BY RX/DR IN RCRD: CPT | Mod: CPTII,,, | Performed by: PHYSICIAN ASSISTANT

## 2021-12-29 PROCEDURE — 99999 PR PBB SHADOW E&M-EST. PATIENT-LVL V: ICD-10-PCS | Mod: PBBFAC,,, | Performed by: PHYSICIAN ASSISTANT

## 2021-12-29 PROCEDURE — 99215 OFFICE O/P EST HI 40 MIN: CPT | Mod: PBBFAC | Performed by: PHYSICIAN ASSISTANT

## 2021-12-29 PROCEDURE — 3074F SYST BP LT 130 MM HG: CPT | Mod: CPTII,,, | Performed by: PHYSICIAN ASSISTANT

## 2021-12-29 PROCEDURE — 1159F MED LIST DOCD IN RCRD: CPT | Mod: CPTII,,, | Performed by: PHYSICIAN ASSISTANT

## 2021-12-29 PROCEDURE — 3008F PR BODY MASS INDEX (BMI) DOCUMENTED: ICD-10-PCS | Mod: CPTII,,, | Performed by: PHYSICIAN ASSISTANT

## 2021-12-29 PROCEDURE — 3074F PR MOST RECENT SYSTOLIC BLOOD PRESSURE < 130 MM HG: ICD-10-PCS | Mod: CPTII,,, | Performed by: PHYSICIAN ASSISTANT

## 2021-12-29 PROCEDURE — 3078F DIAST BP <80 MM HG: CPT | Mod: CPTII,,, | Performed by: PHYSICIAN ASSISTANT

## 2021-12-29 PROCEDURE — 99999 PR PBB SHADOW E&M-EST. PATIENT-LVL V: CPT | Mod: PBBFAC,,, | Performed by: PHYSICIAN ASSISTANT

## 2021-12-29 PROCEDURE — 99213 OFFICE O/P EST LOW 20 MIN: CPT | Mod: S$PBB,,, | Performed by: PHYSICIAN ASSISTANT

## 2021-12-29 PROCEDURE — 3078F PR MOST RECENT DIASTOLIC BLOOD PRESSURE < 80 MM HG: ICD-10-PCS | Mod: CPTII,,, | Performed by: PHYSICIAN ASSISTANT

## 2021-12-29 PROCEDURE — 4010F ACE/ARB THERAPY RXD/TAKEN: CPT | Mod: CPTII,,, | Performed by: PHYSICIAN ASSISTANT

## 2021-12-29 PROCEDURE — 1160F PR REVIEW ALL MEDS BY PRESCRIBER/CLIN PHARMACIST DOCUMENTED: ICD-10-PCS | Mod: CPTII,,, | Performed by: PHYSICIAN ASSISTANT

## 2021-12-29 PROCEDURE — 3008F BODY MASS INDEX DOCD: CPT | Mod: CPTII,,, | Performed by: PHYSICIAN ASSISTANT

## 2021-12-29 PROCEDURE — 99213 PR OFFICE/OUTPT VISIT, EST, LEVL III, 20-29 MIN: ICD-10-PCS | Mod: S$PBB,,, | Performed by: PHYSICIAN ASSISTANT

## 2022-01-05 ENCOUNTER — OFFICE VISIT (OUTPATIENT)
Dept: INTERNAL MEDICINE | Facility: CLINIC | Age: 52
End: 2022-01-05
Payer: MEDICAID

## 2022-01-05 VITALS
HEART RATE: 91 BPM | SYSTOLIC BLOOD PRESSURE: 112 MMHG | WEIGHT: 289.25 LBS | BODY MASS INDEX: 46.49 KG/M2 | OXYGEN SATURATION: 97 % | HEIGHT: 66 IN | DIASTOLIC BLOOD PRESSURE: 72 MMHG | RESPIRATION RATE: 16 BRPM

## 2022-01-05 DIAGNOSIS — M54.41 CHRONIC BILATERAL LOW BACK PAIN WITH BILATERAL SCIATICA: Primary | ICD-10-CM

## 2022-01-05 DIAGNOSIS — G89.29 CHRONIC BILATERAL LOW BACK PAIN WITH BILATERAL SCIATICA: Primary | ICD-10-CM

## 2022-01-05 DIAGNOSIS — M54.42 CHRONIC BILATERAL LOW BACK PAIN WITH BILATERAL SCIATICA: Primary | ICD-10-CM

## 2022-01-05 PROCEDURE — 96372 THER/PROPH/DIAG INJ SC/IM: CPT | Mod: PBBFAC

## 2022-01-05 PROCEDURE — 3074F PR MOST RECENT SYSTOLIC BLOOD PRESSURE < 130 MM HG: ICD-10-PCS | Mod: CPTII,,, | Performed by: INTERNAL MEDICINE

## 2022-01-05 PROCEDURE — 99213 PR OFFICE/OUTPT VISIT, EST, LEVL III, 20-29 MIN: ICD-10-PCS | Mod: S$PBB,,, | Performed by: INTERNAL MEDICINE

## 2022-01-05 PROCEDURE — 3008F PR BODY MASS INDEX (BMI) DOCUMENTED: ICD-10-PCS | Mod: CPTII,,, | Performed by: INTERNAL MEDICINE

## 2022-01-05 PROCEDURE — 99999 PR PBB SHADOW E&M-EST. PATIENT-LVL V: CPT | Mod: PBBFAC,,, | Performed by: INTERNAL MEDICINE

## 2022-01-05 PROCEDURE — 3008F BODY MASS INDEX DOCD: CPT | Mod: CPTII,,, | Performed by: INTERNAL MEDICINE

## 2022-01-05 PROCEDURE — 3078F PR MOST RECENT DIASTOLIC BLOOD PRESSURE < 80 MM HG: ICD-10-PCS | Mod: CPTII,,, | Performed by: INTERNAL MEDICINE

## 2022-01-05 PROCEDURE — 3078F DIAST BP <80 MM HG: CPT | Mod: CPTII,,, | Performed by: INTERNAL MEDICINE

## 2022-01-05 PROCEDURE — 1160F PR REVIEW ALL MEDS BY PRESCRIBER/CLIN PHARMACIST DOCUMENTED: ICD-10-PCS | Mod: CPTII,,, | Performed by: INTERNAL MEDICINE

## 2022-01-05 PROCEDURE — 1160F RVW MEDS BY RX/DR IN RCRD: CPT | Mod: CPTII,,, | Performed by: INTERNAL MEDICINE

## 2022-01-05 PROCEDURE — 99215 OFFICE O/P EST HI 40 MIN: CPT | Mod: PBBFAC,25 | Performed by: INTERNAL MEDICINE

## 2022-01-05 PROCEDURE — 3074F SYST BP LT 130 MM HG: CPT | Mod: CPTII,,, | Performed by: INTERNAL MEDICINE

## 2022-01-05 PROCEDURE — 1159F PR MEDICATION LIST DOCUMENTED IN MEDICAL RECORD: ICD-10-PCS | Mod: CPTII,,, | Performed by: INTERNAL MEDICINE

## 2022-01-05 PROCEDURE — 99213 OFFICE O/P EST LOW 20 MIN: CPT | Mod: S$PBB,,, | Performed by: INTERNAL MEDICINE

## 2022-01-05 PROCEDURE — 1159F MED LIST DOCD IN RCRD: CPT | Mod: CPTII,,, | Performed by: INTERNAL MEDICINE

## 2022-01-05 PROCEDURE — 99999 PR PBB SHADOW E&M-EST. PATIENT-LVL V: ICD-10-PCS | Mod: PBBFAC,,, | Performed by: INTERNAL MEDICINE

## 2022-01-05 RX ORDER — GABAPENTIN 100 MG/1
100 CAPSULE ORAL NIGHTLY
Qty: 30 CAPSULE | Refills: 11 | Status: SHIPPED | OUTPATIENT
Start: 2022-01-05 | End: 2022-12-20

## 2022-01-05 RX ORDER — CYCLOBENZAPRINE HCL 10 MG
10 TABLET ORAL 3 TIMES DAILY PRN
Qty: 60 TABLET | Refills: 0 | Status: SHIPPED | OUTPATIENT
Start: 2022-01-05 | End: 2022-02-14

## 2022-01-05 RX ORDER — TRIAMCINOLONE ACETONIDE 40 MG/ML
40 INJECTION, SUSPENSION INTRA-ARTICULAR; INTRAMUSCULAR
Status: COMPLETED | OUTPATIENT
Start: 2022-01-05 | End: 2022-01-05

## 2022-01-05 RX ADMIN — TRIAMCINOLONE ACETONIDE 40 MG: 40 INJECTION, SUSPENSION INTRA-ARTICULAR; INTRAMUSCULAR at 01:01

## 2022-01-05 NOTE — PROGRESS NOTES
Subjective:       Patient ID: Arron Ron is a 51 y.o. female.    Chief Complaint: Low-back Pain (Left side and pain down leg)      HPI:  Patient is known to me and presents with b/l low back pain and left leg pain. Sx started a few months ago. Gradually worsening. Pain is b/l lower back and shooting down the left leg. She is not taking anything OTC for pain. She does not think celebrex is helpful. Had a fall a few weeks ago but doesn't think that specifically escalated herpain.     Past Medical History:   Diagnosis Date    Acid reflux     Anemia     Back pain     was under therapy    Chronic back pain     Constipation     Elevated cholesterol     Encounter for blood transfusion 1987    Endometriosis     Hyperlipidemia     Hypertension     Nausea & vomiting     Sleep apnea     Tendonitis of wrist, right        Family History   Problem Relation Age of Onset    Hypertension Mother     Heart attack Mother     Hypertension Brother     Heart disease Maternal Grandmother     Hypertension Maternal Grandmother     Diabetes Maternal Aunt     Breast cancer Neg Hx     Colon cancer Neg Hx     Ovarian cancer Neg Hx        Social History     Socioeconomic History    Marital status: Single   Tobacco Use    Smoking status: Never Smoker    Smokeless tobacco: Never Used   Substance and Sexual Activity    Alcohol use: Yes     Comment: rare    Drug use: No    Sexual activity: Yes     Partners: Male     Birth control/protection: Surgical, See Surgical Hx     Comment: --BTL       Review of Systems   Constitutional: Negative for activity change, fatigue, fever and unexpected weight change.   HENT: Negative for congestion, ear pain, hearing loss, rhinorrhea and sore throat.    Eyes: Negative for redness and visual disturbance.   Respiratory: Negative for cough, shortness of breath and wheezing.    Cardiovascular: Negative for chest pain, palpitations and leg swelling.   Gastrointestinal:  Negative for abdominal pain, constipation, diarrhea, nausea and vomiting.   Genitourinary: Negative for dysuria, frequency and urgency.   Musculoskeletal: Positive for arthralgias and back pain. Negative for joint swelling and neck pain.   Skin: Negative for color change, rash and wound.   Neurological: Negative for dizziness, tremors, weakness, light-headedness and headaches.         Objective:      Physical Exam  Vitals reviewed.   Constitutional:       General: She is not in acute distress.     Appearance: She is well-developed and well-nourished. She is obese.   HENT:      Head: Normocephalic and atraumatic.      Right Ear: External ear normal.      Left Ear: External ear normal.   Eyes:      General:         Right eye: No discharge.         Left eye: No discharge.      Extraocular Movements: Extraocular movements intact and EOM normal.      Conjunctiva/sclera: Conjunctivae normal.      Pupils: Pupils are equal, round, and reactive to light.   Neck:      Thyroid: No thyromegaly.   Cardiovascular:      Rate and Rhythm: Normal rate and regular rhythm.   Pulmonary:      Effort: Pulmonary effort is normal. No respiratory distress.      Breath sounds: Normal breath sounds. No wheezing.   Abdominal:      General: Bowel sounds are normal. There is no distension.      Palpations: Abdomen is soft.      Tenderness: There is no abdominal tenderness.   Musculoskeletal:         General: Tenderness (b/l lower back) present.      Cervical back: Neck supple.   Lymphadenopathy:      Cervical: No cervical adenopathy.   Skin:     General: Skin is warm and dry.   Neurological:      Mental Status: She is alert and oriented to person, place, and time.      Cranial Nerves: No cranial nerve deficit.   Psychiatric:         Mood and Affect: Mood and affect normal.         Behavior: Behavior normal.         Thought Content: Thought content normal.         Assessment:       1. Chronic bilateral low back pain with bilateral sciatica         Plan:       Arron was seen today for low-back pain.    Diagnoses and all orders for this visit:    Chronic bilateral low back pain with bilateral sciatica  -     gabapentin (NEURONTIN) 100 MG capsule; Take 1 capsule (100 mg total) by mouth every evening.  -     cyclobenzaprine (FLEXERIL) 10 MG tablet; Take 1 tablet (10 mg total) by mouth 3 (three) times daily as needed for Muscle spasms.  -     triamcinolone acetonide injection 40 mg  -     Ambulatory referral/consult to Physical/Occupational Therapy; Future      IM steroids today for acute pain management  PRN flexeril  Start gabapentin 100mg QHS. Side effects discussed today in detail. This is a very low dose. Call in 4 weeks if not effective and can escalate dosing  Start PT    RTC as dennis and PRN

## 2022-01-18 ENCOUNTER — HOSPITAL ENCOUNTER (OUTPATIENT)
Dept: RADIOLOGY | Facility: HOSPITAL | Age: 52
Discharge: HOME | End: 2022-01-18
Attending: INTERNAL MEDICINE
Payer: MEDICAID

## 2022-01-18 VITALS — WEIGHT: 289 LBS | BODY MASS INDEX: 46.45 KG/M2 | HEIGHT: 66 IN

## 2022-01-18 DIAGNOSIS — Z12.31 OTHER SCREENING MAMMOGRAM: ICD-10-CM

## 2022-01-18 PROCEDURE — 77063 MAMMO DIGITAL SCREENING BILAT WITH TOMO: ICD-10-PCS | Mod: 26,,, | Performed by: RADIOLOGY

## 2022-01-18 PROCEDURE — 77067 SCR MAMMO BI INCL CAD: CPT | Mod: 26,,, | Performed by: RADIOLOGY

## 2022-01-18 PROCEDURE — 77063 BREAST TOMOSYNTHESIS BI: CPT | Mod: TC

## 2022-01-18 PROCEDURE — 77067 MAMMO DIGITAL SCREENING BILAT WITH TOMO: ICD-10-PCS | Mod: 26,,, | Performed by: RADIOLOGY

## 2022-01-18 PROCEDURE — 77067 SCR MAMMO BI INCL CAD: CPT | Mod: TC

## 2022-01-18 PROCEDURE — 77063 BREAST TOMOSYNTHESIS BI: CPT | Mod: 26,,, | Performed by: RADIOLOGY

## 2022-01-24 ENCOUNTER — PATIENT OUTREACH (OUTPATIENT)
Dept: ADMINISTRATIVE | Facility: OTHER | Age: 52
End: 2022-01-24
Payer: MEDICAID

## 2022-01-24 NOTE — PROGRESS NOTES
Health Maintenance Due   Topic Date Due    COVID-19 Vaccine (1) Never done    Pneumococcal Vaccines (Age 0-64) (1 of 2 - PPSV23) Never done    Shingles Vaccine (1 of 2) Never done    Influenza Vaccine (1) 09/01/2021    Lipid Panel  10/10/2021     Updates were requested from care everywhere.  Chart was reviewed for overdue Proactive [facility] Encounters ([facility]) topics (CRS, Breast Cancer Screening, Eye exam)  Health Maintenance has been updated.  LINKS immunization registry triggered.  Immunizations were reconciled.

## 2022-01-25 ENCOUNTER — OFFICE VISIT (OUTPATIENT)
Dept: ORTHOPEDICS | Facility: CLINIC | Age: 52
End: 2022-01-25
Payer: MEDICAID

## 2022-01-25 VITALS — BODY MASS INDEX: 46.45 KG/M2 | HEIGHT: 66 IN | WEIGHT: 289 LBS

## 2022-01-25 DIAGNOSIS — M17.12 PRIMARY OSTEOARTHRITIS OF LEFT KNEE: ICD-10-CM

## 2022-01-25 PROCEDURE — 1159F MED LIST DOCD IN RCRD: CPT | Mod: CPTII,,, | Performed by: ORTHOPAEDIC SURGERY

## 2022-01-25 PROCEDURE — 4010F ACE/ARB THERAPY RXD/TAKEN: CPT | Mod: CPTII,,, | Performed by: ORTHOPAEDIC SURGERY

## 2022-01-25 PROCEDURE — 20610 LARGE JOINT ASPIRATION/INJECTION: L KNEE: ICD-10-PCS | Mod: S$PBB,LT,, | Performed by: ORTHOPAEDIC SURGERY

## 2022-01-25 PROCEDURE — 3008F BODY MASS INDEX DOCD: CPT | Mod: CPTII,,, | Performed by: ORTHOPAEDIC SURGERY

## 2022-01-25 PROCEDURE — 4010F PR ACE/ARB THEARPY RXD/TAKEN: ICD-10-PCS | Mod: CPTII,,, | Performed by: ORTHOPAEDIC SURGERY

## 2022-01-25 PROCEDURE — 99215 OFFICE O/P EST HI 40 MIN: CPT | Mod: PBBFAC,PN,25 | Performed by: ORTHOPAEDIC SURGERY

## 2022-01-25 PROCEDURE — 1160F PR REVIEW ALL MEDS BY PRESCRIBER/CLIN PHARMACIST DOCUMENTED: ICD-10-PCS | Mod: CPTII,,, | Performed by: ORTHOPAEDIC SURGERY

## 2022-01-25 PROCEDURE — 20610 DRAIN/INJ JOINT/BURSA W/O US: CPT | Mod: PBBFAC,PN | Performed by: ORTHOPAEDIC SURGERY

## 2022-01-25 PROCEDURE — 3008F PR BODY MASS INDEX (BMI) DOCUMENTED: ICD-10-PCS | Mod: CPTII,,, | Performed by: ORTHOPAEDIC SURGERY

## 2022-01-25 PROCEDURE — 1159F PR MEDICATION LIST DOCUMENTED IN MEDICAL RECORD: ICD-10-PCS | Mod: CPTII,,, | Performed by: ORTHOPAEDIC SURGERY

## 2022-01-25 PROCEDURE — 1160F RVW MEDS BY RX/DR IN RCRD: CPT | Mod: CPTII,,, | Performed by: ORTHOPAEDIC SURGERY

## 2022-01-25 PROCEDURE — 99214 PR OFFICE/OUTPT VISIT, EST, LEVL IV, 30-39 MIN: ICD-10-PCS | Mod: 25,S$PBB,, | Performed by: ORTHOPAEDIC SURGERY

## 2022-01-25 PROCEDURE — 99214 OFFICE O/P EST MOD 30 MIN: CPT | Mod: 25,S$PBB,, | Performed by: ORTHOPAEDIC SURGERY

## 2022-01-25 PROCEDURE — 99999 PR PBB SHADOW E&M-EST. PATIENT-LVL V: ICD-10-PCS | Mod: PBBFAC,,, | Performed by: ORTHOPAEDIC SURGERY

## 2022-01-25 PROCEDURE — 99999 PR PBB SHADOW E&M-EST. PATIENT-LVL V: CPT | Mod: PBBFAC,,, | Performed by: ORTHOPAEDIC SURGERY

## 2022-01-25 RX ORDER — TRIAMCINOLONE ACETONIDE 40 MG/ML
40 INJECTION, SUSPENSION INTRA-ARTICULAR; INTRAMUSCULAR
Status: DISCONTINUED | OUTPATIENT
Start: 2022-01-25 | End: 2022-01-25 | Stop reason: HOSPADM

## 2022-01-25 RX ORDER — MONTELUKAST SODIUM 10 MG/1
10 TABLET ORAL NIGHTLY
COMMUNITY
Start: 2022-01-15 | End: 2023-07-26

## 2022-01-25 RX ADMIN — TRIAMCINOLONE ACETONIDE 40 MG: 40 INJECTION, SUSPENSION INTRA-ARTICULAR; INTRAMUSCULAR at 08:01

## 2022-01-25 NOTE — PROCEDURES
Large Joint Aspiration/Injection: L knee    Date/Time: 1/25/2022 8:30 AM  Performed by: Pipe Sauceda MD  Authorized by: Pipe Sauceda MD     Location:  Knee  Site:  L knee  Medications:  40 mg triamcinolone acetonide 40 mg/mL     After obtaining verbal informed consent the patient's left knee was prepped aseptically and injected through an inferior lateral approach using 40 mg of triamcinolone and 1 cc of 1% plain Xylocaine.  The patient was warned about postinjection flare and how to manage it with ice, rest and over-the-counter analgesics.  They're advised to contact me for any severe, uncontrolled pain.

## 2022-01-25 NOTE — PROGRESS NOTES
Subjective:      Patient ID: Arron Ron is a 51 y.o. female.    Chief Complaint: Consult and Knee Pain (left )    HPI     They have experienced problems with their left knee over the past many years. The patient reports relevant history of injury/aggravation.  The patient reports that her symptoms 1st began with an injury at work when she fell off a some sort of elevated surface.  Pain is located medially Associated symptoms include giving way.  Symptoms are aggravated by all walk. They have been treated with injections in the distant past with some benefit, NSAIDs and a cane.   Symptoms have recently worsened. Ambulation reportedly has been impaired. Self care ADLs are painful.     Review of Systems   Constitutional: Negative for fever and weight loss.   HENT: Negative for congestion.    Eyes: Negative for visual disturbance.   Cardiovascular: Negative for chest pain.   Respiratory: Negative for shortness of breath.    Hematologic/Lymphatic: Negative for bleeding problem. Does not bruise/bleed easily.   Skin: Negative for poor wound healing.   Musculoskeletal: Positive for back pain, joint pain and joint swelling.   Gastrointestinal: Negative for abdominal pain.   Genitourinary: Negative for dysuria.   Neurological: Negative for seizures.   Psychiatric/Behavioral: Negative for altered mental status.   Allergic/Immunologic: Negative for persistent infections.         Objective:      Ortho/SPM Exam      Left knee    [unfilled]    The patient is not in acute distress.   Sclerae normal  Body habitus is obese.  Respiratory distress:  none   The patient walks with a limp.  Hip irritability  negative.   The skin over the knee is intact.  Knee effusion trace  Palpation- nontender  Range of motion- Flexion 100 deg, Extension 0 deg,   Ligament laxity exam:  2+ valgus laxity   Patellar apprehension negative.  Popliteal cyst negative  Patellar crepitation absent.  Meniscal irritability not applicable  Pulses DP  present, PT present.  Motor normal 5/5 strength in all tested muscle groups.   Sensory normal.    I reviewed the relevant imaging for the patient's condition:  Left knee films show 90% medial narrowing with osteophytes    Assessment:       Encounter Diagnosis   Name Primary?    Primary osteoarthritis of left knee    The condition is radiographically advanced and aggravated by obesity.        Plan:       Arron was seen today for consult and knee pain.    Diagnoses and all orders for this visit:    Primary osteoarthritis of left knee  -     Ambulatory referral/consult to Orthopedics      I explained my diagnostic impression and the reasoning behind it in detail, using layman's terms.  Models and/or pictures were used to help in the explanation.    Weight loss recommended    Injection recommended and consent given    I encouraged the patient to take her NSAIDs as prescribed by her primary physician.  She may also use topical NSAIDs as the additive affect is not enough to be dangerous.    After significant preoperative weight loss, arthroplasty could be considered.    The patient will be on my palliative protocol for the present time.

## 2022-02-14 DIAGNOSIS — M54.42 CHRONIC BILATERAL LOW BACK PAIN WITH BILATERAL SCIATICA: ICD-10-CM

## 2022-02-14 DIAGNOSIS — M17.12 PRIMARY OSTEOARTHRITIS OF LEFT KNEE: ICD-10-CM

## 2022-02-14 DIAGNOSIS — G89.29 CHRONIC BILATERAL LOW BACK PAIN WITH BILATERAL SCIATICA: ICD-10-CM

## 2022-02-14 DIAGNOSIS — M54.41 CHRONIC BILATERAL LOW BACK PAIN WITH BILATERAL SCIATICA: ICD-10-CM

## 2022-02-14 RX ORDER — CYCLOBENZAPRINE HCL 10 MG
TABLET ORAL
Qty: 60 TABLET | Refills: 0 | Status: SHIPPED | OUTPATIENT
Start: 2022-02-14 | End: 2022-03-14

## 2022-02-14 RX ORDER — DICLOFENAC SODIUM 10 MG/G
GEL TOPICAL
Qty: 100 G | Refills: 0 | Status: SHIPPED | OUTPATIENT
Start: 2022-02-14 | End: 2022-03-14

## 2022-02-14 NOTE — TELEPHONE ENCOUNTER
No new care gaps identified.  Powered by [facility] by [employer]. Reference number: [id number].   2/14/2022 10:26:11 AM CST

## 2022-02-22 ENCOUNTER — TELEPHONE (OUTPATIENT)
Dept: INTERNAL MEDICINE | Facility: CLINIC | Age: 52
End: 2022-02-22
Payer: MEDICAID

## 2022-02-22 NOTE — TELEPHONE ENCOUNTER
----- Message from Gemma Garrido sent at 2022  1:51 PM CST -----  Contact: Self  Arron Ron  MRN: [id number]  : 1970  PCP: Mahi Rojas  Home Phone      [phone]  Work Phone      Not on file.  Mobile          [phone]  Mobile          [phone]      MESSAGE:     Requesting to speak to nurse to check status of her referral for Physical Therapy.  States that she received one call, and someone was supposed to call her back and she didn't hear anything again.  Please call to assist.     Phone: [phone]

## 2022-02-23 ENCOUNTER — OFFICE VISIT (OUTPATIENT)
Dept: ORTHOPEDICS | Facility: CLINIC | Age: 52
End: 2022-02-23
Payer: MEDICAID

## 2022-02-23 VITALS — BODY MASS INDEX: 46.45 KG/M2 | HEIGHT: 66 IN | WEIGHT: 289 LBS

## 2022-02-23 DIAGNOSIS — M25.531 RIGHT WRIST PAIN: ICD-10-CM

## 2022-02-23 PROCEDURE — 4010F ACE/ARB THERAPY RXD/TAKEN: CPT | Mod: CPTII,,, | Performed by: ORTHOPAEDIC SURGERY

## 2022-02-23 PROCEDURE — 3008F PR BODY MASS INDEX (BMI) DOCUMENTED: ICD-10-PCS | Mod: CPTII,,, | Performed by: ORTHOPAEDIC SURGERY

## 2022-02-23 PROCEDURE — 99999 PR PBB SHADOW E&M-EST. PATIENT-LVL IV: ICD-10-PCS | Mod: PBBFAC,,, | Performed by: ORTHOPAEDIC SURGERY

## 2022-02-23 PROCEDURE — 99214 OFFICE O/P EST MOD 30 MIN: CPT | Mod: PBBFAC,PN,25 | Performed by: ORTHOPAEDIC SURGERY

## 2022-02-23 PROCEDURE — 4010F PR ACE/ARB THEARPY RXD/TAKEN: ICD-10-PCS | Mod: CPTII,,, | Performed by: ORTHOPAEDIC SURGERY

## 2022-02-23 PROCEDURE — 99203 OFFICE O/P NEW LOW 30 MIN: CPT | Mod: S$PBB,25,, | Performed by: ORTHOPAEDIC SURGERY

## 2022-02-23 PROCEDURE — 99999 PR PBB SHADOW E&M-EST. PATIENT-LVL IV: CPT | Mod: PBBFAC,,, | Performed by: ORTHOPAEDIC SURGERY

## 2022-02-23 PROCEDURE — 99203 PR OFFICE/OUTPT VISIT, NEW, LEVL III, 30-44 MIN: ICD-10-PCS | Mod: S$PBB,25,, | Performed by: ORTHOPAEDIC SURGERY

## 2022-02-23 PROCEDURE — 20605 DRAIN/INJ JOINT/BURSA W/O US: CPT | Mod: PBBFAC,PN,RT | Performed by: ORTHOPAEDIC SURGERY

## 2022-02-23 PROCEDURE — 20605 DRAIN/INJ JOINT/BURSA W/O US: CPT | Mod: S$PBB,RT,, | Performed by: ORTHOPAEDIC SURGERY

## 2022-02-23 PROCEDURE — 20605 PR DRAIN/INJECT INTERMEDIATE JOINT/BURSA: ICD-10-PCS | Mod: S$PBB,RT,, | Performed by: ORTHOPAEDIC SURGERY

## 2022-02-23 PROCEDURE — 1159F PR MEDICATION LIST DOCUMENTED IN MEDICAL RECORD: ICD-10-PCS | Mod: CPTII,,, | Performed by: ORTHOPAEDIC SURGERY

## 2022-02-23 PROCEDURE — 3008F BODY MASS INDEX DOCD: CPT | Mod: CPTII,,, | Performed by: ORTHOPAEDIC SURGERY

## 2022-02-23 PROCEDURE — 1159F MED LIST DOCD IN RCRD: CPT | Mod: CPTII,,, | Performed by: ORTHOPAEDIC SURGERY

## 2022-02-23 RX ORDER — TRIAMCINOLONE ACETONIDE 40 MG/ML
20 INJECTION, SUSPENSION INTRA-ARTICULAR; INTRAMUSCULAR
Status: COMPLETED | OUTPATIENT
Start: 2022-02-23 | End: 2022-02-23

## 2022-02-23 RX ADMIN — TRIAMCINOLONE ACETONIDE 20 MG: 40 INJECTION, SUSPENSION INTRA-ARTICULAR; INTRAMUSCULAR at 01:02

## 2022-02-23 NOTE — PROGRESS NOTES
Subjective:      Patient ID: Arron Ron is a 51 y.o. female.    Chief Complaint: Pain of the Right Wrist      HPI  Arron Ron is a  51 y.o. female presenting today for wrist pain.  There was a history of trauma.  Onset of symptoms began about 6 months ago when she had several falls on her right wrist new since then she has had sort of intermittent pain in the wrist which usually worse with heavy lifting gripping  No numbness or tingling is reported  She has tried using a wrist brace intermittently without long-term relief  She has not had any therapy  Recent MRI scan showed some degenerative changes of the joint and a chronic appearing tear of the scapholunate ligament.      Review of patient's allergies indicates:  No Known Allergies      Current Outpatient Medications   Medication Sig Dispense Refill    albuterol (ACCUNEB) 1.25 mg/3 mL Nebu Take 3 mLs (1.25 mg total) by nebulization every 6 (six) hours as needed (increased cough/wheeze/shortness of breath). Rescue 150 mL 5    albuterol (PROAIR HFA) 90 mcg/actuation inhaler Inhale 2 puffs into the lungs every 6 (six) hours as needed for Wheezing. Rescue 18 g 3    albuterol (PROVENTIL/VENTOLIN HFA) 90 mcg/actuation inhaler Inhale 2 puffs into the lungs every 4 (four) hours as needed for Wheezing or Shortness of Breath. Rescue 18 g 5    ALLERGY RELIEF, CETIRIZINE, 10 mg tablet Take 10 mg by mouth once daily.      ALPRAZolam (XANAX) 2 MG Tab Take 2 mg by mouth every evening.       aspirin (ECOTRIN) 81 MG EC tablet Take 81 mg by mouth every Mon, Wed, Fri.       atorvastatin (LIPITOR) 20 MG tablet TAKE ONE TABLET BY MOUTH ONCE A DAY IN THE EVENING      budesonide-glycopyr-formoterol (BREZTRI AEROSPHERE) 160-9-4.8 mcg/actuation HFAA Inhale 2 puffs into the lungs 2 (two) times daily. 10.7 g 11    celecoxib (CELEBREX) 200 MG capsule Take 200 mg by mouth once daily.      cyclobenzaprine (FLEXERIL) 10 MG tablet TAKE ONE TABLET BY MOUTH THREE  TIMES DAILY AS NEEDED FOR MUSCLE SPASMS 60 tablet 0    diclofenac sodium (VOLTAREN) 1 % Gel APPLY 2 GRAMS TOPICALLY TO THE AFFECTED AREA TWICE DAILY AS DIRECTED 100 g 0    doxazosin (CARDURA) 1 MG tablet Take 1 mg by mouth once daily.      ergocalciferol, vitamin D2, (VITAMIN D ORAL) Take 2,000 Units by mouth once daily.       fish oil-omega-3 fatty acids 300-1,000 mg capsule Take by mouth once daily.      fluticasone propionate (FLONASE) 50 mcg/actuation nasal spray 1 spray (50 mcg total) by Each Nostril route once daily. 16 g 3    gabapentin (NEURONTIN) 100 MG capsule Take 1 capsule (100 mg total) by mouth every evening. 30 capsule 11    ibuprofen (ADVIL,MOTRIN) 800 MG tablet Take 800 mg by mouth 3 (three) times daily as needed for Pain.      mometasone-formoterol (DULERA) 200-5 mcg/actuation inhaler Inhale 2 puffs into the lungs 2 (two) times daily. Controller 13 g 11    montelukast (SINGULAIR) 10 mg tablet Take 10 mg by mouth every evening.      omeprazole (PRILOSEC) 20 MG capsule Take 20 mg by mouth once daily.      phentermine (ADIPEX-P) 37.5 mg tablet Take 37.5 mg by mouth every other day.       spironolactone (ALDACTONE) 50 MG tablet spironolactone 50 mg tablet      torsemide (DEMADEX) 20 MG Tab Take 20 mg by mouth once daily.       valsartan (DIOVAN) 320 MG tablet Take 320 mg by mouth once daily.  11     No current facility-administered medications for this visit.       Past Medical History:   Diagnosis Date    Acid reflux     Anemia     Back pain     was under therapy    Chronic back pain     Constipation     Elevated cholesterol     Encounter for blood transfusion 1987    Endometriosis     Hyperlipidemia     Hypertension     Nausea & vomiting     Sleep apnea     Tendonitis of wrist, right        Past Surgical History:   Procedure Laterality Date    BREAST BIOPSY      left , benign    CHOLECYSTECTOMY      COLONOSCOPY  2017        COLONOSCOPY N/A 4/29/2021    Procedure:  "COLONOSCOPY;  Surgeon: Norbert Garcia MD;  Location: [facility];  Service: Colon and Rectal;  Laterality: N/A;    DILATION AND CURETTAGE OF UTERUS      ENDOMETRIAL ABLATION      gallstone      GASTRIC BYPASS  02/18/2020    HYSTERECTOMY  10/1/15    [facility]- bleeding/ endometriosis    OOPHORECTOMY      TUBAL LIGATION      UPPER [facility]TESTINAL [facility]OSCOPY  2017    [facility]        Review of Systems:  ROS    OBJECTIVE:     PHYSICAL EXAM:  Height: 5' 6" (167.6 cm) Weight: 131.1 kg (289 lb)  Vitals:    02/23/22 1311   Weight: 131.1 kg (289 lb)   Height: 5' 6" (1.676 m)   PainSc:   6     Well developed, well nourished female in no acute distress  Alert and oriented x 3  HEENT- Normal exam  Lungs- Clear to auscultation  Heart- Regular rate and rhythm  Abdomen- Soft nontender  Extremity exam- examination right wrist demonstrates some mild tenderness dorsally  No swelling  Range of motion wrist and fingers full  No clicking  Braun test negative   strength slightly decreased  Tinel sign negative      RADIOGRAPHS:  AP lateral x-rays of the right wrist demonstrates some mild widening of the scapholunate interval  MRI scan shows some degenerative changes of the carpus what appears to be degenerative tearing of the scapholunate interval and ligament which is old chronic  Comments: I have personally reviewed the imaging and I agree with the above radiologist's report.    ASSESSMENT/PLAN:     IMPRESSION:  1. Right wrist pain.    2. 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     PLAN:  I explained the patient that in this case I would not recommend surgery but instead treat her symptomatically  I have reiterated that she should wear the wrist brace for any heavy lifting  Anti-inflammatory medication by mouth  I have ordered a short course of therapy for her right hand and wrist and recommended injection today  After pause for time-out identified the right wrist injected dorsally combination Kenalog 20 mg 0.5 cc " xylocaine sterile technique  She tolerated the procedure well without complication  Follow-up 6 weeks          - We talked at length about the anatomy and pathophysiology of   Encounter Diagnosis   Name Primary?    Right wrist pain            Disclaimer: This note has been generated using voice-recognition software. There may be typographical errors that have been missed during proof-reading.

## 2022-03-02 ENCOUNTER — TELEPHONE (OUTPATIENT)
Dept: INTERNAL MEDICINE | Facility: CLINIC | Age: 52
End: 2022-03-02
Payer: MEDICAID

## 2022-03-02 DIAGNOSIS — M54.42 CHRONIC BILATERAL LOW BACK PAIN WITH BILATERAL SCIATICA: Primary | ICD-10-CM

## 2022-03-02 DIAGNOSIS — R13.10 DYSPHAGIA, UNSPECIFIED TYPE: ICD-10-CM

## 2022-03-02 DIAGNOSIS — M54.41 CHRONIC BILATERAL LOW BACK PAIN WITH BILATERAL SCIATICA: Primary | ICD-10-CM

## 2022-03-02 DIAGNOSIS — G89.29 CHRONIC BILATERAL LOW BACK PAIN WITH BILATERAL SCIATICA: Primary | ICD-10-CM

## 2022-03-02 NOTE — TELEPHONE ENCOUNTER
PT or GI??? I put in PT referral since that was her last visit was for and that's what first message says.    If GI, what for?

## 2022-03-02 NOTE — TELEPHONE ENCOUNTER
I took care of the PT issue. Patient is requesting referral to GI. She said that she used to see someone and she had an EGD before but she doesn't see this provider anymore and she stated that she is due for another EGD.

## 2022-03-02 NOTE — TELEPHONE ENCOUNTER
----- Message from Gemma Garrido sent at 3/2/2022  2:49 PM CST -----  Contact: Self  Arron Ron  MRN: [id number]  : 1970  PCP: Mahi Rojas  Home Phone      [phone]  Work Phone      Not on file.  Mobile          [phone]  Mobile          [phone]      MESSAGE:     Requesting to speak to nurse regarding physical therapy referral.  States that she put a message in several days ago but no one has returned her call.   Please call to discuss and advise.     Phone: [phone]

## 2022-03-04 ENCOUNTER — TELEPHONE (OUTPATIENT)
Dept: GASTROENTEROLOGY | Facility: CLINIC | Age: 52
End: 2022-03-04
Payer: MEDICAID

## 2022-03-04 ENCOUNTER — TELEPHONE (OUTPATIENT)
Dept: INTERNAL MEDICINE | Facility: CLINIC | Age: 52
End: 2022-03-04
Payer: MEDICAID

## 2022-03-04 NOTE — TELEPHONE ENCOUNTER
"----- Message from Halima Wilson sent at 3/4/2022 12:21 PM CST -----  Regarding: Request to speak to a nurse  Contact: self  Arron Ron  MRN: [id number]  : 1970  PCP: Mahi Rojas  Home Phone      [phone]  Work Phone      Not on file.  Mobile          [phone]  Mobile          [phone]      MESSAGE:   Request to speak to a nurse regarding PCP referral to  "stomach doctor" in [place]   Patient stated she found a "stomach doctor" at [facility] which accepts her insurance.     Phone # [phone]    Pharm[place] [facility], [place] [address]    "

## 2022-03-04 NOTE — TELEPHONE ENCOUNTER
Spoke to pt and scheduled pt appt on 3/28/22 at 2 pm with Dr. Ford. Pt confirmed appt and thank MA

## 2022-03-04 NOTE — TELEPHONE ENCOUNTER
The last EGD I see in 2017 and shows gastritis which is why she is on PPI. Did she have another elsewhere which showed something else. Gastritis in the absence of new symptoms may not need another EGD.    I'm looking for a diagnosis to send with the referral. Just reflux they may not see her. I don't recall her discussing this with me at a visit

## 2022-03-04 NOTE — TELEPHONE ENCOUNTER
Patient notified. Verbalized understanding.referral message sent to main campus to schedule patient.

## 2022-03-04 NOTE — TELEPHONE ENCOUNTER
Patient stating that she is having worsening symptoms. Patient has been taking prilosec. Patient stated that it always feels like her food is stuck in her chest/throat.

## 2022-03-12 DIAGNOSIS — G89.29 CHRONIC BILATERAL LOW BACK PAIN WITH BILATERAL SCIATICA: ICD-10-CM

## 2022-03-12 DIAGNOSIS — M54.42 CHRONIC BILATERAL LOW BACK PAIN WITH BILATERAL SCIATICA: ICD-10-CM

## 2022-03-12 DIAGNOSIS — M54.41 CHRONIC BILATERAL LOW BACK PAIN WITH BILATERAL SCIATICA: ICD-10-CM

## 2022-03-12 DIAGNOSIS — M17.12 PRIMARY OSTEOARTHRITIS OF LEFT KNEE: ICD-10-CM

## 2022-03-12 NOTE — TELEPHONE ENCOUNTER
No new care gaps identified.  Powered by [employer]. Reference number: [id number].   3/12/2022 11:47:43 AM CST

## 2022-03-14 ENCOUNTER — CLINICAL SUPPORT (OUTPATIENT)
Dept: REHABILITATION | Facility: HOSPITAL | Age: 52
End: 2022-03-14
Payer: MEDICAID

## 2022-03-14 DIAGNOSIS — G89.29 CHRONIC BILATERAL LOW BACK PAIN WITH BILATERAL SCIATICA: ICD-10-CM

## 2022-03-14 DIAGNOSIS — M54.42 CHRONIC BILATERAL LOW BACK PAIN WITH BILATERAL SCIATICA: ICD-10-CM

## 2022-03-14 DIAGNOSIS — M54.41 CHRONIC BILATERAL LOW BACK PAIN WITH BILATERAL SCIATICA: ICD-10-CM

## 2022-03-14 PROCEDURE — 97162 PT EVAL MOD COMPLEX 30 MIN: CPT | Mod: PN

## 2022-03-14 RX ORDER — CYCLOBENZAPRINE HCL 10 MG
TABLET ORAL
Qty: 60 TABLET | Refills: 0 | Status: SHIPPED | OUTPATIENT
Start: 2022-03-14 | End: 2022-04-11

## 2022-03-14 RX ORDER — DICLOFENAC SODIUM 10 MG/G
GEL TOPICAL
Qty: 100 G | Refills: 0 | Status: SHIPPED | OUTPATIENT
Start: 2022-03-14 | End: 2022-04-11

## 2022-03-14 NOTE — PLAN OF CARE
[facility]   Physical Therapy Initial Evaluation     Date: 3/14/2022   Name: Arron Ron  Clinic Number: [id number]    Therapy Diagnosis:   Encounter Diagnosis   Name Primary?    Chronic bilateral low back pain with bilateral sciatica      Physician: Mahi Rojas MD    Physician Orders: PT Eval and Treat  Medical Diagnosis from Referral: Chronic bilateral low back pain with bilateral sciatica   Evaluation Date: 3/14/2022  Authorization Period Expiration: 12/31/2022   Plan of Care Expiration: 6/14/2022  Progress Note Due: 4/14/2022  Visit # / Visits authorized: 1/ 1   FOTO: 1/3    Precautions: Standard     Time In: 9:32  Time Out: 10:15  Total Appointment Time (timed & untimed codes): 43 minutes      SUBJECTIVE     Date of onset: 2/14/2022    History of current condition - Arron reports: she is having low back pain that radiates into L>R leg that has increased in the past month. She has had this pain for years however. She falls frequently because the L leg will give out on her. She has had physical therapy in the past, about a year ago, which was not helpful. She has been trying the exercises they gave her to help with her recent increase in pain, but this has not improved. She sleeps with a wedge pillow on her back which helps decrease her pain while sleeping.    PMH:  -L ankle pain: She had an incident in 2001 where her L foot was caught in an emergency door when stepping down off a stool. Her ankle was badly sprained. She was followed for blood clots, but they didn't find any. She was told to take Aspirin every other day.  -L knee pain: She was told by a MD that she needed a knee replacement. Patient saw a MD who gave her a shot in the L knee which didn't help.   -R wrist pain: Patient wears a wrist brace on R after falling on hand last year. She is being followed by orthopedics.    Falls: yes    Imaging:   Lumbar spine MRI studies: 2017  Results: There is transitional  anatomy at the lumbosacral junction with lumbarization of the first sacral vertebra.  Please see sagittal T2 images vertebral body labeling.  Vertebral body alignment and heights are maintained.  There is disc desiccation at the L5-S1 level.  The marrow signal is normal without evidence for a marrow replacement process, infection or tumor.  There is edema-like signal about the posterior elements bilaterally at L5 which can be a source of pain.  This is more prominent on the left.  The conus terminates at L1.  The incidentally visualized soft tissues structures of the abdomen show a cyst at the midpole of the right kidney.  At T12-L1, L1-2, L2-3, L3-4 and L4-5, there is no disc herniation, central canal stenosis or neural foraminal narrowing.  At L5-S1, there is a broad-based posterior disc bulge with facet arthropathy contributing to mild bilateral neural foraminal narrowing, worse on the left.  There is significant edema-like signal at the posterior elements at this level.  IMPRESSION:   Transitional anatomy at the lumbosacral junction with lumbarization of the first sacral vertebra.  Please see sagittal T2 weighted images for vertebral body labeling.  It surgical intervention is contemplated, correlation with fluoroscopy is recommended.  Broad-based posterior disc bulge with facet arthropathy at L5-S1 contributing to mild bilateral neural foraminal narrowing, worse on the left.  Edema-like signal about the posterior elements bilaterally at L5 which can be a source of pain.  This is more prominent on the left.    Left knee X-ray: 2021  FINDINGS:  Moderate degenerative change involving the left knee greatest along the medial knee compartment.  Similar findings present on the prior study.  There is no evidence of fracture, dislocation or other acute osseous abnormality. No significant joint effusion.    Prior Therapy: yes  Social History: one story home, lives with son (34)  Occupation: unemployed  Prior Level of  "Function: modified independent with activities of daily living   Current Level of Function: modified independent with activities of daily living     Pain:  Current 6/10, worst 10/10, best 2/10   Location: bilateral low back, L posterior thigh, knee, foot   Description: Tingling, Shooting, Aching and Sharp   Aggravating Factors: Walking, sitting long periods, mopping/sweeping, squatting  Easing Factors: rest, elevation and NSAIDs, topical antiinflammatory, flexeril     Patients goals: "to get better"     Medical History:   Past Medical History:   Diagnosis Date    Acid reflux     Anemia     Back pain     was under therapy    Chronic back pain     Constipation     Elevated cholesterol     Encounter for blood transfusion 1987    Endometriosis     Hyperlipidemia     Hypertension     Nausea & vomiting     Sleep apnea     Tendonitis of wrist, right        Surgical History:   Arron Ron  has a past surgical history that includes Tubal ligation; gallstone; Cholecystectomy; Endometrial ablation; Dilation and curettage of uterus; Breast biopsy; Hysterectomy (10/1/15); Colonoscopy (2017); Upper gastrointestinal endoscopy (2017); Oophorectomy; Colonoscopy (N/A, 4/29/2021); and Gastric bypass (02/18/2020).    Medications:   Arron has a current medication list which includes the following prescription(s): cyclobenzaprine, diclofenac sodium, albuterol, albuterol, albuterol, allergy relief (cetirizine), alprazolam, aspirin, atorvastatin, breztri aerosphere, celecoxib, doxazosin, ergocalciferol (vitamin d2), fish oil-omega-3 fatty acids, fluticasone propionate, gabapentin, ibuprofen, dulera, montelukast, omeprazole, phentermine, spironolactone, torsemide, and valsartan.    Allergies:   Review of patient's allergies indicates:  No Known Allergies       OBJECTIVE     Observation  - increased lordosis, hinge point in region of L5-S1    Gait  - with SPC in L    Lumbar Spine Range of Motion  Lumbar Spine AROM   " "Initial Eval Pain   Flexion  (cm fingertips from floor) fingertips to proximal shins, miriam's sign to return y   Extension Hinge lower lumbar y   R extension quadrant     L extension quadrant     Sidebend R Hinge lower lumbar y   Sidebend L Hinge lower lumbar y   All fields left blank intentionally and signify "not tested"    Lower Extremity Strength MMT /5  Right  Initial Eval Pain  Left  Initial Eval Pain   3- posterior lean trunk y Hip flexion 3- posterior lean trunk y   4+  Knee flexion 4+ y   4+  Knee extension 4 y     Hip external rotation       Hip internal rotation       Hip abduction       Hip adduction       Hip extension     5  Dorsiflexion  5      Plantar flexion       Eversion       Inversion        Great toe extension     All fields left blank intentionally and signify "not tested"    Neurofascial dynamics evaluation  - Slump: (+) B    Functional testing  - sit to stand: uses UEs, uses SPC     Limitation/Restriction for FOTO lumbar spine Survey    Therapist reviewed FOTO scores for Arron Ron on 3/14/2022.   FOTO documents entered into [employer]now [employer] - see Media section.    Limitation Score: [Did not complete]%         TREATMENT     Total Treatment time (time-based codes) separate from Evaluation: 4 minutes      Arron received the treatments listed below:      therapeutic exercises to develop strength, ROM, flexibility and core stabilization for 4 minutes including:  Seated abdominal bracing  Supine knee to chest    PATIENT EDUCATION AND HOME EXERCISES     Education provided:   - Current status, plan of care, goals of physical therapy, home exercise program, role of the physical therapist    Written Home Exercises Provided: yes. Exercises were reviewed and Arron was able to demonstrate them prior to the end of the session.  Arron demonstrated good  understanding of the education provided. See EMR under Patient Instructions for exercises provided during therapy sessions.    ASSESSMENT     Arron " is a 51 y.o. female referred to outpatient Physical Therapy with a medical diagnosis of Chronic bilateral low back pain with bilateral sciatica. Patient presents with deficits in core strength, lower extremity strength, lumbar spine range of motion, and neural mobility all impacting patient's ability to perform activities of daily living.    Patient prognosis is Good.   Patientt will benefit from skilled outpatient Physical Therapy to address the deficits stated above and in the chart below, provide patient /family education, and to maximize patientt's level of independence.     Plan of care discussed with patient: Yes  Patient's spiritual, cultural and educational needs considered and patient is agreeable to the plan of care and goals as stated below:     Anticipated Barriers for therapy: none    Medical Necessity is demonstrated by the following  History  Co-morbidities and personal factors that may impact the plan of care Co-morbidities:   endometriosis and HTN    Personal Factors:   no deficits     low   Examination  Body Structures and Functions, activity limitations and participation restrictions that may impact the plan of care Body Regions:   back  lower extremities    Body Systems:    ROM  strength  gait  transfers  transitions  motor control    Participation Restrictions:   Limited ability to perform activities of daily living     Activity limitations:   Learning and applying knowledge  no deficits    General Tasks and Commands  no deficits    Communication  no deficits    Mobility  lifting and carrying objects  walking    Self care  no deficits    Domestic Life  shopping  cooking  doing house work (cleaning house, washing dishes, laundry)    Interactions/Relationships  no deficits    Life Areas  no deficits    Community and Social Life  no deficits         moderate   Clinical Presentation evolving clinical presentation with changing clinical characteristics moderate   Decision Making/ Complexity Score:  moderate     Goals:  Short Term Goals: 3 weeks   1. Patient will demonstrate independence in prescribed home exercise program once a day with proper form, sets and reps to improve initiation, strength and endurance of muscles needed to return to prior level of function  2. Patient will increase B lower extremity strength in deficient planes by 1/2 to 1 full grade with improved eccentric control to execute squatting activities  3. Patient will demonstrate efficient hip hinge squat without cueing to improve ability to retrieve laundry basket and groceries    Long Term Goals: 10 weeks   1. Patient will demonstrate hip flexion 5/5 with efficient core first strategy to be able to get into/out of car without difficulty  2. Patient will demonstrate hip hinge with neutral trunk and T-larisa arms to lift greater or equal to 10 pounds from floor to chest to increase safety with lifting grocery bags.  3. Patient will decrease neural tension L lower extremity to full knee extension in slump position in order to allow progression of range of motion L hip for squatting activities of daily living    PLAN   Plan of care Certification: 3/14/2022 to 6/14/2022.    Outpatient Physical Therapy 2 times weekly for 10 weeks to include the following interventions: Cervical/Lumbar Traction, Electrical Stimulation  , Gait Training, Manual Therapy, Moist Heat/ Ice, Neuromuscular Re-ed, Patient Education, Self Care, Therapeutic Activities, Therapeutic Exercise and Ultrasound. Interventions can be provided by PTA as a treatment team.    Halima Montejo, PT      I CERTIFY THE NEED FOR THESE SERVICES FURNISHED UNDER THIS PLAN OF TREATMENT AND WHILE UNDER MY CARE   Physician's comments:     Physician's Signature: ___________________________________________________

## 2022-03-14 NOTE — TELEPHONE ENCOUNTER
LOV 1/5/2022  Scheduled visit 3/28/2022    Requested Prescriptions     Pending Prescriptions Disp Refills    cyclobenzaprine (FLEXERIL) 10 MG tablet [Pharmacy Med Name: cyclobenzaprine 10 mg tablet] 60 tablet 0     Sig: TAKE ONE TABLET BY MOUTH THREE TIMES DAILY AS NEEDED FOR MUSCLE SPASMS    diclofenac sodium (VOLTAREN) 1 % Gel [Pharmacy Med Name: diclofenac 1 % topical gel] 100 g 0     Sig: APPLY 2 GRAMS TOPICALLY TO THE AFFECTED AREA TWICE DAILY AS DIRECTED

## 2022-03-21 ENCOUNTER — CLINICAL SUPPORT (OUTPATIENT)
Dept: REHABILITATION | Facility: HOSPITAL | Age: 52
End: 2022-03-21
Payer: MEDICAID

## 2022-03-21 DIAGNOSIS — M54.41 CHRONIC BILATERAL LOW BACK PAIN WITH BILATERAL SCIATICA: Primary | ICD-10-CM

## 2022-03-21 DIAGNOSIS — G89.29 CHRONIC BILATERAL LOW BACK PAIN WITH BILATERAL SCIATICA: Primary | ICD-10-CM

## 2022-03-21 DIAGNOSIS — M54.42 CHRONIC BILATERAL LOW BACK PAIN WITH BILATERAL SCIATICA: Primary | ICD-10-CM

## 2022-03-21 PROCEDURE — 97110 THERAPEUTIC EXERCISES: CPT | Mod: PN,CQ

## 2022-03-21 NOTE — PROGRESS NOTES
[facility]  Physical Therapy Daily Note     Name: Arron Ron  Clinic Number: [id number]    Therapy Diagnosis:   Encounter Diagnosis   Name Primary?    Chronic bilateral low back pain with bilateral sciatica Yes     Physician: Mahi Rojas MD    Visit Date: 3/21/2022    Physician Orders: PT Eval and Treat  Medical Diagnosis from Referral: Chronic bilateral low back pain with bilateral sciatica   Evaluation Date: 3/14/2022  Authorization Period Expiration: 12/31/2022   Plan of Care Expiration: 6/14/2022  Progress Note Due: 4/14/2022  Visit # / Visits authorized: 1/ 20   FOTO: 1/3     Precautions: Standard     Visit #: 1 of 20  PTA Visit #: 1  Time In: 1016  Time Out: 1055  Total Billable Time: 40 minutes    Subjective     Pt reports: she feels a little better today with less back pain.   She was compliant with home exercise program.  Response to previous treatment: first treatment day  Functional change: back pain    Pain: 5/10   Location: low back Left worse than Right    Objective     Arron received therapeutic exercises to develop strength, ROM, flexibility, posture and core stabilization for 25 minutes including:  Single knee to chest using a strap x 10  Supine nerve glide (AAROM) x 10  SLR x 10  Seated hip adduction with ball squeezes x 10  Seated active hip abduction x 10  Seated pelvic tilts x 10    Arron received the following manual therapy techniques: Joint mobilizations and Soft tissue Mobilization were applied to the: low back for 15 minutes, including:  Manual hamstring stretch  Gentle piriformis stretch  Soft tissue mobs to medial semitendinous and semimembranous     Arron received hot pack prior to treatment for 8 minutes to low back for decrease pain.    Home Exercises Provided and Patient Education Provided     Written Home Exercises Provided: yes.  Exercises were reviewed and Arron was able to demonstrate them prior to the end of the session.  Arron  demonstrated good  understanding of the education provided.     Education provided:   -HEP   -Heating pad to low back for decrease pain    Assessment     Patient arrived to therapy clinic reporting of moderate low back pain mostly located on Left side more than Right side. Focused session on pain reduction, low back stretches and core strengthening today. Patient required active assistive during supine nerve glide; however after a few reps patient was able to perform this tasks independently. Printed home exercises program and review with patient. Will continue to progress as tolerated and work on sciatic nerve pain reduction.   Arron Is progressing well towards her goals.   Pt prognosis is Good.     Pt will continue to benefit from skilled outpatient physical therapy to address the deficits listed in the problem list box on initial evaluation, provide pt/family education and to maximize pt's level of independence in the home and community environment.     Pt's spiritual, cultural and educational needs considered and pt agreeable to plan of care and goals.     Anticipated barriers to physical therapy: None    Goals:   Short Term Goals: 3 weeks   1. Patient will demonstrate independence in prescribed home exercise program once a day with proper form, sets and reps to improve initiation, strength and endurance of muscles needed to return to prior level of function  2. Patient will increase B lower extremity strength in deficient planes by 1/2 to 1 full grade with improved eccentric control to execute squatting activities  3. Patient will demonstrate efficient hip hinge squat without cueing to improve ability to retrieve laundry basket and groceries     Long Term Goals: 10 weeks   1. Patient will demonstrate hip flexion 5/5 with efficient core first strategy to be able to get into/out of car without difficulty  2. Patient will demonstrate hip hinge with neutral trunk and T-larisa arms to lift greater or equal to 10  pounds from floor to chest to increase safety with lifting grocery bags.  3. Patient will decrease neural tension L lower extremity to full knee extension in slump position in order to allow progression of range of motion L hip for squatting activities of daily living.   Plan   Plan of care Certification: 3/14/2022 to 6/14/2022.     Outpatient Physical Therapy 2 times weekly for 10 weeks to include the following interventions: Cervical/Lumbar Traction, Electrical Stimulation  , Gait Training, Manual Therapy, Moist Heat/ Ice, Neuromuscular Re-ed, Patient Education, Self Care, Therapeutic Activities, Therapeutic Exercise and Ultrasound. Interventions can be provided by PTA as a treatment team.    Continue with LENO Hill Mai, PTA   3/21/2022

## 2022-03-23 ENCOUNTER — CLINICAL SUPPORT (OUTPATIENT)
Dept: REHABILITATION | Facility: HOSPITAL | Age: 52
End: 2022-03-23
Payer: MEDICAID

## 2022-03-23 DIAGNOSIS — M54.42 CHRONIC BILATERAL LOW BACK PAIN WITH BILATERAL SCIATICA: Primary | ICD-10-CM

## 2022-03-23 DIAGNOSIS — M54.41 CHRONIC BILATERAL LOW BACK PAIN WITH BILATERAL SCIATICA: Primary | ICD-10-CM

## 2022-03-23 DIAGNOSIS — G89.29 CHRONIC BILATERAL LOW BACK PAIN WITH BILATERAL SCIATICA: Primary | ICD-10-CM

## 2022-03-23 PROCEDURE — 97110 THERAPEUTIC EXERCISES: CPT | Mod: PN,CQ

## 2022-03-23 NOTE — PROGRESS NOTES
[facility]  Physical Therapy Daily Note     Name: Arron Ron  Clinic Number: [id number]    Therapy Diagnosis:   Encounter Diagnosis   Name Primary?    Chronic bilateral low back pain with bilateral sciatica Yes     Physician: Mahi Rojas MD    Visit Date: 3/23/2022    Physician Orders: PT Eval and Treat  Medical Diagnosis from Referral: Chronic bilateral low back pain with bilateral sciatica   Evaluation Date: 3/14/2022  Authorization Period Expiration: 12/31/2022   Plan of Care Expiration: 6/14/2022  Progress Note Due: 4/14/2022  Visit # / Visits authorized: 2/ 20   FOTO: 1/3     Precautions: Standard     Visit #: 2 of 20  PTA Visit #: 2  Time In: 1015  Time Out: 1100  Total Billable Time: 45 minutes    Subjective     Pt reports: she still has back pain mainly on left side.   She was compliant with home exercise program.  Response to previous treatment: soreness  Functional change: back pain    Pain: 5/10   Location: low back Left worse than Right    Objective     Arron received therapeutic exercises to develop strength, ROM, flexibility, posture and core stabilization for 15 minutes including:  Single knee to chest using a strap x 10  Supine nerve glide (AAROM) x 10  SLR x 10  Seated hip adduction with ball squeezes x 10  Seated active hip abduction x 10  Seated pelvic tilts x 10     Arron received the following manual therapy techniques: Joint mobilizations and Soft tissue Mobilization were applied to the: low back for 30 minutes, including:  Manual hamstring stretch  Gentle piriformis stretch  Soft tissue mobs to medial semitendinous and semimembranous   Soft tissue mobs to erector spinalis muscle groups, quadratus lumborum, gluteus medius, piriformis      Arron received hot pack prior to treatment for 5 minutes to low back for decrease pain.    Home Exercises Provided and Patient Education Provided     Written Home Exercises Provided: yes.  Exercises were  reviewed and Arron was able to demonstrate them prior to the end of the session.  Arron demonstrated good  understanding of the education provided.     Education provided:   -HEP   -Heating pad to low back for decrease pain    Assessment     Patient arrived to therapy clinic reporting of moderate low back pain mostly located on Left side more than Right side. Focused session on pain reduction, low back stretches and core strengthening today. Noted patient demonstrates moderate muscle tightness to erector spinalis muscles group, gluteus medius, quadratus lumborum, and piriformis today. Therefore added soft tissue mobs and trigger point to these muscles for decrease muscle tension, decrease pain, and to improve low back mobility. Patient responded well during manual therapy. Will continue to progress as tolerated.  Arron Is progressing well towards her goals.   Pt prognosis is Good.     Pt will continue to benefit from skilled outpatient physical therapy to address the deficits listed in the problem list box on initial evaluation, provide pt/family education and to maximize pt's level of independence in the home and community environment.     Pt's spiritual, cultural and educational needs considered and pt agreeable to plan of care and goals.     Anticipated barriers to physical therapy: None    Goals:   Short Term Goals: 3 weeks   1. Patient will demonstrate independence in prescribed home exercise program once a day with proper form, sets and reps to improve initiation, strength and endurance of muscles needed to return to prior level of function  2. Patient will increase B lower extremity strength in deficient planes by 1/2 to 1 full grade with improved eccentric control to execute squatting activities  3. Patient will demonstrate efficient hip hinge squat without cueing to improve ability to retrieve laundry basket and groceries     Long Term Goals: 10 weeks   1. Patient will demonstrate hip flexion 5/5 with  efficient core first strategy to be able to get into/out of car without difficulty  2. Patient will demonstrate hip hinge with neutral trunk and T-larisa arms to lift greater or equal to 10 pounds from floor to chest to increase safety with lifting grocery bags.  3. Patient will decrease neural tension L lower extremity to full knee extension in slump position in order to allow progression of range of motion L hip for squatting activities of daily living.   Plan   Plan of care Certification: 3/14/2022 to 6/14/2022.     Outpatient Physical Therapy 2 times weekly for 10 weeks to include the following interventions: Cervical/Lumbar Traction, Electrical Stimulation  , Gait Training, Manual Therapy, Moist Heat/ Ice, Neuromuscular Re-ed, Patient Education, Self Care, Therapeutic Activities, Therapeutic Exercise and Ultrasound. Interventions can be provided by PTA as a treatment team.    Continue with LENO Hill Mai, PTA   3/23/2022

## 2022-03-28 ENCOUNTER — OFFICE VISIT (OUTPATIENT)
Dept: GASTROENTEROLOGY | Facility: CLINIC | Age: 52
End: 2022-03-28
Payer: MEDICAID

## 2022-03-28 ENCOUNTER — CLINICAL SUPPORT (OUTPATIENT)
Dept: REHABILITATION | Facility: HOSPITAL | Age: 52
End: 2022-03-28
Payer: MEDICAID

## 2022-03-28 VITALS
HEART RATE: 101 BPM | WEIGHT: 289.88 LBS | SYSTOLIC BLOOD PRESSURE: 138 MMHG | HEIGHT: 66 IN | BODY MASS INDEX: 46.59 KG/M2 | DIASTOLIC BLOOD PRESSURE: 79 MMHG

## 2022-03-28 DIAGNOSIS — G89.29 CHRONIC BILATERAL LOW BACK PAIN WITH BILATERAL SCIATICA: Primary | ICD-10-CM

## 2022-03-28 DIAGNOSIS — M54.42 CHRONIC BILATERAL LOW BACK PAIN WITH BILATERAL SCIATICA: Primary | ICD-10-CM

## 2022-03-28 DIAGNOSIS — K59.00 CONSTIPATION, UNSPECIFIED CONSTIPATION TYPE: ICD-10-CM

## 2022-03-28 DIAGNOSIS — R13.19 ESOPHAGEAL DYSPHAGIA: Primary | ICD-10-CM

## 2022-03-28 DIAGNOSIS — Z01.818 PRE-OP TESTING: Primary | ICD-10-CM

## 2022-03-28 DIAGNOSIS — K21.9 GASTROESOPHAGEAL REFLUX DISEASE, UNSPECIFIED WHETHER ESOPHAGITIS PRESENT: ICD-10-CM

## 2022-03-28 DIAGNOSIS — M54.41 CHRONIC BILATERAL LOW BACK PAIN WITH BILATERAL SCIATICA: Primary | ICD-10-CM

## 2022-03-28 PROCEDURE — 99215 OFFICE O/P EST HI 40 MIN: CPT | Mod: PBBFAC | Performed by: STUDENT IN AN ORGANIZED HEALTH CARE EDUCATION/TRAINING PROGRAM

## 2022-03-28 PROCEDURE — 99999 PR PBB SHADOW E&M-EST. PATIENT-LVL V: CPT | Mod: PBBFAC,,, | Performed by: STUDENT IN AN ORGANIZED HEALTH CARE EDUCATION/TRAINING PROGRAM

## 2022-03-28 PROCEDURE — 99204 PR OFFICE/OUTPT VISIT, NEW, LEVL IV, 45-59 MIN: ICD-10-PCS | Mod: S$PBB,,, | Performed by: STUDENT IN AN ORGANIZED HEALTH CARE EDUCATION/TRAINING PROGRAM

## 2022-03-28 PROCEDURE — 99204 OFFICE O/P NEW MOD 45 MIN: CPT | Mod: S$PBB,,, | Performed by: STUDENT IN AN ORGANIZED HEALTH CARE EDUCATION/TRAINING PROGRAM

## 2022-03-28 PROCEDURE — 97110 THERAPEUTIC EXERCISES: CPT | Mod: PN,CQ

## 2022-03-28 PROCEDURE — 99999 PR PBB SHADOW E&M-EST. PATIENT-LVL V: ICD-10-PCS | Mod: PBBFAC,,, | Performed by: STUDENT IN AN ORGANIZED HEALTH CARE EDUCATION/TRAINING PROGRAM

## 2022-03-28 RX ORDER — POLYETHYLENE GLYCOL 3350 17 G/17G
17 POWDER, FOR SOLUTION ORAL DAILY
Qty: 1530 G | Refills: 3 | Status: SHIPPED | OUTPATIENT
Start: 2022-03-28 | End: 2024-01-26

## 2022-03-28 NOTE — PROGRESS NOTES
[facility]  Physical Therapy Daily Note     Name: Arron Ron  Clinic Number: [id number]    Therapy Diagnosis:   Encounter Diagnosis   Name Primary?    Chronic bilateral low back pain with bilateral sciatica Yes     Physician: Mahi Rojas MD    Visit Date: 3/28/2022    Physician Orders: PT Eval and Treat  Medical Diagnosis from Referral: Chronic bilateral low back pain with bilateral sciatica   Evaluation Date: 3/14/2022  Authorization Period Expiration: 12/31/2022   Plan of Care Expiration: 6/14/2022  Progress Note Due: 4/14/2022  Visit # / Visits authorized: 3/ 20   FOTO: 1/3     Precautions: Standard     Visit #: 3 of 20  PTA Visit #: 3  Time In: 1015  Time Out: 1100  Total Billable Time: 45 minutes (30 minutes billable)    Subjective     Pt reports: continuation of low back pain mostly on Left side than Right side. Patient reported she has been doing her exercises at home; however the pain is still present.   She was compliant with home exercise program.  Response to previous treatment: soreness  Functional change: back pain    Pain: 6-7/10   Location: low back Left worse than Right    Objective     Arron received therapeutic exercises to develop strength, ROM, flexibility, posture and core stabilization for 20 minutes including:  Single knee to chest using a strap x 10  Supine nerve glide (AAROM) x 10  SLR x 10  Double knee to chest with ball x 15  Lateral trunk rotation x 15  Supine hip adduction ball squeezes x 20  Supine hip abd Red theraband x 20  Seated hip adduction with ball squeezes x 10  Seated active hip abduction x 10  Seated pelvic tilts x 10       Arron received the following manual therapy techniques: Joint mobilizations and Soft tissue Mobilization were applied to the: low back for 10 minutes, including:  Manual hamstring stretch  Gentle piriformis stretch  Soft tissue mobs to medial semitendinous and semimembranous   Soft tissue mobs to erector  spinalis muscle groups, quadratus lumborum, gluteus medius, piriformis      Arron received hot pack prior to treatment for 5 minutes to low back for decrease pain.    Home Exercises Provided and Patient Education Provided     Written Home Exercises Provided: yes.  Exercises were reviewed and Arron was able to demonstrate them prior to the end of the session.  Arron demonstrated good  understanding of the education provided.     Education provided:   -HEP   -Heating pad to low back for decrease pain    Assessment     Patient arrived to therapy clinic reporting of moderate low back pain mostly located on Left side more than Right side. Focused session on pain reduction, low back stretches and core strengthening today. Noted patient demonstrates moderate muscle tightness to erector spinalis muscles group, gluteus medius, quadratus lumborum, and piriformis today. Therefore added soft tissue mobs and trigger point to these muscles for decrease muscle tension, decrease pain, and to improve low back mobility. Patient responded well during manual therapy. Will continue to progress as tolerated.  Arron Is progressing well towards her goals.   Pt prognosis is Good.     Pt will continue to benefit from skilled outpatient physical therapy to address the deficits listed in the problem list box on initial evaluation, provide pt/family education and to maximize pt's level of independence in the home and community environment.     Pt's spiritual, cultural and educational needs considered and pt agreeable to plan of care and goals.     Anticipated barriers to physical therapy: None    Goals:   Short Term Goals: 3 weeks   1. Patient will demonstrate independence in prescribed home exercise program once a day with proper form, sets and reps to improve initiation, strength and endurance of muscles needed to return to prior level of function  2. Patient will increase B lower extremity strength in deficient planes by 1/2 to 1  full grade with improved eccentric control to execute squatting activities  3. Patient will demonstrate efficient hip hinge squat without cueing to improve ability to retrieve laundry basket and groceries     Long Term Goals: 10 weeks   1. Patient will demonstrate hip flexion 5/5 with efficient core first strategy to be able to get into/out of car without difficulty  2. Patient will demonstrate hip hinge with neutral trunk and T-larisa arms to lift greater or equal to 10 pounds from floor to chest to increase safety with lifting grocery bags.  3. Patient will decrease neural tension L lower extremity to full knee extension in slump position in order to allow progression of range of motion L hip for squatting activities of daily living.   Plan   Plan of care Certification: 3/14/2022 to 6/14/2022.     Outpatient Physical Therapy 2 times weekly for 10 weeks to include the following interventions: Cervical/Lumbar Traction, Electrical Stimulation  , Gait Training, Manual Therapy, Moist Heat/ Ice, Neuromuscular Re-ed, Patient Education, Self Care, Therapeutic Activities, Therapeutic Exercise and Ultrasound. Interventions can be provided by PTA as a treatment team.    Continue with LENO Hill Mai, PTA   3/28/2022

## 2022-03-28 NOTE — PATIENT INSTRUCTIONS
Take miralax daily, or even twice a day to help with constipation.  Take metamucil daily.  We will schedule an endoscopy to evaluate your difficulty swallowing, stop taking your omeprazole 4 weeks before your scope. You can take Tums or pepcid intermittently for reflux in the meantime.

## 2022-03-28 NOTE — PROGRESS NOTES
[facility]    Reason for visit: The encounter diagnosis was Dysphagia, unspecified type.  Referring Provider/PCP: Mahi Rojas MD    History of Present Illness:  Arron Ron is a 51 y.o. female with a history of constipation, GERD, Hiatal hernia?, who is presenting for initial evaluation of dysphagia.    Patient last seen in GI clinic in 2018 for constipation, anemia and history of rectal bleeding, GERD.  She had an EGD and colonoscopy that showed a small hiatal hernia, external hemorrhoids.  Anemia was not iron deficiency, she was referred to Nephrology for possible anemia of chronic disease due to CKD.  She was started on PPI.  Prior to that she had 2 EGDs, showing hiatal hernia. She had a sleeve gastrectomy in 6630-4303 (possible hiatal hernia repair but she is not sure), lost a significant amount of weight after that, but regained it back.   Today, the patient reports that she has been having dysphagia, described as food getting stuck in her esophagus, mostly solids and pills, not liquids for about a year now.  She also reports a sensation of food sitting in her chest and discomfort, the need to belch, however she is unable to so she makes herself throw up every now and then.  She was started on PPI by her PCP, takes it after food, it improved her symptoms slightly but did not resolve them.   She has constipation and has a BM once every 1 week, sometimes even less frequently. She has to take dulcolax to help her go to the bathroom.  She is to take MiraLax every other day which helped some, but did not completely resolve her problem.  She has not been taking it because the stores are out of it now.  Denies any family history of GI malignancies.    PEndoHx:  Colonoscopy 4/2021 - 3mm polyp in the descending colon, removed. Path: TA. Repeat in 5years.  EGD 2017 - small hiatal hernia, otherwise unremarkable.    Review of Systems   Constitutional:        Weight gain    Gastrointestinal: Positive for constipation, heartburn, nausea and vomiting. Negative for abdominal pain, blood in stool, diarrhea and melena.        Dysphagia       Medical History:  Past Medical History:   Diagnosis Date    Acid reflux     Anemia     Back pain     was under therapy    Chronic back pain     Constipation     Elevated cholesterol     Encounter for blood transfusion 1987    Endometriosis     Hyperlipidemia     Hypertension     Nausea & vomiting     Sleep apnea     Tendonitis of wrist, right        Past Surgical History:   Procedure Laterality Date    BREAST BIOPSY      left , benign    CHOLECYSTECTOMY      [facility]  2017    [facility]    COLONOSCOPY N/A 4/29/2021    Procedure: COLONOSCOPY;  Surgeon: Norbert Garcia MD;  Location: [facility];  Service: Colon and Rectal;  Laterality: N/A;    DILATION AND CURETTAGE OF UTERUS      ENDOMETRIAL ABLATION      gallstone      GASTRIC BYPASS  02/18/2020    HYSTERECTOMY  10/1/15    [facility]- bleeding/ endometriosis    OOPHORECTOMY      TUBAL LIGATION      UPPER [facility]TEST[facility]AL [facility]OSCOPY  [facility]        Family History   Problem Relation Age of Onset    Hypertension Mother     Heart attack Mother     Hypertension Brother     Heart disease Maternal Grandmother     Hypertension Maternal Grandmother     Diabetes Maternal Aunt     Breast cancer Neg Hx     Colon cancer Neg Hx     Ovarian cancer Neg Hx     Esophageal cancer Neg Hx        Social History     Socioeconomic History    Marital status: Single   Tobacco Use    Smoking status: Never Smoker    Smokeless tobacco: Never Used   Substance and Sexual Activity    Alcohol use: Yes     Comment: rare    Drug use: No    Sexual activity: Yes     Partners: Male     Birth control/protection: Surgical, See Surgical Hx     Comment: --BTL       Current Outpatient Medications on File Prior to Visit   Medication Sig Dispense Refill    albuterol (ACCUNEB) 1.25 mg/3 mL Nebu Take 3 mLs  (1.25 mg total) by nebulization every 6 (six) hours as needed (increased cough/wheeze/shortness of breath). Rescue 150 mL 5    albuterol (PROAIR HFA) 90 mcg/actuation inhaler Inhale 2 puffs into the lungs every 6 (six) hours as needed for Wheezing. Rescue 18 g 3    albuterol (PROVENTIL/VENTOLIN HFA) 90 mcg/actuation inhaler Inhale 2 puffs into the lungs every 4 (four) hours as needed for Wheezing or Shortness of Breath. Rescue 18 g 5    ALLERGY RELIEF, CETIRIZINE, 10 mg tablet Take 10 mg by mouth once daily.      ALPRAZolam (XANAX) 2 MG Tab Take 2 mg by mouth every evening.       aspirin (ECOTRIN) 81 MG EC tablet Take 81 mg by mouth every Mon, Wed, Fri.       atorvastatin (LIPITOR) 20 MG tablet TAKE ONE TABLET BY MOUTH ONCE A DAY IN THE EVENING      budesonide-glycopyr-formoterol (BREZTRI AEROSPHERE) 160-9-4.8 mcg/actuation HFAA Inhale 2 puffs into the lungs 2 (two) times daily. 10.7 g 11    celecoxib (CELEBREX) 200 MG capsule Take 200 mg by mouth once daily.      cyclobenzaprine (FLEXERIL) 10 MG tablet TAKE ONE TABLET BY MOUTH THREE TIMES DAILY AS NEEDED FOR MUSCLE SPASMS 60 tablet 0    diclofenac sodium (VOLTAREN) 1 % Gel APPLY 2 GRAMS TOPICALLY TO THE AFFECTED AREA TWICE DAILY AS DIRECTED 100 g 0    doxazosin (CARDURA) 1 MG tablet Take 1 mg by mouth once daily.      ergocalciferol, vitamin D2, (VITAMIN D ORAL) Take 2,000 Units by mouth once daily.       fish oil-omega-3 fatty acids 300-1,000 mg capsule Take by mouth once daily.      fluticasone propionate (FLONASE) 50 mcg/actuation nasal spray 1 spray (50 mcg total) by Each Nostril route once daily. 16 g 3    gabapentin (NEURONTIN) 100 MG capsule Take 1 capsule (100 mg total) by mouth every evening. 30 capsule 11    ibuprofen (ADVIL,MOTRIN) 800 MG tablet Take 800 mg by mouth 3 (three) times daily as needed for Pain.      mometasone-formoterol (DULERA) 200-5 mcg/actuation inhaler Inhale 2 puffs into the lungs 2 (two) times daily. Controller 13 g  11    montelukast (SINGULAIR) 10 mg tablet Take 10 mg by mouth every evening.      omeprazole (PRILOSEC) 20 MG capsule Take 20 mg by mouth once daily.      phentermine (ADIPEX-P) 37.5 mg tablet Take 37.5 mg by mouth every other day.       spironolactone (ALDACTONE) 50 MG tablet spironolactone 50 mg tablet      torsemide (DEMADEX) 20 MG Tab Take 20 mg by mouth once daily.       valsartan (DIOVAN) 320 MG tablet Take 320 mg by mouth once daily.  11     No current facility-administered medications on file prior to visit.       Review of patient's allergies indicates:  No Known Allergies    Physical Exam:  Constitutional:  not in acute distress and well developed  HENT: Head: Normal, normocephalic, atraumatic.  Eyes: conjunctiva clear and sclera nonicteric  Cardiovascular: regular rate and rhythm  Respiratory: normal chest expansion & respiratory effort   and no accessory muscle use  GI: soft, non-tender, without masses or organomegaly  Musculoskeletal: no muscular tenderness noted  Skin: normal color  Neurological: alert, oriented x3  Psychiatric: mood and affect are within normal limits, pt is a good historian; no memory problems were noted    Laboratory:  Lab Results   Component Value Date     10/10/2020    K 3.9 10/10/2020     10/10/2020    CO2 27 10/10/2020    BUN 12 10/10/2020    CREATININE 1.2 10/10/2020    CALCIUM 9.6 10/10/2020    ANIONGAP 10 10/10/2020    ESTGFRAFRICA >60 10/10/2020    EGFRNONAA 53 (A) 10/10/2020       Lab Results   Component Value Date    ALT 12 10/10/2020    AST 13 10/10/2020    ALKPHOS 111 10/10/2020    BILITOT 0.5 10/10/2020       Lab Results   Component Value Date    WBC 6.72 10/10/2020    HGB 10.5 (L) 10/10/2020    HCT 31.6 (L) 10/10/2020    MCV 88 10/10/2020     10/10/2020       Microbiology:  No Pertinent Microbiology    Imaging:  No Pertinent Imaging    Assessment:  Arron Ron is a 51 y.o. female who is presenting for initial evaluation of  dysphagia.    Problems:  1. Esophageal dysphagia  2. Constipation  3. GERD    The patient reports a year long history of esophageal dysphagia, differential includes strictures, esophagitis, hernia, motility disorder.  Will plan to evaluate with EGD for causes of dysphagia as well as looking for risks of esophagitis as manifestation of possible underlying GERD since she has significant heartburn symptoms.  Will stop her PPI for 4 weeks prior to her scope to allow for diagnosis of erosive esophagitis if present.  Will plan to restarted after EGD.  EGD might also help evaluate her sleeve and possible fundoplication since we are not sure whether she had her hernia repaired.  For constipation, will plan for combination therapy with Metamucil and MiraLax daily.  If no improvement, can escalate to Linzess.      Plan:  1. EGD  2. Hold PPI until EGD is performed, tums or pepcid prn in the meantime  3. Metamucil and miralax daily  4. F/u after EGD is done    Barbie Ford MD  Gastroenterology Fellow    No orders of the defined types were placed in this encounter.

## 2022-03-28 NOTE — H&P (VIEW-ONLY)
[facility]    Reason for visit: The encounter diagnosis was Dysphagia, unspecified type.  Referring Provider/PCP: Mahi Rojas MD    History of Present Illness:  Arron Ron is a 51 y.o. female with a history of constipation, GERD, Hiatal hernia?, who is presenting for initial evaluation of dysphagia.    Patient last seen in GI clinic in 2018 for constipation, anemia and history of rectal bleeding, GERD.  She had an EGD and colonoscopy that showed a small hiatal hernia, external hemorrhoids.  Anemia was not iron deficiency, she was referred to Nephrology for possible anemia of chronic disease due to CKD.  She was started on PPI.  Prior to that she had 2 EGDs, showing hiatal hernia. She had a sleeve gastrectomy in 7319-2137 (possible hiatal hernia repair but she is not sure), lost a significant amount of weight after that, but regained it back.   Today, the patient reports that she has been having dysphagia, described as food getting stuck in her esophagus, mostly solids and pills, not liquids for about a year now.  She also reports a sensation of food sitting in her chest and discomfort, the need to belch, however she is unable to so she makes herself throw up every now and then.  She was started on PPI by her PCP, takes it after food, it improved her symptoms slightly but did not resolve them.   She has constipation and has a BM once every 1 week, sometimes even less frequently. She has to take dulcolax to help her go to the bathroom.  She is to take MiraLax every other day which helped some, but did not completely resolve her problem.  She has not been taking it because the stores are out of it now.  Denies any family history of GI malignancies.    PEndoHx:  Colonoscopy 4/2021 - 3mm polyp in the descending colon, removed. Path: TA. Repeat in 5years.  EGD 2017 - small hiatal hernia, otherwise unremarkable.    Review of Systems   Constitutional:        Weight gain    Gastrointestinal: Positive for constipation, heartburn, nausea and vomiting. Negative for abdominal pain, blood in stool, diarrhea and melena.        Dysphagia       Medical History:  Past Medical History:   Diagnosis Date    Acid reflux     Anemia     Back pain     was under therapy    Chronic back pain     Constipation     Elevated cholesterol     Encounter for blood transfusion 1987    Endometriosis     Hyperlipidemia     Hypertension     Nausea & vomiting     Sleep apnea     Tendonitis of wrist, right        Past Surgical History:   Procedure Laterality Date    BREAST BIOPSY      left , benign    CHOLECYSTECTOMY      [facility]OSCOPY  2017    [facility]    COLONOSCOPY N/A 4/29/2021    Procedure: COLONOSCOPY;  Surgeon: Norbert Garcia MD;  Location: [facility];  Service: Colon and Rectal;  Laterality: N/A;    DILATION AND CURETTAGE OF UTERUS      ENDOMETRIAL ABLATION      gallstone      GASTRIC BYPASS  02/18/2020    HYSTERECTOMY  10/1/15    [facility]- bleeding/ endometriosis    OOPHORECTOMY      TUBAL LIGATION      [facility]PPER [facility]COPY  [facility]        Family History   Problem Relation Age of Onset    Hypertension Mother     Heart attack Mother     Hypertension Brother     Heart disease Maternal Grandmother     Hypertension Maternal Grandmother     Diabetes Maternal Aunt     Breast cancer Neg Hx     Colon cancer Neg Hx     Ovarian cancer Neg Hx     Esophageal cancer Neg Hx        Social History     Socioeconomic History    Marital status: Single   Tobacco Use    Smoking status: Never Smoker    Smokeless tobacco: Never Used   Substance and Sexual Activity    Alcohol use: Yes     Comment: rare    Drug use: No    Sexual activity: Yes     Partners: Male     Birth control/protection: Surgical, See Surgical Hx     Comment: --BTL       Current Outpatient Medications on File Prior to Visit   Medication Sig Dispense Refill    albuterol (ACCUNEB) 1.25 mg/3 mL Nebu Take 3 mLs  (1.25 mg total) by nebulization every 6 (six) hours as needed (increased cough/wheeze/shortness of breath). Rescue 150 mL 5    albuterol (PROAIR HFA) 90 mcg/actuation inhaler Inhale 2 puffs into the lungs every 6 (six) hours as needed for Wheezing. Rescue 18 g 3    albuterol (PROVENTIL/VENTOLIN HFA) 90 mcg/actuation inhaler Inhale 2 puffs into the lungs every 4 (four) hours as needed for Wheezing or Shortness of Breath. Rescue 18 g 5    ALLERGY RELIEF, CETIRIZINE, 10 mg tablet Take 10 mg by mouth once daily.      ALPRAZolam (XANAX) 2 MG Tab Take 2 mg by mouth every evening.       aspirin (ECOTRIN) 81 MG EC tablet Take 81 mg by mouth every Mon, Wed, Fri.       atorvastatin (LIPITOR) 20 MG tablet TAKE ONE TABLET BY MOUTH ONCE A DAY IN THE EVENING      budesonide-glycopyr-formoterol (BREZTRI AEROSPHERE) 160-9-4.8 mcg/actuation HFAA Inhale 2 puffs into the lungs 2 (two) times daily. 10.7 g 11    celecoxib (CELEBREX) 200 MG capsule Take 200 mg by mouth once daily.      cyclobenzaprine (FLEXERIL) 10 MG tablet TAKE ONE TABLET BY MOUTH THREE TIMES DAILY AS NEEDED FOR MUSCLE SPASMS 60 tablet 0    diclofenac sodium (VOLTAREN) 1 % Gel APPLY 2 GRAMS TOPICALLY TO THE AFFECTED AREA TWICE DAILY AS DIRECTED 100 g 0    doxazosin (CARDURA) 1 MG tablet Take 1 mg by mouth once daily.      ergocalciferol, vitamin D2, (VITAMIN D ORAL) Take 2,000 Units by mouth once daily.       fish oil-omega-3 fatty acids 300-1,000 mg capsule Take by mouth once daily.      fluticasone propionate (FLONASE) 50 mcg/actuation nasal spray 1 spray (50 mcg total) by Each Nostril route once daily. 16 g 3    gabapentin (NEURONTIN) 100 MG capsule Take 1 capsule (100 mg total) by mouth every evening. 30 capsule 11    ibuprofen (ADVIL,MOTRIN) 800 MG tablet Take 800 mg by mouth 3 (three) times daily as needed for Pain.      mometasone-formoterol (DULERA) 200-5 mcg/actuation inhaler Inhale 2 puffs into the lungs 2 (two) times daily. Controller 13 g  11    montelukast (SINGULAIR) 10 mg tablet Take 10 mg by mouth every evening.      omeprazole (PRILOSEC) 20 MG capsule Take 20 mg by mouth once daily.      phentermine (ADIPEX-P) 37.5 mg tablet Take 37.5 mg by mouth every other day.       spironolactone (ALDACTONE) 50 MG tablet spironolactone 50 mg tablet      torsemide (DEMADEX) 20 MG Tab Take 20 mg by mouth once daily.       valsartan (DIOVAN) 320 MG tablet Take 320 mg by mouth once daily.  11     No current facility-administered medications on file prior to visit.       Review of patient's allergies indicates:  No Known Allergies    Physical Exam:  Constitutional:  not in acute distress and well developed  HENT: Head: Normal, normocephalic, atraumatic.  Eyes: conjunctiva clear and sclera nonicteric  Cardiovascular: regular rate and rhythm  Respiratory: normal chest expansion & respiratory effort   and no accessory muscle use  GI: soft, non-tender, without masses or organomegaly  Musculoskeletal: no muscular tenderness noted  Skin: normal color  Neurological: alert, oriented x3  Psychiatric: mood and affect are within normal limits, pt is a good historian; no memory problems were noted    Laboratory:  Lab Results   Component Value Date     10/10/2020    K 3.9 10/10/2020     10/10/2020    CO2 27 10/10/2020    BUN 12 10/10/2020    CREATININE 1.2 10/10/2020    CALCIUM 9.6 10/10/2020    ANIONGAP 10 10/10/2020    ESTGFRAFRICA >60 10/10/2020    EGFRNONAA 53 (A) 10/10/2020       Lab Results   Component Value Date    ALT 12 10/10/2020    AST 13 10/10/2020    ALKPHOS 111 10/10/2020    BILITOT 0.5 10/10/2020       Lab Results   Component Value Date    WBC 6.72 10/10/2020    HGB 10.5 (L) 10/10/2020    HCT 31.6 (L) 10/10/2020    MCV 88 10/10/2020     10/10/2020       Microbiology:  No Pertinent Microbiology    Imaging:  No Pertinent Imaging    Assessment:  Arron Ron is a 51 y.o. female who is presenting for initial evaluation of  dysphagia.    Problems:  1. Esophageal dysphagia  2. Constipation  3. GERD    The patient reports a year long history of esophageal dysphagia, differential includes strictures, esophagitis, hernia, motility disorder.  Will plan to evaluate with EGD for causes of dysphagia as well as looking for risks of esophagitis as manifestation of possible underlying GERD since she has significant heartburn symptoms.  Will stop her PPI for 4 weeks prior to her scope to allow for diagnosis of erosive esophagitis if present.  Will plan to restarted after EGD.  EGD might also help evaluate her sleeve and possible fundoplication since we are not sure whether she had her hernia repaired.  For constipation, will plan for combination therapy with Metamucil and MiraLax daily.  If no improvement, can escalate to Linzess.      Plan:  1. EGD  2. Hold PPI until EGD is performed, tums or pepcid prn in the meantime  3. Metamucil and miralax daily  4. F/u after EGD is done    Barbie Ford MD  Gastroenterology Fellow    No orders of the defined types were placed in this encounter.

## 2022-03-30 ENCOUNTER — CLINICAL SUPPORT (OUTPATIENT)
Dept: REHABILITATION | Facility: HOSPITAL | Age: 52
End: 2022-03-30
Payer: MEDICAID

## 2022-03-30 DIAGNOSIS — M54.41 CHRONIC BILATERAL LOW BACK PAIN WITH BILATERAL SCIATICA: Primary | ICD-10-CM

## 2022-03-30 DIAGNOSIS — G89.29 CHRONIC BILATERAL LOW BACK PAIN WITH BILATERAL SCIATICA: Primary | ICD-10-CM

## 2022-03-30 DIAGNOSIS — M54.42 CHRONIC BILATERAL LOW BACK PAIN WITH BILATERAL SCIATICA: Primary | ICD-10-CM

## 2022-03-30 PROCEDURE — 97110 THERAPEUTIC EXERCISES: CPT | Mod: PN,CQ

## 2022-03-30 NOTE — PROGRESS NOTES
[facility]  Physical Therapy Daily Note     Name: Arron Ron  Clinic Number: [id number]    Therapy Diagnosis:   Encounter Diagnosis   Name Primary?    Chronic bilateral low back pain with bilateral sciatica Yes     Physician: Mahi Rojas MD    Visit Date: 3/30/2022    Physician Orders: PT Eval and Treat  Medical Diagnosis from Referral: Chronic bilateral low back pain with bilateral sciatica   Evaluation Date: 3/14/2022  Authorization Period Expiration: 12/31/2022   Plan of Care Expiration: 6/14/2022  Progress Note Due: 4/14/2022  Visit # / Visits authorized: 4/ 20   FOTO: 1/3     Precautions: Standard     Visit #: 4 of 20  PTA Visit #: 4  Time In: 1015  Time Out: 1100  Total Billable Time: 45 minutes     Subjective     Pt reports: her left knee is bothering her today. It began since Monday after she leaves here from therapy clinic. Patient report she had an appointment with her MD in [place] and had to go for a far walk. Patient did used her cane to help alleviate pain on Left knee. Right now, patient report of dull constant pain to left knee and central low back pain.   She was compliant with home exercise program.  Response to previous treatment: soreness  Functional change: back pain and left knee pain     Pain: 7-8/10    Location: Left knee today     Objective     Arron received therapeutic exercises to develop strength, ROM, flexibility, posture and core stabilization for 15 minutes including:  Single knee to chest using a strap x 10  Supine nerve glide (AAROM) x 10  SLR x 10  Double knee to chest with ball x 15  Lateral trunk rotation x 15  Supine hip adduction ball squeezes x 20  Supine hip abd Red theraband x 20  Seated hip adduction with ball squeezes x 10  Seated active hip abduction x 10   Seated pelvic tilts x 10   Self calf stretch in sitting 3 x 20 seconds hold    Arron received the following manual therapy techniques: Joint mobilizations and Soft  tissue Mobilization were applied to the: low back for 30 minutes, including:  Manual hamstring stretch  Gentle piriformis stretch  Soft tissue mobs to medial semitendinous and semimembranous   Soft tissue mobs to erector spinalis muscle groups, quadratus lumborum, gluteus medius, piriformis  Soft tissue mobs to left gastrocnemius and the soleus  Manual quadratus lumborum stretch bilateral     Arron received hot pack prior to treatment for 5 minutes to low back for decrease pain.    Home Exercises Provided and Patient Education Provided     Written Home Exercises Provided: yes.  Exercises were reviewed and Arron was able to demonstrate them prior to the end of the session.  Arron demonstrated good  understanding of the education provided.     Education provided:   -HEP   -Heating pad to low back for decrease pain    Assessment     Patient presented with central low back pain and generalized left knee pain this visit. Patient was limited in therapy session due to increase left knee pain. Noted patient displayed muscle tightness and stiffness to center low back and left gastrocnemius and the soleus. Provided manual quadratus lumborum stretch bilateral for LBP relief which patient responded well with this stretch. Added soft tissue mobilization and self calf stretch in sitting for decrease muscle tension at end of session. I advised patient to add this calf stretch into her routine for decrease pain in the left knee. At this time, patient is showing steady improvement to low back, but still has pain to low back, weak in core, lower extremity and decrease hip mobility. Will need to improve these area in order for patient to return for full functional mobility.   Arron Is progressing towards her goals.   Pt prognosis is Good.     Pt will continue to benefit from skilled outpatient physical therapy to address the deficits listed in the problem list box on initial evaluation, provide pt/family education and to  maximize pt's level of independence in the home and community environment.     Pt's spiritual, cultural and educational needs considered and pt agreeable to plan of care and goals.     Anticipated barriers to physical therapy: None    Goals:   Short Term Goals: 3 weeks   1. Patient will demonstrate independence in prescribed home exercise program once a day with proper form, sets and reps to improve initiation, strength and endurance of muscles needed to return to prior level of function  2. Patient will increase B lower extremity strength in deficient planes by 1/2 to 1 full grade with improved eccentric control to execute squatting activities  3. Patient will demonstrate efficient hip hinge squat without cueing to improve ability to retrieve laundry basket and groceries     Long Term Goals: 10 weeks   1. Patient will demonstrate hip flexion 5/5 with efficient core first strategy to be able to get into/out of car without difficulty  2. Patient will demonstrate hip hinge with neutral trunk and T-larisa arms to lift greater or equal to 10 pounds from floor to chest to increase safety with lifting grocery bags.  3. Patient will decrease neural tension L lower extremity to full knee extension in slump position in order to allow progression of range of motion L hip for squatting activities of daily living.   Plan   Plan of care Certification: 3/14/2022 to 6/14/2022.     Outpatient Physical Therapy 2 times weekly for 10 weeks to include the following interventions: Cervical/Lumbar Traction, Electrical Stimulation  , Gait Training, Manual Therapy, Moist Heat/ Ice, Neuromuscular Re-ed, Patient Education, Self Care, Therapeutic Activities, Therapeutic Exercise and Ultrasound. Interventions can be provided by PTA as a treatment team.    Continue with LENO Hill Mai, PTA   3/30/2022

## 2022-04-01 ENCOUNTER — HOSPITAL ENCOUNTER (OUTPATIENT)
Facility: HOSPITAL | Age: 52
Discharge: HOME | End: 2022-04-01
Attending: INTERNAL MEDICINE | Admitting: INTERNAL MEDICINE
Payer: MEDICARE

## 2022-04-01 ENCOUNTER — TELEPHONE (OUTPATIENT)
Dept: GASTROENTEROLOGY | Facility: CLINIC | Age: 52
End: 2022-04-01
Payer: MEDICAID

## 2022-04-01 ENCOUNTER — ANESTHESIA EVENT (OUTPATIENT)
Dept: ENDOSCOPY | Facility: HOSPITAL | Age: 52
End: 2022-04-01
Payer: MEDICAID

## 2022-04-01 ENCOUNTER — ANESTHESIA (OUTPATIENT)
Dept: ENDOSCOPY | Facility: HOSPITAL | Age: 52
End: 2022-04-01
Payer: MEDICAID

## 2022-04-01 VITALS
HEIGHT: 66 IN | WEIGHT: 280 LBS | BODY MASS INDEX: 45 KG/M2 | SYSTOLIC BLOOD PRESSURE: 106 MMHG | DIASTOLIC BLOOD PRESSURE: 68 MMHG | OXYGEN SATURATION: 100 % | RESPIRATION RATE: 16 BRPM | TEMPERATURE: 98 F | HEART RATE: 82 BPM

## 2022-04-01 DIAGNOSIS — K21.00 GASTROESOPHAGEAL REFLUX DISEASE WITH ESOPHAGITIS WITHOUT HEMORRHAGE: ICD-10-CM

## 2022-04-01 DIAGNOSIS — K44.9 HIATAL HERNIA: Primary | ICD-10-CM

## 2022-04-01 DIAGNOSIS — R13.10 DYSPHAGIA: ICD-10-CM

## 2022-04-01 LAB
CTP QC/QA: YES
SARS-COV-2 AG RESP QL IA.RAPID: NEGATIVE

## 2022-04-01 PROCEDURE — E9220 PRA ENDO ANESTHESIA: HCPCS | Mod: CRNA,,, | Performed by: NURSE ANESTHETIST, CERTIFIED REGISTERED

## 2022-04-01 PROCEDURE — 25000003 PHARM REV CODE 250: Performed by: INTERNAL MEDICINE

## 2022-04-01 PROCEDURE — 43235 EGD DIAGNOSTIC BRUSH WASH: CPT | Mod: ,,, | Performed by: INTERNAL MEDICINE

## 2022-04-01 PROCEDURE — 00731 ANES UPR GI NDSC PX NOS: CPT | Performed by: INTERNAL MEDICINE

## 2022-04-01 PROCEDURE — E9220 PRA ENDO ANESTHESIA: ICD-10-PCS | Mod: ANES,,, | Performed by: ANESTHESIOLOGY

## 2022-04-01 PROCEDURE — 63600175 PHARM REV CODE 636 W HCPCS: Performed by: NURSE ANESTHETIST, CERTIFIED REGISTERED

## 2022-04-01 PROCEDURE — 37000008 HC ANESTHESIA 1ST 15 MINUTES: Performed by: INTERNAL MEDICINE

## 2022-04-01 PROCEDURE — 43235 EGD DIAGNOSTIC BRUSH WASH: CPT | Performed by: INTERNAL MEDICINE

## 2022-04-01 PROCEDURE — 43235 PR EGD, FLEX, DIAGNOSTIC: ICD-10-PCS | Mod: ,,, | Performed by: INTERNAL MEDICINE

## 2022-04-01 PROCEDURE — 25000003 PHARM REV CODE 250: Performed by: NURSE ANESTHETIST, CERTIFIED REGISTERED

## 2022-04-01 PROCEDURE — E9220 PRA ENDO ANESTHESIA: ICD-10-PCS | Mod: CRNA,,, | Performed by: NURSE ANESTHETIST, CERTIFIED REGISTERED

## 2022-04-01 PROCEDURE — E9220 PRA ENDO ANESTHESIA: HCPCS | Mod: ANES,,, | Performed by: ANESTHESIOLOGY

## 2022-04-01 PROCEDURE — 37000009 HC ANESTHESIA EA ADD 15 MINS: Performed by: INTERNAL MEDICINE

## 2022-04-01 RX ORDER — PROPOFOL 10 MG/ML
VIAL (ML) INTRAVENOUS
Status: DISCONTINUED | OUTPATIENT
Start: 2022-04-01 | End: 2022-04-01

## 2022-04-01 RX ORDER — SODIUM CHLORIDE 9 MG/ML
INJECTION, SOLUTION INTRAVENOUS CONTINUOUS
Status: DISCONTINUED | OUTPATIENT
Start: 2022-04-01 | End: 2022-04-01 | Stop reason: HOSPADM

## 2022-04-01 RX ORDER — PANTOPRAZOLE SODIUM 40 MG/1
40 TABLET, DELAYED RELEASE ORAL 2 TIMES DAILY
Qty: 60 TABLET | Refills: 3 | Status: SHIPPED | OUTPATIENT
Start: 2022-04-01 | End: 2022-06-08

## 2022-04-01 RX ORDER — LIDOCAINE HYDROCHLORIDE 20 MG/ML
INJECTION INTRAVENOUS
Status: DISCONTINUED | OUTPATIENT
Start: 2022-04-01 | End: 2022-04-01

## 2022-04-01 RX ADMIN — SODIUM CHLORIDE: 0.9 INJECTION, SOLUTION INTRAVENOUS at 12:04

## 2022-04-01 RX ADMIN — LIDOCAINE HYDROCHLORIDE 100 MG: 20 INJECTION, SOLUTION INTRAVENOUS at 01:04

## 2022-04-01 RX ADMIN — PROPOFOL 175 MCG/KG/MIN: 10 INJECTION, EMULSION INTRAVENOUS at 01:04

## 2022-04-01 RX ADMIN — SODIUM CHLORIDE: 0.9 INJECTION, SOLUTION INTRAVENOUS at 01:04

## 2022-04-01 RX ADMIN — PROPOFOL 60 MG: 10 INJECTION, EMULSION INTRAVENOUS at 01:04

## 2022-04-01 RX ADMIN — GLYCOPYRROLATE 0.2 MG: 0.2 INJECTION, SOLUTION INTRAMUSCULAR; INTRAVITREAL at 01:04

## 2022-04-01 NOTE — INTERVAL H&P NOTE
The patient has been examined and the H&P has been reviewed:    I concur with the findings and no changes have occurred since H&P was written.    Anesthesia risks, benefits and alternative options discussed and understood by patient/family.      History and Exam unchanged from visit.    Procedure - EGD  Neck - supple  Plan of anesthesia - General  ASA - per anesthesia  Mallampati - per anesthesia  Anesthesia problems - no  Family history of anesthesia problems - no      There are no hospital problems to display for this patient.

## 2022-04-01 NOTE — ANESTHESIA POSTPROCEDURE EVALUATION
Anesthesia Post Evaluation    Patient: Arron Ron    Procedure(s) Performed: Procedure(s) (LRB):  EGD (ESOPHAGOGASTRODUODENOSCOPY) (N/A)    Final Anesthesia Type: general      Patient location during evaluation: GI PACU  Patient participation: Yes- Able to Participate  Level of consciousness: awake and alert and oriented  Post-procedure vital signs: reviewed and stable  Pain management: adequate  Airway patency: patent    PONV status at discharge: No PONV  Anesthetic complications: no      Cardiovascular status: stable  Respiratory status: unassisted, spontaneous ventilation and room air  Hydration status: euvolemic  Follow-up not needed.          Vitals Value Taken Time   /64 04/01/22 1333   Temp 36.4 °C (97.5 °F) 04/01/22 1321   Pulse 91 04/01/22 1333   Resp 18 04/01/22 1333   SpO2 100 % 04/01/22 1333         No case tracking events are documented in the log.      Pain/Venancio Score: Venancio Score: 10 (4/1/2022  1:33 PM)

## 2022-04-01 NOTE — TRANSFER OF CARE
"Anesthesia Transfer of Care Note    Patient: Arron Ron    Procedure(s) Performed: Procedure(s) (LRB):  EGD (ESOPHAGOGASTRODUODENOSCOPY) (N/A)    Patient location: PACU    Anesthesia Type: general    Transport from OR: Transported from OR on room air with adequate spontaneous ventilation    Post pain: adequate analgesia    Post assessment: no apparent anesthetic complications    Post vital signs: stable    Level of consciousness: awake    Nausea/Vomiting: no nausea/vomiting    Complications: none    Transfer of care protocol was followed      Last vitals:   Visit Vitals  /64 (BP Location: Left arm, Patient Position: Lying)   Pulse 85   Temp 36.4 °C (97.5 °F) (Oral)   Resp 16   Ht 5' 6" (1.676 m)   Wt 127 kg (280 lb)   LMP 09/21/2015   SpO2 97%   Breastfeeding No   BMI 45.19 kg/m²     "

## 2022-04-01 NOTE — ANESTHESIA PREPROCEDURE EVALUATION
04/01/2022  Arron Ron is a 51 y.o., female  Past Medical History:   Diagnosis Date    Acid reflux     Anemia     Back pain     was under therapy    Chronic back pain     Constipation     Elevated cholesterol     Encounter for blood transfusion 1987    Endometriosis     Hyperlipidemia     Hypertension     Nausea & vomiting     Sleep apnea     Tendonitis of wrist, right      Past Surgical History:   Procedure Laterality Date    BREAST BIOPSY      left , benign    CHOLECYSTECTOMY      COLONOSCOPY  2017        COLONOSCOPY N/A 4/29/2021    Procedure: COLONOSCOPY;  Surgeon: Norbert Garcia MD;  [facility];  Service: Colon and Rectal;  Laterality: N/A;    DILATION AND CURETTAGE OF UTERUS      ENDOMETRIAL ABLATION      gallstone      GASTRIC BYPASS  02/18/2020    HYSTERECTOMY  10/1/15    [facility]- bleeding/ endometriosis    OOPHORECTOMY      TUBAL LIGATION      UPPER [facility]TESTINAL ENDOSCOPY  [facility]    .      Pre-op Assessment    I have reviewed the Patient Summary Reports.    I have reviewed the NPO Status.   I have reviewed the Medications.     Review of Systems  Anesthesia Hx:  Denies Hx of Anesthetic complications  Denies Family Hx of Anesthesia complications.   Denies Personal Hx of Anesthesia complications.   Hematology/Oncology:  Hematology Normal   Oncology Normal     EENT/Dental:EENT/Dental Normal   Cardiovascular:   Hypertension, well controlled    Pulmonary:   Asthma Sleep Apnea    Renal/:  Renal/ Normal     Hepatic/GI:   Hiatal Hernia, GERD    Endocrine:  Endocrine Normal    Dermatological:  Skin Normal        Physical Exam  General: Well nourished, Alert and Oriented    Dental:  Intact        Anesthesia Plan  Type of Anesthesia, risks & benefits discussed:    Anesthesia Type: Gen Natural Airway  Intra-op Monitoring Plan: Standard ASA Monitors  Post  Op Pain Control Plan: multimodal analgesia  Induction:  IV  Informed Consent: Informed consent signed with the Patient and all parties understand the risks and agree with anesthesia plan.  All questions answered.   ASA Score: 2  Day of Surgery Review of History & Physical: H&P Update referred to the surgeon/provider.    Ready For Surgery From Anesthesia Perspective.     .

## 2022-04-01 NOTE — ANESTHESIA POSTPROCEDURE EVALUATION
Anesthesia Post Evaluation    Patient: Arron Ron    Procedure(s) Performed: Procedure(s) (LRB):  EGD (ESOPHAGOGASTRODUODENOSCOPY) (N/A)    Final Anesthesia Type: general      Patient location during evaluation: GI PACU  Patient participation: Yes- Able to Participate  Level of consciousness: awake and alert and oriented  Post-procedure vital signs: reviewed and stable  Pain management: adequate  Airway patency: patent    PONV status at discharge: No PONV  Anesthetic complications: no      Cardiovascular status: stable  Respiratory status: unassisted, spontaneous ventilation and room air  Hydration status: euvolemic  Follow-up not needed.          Vitals Value Taken Time   /68 04/01/22 1346   Temp 36.4 °C (97.5 °F) 04/01/22 1321   Pulse 82 04/01/22 1346   Resp 16 04/01/22 1346   SpO2 100 % 04/01/22 1346         Event Time   Out of Recovery 13:54:46         Pain/Venancio Score: Venancio Score: 10 (4/1/2022  1:33 PM)

## 2022-04-01 NOTE — [employer]OVATION PATIENT INSTRUCTIONS
Discharge Summary/Instructions after an Endoscopic Procedure  Patient Name: Arron Ron  Patient MRN: [id number]  Patient YOB: 1970 Friday, April 1, 2022  Diego Spear MD  Dear patient,  As a result of recent federal legislation (The Federal Cures Act), you may   receive lab or pathology results from your procedure in your [facility]   account before your physician is able to contact you. Your physician or   their representative will relay the results to you with their   recommendations at their soonest availability.  Thank you,  RESTRICTIONS:  During your procedure today, you received medications for sedation.  These   medications may affect your judgment, balance and coordination.  Therefore,   for 24 hours, you have the following restrictions:   - DO NOT drive a car, operate machinery, make legal/financial decisions,   sign important papers or drink alcohol.    ACTIVITY:  Today: no heavy lifting, straining or running due to procedural   sedation/anesthesia.  The following day: return to full activity including work.  DIET:  Eat and drink normally unless instructed otherwise.     TREATMENT FOR COMMON SIDE EFFECTS:  - Mild abdominal pain, nausea, belching, bloating or excessive gas:  rest,   eat lightly and use a heating pad.  - Sore Throat: treat with throat lozenges and/or gargle with warm salt   water.  - Because air was used during the procedure, expelling large amounts of air   from your rectum or belching is normal.  - If a bowel prep was taken, you may not have a bowel movement for 1-3 days.    This is normal.  SYMPTOMS TO WATCH FOR AND REPORT TO YOUR PHYSICIAN:  1. Abdominal pain or bloating, other than gas cramps.  2. Chest pain.  3. Back pain.  4. Signs of infection such as: chills or fever occurring within 24 hours   after the procedure.  5. Rectal bleeding, which would show as bright red, maroon, or black stools.   (A tablespoon of blood from the rectum is not serious, especially if    hemorrhoids are present.)  6. Vomiting.  7. Weakness or dizziness.  GO DIRECTLY TO THE NEAREST EMERGENCY ROOM IF YOU HAVE ANY OF THE FOLLOWING:      Difficulty breathing              Chills and/or fever over 101 F   Persistent vomiting and/or vomiting blood   Severe abdominal pain   Severe chest pain   Black, tarry stools   Bleeding- more than one tablespoon   Any other symptom or condition that you feel may need urgent attention  Your doctor recommends these additional instructions:  If any biopsies were taken, your doctors clinic will contact you in 1 to 2   weeks with any results.  - Patient has a contact number available for emergencies.  The signs and   symptoms of potential delayed complications were discussed with the   patient.  Return to normal activities tomorrow.  Written discharge   instructions were provided to the patient.   - Discharge patient to home.   - Repeat upper endoscopy in 3 months to check healing.   - Use a proton pump inhibitor PO BID.   - The findings and recommendations were discussed with the designated   responsible adult.  For questions, problems or results please call your physician - Diego Spear MD at Work:  [phone].  [facility] [place], EMERGENCY ROOM PHONE NUMBER: [phone]  IF A COMPLICATION OR EMERGENCY SITUATION ARISES AND YOU ARE UNABLE TO REACH   YOUR PHYSICIAN - GO DIRECTLY TO THE EMERGENCY ROOM.  Diego Spear MD  4/1/2022 1:20:00 PM  This report has been verified and signed electronically.  Dear patient,  As a result of recent federal legislation (The Federal Cures Act), you may   receive lab or pathology results from your procedure in your [facility]   account before your physician is able to contact you. Your physician or   their representative will relay the results to you with their   recommendations at their soonest availability.  Thank you,  PROVATION

## 2022-04-01 NOTE — TELEPHONE ENCOUNTER
Spoke to regarding fax received from [facility] PA request. Pt stated she  the medication already to disregard fax and thank me for calling.

## 2022-04-04 ENCOUNTER — PATIENT MESSAGE (OUTPATIENT)
Dept: ADMINISTRATIVE | Facility: OTHER | Age: 52
End: 2022-04-04
Payer: MEDICAID

## 2022-04-05 ENCOUNTER — CLINICAL SUPPORT (OUTPATIENT)
Dept: REHABILITATION | Facility: HOSPITAL | Age: 52
End: 2022-04-05
Payer: MEDICAID

## 2022-04-05 DIAGNOSIS — G89.29 CHRONIC BILATERAL LOW BACK PAIN WITH BILATERAL SCIATICA: Primary | ICD-10-CM

## 2022-04-05 DIAGNOSIS — M54.42 CHRONIC BILATERAL LOW BACK PAIN WITH BILATERAL SCIATICA: Primary | ICD-10-CM

## 2022-04-05 DIAGNOSIS — M54.41 CHRONIC BILATERAL LOW BACK PAIN WITH BILATERAL SCIATICA: Primary | ICD-10-CM

## 2022-04-05 PROCEDURE — 97110 THERAPEUTIC EXERCISES: CPT | Mod: PN,CQ

## 2022-04-05 NOTE — PROGRESS NOTES
[facility]  Physical Therapy Daily Note     Name: Arron Ron  Clinic Number: [id number]    Therapy Diagnosis:   Encounter Diagnosis   Name Primary?    Chronic bilateral low back pain with bilateral sciatica Yes     Physician: Mahi Rojas MD    Visit Date: 4/5/2022    Physician Orders: PT Eval and Treat  Medical Diagnosis from Referral: Chronic bilateral low back pain with bilateral sciatica   Evaluation Date: 3/14/2022  Authorization Period Expiration: 12/31/2022   Plan of Care Expiration: 6/14/2022  Progress Note Due: 4/14/2022  Visit # / Visits authorized: 5/ 20   FOTO: 1/3     Precautions: Standard     Visit #: 5 of 20  PTA Visit #: 5  Time In: 800  Time Out: 845  Total Billable Time: 45 minutes (35 minutes billable)     Subjective     Pt reports: her left knee still bothers her at times especially with prolong walking and sometimes has to use her cane for support. Patient reports she still has pain to center of low back; however this is slowly improving with exercises. Patient also reports of recent endo scope of hiatal hernia and will have a follow up with her doctor in 3 months.   She was compliant with home exercise program.  Response to previous treatment: soreness  Functional change: back pain and left knee pain     Pain: 5/10    Location: to low back today    Objective     Arron received therapeutic exercises to develop strength, ROM, flexibility, posture and core stabilization for 15 minutes including:  Single knee to chest using a strap x 10  Supine nerve glide (AAROM) x 10  SLR x 10 bilateral (slightly difficult on Left side)  Double knee to chest with ball x 15  Lateral trunk rotation x 15  Supine hip adduction ball squeezes x 20  Supine hip abd Red theraband x 20  Seated hip adduction with ball squeezes x 10  Seated active hip abduction x 10   Seated pelvic tilts x 10   Self calf stretch in sitting 3 x 20 seconds hold  Sit to stand working on hip hinge x  10    Arron received the following manual therapy techniques: Joint mobilizations and Soft tissue Mobilization were applied to the: low back for 20 minutes, including:  Manual hamstring stretch  Gentle piriformis stretch  Soft tissue mobs to medial semitendinous and semimembranous   Soft tissue mobs to erector spinalis muscle groups, quadratus lumborum, gluteus medius, piriformis  Soft tissue mobs to left gastrocnemius and the soleus  Manual quadratus lumborum stretch bilateral     Arron received hot pack prior to treatment for 5 minutes to low back for decrease pain.    Home Exercises Provided and Patient Education Provided     Written Home Exercises Provided: yes.  Exercises were reviewed and Arron was able to demonstrate them prior to the end of the session.  Arron demonstrated good  understanding of the education provided.     Education provided:   -HEP   -Heating pad to low back for decrease pain    Assessment     Patient appeared to show slight improvement in low back pain with no pain radiating down to posterior left leg. Her LBP seems to have centralized with moderate pain and with decrease muscle tension at center of low back. Continue with manual therapy for decrease pain and to improve patient's functional mobility. Patient still has difficulty with performing her daily routines and house chores at home such as mopping due to pain. Patient also has been having Left medial knee pain. 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 I advised patient to contact her doctor if this persist regarding her knee pain. Patient expressed understanding. Added sit to stand with cues for hip hinge squat in order for patient to perform proper squatting techniques without LBP. Patient tolerates fairly with this tasks. Will need to re-assess patient next visit and continue working on her treatment goals.   Arron Is progressing towards her goals.   Pt  prognosis is Good.     Pt will continue to benefit from skilled outpatient physical therapy to address the deficits listed in the problem list box on initial evaluation, provide pt/family education and to maximize pt's level of independence in the home and community environment.     Pt's spiritual, cultural and educational needs considered and pt agreeable to plan of care and goals.     Anticipated barriers to physical therapy: None    Goals:   Short Term Goals: 3 weeks   1. Patient will demonstrate independence in prescribed home exercise program once a day with proper form, sets and reps to improve initiation, strength and endurance of muscles needed to return to prior level of function  2. Patient will increase B lower extremity strength in deficient planes by 1/2 to 1 full grade with improved eccentric control to execute squatting activities  3. Patient will demonstrate efficient hip hinge squat without cueing to improve ability to retrieve laundry basket and groceries.      Long Term Goals: 10 weeks   1. Patient will demonstrate hip flexion 5/5 with efficient core first strategy to be able to get into/out of car without difficulty  2. Patient will demonstrate hip hinge with neutral trunk and T-larisa arms to lift greater or equal to 10 pounds from floor to chest to increase safety with lifting grocery bags.  3. Patient will decrease neural tension L lower extremity to full knee extension in slump position in order to allow progression of range of motion L hip for squatting activities of daily living.   Plan   Plan of care Certification: 3/14/2022 to 6/14/2022.     Outpatient Physical Therapy 2 times weekly for 10 weeks to include the following interventions: Cervical/Lumbar Traction, Electrical Stimulation  , Gait Training, Manual Therapy, Moist Heat/ Ice, Neuromuscular Re-ed, Patient Education, Self Care, Therapeutic Activities, Therapeutic Exercise and Ultrasound. Interventions can be provided by PTA as a  treatment team.    Re-assess next visit.     Liz Busch, PTA   4/5/2022

## 2022-04-06 ENCOUNTER — CLINICAL SUPPORT (OUTPATIENT)
Dept: REHABILITATION | Facility: HOSPITAL | Age: 52
End: 2022-04-06
Payer: MEDICAID

## 2022-04-06 DIAGNOSIS — M54.42 CHRONIC BILATERAL LOW BACK PAIN WITH BILATERAL SCIATICA: Primary | ICD-10-CM

## 2022-04-06 DIAGNOSIS — G89.29 CHRONIC BILATERAL LOW BACK PAIN WITH BILATERAL SCIATICA: Primary | ICD-10-CM

## 2022-04-06 DIAGNOSIS — M54.41 CHRONIC BILATERAL LOW BACK PAIN WITH BILATERAL SCIATICA: Primary | ICD-10-CM

## 2022-04-06 PROCEDURE — 97110 THERAPEUTIC EXERCISES: CPT | Mod: PN

## 2022-04-06 PROCEDURE — 97140 MANUAL THERAPY 1/> REGIONS: CPT | Mod: PN

## 2022-04-06 NOTE — PROGRESS NOTES
"  [facility]  Physical Therapy Reassessment Note     Name: Arron Ron  Clinic Number: [id number]    Therapy Diagnosis:   No diagnosis found.  Physician: Mahi Rojas MD    Visit Date: 4/6/2022    Physician Orders: PT Eval and Treat  Medical Diagnosis from Referral: Chronic bilateral low back pain with bilateral sciatica   Evaluation Date: 3/14/2022  Authorization Period Expiration: 12/31/2022   Plan of Care Expiration: 6/14/2022  Progress Note Due: 5/6/2022  Visit # / Visits authorized: 6/ 20   FOTO: 1/3     Precautions: Standard     Visit #: 6 of 20  PTA Visit #: 0  Time In: 10:15  Time Out: 11:00  Total Billable Time: 45 minutes    Subjective     Pt reports: she's still been hurting, but using heat on her back. She's been using a massager on her leg. Her knee locked up on her in [place] the other day. She's been pacing herself with housework, but getting it done.  She was compliant with home exercise program.  Response to previous treatment: soreness  Functional change: back pain and left knee pain     Pain: 4-5/10    Location: low back    Objective     Observation  - increased lordosis, hinge point in region of L5-S1     Gait  - with SPC in L > 4/6/2022 no assistive device      Lumbar Spine Range of Motion  Lumbar Spine AROM    Initial Eval Pain 4/6/2022 Pain   Flexion  (cm fingertips from floor) fingertips to proximal shins, miriam's sign to return y Fingertips distal shin, NO miriam's sign y   Extension Hinge lower lumbar y     Sidebend R Hinge lower lumbar y Fingertips to knee y   Sidebend L Hinge lower lumbar y Fingertips to knee y   All fields left blank intentionally and signify "not tested"     Lower Extremity Strength MMT /5  Right  Initial Eval Pain 4/6/2022   Left  Initial Eval Pain 4/6/2022   3- posterior lean trunk y 3- posterior lean Hip flexion 3- posterior lean trunk y 3- posterior lean   4+   4+ Knee flexion 4+ y 4+   4+   4+ Knee extension 4 y 4+        Hip " "external rotation             Hip internal rotation             Hip abduction             Hip adduction             Hip extension        5   5 Dorsiflexion  5   5        Plantar flexion             Eversion             Inversion              Great toe extension        All fields left blank intentionally and signify "not tested"     Functional testing  - sit to stand: uses UEs, uses SPC   > 4/6/2022 able to perform without UE      Arron received therapeutic exercises to develop strength, ROM, flexibility, posture and core stabilization for 30 minutes including:  Single knee to chest using a strap x 10  Supine nerve glide (AAROM) x 10  SLR x 10 bilateral (slightly difficult on Left side)  Double knee to chest with orange ball and therapist assisting x 10  Lateral trunk rotation x 15  Supine hip adduction ball squeezes x 20  Supine hip abd Red theraband x 20  Seated hip adduction with ball squeezes x 10  Seated active hip abduction x 10   Seated pelvic tilts x 10   Self calf stretch in sitting 3 x 20 seconds hold  Sit to stand working on hip hinge x 10  Supine pelvic tilts with LEs supported 2 x 20  Seated adduction with ball 2 x 10  Seated hip hinge x 10    Arron received the following manual therapy techniques: Joint mobilizations and Soft tissue Mobilization were applied to the: low back for 10 minutes, including:  Manual hamstring stretch  Gentle piriformis stretch  Soft tissue mobs to medial semitendinous and semimembranous   Soft tissue mobs to erector spinalis muscle groups, quadratus lumborum, gluteus medius, piriformis  Soft tissue mobs to left gastrocnemius and the soleus  Manual quadratus lumborum stretch bilateral   soft tissue mobilization abdominal contents, lateral rectus, iliacus    Home Exercises Provided and Patient Education Provided     Written Home Exercises Provided: yes.  Exercises were reviewed and Arron was able to demonstrate them prior to the end of the session.  Arron demonstrated " good  understanding of the education provided.     Education provided:   -HEP   -Heating pad to low back for decrease pain     Assessment     Patient presents with improving lumbar spine mobility and decreased UE assistance required with sit to stand since initial evaluation. Continues with limited hip and knee strength due to knee pain primarily; rehab potential may be limited by severe B knee pain. Today addressed soft tissue restrictions anterior hip and abdomen to decrease tension here leading to postural deficits such as exaggerated lordosis.Continue to progress core strength, hip strength as tolerated, and lumbar spine mobility to allow patient to return to prior level of function.    Arron Is progressing towards her goals.   Pt prognosis is Good.     Pt will continue to benefit from skilled outpatient physical therapy to address the deficits listed in the problem list box on initial evaluation, provide pt/family education and to maximize pt's level of independence in the home and community environment.     Pt's spiritual, cultural and educational needs considered and pt agreeable to plan of care and goals.     Anticipated barriers to physical therapy: None    Goals:   Short Term Goals: 3 weeks   1. Patient will demonstrate independence in prescribed home exercise program once a day with proper form, sets and reps to improve initiation, strength and endurance of muscles needed to return to prior level of function Met 4/6/2022  2. Patient will increase B lower extremity strength in deficient planes by 1/2 to 1 full grade with improved eccentric control to execute squatting activities Ongoing 4/6/2022  3. Patient will demonstrate efficient hip hinge squat without cueing to improve ability to retrieve laundry basket and groceries. Ongoing 4/6/2022     Long Term Goals: 10 weeks   1. Patient will demonstrate hip flexion 5/5 with efficient core first strategy to be able to get into/out of car without difficulty  Ongoing 4/6/2022  2. Patient will demonstrate hip hinge with neutral trunk and T-larisa arms to lift greater or equal to 10 pounds from floor to chest to increase safety with lifting grocery bags. Ongoing 4/6/2022  3. Patient will decrease neural tension L lower extremity to full knee extension in slump position in order to allow progression of range of motion L hip for squatting activities of daily living.  Ongoing 4/6/2022    Plan     Plan of care Certification: 3/14/2022 to 6/14/2022.     Outpatient Physical Therapy 2 times weekly for 10 weeks to include the following interventions: Cervical/Lumbar Traction, Electrical Stimulation  , Gait Training, Manual Therapy, Moist Heat/ Ice, Neuromuscular Re-ed, Patient Education, Self Care, Therapeutic Activities, Therapeutic Exercise and Ultrasound. Interventions can be provided by PTA as a treatment team.    Halima Montejo, PT   4/6/2022   [facility]                                 Physical Therapy       PT/PTA met face to face to discuss pt's treatment plan and progress towards established goals. Pt will be seen by a physical therapist minimally every 6th visit or every 30 days.    Please see Updated Plan of Care dated 4/6/2022 for changes and updated goals.    Liz Busch PTA  4/6/2022

## 2022-04-09 DIAGNOSIS — M17.12 PRIMARY OSTEOARTHRITIS OF LEFT KNEE: ICD-10-CM

## 2022-04-09 DIAGNOSIS — M54.42 CHRONIC BILATERAL LOW BACK PAIN WITH BILATERAL SCIATICA: ICD-10-CM

## 2022-04-09 DIAGNOSIS — G89.29 CHRONIC BILATERAL LOW BACK PAIN WITH BILATERAL SCIATICA: ICD-10-CM

## 2022-04-09 DIAGNOSIS — M54.41 CHRONIC BILATERAL LOW BACK PAIN WITH BILATERAL SCIATICA: ICD-10-CM

## 2022-04-09 NOTE — TELEPHONE ENCOUNTER
No new care gaps identified.  Powered by [employer]. Reference number: [id number].   4/09/2022 11:46:41 AM CDT

## 2022-04-11 ENCOUNTER — CLINICAL SUPPORT (OUTPATIENT)
Dept: REHABILITATION | Facility: HOSPITAL | Age: 52
End: 2022-04-11
Payer: MEDICAID

## 2022-04-11 DIAGNOSIS — M54.41 CHRONIC BILATERAL LOW BACK PAIN WITH BILATERAL SCIATICA: Primary | ICD-10-CM

## 2022-04-11 DIAGNOSIS — G89.29 CHRONIC BILATERAL LOW BACK PAIN WITH BILATERAL SCIATICA: Primary | ICD-10-CM

## 2022-04-11 DIAGNOSIS — M54.42 CHRONIC BILATERAL LOW BACK PAIN WITH BILATERAL SCIATICA: Primary | ICD-10-CM

## 2022-04-11 PROCEDURE — 97110 THERAPEUTIC EXERCISES: CPT | Mod: PN,CQ

## 2022-04-11 RX ORDER — DICLOFENAC SODIUM 10 MG/G
GEL TOPICAL
Qty: 100 G | Refills: 0 | Status: SHIPPED | OUTPATIENT
Start: 2022-04-11 | End: 2022-05-10

## 2022-04-11 RX ORDER — CYCLOBENZAPRINE HCL 10 MG
TABLET ORAL
Qty: 60 TABLET | Refills: 0 | Status: SHIPPED | OUTPATIENT
Start: 2022-04-11 | End: 2022-05-10

## 2022-04-11 NOTE — PROGRESS NOTES
[facility]  Physical Therapy Reassessment Note     Name: Arron Ron  Clinic Number: [id number]    Therapy Diagnosis:   Encounter Diagnosis   Name Primary?    Chronic bilateral low back pain with bilateral sciatica Yes     Physician: Mahi Rojas MD    Visit Date: 4/11/2022    Physician Orders: PT Eval and Treat  Medical Diagnosis from Referral: Chronic bilateral low back pain with bilateral sciatica   Evaluation Date: 3/14/2022  Authorization Period Expiration: 12/31/2022   Plan of Care Expiration: 6/14/2022  Progress Note Due: 5/6/2022  Visit # / Visits authorized: 7/ 20   FOTO: 1/3     Precautions: Standard     Visit #: 7 of 20  PTA Visit #: 1  Time In: 10:15  Time Out: 11:00  Total Billable Time: 45 minutes    Subjective     Pt reports: she may have done too much over the weekend cleaning her house. Patient did not take rest breaks while cleaning due to patient wants to get her cleaning done. Patient also reports she feels like the pain is causing her blood pressure to spike up.   She was compliant with home exercise program.  Response to previous treatment: soreness  Functional change: back pain and left knee pain     Pain: 7/10    Location: low back    Objective     Arron received therapeutic exercises to develop strength, ROM, flexibility, posture and core stabilization for 25 minutes including:  Single knee to chest using a strap x 10  Supine nerve glide (AAROM) x 10  SLR x 10 bilateral (slightly difficult on Left side)  Double knee to chest with orange ball and therapist assisting x 10  Lateral trunk rotation x 15   Supine hip adduction ball squeezes x 20  Supine hip abd Red theraband x 20  Seated hip adduction with ball squeezes x 10  Seated active hip abduction x 10   Seated pelvic tilts x 10   Self calf stretch in sitting 3 x 20 seconds hold  Sit to stand working on hip hinge x 10  Supine pelvic tilts with LEs supported 2 x 20  Seated adduction with ball 2 x  10  Seated hip hinge x 10    Arron received the following manual therapy techniques: Joint mobilizations and Soft tissue Mobilization were applied to the: low back for 20 minutes, including:  Manual hamstring stretch  Gentle piriformis stretch  Soft tissue mobs to medial semitendinous and semimembranous   Soft tissue mobs to erector spinalis muscle groups, quadratus lumborum, gluteus medius, piriformis  Soft tissue mobs to left gastrocnemius and the soleus  Manual quadratus lumborum stretch bilateral   soft tissue mobilization abdominal contents, lateral rectus, iliacus    BP check after therapy:  BP: 147/95  HR: 73    Home Exercises Provided and Patient Education Provided     Written Home Exercises Provided: yes.  Exercises were reviewed and Arron was able to demonstrate them prior to the end of the session.  Arron demonstrated good  understanding of the education provided.     Education provided:   -HEP   -Heating pad to low back for decrease pain     Assessment     Patient appeared to showed improve lumbar mobility since last re-assessment; however still has centralized pain to low back and bilateral knees pain. I instructed patient to contact her doctor for a follow up regarding bilateral knee pain. Patient voiced understanding. Patient may be limited in therapy session due to knee pain. Therefore will modified exercises and activities as appropriate. Noted patient demonstrates tenderness and minimal paraspinals muscle tightness on Left sided low back this visit. Performed manual therapy for decrease muscle tension and for decrease pain. Patient responded well with manual therapy. Also noted in today's session, patient reports she thinks her pain is causing her blood pressure to elevate. I checked patient's BP after therapy. Patient's BP at rest in sitting was 147/95 and HR at rest 73. I advised patient to monitor her blood pressure every 2 hours and log it to keep track of her BP. I also mentioned to  patient about relaxation techniques such as meditation to help her focus on relaxing the body instead of focusing on the pain all the time. Will follow up with patient's regarding her symptoms next visit.   Arron Is progressing towards her goals.   Pt prognosis is Good.     Pt will continue to benefit from skilled outpatient physical therapy to address the deficits listed in the problem list box on initial evaluation, provide pt/family education and to maximize pt's level of independence in the home and community environment.     Pt's spiritual, cultural and educational needs considered and pt agreeable to plan of care and goals.     Anticipated barriers to physical therapy: None    Goals:   Short Term Goals: 3 weeks   1. Patient will demonstrate independence in prescribed home exercise program once a day with proper form, sets and reps to improve initiation, strength and endurance of muscles needed to return to prior level of function Met 4/6/2022  2. Patient will increase B lower extremity strength in deficient planes by 1/2 to 1 full grade with improved eccentric control to execute squatting activities Ongoing 4/6/2022  3. Patient will demonstrate efficient hip hinge squat without cueing to improve ability to retrieve laundry basket and groceries. Ongoing 4/6/2022     Long Term Goals: 10 weeks   1. Patient will demonstrate hip flexion 5/5 with efficient core first strategy to be able to get into/out of car without difficulty Ongoing 4/6/2022  2. Patient will demonstrate hip hinge with neutral trunk and T-larisa arms to lift greater or equal to 10 pounds from floor to chest to increase safety with lifting grocery bags. Ongoing 4/6/2022  3. Patient will decrease neural tension L lower extremity to full knee extension in slump position in order to allow progression of range of motion L hip for squatting activities of daily living.  Ongoing 4/6/2022    Plan     Plan of care Certification: 3/14/2022 to  6/14/2022.     Outpatient Physical Therapy 2 times weekly for 10 weeks to include the following interventions: Cervical/Lumbar Traction, Electrical Stimulation  , Gait Training, Manual Therapy, Moist Heat/ Ice, Neuromuscular Re-ed, Patient Education, Self Care, Therapeutic Activities, Therapeutic Exercise and Ultrasound. Interventions can be provided by PTA as a treatment team.    Continue to progress core strength, hip strength as tolerated, and lumbar spine mobility to allow patient to return to prior level of function.    Liz Busch, PTA   4/11/2022

## 2022-04-13 ENCOUNTER — CLINICAL SUPPORT (OUTPATIENT)
Dept: REHABILITATION | Facility: HOSPITAL | Age: 52
End: 2022-04-13
Payer: MEDICAID

## 2022-04-13 DIAGNOSIS — G89.29 CHRONIC BILATERAL LOW BACK PAIN WITH BILATERAL SCIATICA: Primary | ICD-10-CM

## 2022-04-13 DIAGNOSIS — M54.42 CHRONIC BILATERAL LOW BACK PAIN WITH BILATERAL SCIATICA: Primary | ICD-10-CM

## 2022-04-13 DIAGNOSIS — M54.41 CHRONIC BILATERAL LOW BACK PAIN WITH BILATERAL SCIATICA: Primary | ICD-10-CM

## 2022-04-13 NOTE — PROGRESS NOTES
[facility]  Physical Therapy Note     Name: Arron Ron  Clinic Number: [id number]    Therapy Diagnosis:   Encounter Diagnosis   Name Primary?    Chronic bilateral low back pain with bilateral sciatica Yes     Physician: Mahi Rojas MD    Visit Date: 4/13/2022    Physician Orders: PT Eval and Treat  Medical Diagnosis from Referral: Chronic bilateral low back pain with bilateral sciatica   Evaluation Date: 3/14/2022  Authorization Period Expiration: 12/31/2022   Plan of Care Expiration: 6/14/2022  Progress Note Due: 5/6/2022  Visit # / Visits authorized: 7/ 20   FOTO: 1/3     Precautions: Standard     Visit #: 7 of 20  PTA Visit #: 1  Time In: 10:15  Time Out: 11:00  Total Billable Time: 0 minutes    Subjective     Pt reports: last night she dropped her tablet onto her left great toe and now patient is currently having increase pain and swelling to great toe.   She was compliant with home exercise program.  Response to previous treatment: soreness  Functional change: back pain and left knee pain     Pain: 7/10    Location: low back    Objective     Arron received therapeutic exercises to develop strength, ROM, flexibility, posture and core stabilization for 0 minutes including:  Single knee to chest using a strap x 10  Supine nerve glide (AAROM) x 10  SLR x 10 bilateral (slightly difficult on Left side)  Double knee to chest with orange ball and therapist assisting x 10  Lateral trunk rotation x 15   Supine hip adduction ball squeezes x 20  Supine hip abd Red theraband x 20  Seated hip adduction with ball squeezes x 10  Seated active hip abduction x 10   Seated pelvic tilts x 10   Self calf stretch in sitting 3 x 20 seconds hold  Sit to stand working on hip hinge x 10  Supine pelvic tilts with LEs supported 2 x 20  Seated adduction with ball 2 x 10  Seated hip hinge x 10    Arron received the following manual therapy techniques: Joint mobilizations and Soft tissue  Mobilization were applied to the: low back for 0 minutes, including:  Manual hamstring stretch  Gentle piriformis stretch  Soft tissue mobs to medial semitendinous and semimembranous   Soft tissue mobs to erector spinalis muscle groups, quadratus lumborum, gluteus medius, piriformis  Soft tissue mobs to left gastrocnemius and the soleus  Manual quadratus lumborum stretch bilateral   soft tissue mobilization abdominal contents, lateral rectus, iliacus      Home Exercises Provided and Patient Education Provided     Written Home Exercises Provided: yes.  Exercises were reviewed and Arron was able to demonstrate them prior to the end of the session.  Arron demonstrated good  understanding of the education provided.     Education provided:   -HEP   -Heating pad to low back for decrease pain     Assessment     Patient arrived to therapy clinic reporting of left great toe pain and swelling. Patient reports she dropped her tablet on her toe last night and patient is currently experiencing pain to left toe. I advised patient to seek medical help or go ER for further examination on her left toe. Patient voiced understanding and would like to hold PT for today. Will defer therapy treatment today due to left toe pain. Will resume therapy next visit depending on patient's symptoms. No charge in today's session.  Arron Is progressing towards her goals.   Pt prognosis is Good.     Pt will continue to benefit from skilled outpatient physical therapy to address the deficits listed in the problem list box on initial evaluation, provide pt/family education and to maximize pt's level of independence in the home and community environment.     Pt's spiritual, cultural and educational needs considered and pt agreeable to plan of care and goals.     Anticipated barriers to physical therapy: None    Goals:   Short Term Goals: 3 weeks   1. Patient will demonstrate independence in prescribed home exercise program once a day with proper  form, sets and reps to improve initiation, strength and endurance of muscles needed to return to prior level of function Met 4/6/2022  2. Patient will increase B lower extremity strength in deficient planes by 1/2 to 1 full grade with improved eccentric control to execute squatting activities Ongoing 4/6/2022  3. Patient will demonstrate efficient hip hinge squat without cueing to improve ability to retrieve laundry basket and groceries. Ongoing 4/6/2022     Long Term Goals: 10 weeks   1. Patient will demonstrate hip flexion 5/5 with efficient core first strategy to be able to get into/out of car without difficulty Ongoing 4/6/2022  2. Patient will demonstrate hip hinge with neutral trunk and T-larisa arms to lift greater or equal to 10 pounds from floor to chest to increase safety with lifting grocery bags. Ongoing 4/6/2022  3. Patient will decrease neural tension L lower extremity to full knee extension in slump position in order to allow progression of range of motion L hip for squatting activities of daily living.  Ongoing 4/6/2022    Plan     Plan of care Certification: 3/14/2022 to 6/14/2022.     Outpatient Physical Therapy 2 times weekly for 10 weeks to include the following interventions: Cervical/Lumbar Traction, Electrical Stimulation  , Gait Training, Manual Therapy, Moist Heat/ Ice, Neuromuscular Re-ed, Patient Education, Self Care, Therapeutic Activities, Therapeutic Exercise and Ultrasound. Interventions can be provided by PTA as a treatment team.    Continue to progress core strength, hip strength as tolerated, and lumbar spine mobility to allow patient to return to prior level of function.    NO CHARGE IN TODAY'S SESSION.    Liz Busch PTA   4/13/2022

## 2022-04-18 ENCOUNTER — PATIENT MESSAGE (OUTPATIENT)
Dept: INTERNAL MEDICINE | Facility: CLINIC | Age: 52
End: 2022-04-18
Payer: MEDICARE

## 2022-04-19 ENCOUNTER — PATIENT MESSAGE (OUTPATIENT)
Dept: INTERNAL MEDICINE | Facility: CLINIC | Age: 52
End: 2022-04-19
Payer: MEDICARE

## 2022-04-20 ENCOUNTER — PATIENT OUTREACH (OUTPATIENT)
Dept: INTERNAL MEDICINE | Facility: CLINIC | Age: 52
End: 2022-04-20
Payer: MEDICARE

## 2022-04-20 ENCOUNTER — CLINICAL SUPPORT (OUTPATIENT)
Dept: REHABILITATION | Facility: HOSPITAL | Age: 52
End: 2022-04-20
Payer: MEDICARE

## 2022-04-20 DIAGNOSIS — G89.29 CHRONIC BILATERAL LOW BACK PAIN WITH BILATERAL SCIATICA: Primary | ICD-10-CM

## 2022-04-20 DIAGNOSIS — M54.41 CHRONIC BILATERAL LOW BACK PAIN WITH BILATERAL SCIATICA: Primary | ICD-10-CM

## 2022-04-20 DIAGNOSIS — M54.42 CHRONIC BILATERAL LOW BACK PAIN WITH BILATERAL SCIATICA: Primary | ICD-10-CM

## 2022-04-20 PROCEDURE — 97110 THERAPEUTIC EXERCISES: CPT | Mod: PN,CQ

## 2022-04-20 NOTE — TELEPHONE ENCOUNTER
Contacted pt to assist with device set up.  Assisted pt with proper [employer] kallie and connecting BP monitor.  Obtained pt's permission to access and link accounts; linked successfully.   Advised that DM team will reach out to her to finish enrollment.  Pt v/u.

## 2022-04-20 NOTE — PROGRESS NOTES
"  [facility]  Physical Therapy Daily Note     Name: Arron Ron  Clinic Number: [id number]    Therapy Diagnosis:   Encounter Diagnosis   Name Primary?    Chronic bilateral low back pain with bilateral sciatica Yes     Physician: Mahi Rojas MD    Visit Date: 4/20/2022    Physician Orders: PT Eval and Treat  Medical Diagnosis from Referral: Chronic bilateral low back pain with bilateral sciatica   Evaluation Date: 3/14/2022  Authorization Period Expiration: 12/31/2022   Plan of Care Expiration: 6/14/2022  Progress Note Due: 5/6/2022  Visit # / Visits authorized: 8/ 20   FOTO: 1/3     Precautions: Standard     Visit #: 8 of 20  PTA Visit #: 3  Time In: 10:21   Time Out: 11:00  Total Billable Time: 35 minutes    Subjective     Pt reports: she still feels the same pain to low back usually at center and now to Right buttocks below iliac crest. Patient continues to have Left knee pain with "popping or clicking" sensation. Patient reports she used her cane over the weekend due to patient can feel her knee may give out on her and with increase knee pain. Patient also reports her left great toe is healing from the inflammation due to last week patient dropped her tablet on her toes.   She was compliant with home exercise program.  Response to previous treatment: soreness  Functional change: back pain and left knee pain     Pain: 5/10    Location: low back    Objective     Arron received therapeutic exercises to develop strength, ROM, flexibility, posture and core stabilization for 15 minutes including:  Single knee to chest using a strap x 10  Supine nerve glide (AAROM) x 10  SLR x 10 bilateral (slightly difficult on Left side)  Double knee to chest with orange ball and therapist assisting x 10  Lateral trunk rotation x 15   Supine hip adduction ball squeezes x 20  Supine hip abd Red theraband x 20  Seated hip adduction with ball squeezes x 10  Seated active hip abduction x 10 "   Seated pelvic tilts x 10   Self calf stretch in sitting 3 x 20 seconds hold  Sit to stand working on hip hinge x 10  Supine pelvic tilts with LEs supported 2 x 20  Seated adduction with ball 2 x 10  Seated hip hinge x 10  Seated trunk flexion reaching for toes x 10  Seated abdominal contraction while holding a physio ball going into flexion x 10  Seated abdominal contraction while holding a physio ball going into trunk rotation to each side x 10  Self mobilization to right gluteal using a tennis ball     Arron received the following manual therapy techniques: Joint mobilizations and Soft tissue Mobilization were applied to the: low back for 25 minutes, including:  Manual hamstring stretch  Gentle piriformis stretch  Soft tissue mobs to medial semitendinous and semimembranous   Soft tissue mobs to erector spinalis muscle groups, quadratus lumborum, gluteus medius, piriformis  Soft tissue mobs to left gastrocnemius and the soleus  Manual quadratus lumborum stretch bilateral   soft tissue mobilization abdominal contents, lateral rectus, iliacus  Soft tissue mobs to right gluteus frederick, piriformis, and paraspinals     Home Exercises Provided and Patient Education Provided     Written Home Exercises Provided: yes.  Exercises were reviewed and Arron was able to demonstrate them prior to the end of the session.  Arron demonstrated good  understanding of the education provided.     Education provided:   -HEP   -Heating pad to low back for decrease pain     Assessment     Patient arrived to therapy clinic reporting of usual central low back pain and now to right gluteal region under iliac crest. Performed soft tissue mobilization to these area for pain relief which patient responded well. Noted patient displayed crepitus and pain to Left knee. Patient stated she had to use her cane over the weekend due to knee pain and fear of knee buckling or giving out. I advised patient to continue to use her cane when needed  for support, reduce stress pressure to knee, and to prevent fall risks. I advised patient to contact her MD regarding her knee due to patient has a history of arthritis.     At this time, patient has made very minimal improvement in PT regarding her low back. Patient also agreed that she does not see much of an improvement in PT. I educated patient on walking program to alleviate her low back pain. Patient reports she recently got a dog on Good Friday and she has been walking with her dog approximately 20-25 feet per patient. Patient also experiencing hypertension which is currently being monitor by her MD. Patient stated she thinks the pain is causing her to have increase high blood pressure. Patient feels stress in her  personal life at this time. Will re-assess next week and possible discontinue PT due to very minimal carryover since therapy has started. Will discussed with PT regarding patient's progress.   Arron Is progressing towards her goals.   Pt prognosis is Good.     Pt will continue to benefit from skilled outpatient physical therapy to address the deficits listed in the problem list box on initial evaluation, provide pt/family education and to maximize pt's level of independence in the home and community environment.     Pt's spiritual, cultural and educational needs considered and pt agreeable to plan of care and goals.     Anticipated barriers to physical therapy: None    Goals:   Short Term Goals: 3 weeks   1. Patient will demonstrate independence in prescribed home exercise program once a day with proper form, sets and reps to improve initiation, strength and endurance of muscles needed to return to prior level of function Met 4/6/2022  2. Patient will increase B lower extremity strength in deficient planes by 1/2 to 1 full grade with improved eccentric control to execute squatting activities Ongoing 4/6/2022  3. Patient will demonstrate efficient hip hinge squat without cueing to improve ability  to retrieve laundry basket and groceries. Ongoing 4/6/2022     Long Term Goals: 10 weeks   1. Patient will demonstrate hip flexion 5/5 with efficient core first strategy to be able to get into/out of car without difficulty Ongoing 4/6/2022  2. Patient will demonstrate hip hinge with neutral trunk and T-larisa arms to lift greater or equal to 10 pounds from floor to chest to increase safety with lifting grocery bags. Ongoing 4/6/2022  3. Patient will decrease neural tension L lower extremity to full knee extension in slump position in order to allow progression of range of motion L hip for squatting activities of daily living.  Ongoing 4/6/2022    Plan     Plan of care Certification: 3/14/2022 to 6/14/2022.     Outpatient Physical Therapy 2 times weekly for 10 weeks to include the following interventions: Cervical/Lumbar Traction, Electrical Stimulation  , Gait Training, Manual Therapy, Moist Heat/ Ice, Neuromuscular Re-ed, Patient Education, Self Care, Therapeutic Activities, Therapeutic Exercise and Ultrasound. Interventions can be provided by PTA as a treatment team.    Continue to progress core strength, hip strength as tolerated, and lumbar spine mobility to allow patient to return to prior level of function.    Liz Busch, KRISTIE   4/20/2022

## 2022-04-25 ENCOUNTER — PATIENT OUTREACH (OUTPATIENT)
Dept: ADMINISTRATIVE | Facility: OTHER | Age: 52
End: 2022-04-25
Payer: MEDICAID

## 2022-04-25 NOTE — PROGRESS NOTES
Health Maintenance Due   Topic Date Due    COVID-19 Vaccine (1) Never done    Shingles Vaccine (1 of 2) Never done    Influenza Vaccine (1) 09/01/2021    Lipid Panel  10/10/2021     Updates were requested from care everywhere.  Chart was reviewed for overdue Proactive [facility] Encounters ([facility]) topics (CRS, Breast Cancer Screening, Eye exam)  Health Maintenance has been updated.  LINKS immunization registry triggered.  Immunizations were reconciled.

## 2022-04-26 ENCOUNTER — OFFICE VISIT (OUTPATIENT)
Dept: ORTHOPEDICS | Facility: CLINIC | Age: 52
End: 2022-04-26
Payer: MEDICAID

## 2022-04-26 ENCOUNTER — TELEPHONE (OUTPATIENT)
Dept: ORTHOPEDICS | Facility: CLINIC | Age: 52
End: 2022-04-26
Payer: MEDICAID

## 2022-04-26 VITALS — BODY MASS INDEX: 45 KG/M2 | HEIGHT: 66 IN | WEIGHT: 280 LBS

## 2022-04-26 DIAGNOSIS — M17.12 PRIMARY OSTEOARTHRITIS OF LEFT KNEE: Primary | ICD-10-CM

## 2022-04-26 PROCEDURE — 20610 LARGE JOINT ASPIRATION/INJECTION: L KNEE: ICD-10-PCS | Mod: S$PBB,LT,, | Performed by: ORTHOPAEDIC SURGERY

## 2022-04-26 PROCEDURE — 99999 PR PBB SHADOW E&M-EST. PATIENT-LVL IV: ICD-10-PCS | Mod: PBBFAC,,, | Performed by: ORTHOPAEDIC SURGERY

## 2022-04-26 PROCEDURE — 1159F PR MEDICATION LIST DOCUMENTED IN MEDICAL RECORD: ICD-10-PCS | Mod: CPTII,,, | Performed by: ORTHOPAEDIC SURGERY

## 2022-04-26 PROCEDURE — 99999 PR PBB SHADOW E&M-EST. PATIENT-LVL IV: CPT | Mod: PBBFAC,,, | Performed by: ORTHOPAEDIC SURGERY

## 2022-04-26 PROCEDURE — 3008F PR BODY MASS INDEX (BMI) DOCUMENTED: ICD-10-PCS | Mod: CPTII,,, | Performed by: ORTHOPAEDIC SURGERY

## 2022-04-26 PROCEDURE — 4010F ACE/ARB THERAPY RXD/TAKEN: CPT | Mod: CPTII,,, | Performed by: ORTHOPAEDIC SURGERY

## 2022-04-26 PROCEDURE — 1160F PR REVIEW ALL MEDS BY PRESCRIBER/CLIN PHARMACIST DOCUMENTED: ICD-10-PCS | Mod: CPTII,,, | Performed by: ORTHOPAEDIC SURGERY

## 2022-04-26 PROCEDURE — 3008F BODY MASS INDEX DOCD: CPT | Mod: CPTII,,, | Performed by: ORTHOPAEDIC SURGERY

## 2022-04-26 PROCEDURE — 1159F MED LIST DOCD IN RCRD: CPT | Mod: CPTII,,, | Performed by: ORTHOPAEDIC SURGERY

## 2022-04-26 PROCEDURE — 99214 OFFICE O/P EST MOD 30 MIN: CPT | Mod: PBBFAC,PN,25 | Performed by: ORTHOPAEDIC SURGERY

## 2022-04-26 PROCEDURE — 99214 PR OFFICE/OUTPT VISIT, EST, LEVL IV, 30-39 MIN: ICD-10-PCS | Mod: 25,S$PBB,, | Performed by: ORTHOPAEDIC SURGERY

## 2022-04-26 PROCEDURE — 4010F PR ACE/ARB THEARPY RXD/TAKEN: ICD-10-PCS | Mod: CPTII,,, | Performed by: ORTHOPAEDIC SURGERY

## 2022-04-26 PROCEDURE — 99214 OFFICE O/P EST MOD 30 MIN: CPT | Mod: 25,S$PBB,, | Performed by: ORTHOPAEDIC SURGERY

## 2022-04-26 PROCEDURE — 20610 DRAIN/INJ JOINT/BURSA W/O US: CPT | Mod: PBBFAC,PN | Performed by: ORTHOPAEDIC SURGERY

## 2022-04-26 PROCEDURE — 1160F RVW MEDS BY RX/DR IN RCRD: CPT | Mod: CPTII,,, | Performed by: ORTHOPAEDIC SURGERY

## 2022-04-26 RX ORDER — METOCLOPRAMIDE HYDROCHLORIDE 10 MG/1
TABLET ORAL
COMMUNITY
Start: 2022-04-20 | End: 2023-07-26 | Stop reason: ALTCHOICE

## 2022-04-26 RX ORDER — TRIAMCINOLONE ACETONIDE 40 MG/ML
40 INJECTION, SUSPENSION INTRA-ARTICULAR; INTRAMUSCULAR
Status: DISCONTINUED | OUTPATIENT
Start: 2022-04-26 | End: 2022-04-26 | Stop reason: HOSPADM

## 2022-04-26 RX ORDER — CYANOCOBALAMIN 500 UG/1
SPRAY NASAL
COMMUNITY
Start: 2022-04-20

## 2022-04-26 RX ADMIN — TRIAMCINOLONE ACETONIDE 40 MG: 40 INJECTION, SUSPENSION INTRA-ARTICULAR; INTRAMUSCULAR at 09:04
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
## 2022-04-26 NOTE — PROCEDURES
Large Joint Aspiration/Injection: L knee    Date/Time: 4/26/2022 9:30 AM  Performed by: Pipe Sauceda MD  Authorized by: Pipe Sauceda MD     Location:  Knee  Site:  L knee  Medications:  40 mg triamcinolone acetonide 40 mg/mL     After obtaining verbal informed consent the patient's left knee was prepped aseptically and injected through an inferior lateral approach using 40 mg of triamcinolone and 1 cc of 1% plain Xylocaine.  The patient was warned about postinjection flare and how to manage it with ice, rest and over-the-counter analgesics.  They're advised to contact me for any severe, uncontrolled pain.

## 2022-04-26 NOTE — TELEPHONE ENCOUNTER
----- Message from Alejandra Beck sent at 4/26/2022  2:06 PM CDT -----  Regarding: Appointment  Patient is unable to make it to appointment on 4/27 with  and wants to reschedule. She was informed that someone will contact her to reschedule her appointment. Thanks

## 2022-04-26 NOTE — PROGRESS NOTES
" [facility]  Physical Therapy Reassessment and Discharge Note     Name: Arron Ron  Clinic Number: [id number]    Therapy Diagnosis:   Encounter Diagnosis   Name Primary?    Chronic bilateral low back pain with bilateral sciatica Yes     Physician: Mahi Rojas MD    Visit Date: 4/27/2022    Physician Orders: PT Eval and Treat  Medical Diagnosis from Referral: Chronic bilateral low back pain with bilateral sciatica   Evaluation Date: 3/14/2022  Date of Last visit: 4/27/2022  Total Visits Received: 10    Time In: 10:15  Time Out: 11:45  Total Billable Time: 30 minutes    Subjective     Pt reports: She had an injection in the L knee yesterday. She's not feeling good today in the back or the knee.  She was compliant with home exercise program.  Response to previous treatment: soreness  Functional change: back pain and left knee pain     Pain: 5/10    Location: low back    Objective     Lumbar Spine Range of Motion  Lumbar Spine AROM    Initial Eval Pain 4/6/2022 Pain 4/27/2022 Pain   Flexion  (cm fingertips from floor) fingertips to proximal shins, miriam's sign to return y Fingertips distal shin, NO miriam's sign y Fingertips distal shin, NO miriam's sign y   Extension Hinge lower lumbar y         Sidebend R Hinge lower lumbar y Fingertips to knee y Fingertips to knee y   Sidebend L Hinge lower lumbar y Fingertips to knee y Fingertips to knee y   All fields left blank intentionally and signify "not tested"     Lower Extremity Strength MMT /5  Right  Initial Eval Pain 4/6/2022 4/27/2022   Left  Initial Eval Pain 4/6/2022 4/27/2022   3- posterior lean trunk y 3- posterior lean 3- posterior lean Hip flexion 3- posterior lean trunk y 3- posterior lean 3- posterior lean   4+   4+ 4+ Knee flexion 4+ y 4+ 4+   4+   4+ 4+ Knee extension 4 y 4+ 4+   5   5 5 Dorsiflexion  5   5 5   All fields left blank intentionally and signify "not tested"     Functional testing  Sit to stand: uses UEs, " uses SPC   > 4/6/2022 able to perform without UE > 4/27/2022 sit to stand: uses UEs, uses SPC  Gait: with SPC in L > 4/6/2022 no assistive device > 4/27/2022 with SPC in L       Arron received therapeutic exercises to develop strength, ROM, flexibility, posture and core stabilization for 30 minutes including:  +Reassessment and patient education    Home Exercises Provided and Patient Education Provided     Written Home Exercises Provided: Patient instructed to cont prior HEP.  Exercises were reviewed and Arron was able to demonstrate them prior to the end of the session.  Arron demonstrated good  understanding of the education provided.     Education provided:   Lack of progress indicating referral back to MD  Possible benefit of referral to spine specialist  Weight loss to decrease pain, improve likelihood of successful knee surgery  Diaphragmatic breathing to improve parasympathetic response    Assessment     Patient presents for reassessment today with continued complaints of left>right knee pain and back pain limiting function. Per previous note written by PTA, patient and PTA report minimal change in function since starting Physical Therapy. I agree that patient has not demonstrated significant improvements in her strength, range of motion, nor tolerance to walking or lifting activities. Therapy has been limited due to preexisting left knee pain which does not allow for progression of strengthening required to support the spine. Patient is seeing an orthopedic MD who recommends knee surgery at this time. If patient chooses to have TKR, will be appropriate to work on the lumbar spine again. I recommend that the patient consult with a spine specialist as well to follow her case.     Discharge reason: Patient has reached the maximum rehab potential for the present time    Goals:   Short Term Goals: 3 weeks   1. Patient will demonstrate independence in prescribed home exercise program once a day with proper  form, sets and reps to improve initiation, strength and endurance of muscles needed to return to prior level of function Met 4/6/2022  2. Patient will increase B lower extremity strength in deficient planes by 1/2 to 1 full grade with improved eccentric control to execute squatting activities Not met 4/27/2022  3. Patient will demonstrate efficient hip hinge squat without cueing to improve ability to retrieve laundry basket and groceries. Not met 4/27/2022     Long Term Goals: 10 weeks   1. Patient will demonstrate hip flexion 5/5 with efficient core first strategy to be able to get into/out of car without difficulty Not met 4/27/2022  2. Patient will demonstrate hip hinge with neutral trunk and T-larisa arms to lift greater or equal to 10 pounds from floor to chest to increase safety with lifting grocery bags. Not met 4/27/2022  3. Patient will decrease neural tension L lower extremity to full knee extension in slump position in order to allow progression of range of motion L hip for squatting activities of daily living.  Not met 4/27/2022    Plan     This patient is discharged from Physical Therapy    Halima Montejo, PT   4/27/2022

## 2022-04-27 ENCOUNTER — CLINICAL SUPPORT (OUTPATIENT)
Dept: REHABILITATION | Facility: HOSPITAL | Age: 52
End: 2022-04-27
Payer: MEDICAID

## 2022-04-27 DIAGNOSIS — M54.41 CHRONIC BILATERAL LOW BACK PAIN WITH BILATERAL SCIATICA: Primary | ICD-10-CM

## 2022-04-27 DIAGNOSIS — M54.42 CHRONIC BILATERAL LOW BACK PAIN WITH BILATERAL SCIATICA: Primary | ICD-10-CM

## 2022-04-27 DIAGNOSIS — G89.29 CHRONIC BILATERAL LOW BACK PAIN WITH BILATERAL SCIATICA: Primary | ICD-10-CM

## 2022-04-27 PROCEDURE — 97110 THERAPEUTIC EXERCISES: CPT | Mod: PN

## 2022-05-07 DIAGNOSIS — G89.29 CHRONIC BILATERAL LOW BACK PAIN WITH BILATERAL SCIATICA: ICD-10-CM

## 2022-05-07 DIAGNOSIS — M17.12 PRIMARY OSTEOARTHRITIS OF LEFT KNEE: ICD-10-CM

## 2022-05-07 DIAGNOSIS — M54.41 CHRONIC BILATERAL LOW BACK PAIN WITH BILATERAL SCIATICA: ICD-10-CM

## 2022-05-07 DIAGNOSIS — M54.42 CHRONIC BILATERAL LOW BACK PAIN WITH BILATERAL SCIATICA: ICD-10-CM

## 2022-05-07 NOTE — TELEPHONE ENCOUNTER
No new care gaps identified.  [facility] Embedded Care Gaps. Reference number: [id number]. 5/07/2022   11:01:17 AM ELTONT

## 2022-05-10 RX ORDER — DICLOFENAC SODIUM 10 MG/G
GEL TOPICAL
Qty: 100 G | Refills: 0 | Status: SHIPPED | OUTPATIENT
Start: 2022-05-10 | End: 2022-06-06

## 2022-05-10 RX ORDER — CYCLOBENZAPRINE HCL 10 MG
TABLET ORAL
Qty: 60 TABLET | Refills: 0 | Status: SHIPPED | OUTPATIENT
Start: 2022-05-10 | End: 2022-06-06

## 2022-06-04 DIAGNOSIS — M54.41 CHRONIC BILATERAL LOW BACK PAIN WITH BILATERAL SCIATICA: ICD-10-CM

## 2022-06-04 DIAGNOSIS — G89.29 CHRONIC BILATERAL LOW BACK PAIN WITH BILATERAL SCIATICA: ICD-10-CM

## 2022-06-04 DIAGNOSIS — M54.42 CHRONIC BILATERAL LOW BACK PAIN WITH BILATERAL SCIATICA: ICD-10-CM

## 2022-06-04 DIAGNOSIS — M17.12 PRIMARY OSTEOARTHRITIS OF LEFT KNEE: ICD-10-CM

## 2022-06-04 NOTE — TELEPHONE ENCOUNTER
No new care gaps identified.  [facility] Embedded Care Gaps. Reference number: [id number]. 6/04/2022   11:52:21 AM ELTONT

## 2022-06-06 RX ORDER — DICLOFENAC SODIUM 10 MG/G
GEL TOPICAL
Qty: 100 G | Refills: 0 | Status: SHIPPED | OUTPATIENT
Start: 2022-06-06 | End: 2022-07-05

## 2022-06-06 RX ORDER — CYCLOBENZAPRINE HCL 10 MG
TABLET ORAL
Qty: 60 TABLET | Refills: 0 | Status: SHIPPED | OUTPATIENT
Start: 2022-06-06 | End: 2022-07-05

## 2022-06-06 NOTE — TELEPHONE ENCOUNTER
LOV 1/5/2022    Requested Prescriptions     Pending Prescriptions Disp Refills    diclofenac sodium (VOLTAREN) 1 % Gel [Pharmacy Med Name: diclofenac 1 % topical gel] 100 g 0     Sig: APPLY 2 GRAMS TOPICALLY TO THE AFFECTED AREA TWICE DAILY AS DIRECTED    cyclobenzaprine (FLEXERIL) 10 MG tablet [Pharmacy Med Name: cyclobenzaprine 10 mg tablet] 60 tablet 0     Sig: TAKE ONE TABLET BY MOUTH THREE TIMES DAILY AS NEEDED FOR MUSCLE SPASMS

## 2022-06-08 ENCOUNTER — OFFICE VISIT (OUTPATIENT)
Dept: GASTROENTEROLOGY | Facility: CLINIC | Age: 52
End: 2022-06-08
Payer: MEDICAID

## 2022-06-08 VITALS
SYSTOLIC BLOOD PRESSURE: 136 MMHG | DIASTOLIC BLOOD PRESSURE: 82 MMHG | HEART RATE: 99 BPM | HEIGHT: 66 IN | BODY MASS INDEX: 47.09 KG/M2 | WEIGHT: 293 LBS

## 2022-06-08 DIAGNOSIS — K21.00 GASTROESOPHAGEAL REFLUX DISEASE WITH ESOPHAGITIS WITHOUT HEMORRHAGE: ICD-10-CM

## 2022-06-08 DIAGNOSIS — R13.19 ESOPHAGEAL DYSPHAGIA: Primary | ICD-10-CM

## 2022-06-08 DIAGNOSIS — K58.1 IRRITABLE BOWEL SYNDROME WITH CONSTIPATION: ICD-10-CM

## 2022-06-08 PROCEDURE — 99999 PR PBB SHADOW E&M-EST. PATIENT-LVL V: ICD-10-PCS | Mod: PBBFAC,,, | Performed by: STUDENT IN AN ORGANIZED HEALTH CARE EDUCATION/TRAINING PROGRAM

## 2022-06-08 PROCEDURE — 99214 PR OFFICE/OUTPT VISIT, EST, LEVL IV, 30-39 MIN: ICD-10-PCS | Mod: S$PBB,,, | Performed by: STUDENT IN AN ORGANIZED HEALTH CARE EDUCATION/TRAINING PROGRAM

## 2022-06-08 PROCEDURE — 99214 OFFICE O/P EST MOD 30 MIN: CPT | Mod: S$PBB,,, | Performed by: STUDENT IN AN ORGANIZED HEALTH CARE EDUCATION/TRAINING PROGRAM

## 2022-06-08 PROCEDURE — 99999 PR PBB SHADOW E&M-EST. PATIENT-LVL V: CPT | Mod: PBBFAC,,, | Performed by: STUDENT IN AN ORGANIZED HEALTH CARE EDUCATION/TRAINING PROGRAM

## 2022-06-08 PROCEDURE — 99215 OFFICE O/P EST HI 40 MIN: CPT | Mod: PBBFAC | Performed by: STUDENT IN AN ORGANIZED HEALTH CARE EDUCATION/TRAINING PROGRAM

## 2022-06-08 RX ORDER — PANTOPRAZOLE SODIUM 40 MG/1
40 TABLET, DELAYED RELEASE ORAL 2 TIMES DAILY
Qty: 180 TABLET | Refills: 1 | Status: SHIPPED | OUTPATIENT
Start: 2022-06-08 | End: 2022-08-20 | Stop reason: SDUPTHER

## 2022-06-08 NOTE — PROGRESS NOTES
"   [facility]    Reason for visit: The primary encounter diagnosis was Esophageal dysphagia. A diagnosis of Irritable bowel syndrome with constipation was also pertinent to this visit.  Referring Provider/PCP: Mahi Rojas MD    History of Present Illness:  Arron Ron is a 51 y.o. female with a history of GERD, constipation, sleeve gastrectomy who is presenting for follow-up evaluation of GERD, dysphagia and constipation.    Last seen by me in June 2020 for dysphagia and GERD. From that visit: "she has been having dysphagia, described as food getting stuck in her esophagus, mostly solids and pills, not liquids for about a year now.  She also reports a sensation of food sitting in her chest and discomfort, the need to belch, however she is unable to so she makes herself throw up every now and then.  She was started on PPI by her PCP, takes it after food, it improved her symptoms slightly but did not resolve them.  Was having constipation with bowel movements about once a week, sometimes less frequently.    Previously seen in 2018 for constipation, anemia and history of rectal bleeding, GERD.  She had an EGD and colonoscopy that showed a small hiatal hernia, external hemorrhoids.  Anemia was not iron deficiency, she was referred to Nephrology for possible anemia of chronic disease due to CKD.  She was started on PPI.  Prior to that she had 2 EGDs, showing hiatal hernia. She had a sleeve gastrectomy in 1873-0619 (possible hiatal hernia repair but she is not sure), lost a significant amount of weight after that, but regained it back.     Interval history:  Last visit recommended EGD to evaluate for evidence of reflux and hiatal hernia.  She underwent an EGD that showed LA grade C esophagitis as well as a 5 cm hiatal hernia.  She was also started on MiraLax and Metamucil for constipation.  Since her endoscopy she has been taking PPI once a day 30 minutes before meals which helps " her symptoms some but she continues to have pretty severe reflux and dysphagia.  She has also been having on average 1 bowel movement a week despite taking the MiraLax and Metamucil.  She reports abdominal pain associated with the constipation that improves after she has a bowel movement.  No rectal bleeding.    PEndoHx:  EGD 4/2022 - LA grade C esophagitis, 5 cm hiatal hernia, sleeve gastrectomy healthy appearing.  Colonoscopy 4/2021 - 3mm polyp in the descending colon, removed. Path: TA. Repeat in 5years.  EGD 2017 - small hiatal hernia, otherwise unremarkable.    Review of Systems   Gastrointestinal: Positive for abdominal pain, constipation, nausea and vomiting. Negative for blood in stool.       Medical History:  Past Medical History:   Diagnosis Date    Acid reflux     Anemia     Back pain     was under therapy    Chronic back pain     Constipation     Elevated cholesterol     Encounter for blood transfusion 1987    Endometriosis     Hyperlipidemia     Hypertension     Nausea & vomiting     Sleep apnea     Tendonitis of wrist, right        Past Surgical History:   Procedure Laterality Date    BREAST BIOPSY      left , benign    CHOLECYSTECTOMY      COLONOSCOPY  2017        COLONOSCOPY N/A 4/29/2021    Procedure: COLONOSCOPY;  Surgeon: Norbert Garcia MD;  [facility];  Service: Colon and Rectal;  Laterality: N/A;    DILATION AND CURETTAGE OF UTERUS      ENDOMETRIAL ABLATION      ESOPHAGOGASTRODUODENOSCOPY N/A 4/1/2022    Procedure: EGD (ESOPHAGOGASTRODUODENOSCOPY);  Surgeon: Diego Spear MD;  [facility]  Service: Endoscopy;  Laterality: N/A;  rapid covid test arrival is 12pm -   instructions handed -     gallstone      GASTRIC BYPASS  02/18/2020    HYSTERECTOMY  10/1/15    [facility]- bleeding/ endometriosis    OOPHORECTOMY      TUBAL LIGATION      UPPER GASTROINTESTINAL ENDOSCOPY  2017    [facility]        Family History   Problem Relation Age of Onset     Hypertension Mother     Heart attack Mother     Hypertension Brother     Heart disease Maternal Grandmother     Hypertension Maternal Grandmother     Diabetes Maternal Aunt     Breast cancer Neg Hx     Colon cancer Neg Hx     Ovarian cancer Neg Hx     Esophageal cancer Neg Hx        Social History     Socioeconomic History    Marital status: Single   Tobacco Use    Smoking status: Never Smoker    Smokeless tobacco: Never Used   Substance and Sexual Activity    Alcohol use: Yes     Comment: rare    Drug use: No    Sexual activity: Yes     Partners: Male     Birth control/protection: Surgical, See Surgical Hx     Comment: --BTL       Current Outpatient Medications on File Prior to Visit   Medication Sig Dispense Refill    albuterol (ACCUNEB) 1.25 mg/3 mL Nebu Take 3 mLs (1.25 mg total) by nebulization every 6 (six) hours as needed (increased cough/wheeze/shortness of breath). Rescue 150 mL 5    albuterol (PROAIR HFA) 90 mcg/actuation inhaler Inhale 2 puffs into the lungs every 6 (six) hours as needed for Wheezing. Rescue 18 g 3    albuterol (PROVENTIL/VENTOLIN HFA) 90 mcg/actuation inhaler Inhale 2 puffs into the lungs every 4 (four) hours as needed for Wheezing or Shortness of Breath. Rescue 18 g 5    ALLERGY RELIEF, CETIRIZINE, 10 mg tablet Take 10 mg by mouth once daily.      ALPRAZolam (XANAX) 2 MG Tab Take 2 mg by mouth every evening.       aspirin (ECOTRIN) 81 MG EC tablet Take 81 mg by mouth every Mon, Wed, Fri.       atorvastatin (LIPITOR) 20 MG tablet TAKE ONE TABLET BY MOUTH ONCE A DAY IN THE EVENING      budesonide-glycopyr-formoterol (BREZTRI AEROSPHERE) 160-9-4.8 mcg/actuation HFAA Inhale 2 puffs into the lungs 2 (two) times daily. 10.7 g 11    celecoxib (CELEBREX) 200 MG capsule Take 200 mg by mouth once daily.      cyclobenzaprine (FLEXERIL) 10 MG tablet TAKE ONE TABLET BY MOUTH THREE TIMES DAILY AS NEEDED FOR MUSCLE SPASMS 60 tablet 0    diclofenac sodium (VOLTAREN) 1  % Gel APPLY 2 GRAMS TOPICALLY TO THE AFFECTED AREA TWICE DAILY AS DIRECTED 100 g 0    doxazosin (CARDURA) 1 MG tablet Take 1 mg by mouth once daily.      ergocalciferol, vitamin D2, (VITAMIN D ORAL) Take 2,000 Units by mouth once daily.       fish oil-omega-3 fatty acids 300-1,000 mg capsule Take by mouth once daily.      fluticasone propionate (FLONASE) 50 mcg/actuation nasal spray 1 spray (50 mcg total) by Each Nostril route once daily. 16 g 3    gabapentin (NEURONTIN) 100 MG capsule Take 1 capsule (100 mg total) by mouth every evening. 30 capsule 11    mometasone-formoterol (DULERA) 200-5 mcg/actuation inhaler Inhale 2 puffs into the lungs 2 (two) times daily. Controller 13 g 11    montelukast (SINGULAIR) 10 mg tablet Take 10 mg by mouth every evening.      NASCOBAL 500 mcg/spray nasal spray SMARTSIG:Both Nares      phentermine (ADIPEX-P) 37.5 mg tablet Take 37.5 mg by mouth every other day.       polyethylene glycol (GLYCOLAX) 17 gram/dose powder Take 17 g by mouth once daily. 1530 g 3    REGLAN 10 mg tablet Take by mouth.      spironolactone (ALDACTONE) 50 MG tablet spironolactone 50 mg tablet      torsemide (DEMADEX) 20 MG Tab Take 20 mg by mouth once daily.       valsartan (DIOVAN) 320 MG tablet Take 320 mg by mouth once daily.  11    [DISCONTINUED] pantoprazole (PROTONIX) 40 MG tablet Take 1 tablet (40 mg total) by mouth 2 (two) times daily. 60 tablet 3     No current facility-administered medications on file prior to visit.       Review of patient's allergies indicates:  No Known Allergies    Physical Exam:  Constitutional:  not in acute distress and well developed  HENT: Head: Normal, normocephalic, atraumatic.  Eyes: conjunctiva clear and sclera nonicteric  Cardiovascular: regular rate and rhythm  Respiratory: normal chest expansion & respiratory effort   and no accessory muscle use  GI: soft, non-tender, without masses or organomegaly  Musculoskeletal: no muscular tenderness noted  Skin:  normal color  Neurological: alert, oriented x3  Psychiatric: mood and affect are within normal limits, pt is a good historian; no memory problems were noted    Laboratory:  Lab Results   Component Value Date     10/10/2020    K 3.9 10/10/2020     10/10/2020    CO2 27 10/10/2020    BUN 12 10/10/2020    CREATININE 1.2 10/10/2020    CALCIUM 9.6 10/10/2020    ANIONGAP 10 10/10/2020    ESTGFRAFRICA >60 10/10/2020    EGFRNONAA 53 (A) 10/10/2020       Lab Results   Component Value Date    ALT 12 10/10/2020    AST 13 10/10/2020    ALKPHOS 111 10/10/2020    BILITOT 0.5 10/10/2020       Lab Results   Component Value Date    WBC 6.72 10/10/2020    HGB 10.5 (L) 10/10/2020    HCT 31.6 (L) 10/10/2020    MCV 88 10/10/2020     10/10/2020       Microbiology:  No Pertinent Microbiology    Imaging:  Independently reviewed EGD images, LA grade C esophagitis identified.    Assessment:  Arron Ron is a 51 y.o. female who is presenting for follow-up evaluation of GERD, IBS-C, dysphagia.    Problems:  1. GERD  2. Dysphagia  3. IBS C    The patient comes for follow-up of dysphagia and reflux symptoms.  EGD with evidence of reflux, not controlled on once daily PPI.  Recommend starting twice daily PPI and repeat endoscopy as scheduled to ensure esophagitis healing.  If her symptoms do not improve on PPI, she might need surgical conversion to gastric bypass.  We will perform an esophageal manometry to rule out any motility disorders.  She also exhibit symptoms of IBS C, failed MiraLax and Metamucil.  Will start Linzess 72 and reassess her symptoms in 3 months.      Plan:  1. PPI b.i.d.  2. Esophageal manometry, consent obtained today  3. Linzess 72 mcg daily  Follow up in about 3 months (around 9/8/2022).    Barbie Ford MD  Gastroenterology Fellow    Orders Placed This Encounter   Procedures    Manometry esophageal

## 2022-06-08 NOTE — PATIENT INSTRUCTIONS
Take Protonix twice a day 30-60 minutes prior to meals  We will perform an esophageal manometry study, you will get a call to get that scheduled  Start taking Linzess 72 mcg daily for constipation  Will follow-up in 3 months

## 2022-06-21 ENCOUNTER — HOSPITAL ENCOUNTER (OUTPATIENT)
Dept: RADIOLOGY | Facility: HOSPITAL | Age: 52
Discharge: HOME | End: 2022-06-21
Attending: NURSE PRACTITIONER
Payer: MEDICAID

## 2022-06-21 ENCOUNTER — TELEPHONE (OUTPATIENT)
Dept: INTERNAL MEDICINE | Facility: CLINIC | Age: 52
End: 2022-06-21
Payer: MEDICAID

## 2022-06-21 ENCOUNTER — OFFICE VISIT (OUTPATIENT)
Dept: INTERNAL MEDICINE | Facility: CLINIC | Age: 52
End: 2022-06-21
Payer: MEDICAID

## 2022-06-21 VITALS
HEIGHT: 66 IN | SYSTOLIC BLOOD PRESSURE: 114 MMHG | HEART RATE: 91 BPM | OXYGEN SATURATION: 97 % | WEIGHT: 288.13 LBS | DIASTOLIC BLOOD PRESSURE: 70 MMHG | RESPIRATION RATE: 16 BRPM | BODY MASS INDEX: 46.3 KG/M2

## 2022-06-21 DIAGNOSIS — E66.01 MORBID OBESITY: ICD-10-CM

## 2022-06-21 DIAGNOSIS — M79.672 LEFT FOOT PAIN: ICD-10-CM

## 2022-06-21 DIAGNOSIS — R26.9 ABNORMALITY OF GAIT AND MOBILITY: ICD-10-CM
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
## 2022-06-21 DIAGNOSIS — M54.41 CHRONIC BILATERAL LOW BACK PAIN WITH BILATERAL SCIATICA: Primary | ICD-10-CM

## 2022-06-21 DIAGNOSIS — G89.29 CHRONIC BILATERAL LOW BACK PAIN WITH BILATERAL SCIATICA: Primary | ICD-10-CM

## 2022-06-21 DIAGNOSIS — M54.42 CHRONIC BILATERAL LOW BACK PAIN WITH BILATERAL SCIATICA: Primary | ICD-10-CM

## 2022-06-21 DIAGNOSIS — M17.12 PRIMARY OSTEOARTHRITIS OF LEFT KNEE: ICD-10-CM

## 2022-06-21 PROCEDURE — 99999 PR PBB SHADOW E&M-EST. PATIENT-LVL V: ICD-10-PCS | Mod: PBBFAC,,, | Performed by: NURSE PRACTITIONER

## 2022-06-21 PROCEDURE — 73630 X-RAY EXAM OF FOOT: CPT | Mod: 26,LT,, | Performed by: RADIOLOGY

## 2022-06-21 PROCEDURE — 3078F DIAST BP <80 MM HG: CPT | Mod: CPTII,,, | Performed by: NURSE PRACTITIONER

## 2022-06-21 PROCEDURE — 4010F PR ACE/ARB THEARPY RXD/TAKEN: ICD-10-PCS | Mod: CPTII,,, | Performed by: NURSE PRACTITIONER

## 2022-06-21 PROCEDURE — 99215 OFFICE O/P EST HI 40 MIN: CPT | Mod: PBBFAC | Performed by: NURSE PRACTITIONER

## 2022-06-21 PROCEDURE — 3008F PR BODY MASS INDEX (BMI) DOCUMENTED: ICD-10-PCS | Mod: CPTII,,, | Performed by: NURSE PRACTITIONER

## 2022-06-21 PROCEDURE — 3074F PR MOST RECENT SYSTOLIC BLOOD PRESSURE < 130 MM HG: ICD-10-PCS | Mod: CPTII,,, | Performed by: NURSE PRACTITIONER

## 2022-06-21 PROCEDURE — 1159F PR MEDICATION LIST DOCUMENTED IN MEDICAL RECORD: ICD-10-PCS | Mod: CPTII,,, | Performed by: NURSE PRACTITIONER

## 2022-06-21 PROCEDURE — 4010F ACE/ARB THERAPY RXD/TAKEN: CPT | Mod: CPTII,,, | Performed by: NURSE PRACTITIONER

## 2022-06-21 PROCEDURE — 3078F PR MOST RECENT DIASTOLIC BLOOD PRESSURE < 80 MM HG: ICD-10-PCS | Mod: CPTII,,, | Performed by: NURSE PRACTITIONER

## 2022-06-21 PROCEDURE — 73630 XR FOOT COMPLETE 3 VIEW LEFT: ICD-10-PCS | Mod: 26,LT,, | Performed by: RADIOLOGY

## 2022-06-21 PROCEDURE — 1159F MED LIST DOCD IN RCRD: CPT | Mod: CPTII,,, | Performed by: NURSE PRACTITIONER

## 2022-06-21 PROCEDURE — 99999 PR PBB SHADOW E&M-EST. PATIENT-LVL V: CPT | Mod: PBBFAC,,, | Performed by: NURSE PRACTITIONER

## 2022-06-21 PROCEDURE — 99214 OFFICE O/P EST MOD 30 MIN: CPT | Mod: S$PBB,,, | Performed by: NURSE PRACTITIONER

## 2022-06-21 PROCEDURE — 99214 PR OFFICE/OUTPT VISIT, EST, LEVL IV, 30-39 MIN: ICD-10-PCS | Mod: S$PBB,,, | Performed by: NURSE PRACTITIONER

## 2022-06-21 PROCEDURE — 3074F SYST BP LT 130 MM HG: CPT | Mod: CPTII,,, | Performed by: NURSE PRACTITIONER

## 2022-06-21 PROCEDURE — 73630 X-RAY EXAM OF FOOT: CPT | Mod: TC,LT

## 2022-06-21 PROCEDURE — 3008F BODY MASS INDEX DOCD: CPT | Mod: CPTII,,, | Performed by: NURSE PRACTITIONER

## 2022-06-21 NOTE — TELEPHONE ENCOUNTER
----- Message from Yue Thompson sent at 2022 11:10 AM CDT -----  Contact: Arron Ron  MRN: [id number]  : 1970  PCP: Mahi Rojas  Home Phone      [phone]  Work Phone      Not on file.  Mobile          [phone]  Mobile          [phone]      MESSAGE: Arron called back to say that she can't do Physical therapy in [place]. She needs to do it in [place]. She doesn't mind being on a waiting list.      Phone: [phone]

## 2022-06-21 NOTE — TELEPHONE ENCOUNTER
PT will be in [place]. Unsure why referrals are being routed to [facility] first.
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
## 2022-06-21 NOTE — PROGRESS NOTES
There is no evidence of fracture, dislocation or other acute osseous abnormality. Soft tissue swelling.  Continue with plan as dicussed Rest, ICE, elevation

## 2022-06-22 ENCOUNTER — OFFICE VISIT (OUTPATIENT)
Dept: ORTHOPEDICS | Facility: CLINIC | Age: 52
End: 2022-06-22
Payer: MEDICAID

## 2022-06-22 VITALS — HEIGHT: 66 IN | BODY MASS INDEX: 46.28 KG/M2 | WEIGHT: 288 LBS

## 2022-06-22 DIAGNOSIS — M25.531 RIGHT WRIST PAIN: Primary | ICD-10-CM

## 2022-06-22 PROCEDURE — 3008F BODY MASS INDEX DOCD: CPT | Mod: CPTII,,, | Performed by: ORTHOPAEDIC SURGERY

## 2022-06-22 PROCEDURE — 3008F PR BODY MASS INDEX (BMI) DOCUMENTED: ICD-10-PCS | Mod: CPTII,,, | Performed by: ORTHOPAEDIC SURGERY

## 2022-06-22 PROCEDURE — 4010F PR ACE/ARB THEARPY RXD/TAKEN: ICD-10-PCS | Mod: CPTII,,, | Performed by: ORTHOPAEDIC SURGERY

## 2022-06-22 PROCEDURE — 1159F MED LIST DOCD IN RCRD: CPT | Mod: CPTII,,, | Performed by: ORTHOPAEDIC SURGERY

## 2022-06-22 PROCEDURE — 99214 OFFICE O/P EST MOD 30 MIN: CPT | Mod: PBBFAC,PN | Performed by: ORTHOPAEDIC SURGERY

## 2022-06-22 PROCEDURE — 99213 PR OFFICE/OUTPT VISIT, EST, LEVL III, 20-29 MIN: ICD-10-PCS | Mod: S$PBB,,, | Performed by: ORTHOPAEDIC SURGERY

## 2022-06-22 PROCEDURE — 99999 PR PBB SHADOW E&M-EST. PATIENT-LVL IV: CPT | Mod: PBBFAC,,, | Performed by: ORTHOPAEDIC SURGERY

## 2022-06-22 PROCEDURE — 99999 PR PBB SHADOW E&M-EST. PATIENT-LVL IV: ICD-10-PCS | Mod: PBBFAC,,, | Performed by: ORTHOPAEDIC SURGERY

## 2022-06-22 PROCEDURE — 1159F PR MEDICATION LIST DOCUMENTED IN MEDICAL RECORD: ICD-10-PCS | Mod: CPTII,,, | Performed by: ORTHOPAEDIC SURGERY

## 2022-06-22 PROCEDURE — 4010F ACE/ARB THERAPY RXD/TAKEN: CPT | Mod: CPTII,,, | Performed by: ORTHOPAEDIC SURGERY

## 2022-06-22 PROCEDURE — 99213 OFFICE O/P EST LOW 20 MIN: CPT | Mod: S$PBB,,, | Performed by: ORTHOPAEDIC SURGERY

## 2022-06-22 RX ORDER — MELOXICAM 15 MG/1
15 TABLET ORAL DAILY
Qty: 30 TABLET | Refills: 1 | Status: SHIPPED | OUTPATIENT
Start: 2022-06-22 | End: 2022-08-29

## 2022-06-22 NOTE — PROGRESS NOTES
Subjective:      Patient ID: Arron Ron is a 51 y.o. female.  Chief Complaint: Follow-up (R Wrist )      HPI  Arron Ron is a  51 y.o. female presenting today for follow up of right wrist pain related to chronic scapholunate ligament tear.  She reports that she is improved a little bit since he injection last visit  She also wears the wrist brace  No endeavor called for therapy so we would like to reorder this.    Review of patient's allergies indicates:  No Known Allergies      Current Outpatient Medications   Medication Sig Dispense Refill    albuterol (ACCUNEB) 1.25 mg/3 mL Nebu Take 3 mLs (1.25 mg total) by nebulization every 6 (six) hours as needed (increased cough/wheeze/shortness of breath). Rescue 150 mL 5    albuterol (PROAIR HFA) 90 mcg/actuation inhaler Inhale 2 puffs into the lungs every 6 (six) hours as needed for Wheezing. Rescue 18 g 3    albuterol (PROVENTIL/VENTOLIN HFA) 90 mcg/actuation inhaler Inhale 2 puffs into the lungs every 4 (four) hours as needed for Wheezing or Shortness of Breath. Rescue 18 g 5    ALLERGY RELIEF, CETIRIZINE, 10 mg tablet Take 10 mg by mouth once daily.      ALPRAZolam (XANAX) 2 MG Tab Take 2 mg by mouth every evening.       aspirin (ECOTRIN) 81 MG EC tablet Take 81 mg by mouth every Mon, Wed, Fri.       atorvastatin (LIPITOR) 20 MG tablet TAKE ONE TABLET BY MOUTH ONCE A DAY IN THE EVENING      budesonide-glycopyr-formoterol (BREZTRI AEROSPHERE) 160-9-4.8 mcg/actuation HFAA Inhale 2 puffs into the lungs 2 (two) times daily. 10.7 g 11    celecoxib (CELEBREX) 200 MG capsule Take 200 mg by mouth once daily.      cyclobenzaprine (FLEXERIL) 10 MG tablet TAKE ONE TABLET BY MOUTH THREE TIMES DAILY AS NEEDED FOR MUSCLE SPASMS 60 tablet 0    diclofenac sodium (VOLTAREN) 1 % Gel APPLY 2 GRAMS TOPICALLY TO THE AFFECTED AREA TWICE DAILY AS DIRECTED 100 g 0    doxazosin (CARDURA) 1 MG tablet Take 1 mg by mouth once daily.      ergocalciferol, vitamin  D2, (VITAMIN D ORAL) Take 2,000 Units by mouth once daily.       fish oil-omega-3 fatty acids 300-1,000 mg capsule Take by mouth once daily.      fluticasone propionate (FLONASE) 50 mcg/actuation nasal spray 1 spray (50 mcg total) by Each Nostril route once daily. 16 g 3    gabapentin (NEURONTIN) 100 MG capsule Take 1 capsule (100 mg total) by mouth every evening. 30 capsule 11    linaCLOtide (LINZESS) 72 mcg Cap capsule Take 1 capsule (72 mcg total) by mouth before breakfast. 90 capsule 3    mometasone-formoterol (DULERA) 200-5 mcg/actuation inhaler Inhale 2 puffs into the lungs 2 (two) times daily. Controller 13 g 11    montelukast (SINGULAIR) 10 mg tablet Take 10 mg by mouth every evening.      NASCOBAL 500 mcg/spray nasal spray SMARTSIG:Both Nares      pantoprazole (PROTONIX) 40 MG tablet Take 1 tablet (40 mg total) by mouth 2 (two) times daily. 180 tablet 1    phentermine (ADIPEX-P) 37.5 mg tablet Take 37.5 mg by mouth every other day.       polyethylene glycol (GLYCOLAX) 17 gram/dose powder Take 17 g by mouth once daily. 1530 g 3    REGLAN 10 mg tablet Take by mouth.      spironolactone (ALDACTONE) 50 MG tablet spironolactone 50 mg tablet      torsemide (DEMADEX) 20 MG Tab Take 20 mg by mouth once daily.       valsartan (DIOVAN) 320 MG tablet Take 320 mg by mouth once daily.  11    meloxicam (MOBIC) 15 MG tablet Take 1 tablet (15 mg total) by mouth once daily. 30 tablet 1     No current facility-administered medications for this visit.       Past Medical History:   Diagnosis Date    Acid reflux     Anemia     Back pain     was under therapy    Chronic back pain     Constipation     Elevated cholesterol     Encounter for blood transfusion 1987    Endometriosis     Hyperlipidemia     Hypertension     Nausea & vomiting     Sleep apnea     Tendonitis of wrist, right        Past Surgical History:   Procedure Laterality Date    BREAST BIOPSY      left , benign    CHOLECYSTECTOMY       "COLONOSCOPY  2017        COLONOSCOPY N/A 4/29/2021    Procedure: COLONOSCOPY;  Surgeon: Norbert Garcia MD;  [facility] ENDO;  Service: Colon and Rectal;  Laterality: N/A;    DILATION AND CURETTAGE OF UTERUS      ENDOMETRIAL ABLATION      ESOPHAGOGASTRODUODENOSCOPY N/A 4/1/2022    Procedure: EGD (ESOPHAGOGASTRODUODENOSCOPY);  Surgeon: Diego Spear MD;  Location: [facility] ENDO (4TH FLR);  Service: Endoscopy;  Laterality: N/A;  rapid covid test arrival is 12pm -   instructions handed -     gallstone      GASTRIC BYPASS  02/18/2020    HYSTERECTOMY  10/1/15    [facility]- bleeding/ endometriosis    OOPHORECTOMY      TUBAL LIGATION      UPPER GASTROINTESTINAL ENDOSCOPY  2017    [facility]        OBJECTIVE:   PHYSICAL EXAM:  Height: 5' 6" (167.6 cm) Weight: 130.6 kg (288 lb)  Vitals:    06/22/22 1309   Weight: 130.6 kg (288 lb)   Height: 5' 6" (1.676 m)   PainSc: 0-No pain     Ortho/SPM Exam  Examination right wrist there is mild diffuse tenderness dorsally  No swelling  Range of motion slightly decreased   strength decreased Tinel sign negative    RADIOGRAPHS:  None  Comments: I have personally reviewed the imaging and I agree with the above radiologist's report.    ASSESSMENT/PLAN:     IMPRESSION:  Chronic scapholunate ligament tear right wrist    PLAN:  I have reordered therapy for the right hand and wrist  Continue wrist brace as needed  I have started her on Mobic 15 mg once a day with food      FOLLOW UP:  6 weeks    Disclaimer: This note has been generated using voice-recognition software. There may be typographical errors that have been missed during proof-reading.    "

## 2022-07-05 ENCOUNTER — TELEPHONE (OUTPATIENT)
Dept: ORTHOPEDICS | Facility: CLINIC | Age: 52
End: 2022-07-05
Payer: MEDICAID

## 2022-07-05 NOTE — TELEPHONE ENCOUNTER
Pt's FMLA forms were scanned into chart under media manager and faxed to [employer] [phone]. Confirmation was received.

## 2022-07-08 ENCOUNTER — TELEPHONE (OUTPATIENT)
Dept: ORTHOPEDICS | Facility: CLINIC | Age: 52
End: 2022-07-08
Payer: MEDICAID

## 2022-07-08 NOTE — TELEPHONE ENCOUNTER
----- Message from Clara Warren sent at 7/8/2022  7:39 AM CDT -----  Regarding: call back  Contact: [phone]  Type:  Same Day Appointment Request    Caller is requesting a same day appointment.  Caller declined first available appointment listed below.    Name of Caller: PT   When is the first available appointment? August   Symptoms: Severe pain on her left knee  Best Call Back Number: [phone]  Additional Information:

## 2022-07-11 ENCOUNTER — TELEPHONE (OUTPATIENT)
Dept: ORTHOPEDICS | Facility: CLINIC | Age: 52
End: 2022-07-11
Payer: MEDICAID

## 2022-07-11 ENCOUNTER — TELEPHONE (OUTPATIENT)
Dept: ENDOSCOPY | Facility: HOSPITAL | Age: 52
End: 2022-07-11
Payer: MEDICAID

## 2022-07-11 ENCOUNTER — CLINICAL SUPPORT (OUTPATIENT)
Dept: REHABILITATION | Facility: HOSPITAL | Age: 52
End: 2022-07-11
Attending: ORTHOPAEDIC SURGERY
Payer: MEDICAID

## 2022-07-11 DIAGNOSIS — M25.531 RIGHT WRIST PAIN: ICD-10-CM

## 2022-07-11 DIAGNOSIS — R29.898 DECREASED STRENGTH OF UPPER EXTREMITY: ICD-10-CM

## 2022-07-11 DIAGNOSIS — M25.611 DECREASED SHOULDER MOBILITY, RIGHT: ICD-10-CM

## 2022-07-11 PROCEDURE — 97166 OT EVAL MOD COMPLEX 45 MIN: CPT | Mod: PN

## 2022-07-11 PROCEDURE — 97530 THERAPEUTIC ACTIVITIES: CPT | Mod: PN

## 2022-07-11 NOTE — TELEPHONE ENCOUNTER
Spoke with Mrs. Ron- pt will keep scheduled appointment with PA on 07/26=- appointment place on wait-list in case there's a cancellation,.

## 2022-07-11 NOTE — TELEPHONE ENCOUNTER
----- Message from Elly Luis sent at 7/11/2022  9:21 AM CDT -----  Type:  Sooner Apoointment Request    Caller is requesting a sooner appointment.  Caller declined first available appointment listed below.  Caller will not accept being placed on the waitlist and is requesting a message be sent to doctor.  Name of Caller:pt  When is the first available appointment? Pt had appt today 7/12  Symptoms: knee pain  Would the patient rather a call back or a response via [facility]? call  Best Call Back Number:[phone]  Additional Information: pt cx appt and wants appt back if possible

## 2022-07-11 NOTE — PLAN OF CARE
" [facility]  Occupational Therapy Initial Evaluation    Date: 7/11/2022  Name: Arron Ron  Clinic Number: [id number]    Therapy Diagnosis:   Encounter Diagnoses   Name Primary?    Right wrist pain     Decreased shoulder mobility, right     Decreased strength of upper extremity      Physician: Tin Da Silva Jr., *    Physician Orders: Eval and treat  Medical Diagnosis: (R) wrist pain  Surgical Procedure and Date: n/a  Evaluation Date: 7/11/2022  Insurance Authorization Period Expiration: 12/31/2022  Plan of Care Certification Period: 7/11/22 - 9/11/22  Progress Note Due: 8/11/22  Date of Return to MD: n/a  Visit # / Visits authorized: 1 / 20  FOTO: 0/0    Precautions:  Fall    Time In: 8:05  Time Out: 9:05  Total Appointment Time (timed & untimed codes) 60 minutes    SUBJECTIVE     Date of Onset: 2020    History of Current Condition/Mechanism of Injury: Arron reports:      Falls: high risk for falls    Involved Side: right  Dominant Side: Right  Imaging: no recent imaging  Prior Therapy: has PT services recently  Occupation:  unemployed      Functional Limitations/Social History:    Previous functional status includes: Independent with all ADLs.     Current Functional Status   Home/Living environment: lives with their daughter      Limitation of Functional Status as follows:   ADLs/IADLs:     - Feeding:Independent    - Bathing: Mod(I) with pain    - Dressing/Grooming: minimal (A) with pain    - Driving: Independent     Leisure: n/a    Pain:  Functional Pain Scale Rating 0-10: Current 7/10, worst 10/10, best 7/10   Location: (R)UE radiating shoulder to hand  Description: Tingling  Aggravating Factors: activities and weightbearing transferring sit to stand and use of cane.  Easing Factors: none    Patient's Goals for Therapy: "use arm without pain"    Medical History:   Past Medical History:   Diagnosis Date    Acid reflux     Anemia     Back pain     was under therapy "    Chronic back pain     Constipation     Elevated cholesterol     Encounter for blood transfusion 1987    Endometriosis     Hyperlipidemia     Hypertension     Nausea & vomiting     Sleep apnea     Tendonitis of wrist, right        Surgical History:    has a past surgical history that includes Tubal ligation; gallstone; Cholecystectomy; Endometrial ablation; Dilation and curettage of uterus; Breast biopsy; Hysterectomy (10/1/15); Colonoscopy (2017); Upper gastrointestinal endoscopy (2017); Oophorectomy; Colonoscopy (N/A, 4/29/2021); Gastric bypass (02/18/2020); and Esophagogastroduodenoscopy (N/A, 4/1/2022).    Medications:   has a current medication list which includes the following prescription(s): cyclobenzaprine, diclofenac sodium, albuterol, albuterol, albuterol, allergy relief (cetirizine), alprazolam, aspirin, atorvastatin, budesonide-formoterol 160-4.5 mcg, doxazosin, ergocalciferol (vitamin d2), fish oil-omega-3 fatty acids, fluticasone propionate, gabapentin, linaclotide, meloxicam, montelukast, nascobal, pantoprazole, phentermine, polyethylene glycol, reglan, spironolactone, torsemide, and valsartan.    Allergies:   Review of patient's allergies indicates:  No Known Allergies       OBJECTIVE     Observation/Appearance: Patient was observed with noted increased pain to (R)UE and difficulty with weightbearing with use of (R) for transfers of sit to stand. Pt reported (R)UE limitations affect ability to safely perform transfers.    Active Range of Motion:  (Measured in degrees)   Right Left   Shld Flexion 126 NT   Shld Extension 37 NT   Shld Abduction 110 NT   Shld Adduction wnl NT   Shld Ext Rotation NT due to pain NT   Shld Int Rotation NT due to pain NT   Elbow Flexion 130 wnl   Elbow Extension No lag noted wnl   Elbow Supination 65 wnl   Elbow Pronation 75 wnl   Wrist Extension  84 wnl   Wrist Flexion 37 wnl   Radial Deviation 15 wnl   Ulnar Deviation 30 wnl       Passive Range of Motion:   (Measured in degrees)   Right   Shld Flexion 130   Shld Extension 42   Shld Abduction 110   Shld Adduction NT   Shld Ext Rotation NTdue to pain   Shld Int Rotation NT due to pain   Elbow Flexion wnl   Elbow Extension wnl   Elbow Supination wnl   Elbow Pronation wnl   Wrist Extension  wnl   Wrist Flexion wnl   Radial Deviation wnl   Ulnar Deviation wnl       Range of Motion Hand  (Active)  Measured in degrees.   7/11/2022    Right       Digit 2:  MP  wfl                 PIP     wfl                 DIP wfl   Digit 3:  MP wfl                 PIP wfl                 DIP wfl   Digit 4:   MP wfl                  PIP wfl                  DIP wfl   Digit 5:  MP wfl                 PIP wfl                 DIP wfl       CMC:    MP wfl                 IP wfl       Rad ADD/ABD wfl       Pal ADD/ABD wfl        Strength (Dynamometer/Measured in pounds on Rung II) and Pinch Strength (Pinch Gauge)   7/11/2022 7/11/2022    Left Right   Composite 7 25   Lateral Pinch 9 11   Tripod Pinch NT NT   Tip Pinch 6 6       Manual Muscle Test   Right   Shld Flexion 3+/5   Shld Extension 3+/5   Shld Abduction 3+/5   Shld Adduction 3+/5   Shld Ext Rotation 3+/5   Shld Int Rotation 3+/5   Elbow Flexion 4+/5   Elbow Extension 4+/5   Elbow Supination 3/5   Elbow Pronation 3/5   Wrist Extension  3/5   Wrist Flexion 3/5   Radial Deviation 3+/5   Ulnar Deviation 3+/5       Opposition (Fine Motor Skills/Dexterity): able to oppose cmc and each digit with good intrinsic motor skills      Sensation: wnl     Edema. Measured in centimeters.   7/11/2022 7/11/2022    Left Right   2in. Above elbow nt wnl   2in. Below elbow nt wnl   Wrist Crease nt wnl   MCPs nt 23           Limitation/Restriction for FOTO Survey    Therapist reviewed FOTO scores for Arron Ron on 7/11/2022.   FOTO documents entered into [employer] - see Media section.    Limitation Score: will do on next sssion         Treatment     Total Treatment time (time-based codes) separate  from Evaluation: 10 minutes    Arron received the treatments listed below:     Therapeutic activities to improve functional performance for 10  minutes, including:  Pt was educated on HEP to include hand ad intrinsic strengthening activities with theraputty (pink).  Pt was given handwritten instructions and putty to take home.        Patient Education and Home Exercises      Education provided:   HEP. See above for details    Written Home Exercises Provided: yes.  Exercises were reviewed and Arron was able to demonstrate them prior to the end of the session.  Arron demonstrated fair  understanding of the education provided. See EMR under Patient Instructions for exercises provided during therapy sessions.     Pt was advised to perform these exercises free of pain, and to stop performing them if pain occurs.    Patient/Family Education: role of OT, goals for OT, scheduling/cancellations - pt verbalized understanding. Discussed insurance limitations with patient.      ASSESSMENT     Arron Ron is a 51 y.o. female referred to outpatient occupational therapy and presents with a medical diagnosis of (R)Wrist pain.  Patient presents with the following therapy deficits: Decreased ROM, Decreased  strength, Decreased functional hand use and Edema and demonstrates limitations as described in the chart below. Following medical record review it is determined that pt will benefit from occupational therapy services in order to maximize pain free and/or functional use of right wrist and shoulder joint. The following goals were discussed with the patient and patient is in agreement with them as to be addressed in the treatment plan. The patient's rehab potential is Fair.     Anticipated barriers to occupational therapy: recent history of multiple falls  Pt has no cultural, educational or language barriers to learning provided.    Profile and History Assessment of Occupational Performance Level of Clinical  Decision Making Complexity Score   Occupational Profile:   Arron Ron is a 51 y.o. female who lives with their daughter and is unemployed Arron Ron has difficulty with  ADLs and IADLs as listed previously, which  Affecting herdaily functional abilities.      Comorbidities:    has a past medical history of Acid reflux, Anemia, Back pain, Chronic back pain, Constipation, Elevated cholesterol, Encounter for blood transfusion, Endometriosis, Hyperlipidemia, Hypertension, Nausea & vomiting, Sleep apnea, and Tendonitis of wrist, right.    Medical and Therapy History Review:   Expanded               Performance Deficits    Physical:  Joint Mobility  Muscle Power/Strength  Muscle Endurance  Edema   Strength  Pinch Strength    Cognitive:  No Deficits    Psychosocial:    No Deficits     Clinical Decision Making:  moderate    Assessment Process:  Problem-Focused Assessments    Modification/Need for Assistance:  Minimal-Moderate Modifications/Assistance    Intervention Selection:  Multiple Treatment Options       moderate  Based on PMHX, co morbidities , data from assessments and functional level of assistance required with task and clinical presentation directly impacting function.       The following goals were discussed with the patient and patient is in agreement with them as to be addressed in the treatment plan.     Goals:   Goals:   The following goals were discussed with the patient and patient is in agreement with them as to be addressed in the treatment plan.   Long Term Goals (LTGs); to be met by discharge.  LTG #1: Pt will report a pain level of 2 out of 10 with functional use of (R)UE   LTG #2: Pt will demo improved FOTO score by 20 points.   LTG #3: Pt will return to prior level of function for ADLs and household management  LTG #4: Pt will display wfl active range of motion (R)UE all planes  LTG #5:  P will display WFL (R)UE strength all planes    Short Term Goals (STGs); to be met  within 4 weeks   STG #1a: Pt will report consistently 4 out of 10 pain level with functional use of (R)UE.  STG #2a: Pt will report/demo [place] with UB dressing activities.  STG #2b: Pt will report/demo [place] with grooming activities.  STG #3a: Pt will demonstrate independence with issued HEP.   STG #3b: Pt will demo improved active range of motion (R)shoulder [id number] degrees.  STG # 4: Pt increased (R)  strength 15 #       PLAN   Plan of Care Certification: 7/11/2022 to 9/11/2022.     Outpatient Occupational Therapy 2 times weekly for 8 weeks to include the following interventions: Paraffin, Manual therapy/joint mobilizations, Modalities for pain management, US 3 mhz, Therapeutic exercises/activities., Strengthening, Edema Control, Electrical Modalities and Joint Protection.      Berna Goldsmith OT      I CERTIFY THE NEED FOR THESE SERVICES FURNISHED UNDER THIS PLAN OF TREATMENT AND WHILE UNDER MY CARE  Physician's comments:      Physician's Signature: ___________________________________________________

## 2022-07-11 NOTE — TELEPHONE ENCOUNTER
----- Message from Breanna Roe sent at 7/11/2022  4:11 PM CDT -----  Regarding: time of arival  Pt calling for time of arrival for procedure on 7-13     Confirmed patient's contact info below:  Contact Name: Arron Ron  Phone Number: [phone]

## 2022-07-13 ENCOUNTER — HOSPITAL ENCOUNTER (OUTPATIENT)
Facility: HOSPITAL | Age: 52
Discharge: HOME | End: 2022-07-13
Attending: INTERNAL MEDICINE | Admitting: INTERNAL MEDICINE
Payer: MEDICAID

## 2022-07-13 VITALS
TEMPERATURE: 98 F | SYSTOLIC BLOOD PRESSURE: 142 MMHG | HEART RATE: 78 BPM | OXYGEN SATURATION: 100 % | BODY MASS INDEX: 43.23 KG/M2 | WEIGHT: 269 LBS | DIASTOLIC BLOOD PRESSURE: 80 MMHG | RESPIRATION RATE: 15 BRPM | HEIGHT: 66 IN

## 2022-07-13 DIAGNOSIS — R13.10 DYSPHAGIA: ICD-10-CM

## 2022-07-13 LAB
CTP QC/QA: YES
SARS-COV-2 AG RESP QL IA.RAPID: NEGATIVE

## 2022-07-13 PROCEDURE — 91037 PR GERD TST W/ NASAL IMPEDENCE ELECTROD: ICD-10-PCS | Mod: 26,,, | Performed by: INTERNAL MEDICINE

## 2022-07-13 PROCEDURE — 91010 ESOPHAGUS MOTILITY STUDY: CPT | Mod: 26,,, | Performed by: INTERNAL MEDICINE

## 2022-07-13 PROCEDURE — 91037 ESOPH IMPED FUNCTION TEST: CPT | Mod: 26,,, | Performed by: INTERNAL MEDICINE

## 2022-07-13 PROCEDURE — 25000003 PHARM REV CODE 250: Performed by: INTERNAL MEDICINE

## 2022-07-13 PROCEDURE — 91010 PR ESOPHAGEAL MOTILITY STUDY, MA2METRY: ICD-10-PCS | Mod: 26,,, | Performed by: INTERNAL MEDICINE

## 2022-07-13 PROCEDURE — 91010 ESOPHAGUS MOTILITY STUDY: CPT | Mod: TC | Performed by: INTERNAL MEDICINE

## 2022-07-13 PROCEDURE — 91037 ESOPH IMPED FUNCTION TEST: CPT | Mod: TC | Performed by: INTERNAL MEDICINE

## 2022-07-13 RX ORDER — SODIUM CHLORIDE 9 MG/ML
INJECTION, SOLUTION INTRAVENOUS CONTINUOUS
Status: DISCONTINUED | OUTPATIENT
Start: 2022-07-13 | End: 2022-07-13 | Stop reason: HOSPADM

## 2022-07-13 RX ORDER — LIDOCAINE HYDROCHLORIDE 20 MG/ML
JELLY TOPICAL ONCE
Status: COMPLETED | OUTPATIENT
Start: 2022-07-13 | End: 2022-07-13

## 2022-07-13 RX ADMIN — LIDOCAINE HYDROCHLORIDE 10 ML: 20 JELLY TOPICAL at 10:07

## 2022-07-13 NOTE — PROVATION PATIENT INSTRUCTIONS
Discharge Summary/Instructions after an Endoscopic Procedure  Patient Name: Arron Ron  Patient MRN: [id number]  Patient YOB: 1970 Wednesday, July 13, 2022  Alan Gomez MD  Dear patient,  As a result of recent federal legislation (The Federal Cures Act), you may   receive lab or pathology results from your procedure in your [facility]   account before your physician is able to contact you. Your physician or   their representative will relay the results to you with their   recommendations at their soonest availability.  Thank you,  RESTRICTIONS:  During your procedure today, you received medications for sedation.  These   medications may affect your judgment, balance and coordination.  Therefore,   for 24 hours, you have the following restrictions:   - DO NOT drive a car, operate machinery, make legal/financial decisions,   sign important papers or drink alcohol.    ACTIVITY:  Today: no heavy lifting, straining or running due to procedural   sedation/anesthesia.  The following day: return to full activity including work.  DIET:  Eat and drink normally unless instructed otherwise.     TREATMENT FOR COMMON SIDE EFFECTS:  - Mild abdominal pain, nausea, belching, bloating or excessive gas:  rest,   eat lightly and use a heating pad.  - Sore Throat: treat with throat lozenges and/or gargle with warm salt   water.  - Because air was used during the procedure, expelling large amounts of air   from your rectum or belching is normal.  - If a bowel prep was taken, you may not have a bowel movement for 1-3 days.    This is normal.  SYMPTOMS TO WATCH FOR AND REPORT TO YOUR PHYSICIAN:  1. Abdominal pain or bloating, other than gas cramps.  2. Chest pain.  3. Back pain.  4. Signs of infection such as: chills or fever occurring within 24 hours   after the procedure.  5. Rectal bleeding, which would show as bright red, maroon, or black stools.   (A tablespoon of blood from the rectum is not serious, especially  if   hemorrhoids are present.)  6. Vomiting.  7. Weakness or dizziness.  GO DIRECTLY TO THE NEAREST EMERGENCY ROOM IF YOU HAVE ANY OF THE FOLLOWING:      Difficulty breathing              Chills and/or fever over 101 F   Persistent vomiting and/or vomiting blood   Severe abdominal pain   Severe chest pain   Black, tarry stools   Bleeding- more than one tablespoon   Any other symptom or condition that you feel may need urgent attention  Your doctor recommends these additional instructions:  If any biopsies were taken, your doctors clinic will contact you in 1 to 2   weeks with any results.  - Return to referring physician.   For questions, problems or results please call your physician - Alan Gomez MD at Work:  [phone].  [facility], EMERGENCY ROOM PHONE NUMBER: [phone]  IF A COMPLICATION OR EMERGENCY SITUATION ARISES AND YOU ARE UNABLE TO REACH   YOUR PHYSICIAN - GO DIRECTLY TO THE EMERGENCY ROOM.  Alan Gomez MD  7/13/2022 6:02:17 PM  This report has been verified and signed electronically.  Dear patient,  As a result of recent federal legislation (The Federal Cures Act), you may   receive lab or pathology results from your procedure in your [facility]   account before your physician is able to contact you. Your physician or   their representative will relay the results to you with their   recommendations at their soonest availability.  Thank you,  PROVATION

## 2022-07-18 ENCOUNTER — CLINICAL SUPPORT (OUTPATIENT)
Dept: REHABILITATION | Facility: HOSPITAL | Age: 52
End: 2022-07-18
Attending: ORTHOPAEDIC SURGERY
Payer: MEDICAID

## 2022-07-18 DIAGNOSIS — R29.898 DECREASED STRENGTH OF UPPER EXTREMITY: Primary | ICD-10-CM

## 2022-07-18 PROCEDURE — 97530 THERAPEUTIC ACTIVITIES: CPT | Mod: PN

## 2022-07-18 NOTE — PROGRESS NOTES
"  Occupational Therapy Daily Treatment Note     Date: 7/18/2022  Name: Arron Ron  Clinic Number: [id number]    Therapy Diagnosis: No diagnosis found.  Physician: Tin Da Silva Jr., *    Physician Orders:eval and treat  Medical Diagnosis: (R) wrist and shoulder pain  Surgical Procedure and Date: n/a  Evaluation Date: 7/11/2022  Insurance Authorization Period Expiration: 7/18/2022 - 12/31/2022  Plan of Care Certification Period: 7/11/2022 - 9/11/2022  Date of Return to MD: n/a    Visit # / Visits authorized: 2 of 20  Time In: 8:45  Time Out: 9:45  Total Billable Time:  50 min  Total Treatment Time: 60 min    Precautions:  Fall      Subjective     Pt reports: "Im getting around a little better but Im still losing my balance, but I haven't fallen. Living at my daughter is good but I don't want to have to always be there." per patient.  she was compliant with home exercise program given last session.   Response to previous treatment:  "I'm doing my exercises you gave me and my granddaugther making sure I do them. She helps me."  Functional change: decreased edema and increased joint mobility of (R) wrist and hand noted    Pain Prior to session: 8/10 constant  Pain End of session:    6 /10 constant  Pt reports continued waking at night with pain  Location: right shoulder  and wrist      Objective     Arron received the following supervised modalities after being cleared for contradictions for 10 minutes:   MHP x 10 min to (R) shoulder and wrist/hand to provide increased soft tissue mobility prior to stretches and strength activities with reduced joint inflamation.      Arron received the following manual therapy techniques for 5 minutes:   Manual muscle message with manual deep pressure Stim x 5 minutes with pain analgesic cream to reduce muscle spasm, promoted increase circulation to muscle complex of (R) shoulder and ST joint, decreased post session pain and inflammation with increased soft tissue " stretch and capsule mobility.    Arron participated in dynamic functional therapeutic activities to improve functional performance for 45 minutes, including:   - Scapular strengthening/stabilzation 2# (R) scap retraction and elevation 3x5 (pt reported pain)   - Scapular active range of motion all (R) ST joint planes 3 x 10   - green Digiflex composite and isolated digits 3x10 (R) hand   - brown composite grippers 3x10 (R) hand   - Prayer stretches of (R) wrist flexion and extension    - yellow net grippers with cmc flexion/extension 3x10   - yellow foam exercises 3x10   - educated on HEP with wall climb demonstration x 5  *all wrist exercises completed with table support        Home Exercises and Education Provided     Education provided:   Pt educated on exercises to add to current HEP for scapular mobility and stabilization and added wrist stretches and strength. Pt educated on wall climbs to continued ONLY in a seated position with slight stretch each time.  Pt educated on importance of doing exercises seated due to fall risk.       Written Home Exercises Provided: yes.  Exercises were reviewed and Arron was able to demonstrate them prior to the end of the session.  Arron demonstrated good  understanding of the HEP provided.   .        Assessment     Ms. Heller tolerated exercises fair to day with pain reported with all activities, but tolerable. No edema noted in (R) wrist area today but minimal edema of the (R)shoulder observed. Decreased scapular thoracic mobility and ST glide limited GH joint noted. contued decreased strength (R) wrist and increased mobility of the wrist and CMC today. NO active range of motion activities of (R) shoulder GH joint completed today due to pain with ST glides.  Pt reports increased use of (R) hand with functional activities: however, decreased use of her (R) shoulder.    Pt would continue to benefit from skilled OT to address decreased joint mobility, edema management,  joint pain, decreased strength, and decreased functionl use of (R) UE     Arron is progressing well towards her goals and there are no updates to goals at this time. Pt prognosis is Good.     Pt will continue to benefit from skilled outpatient occupational therapy to address the deficits listed in the problem list on initial evaluation provide pt/family education and to maximize pt's level of independence in the home and community environment.     Anticipated barriers to occupational therapy: none    Pt's spiritual, cultural and educational needs considered and pt agreeable to plan of care and goals.    Goals:  (ongoing)  The following goals were discussed with the patient and patient is in agreement with them as to be addressed in the treatment plan.   Long Term Goals (LTGs); to be met by discharge.  LTG #1: Pt will report a pain level of 2 out of 10 with functional use of (R)UE           LTG #2: Pt will demo improved FOTO score by 20 points.   LTG #3: Pt will return to prior level of function for ADLs and household management  LTG #4: Pt will display wfl active range of motion (R)UE all planes  LTG #5:  P will display WFL (R)UE strength all planes     Short Term Goals (STGs); to be met within 4 weeks   STG #1a: Pt will report consistently 4 out of 10 pain level with functional use of (R)UE.  STG #2a: Pt will report/demo [place] with UB dressing activities.  STG #2b: Pt will report/demo [place] with grooming activities.  STG #3a: Pt will demonstrate independence with issued HEP.   STG #3b: Pt will demo improved active range of motion (R)shoulder [id number] degrees.  STG # 4: Pt increased (R)  strength 15 #     Plan   Cont OT 2 x wk x 8 weeks with reassess on 8/11/2022        Berna Goldsmith OT

## 2022-07-20 ENCOUNTER — HOSPITAL ENCOUNTER (OUTPATIENT)
Facility: HOSPITAL | Age: 52
Discharge: HOME | End: 2022-07-20
Attending: INTERNAL MEDICINE | Admitting: INTERNAL MEDICINE
Payer: MEDICARE

## 2022-07-20 ENCOUNTER — ANESTHESIA EVENT (OUTPATIENT)
Dept: ENDOSCOPY | Facility: HOSPITAL | Age: 52
End: 2022-07-20
Payer: MEDICAID

## 2022-07-20 ENCOUNTER — ANESTHESIA (OUTPATIENT)
Dept: ENDOSCOPY | Facility: HOSPITAL | Age: 52
End: 2022-07-20
Payer: MEDICAID

## 2022-07-20 VITALS
WEIGHT: 266 LBS | TEMPERATURE: 98 F | OXYGEN SATURATION: 100 % | SYSTOLIC BLOOD PRESSURE: 133 MMHG | BODY MASS INDEX: 42.75 KG/M2 | RESPIRATION RATE: 18 BRPM | HEART RATE: 87 BPM | HEIGHT: 66 IN | DIASTOLIC BLOOD PRESSURE: 70 MMHG

## 2022-07-20 DIAGNOSIS — K21.00 GASTROESOPHAGEAL REFLUX DISEASE WITH ESOPHAGITIS WITHOUT HEMORRHAGE: Primary | ICD-10-CM

## 2022-07-20 DIAGNOSIS — K21.9 GERD (GASTROESOPHAGEAL REFLUX DISEASE): ICD-10-CM

## 2022-07-20 LAB
CTP QC/QA: YES
SARS-COV-2 AG RESP QL IA.RAPID: NEGATIVE

## 2022-07-20 PROCEDURE — 37000008 HC ANESTHESIA 1ST 15 MINUTES: Performed by: INTERNAL MEDICINE

## 2022-07-20 PROCEDURE — 25000003 PHARM REV CODE 250: Performed by: NURSE ANESTHETIST, CERTIFIED REGISTERED

## 2022-07-20 PROCEDURE — 88305 TISSUE EXAM BY PATHOLOGIST: ICD-10-PCS | Mod: 26,,, | Performed by: PATHOLOGY

## 2022-07-20 PROCEDURE — 43239 EGD BIOPSY SINGLE/MULTIPLE: CPT | Performed by: INTERNAL MEDICINE

## 2022-07-20 PROCEDURE — 63600175 PHARM REV CODE 636 W HCPCS: Performed by: NURSE ANESTHETIST, CERTIFIED REGISTERED

## 2022-07-20 PROCEDURE — 43239 EGD BIOPSY SINGLE/MULTIPLE: CPT | Mod: ,,, | Performed by: INTERNAL MEDICINE

## 2022-07-20 PROCEDURE — 00731 ANES UPR GI NDSC PX NOS: CPT | Performed by: INTERNAL MEDICINE

## 2022-07-20 PROCEDURE — 43239 PR EGD, FLEX, W/BIOPSY, SGL/MULTI: ICD-10-PCS | Mod: ,,, | Performed by: INTERNAL MEDICINE

## 2022-07-20 PROCEDURE — D9220A PRA ANESTHESIA: ICD-10-PCS | Mod: ANES,,, | Performed by: SURGERY

## 2022-07-20 PROCEDURE — 88305 TISSUE EXAM BY PATHOLOGIST: CPT | Mod: 26,,, | Performed by: PATHOLOGY

## 2022-07-20 PROCEDURE — D9220A PRA ANESTHESIA: ICD-10-PCS | Mod: CRNA,,, | Performed by: NURSE ANESTHETIST, CERTIFIED REGISTERED

## 2022-07-20 PROCEDURE — D9220A PRA ANESTHESIA: Mod: ANES,,, | Performed by: SURGERY

## 2022-07-20 PROCEDURE — 88305 TISSUE EXAM BY PATHOLOGIST: CPT | Performed by: PATHOLOGY

## 2022-07-20 PROCEDURE — D9220A PRA ANESTHESIA: Mod: CRNA,,, | Performed by: NURSE ANESTHETIST, CERTIFIED REGISTERED

## 2022-07-20 PROCEDURE — 25000003 PHARM REV CODE 250: Performed by: INTERNAL MEDICINE

## 2022-07-20 PROCEDURE — 37000009 HC ANESTHESIA EA ADD 15 MINS: Performed by: INTERNAL MEDICINE

## 2022-07-20 PROCEDURE — 27201012 HC FORCEPS, HOT/COLD, DISP: Performed by: INTERNAL MEDICINE

## 2022-07-20 RX ORDER — PROPOFOL 10 MG/ML
INJECTION, EMULSION INTRAVENOUS
Status: DISCONTINUED | OUTPATIENT
Start: 2022-07-20 | End: 2022-07-20

## 2022-07-20 RX ORDER — SODIUM CHLORIDE 0.9 % (FLUSH) 0.9 %
10 SYRINGE (ML) INJECTION
Status: DISCONTINUED | OUTPATIENT
Start: 2022-07-20 | End: 2022-07-20 | Stop reason: HOSPADM

## 2022-07-20 RX ORDER — PROPOFOL 10 MG/ML
INJECTION, EMULSION INTRAVENOUS CONTINUOUS PRN
Status: DISCONTINUED | OUTPATIENT
Start: 2022-07-20 | End: 2022-07-20

## 2022-07-20 RX ORDER — LIDOCAINE HCL/PF 100 MG/5ML
SYRINGE (ML) INTRAVENOUS
Status: DISCONTINUED | OUTPATIENT
Start: 2022-07-20 | End: 2022-07-20

## 2022-07-20 RX ORDER — SODIUM CHLORIDE 9 MG/ML
INJECTION, SOLUTION INTRAVENOUS CONTINUOUS
Status: DISCONTINUED | OUTPATIENT
Start: 2022-07-20 | End: 2022-07-20 | Stop reason: HOSPADM

## 2022-07-20 RX ADMIN — Medication 100 MG: at 11:07

## 2022-07-20 RX ADMIN — GLYCOPYRROLATE 0.2 MG: 0.2 INJECTION, SOLUTION INTRAMUSCULAR; INTRAVITREAL at 11:07

## 2022-07-20 RX ADMIN — PROPOFOL 100 MCG/KG/MIN: 10 INJECTION, EMULSION INTRAVENOUS at 11:07

## 2022-07-20 RX ADMIN — PROPOFOL 100 MG: 10 INJECTION, EMULSION INTRAVENOUS at 11:07

## 2022-07-20 RX ADMIN — PROPOFOL 30 MG: 10 INJECTION, EMULSION INTRAVENOUS at 11:07

## 2022-07-20 RX ADMIN — SODIUM CHLORIDE: 0.9 INJECTION, SOLUTION INTRAVENOUS at 11:07

## 2022-07-20 NOTE — PLAN OF CARE
Patient discharged to home via wheelchair, escorted by her friend Milagros. Pt alert and talkative, vitals stable on room air, tolerating PO intake. Discharge instructions (written and verbal) and follow-up information given to patient who verbalized understanding, as well as a readiness for discharge. C contact info provided for additional questions following discharge.

## 2022-07-20 NOTE — ANESTHESIA POSTPROCEDURE EVALUATION
Anesthesia Post Evaluation    Patient: Arron Ron    Procedure(s) Performed: Procedure(s) (LRB):  ESOPHAGOGASTRODUODENOSCOPY (EGD) (N/A)    Final Anesthesia Type: general      Patient location during evaluation: PACU  Patient participation: Yes- Able to Participate  Level of consciousness: awake and alert  Post-procedure vital signs: reviewed and stable  Pain management: adequate  Airway patency: patent    PONV status at discharge: No PONV  Anesthetic complications: no      Cardiovascular status: blood pressure returned to baseline  Respiratory status: unassisted and spontaneous ventilation  Hydration status: euvolemic  Follow-up not needed.          Vitals Value Taken Time   /70 07/20/22 1300   Temp 36.5 °C (97.7 °F) 07/20/22 1300   Pulse 87 07/20/22 1300   Resp 18 07/20/22 1300   SpO2 100 % 07/20/22 1300         No case tracking events are documented in the log.      Pain/Venancio Score: Venancio Score: 10 (7/20/2022  1:00 PM)

## 2022-07-20 NOTE — TRANSFER OF CARE
"Anesthesia Transfer of Care Note    Patient: Arron Ron    Procedure(s) Performed: Procedure(s) (LRB):  ESOPHAGOGASTRODUODENOSCOPY (EGD) (N/A)    Patient location: [facility]    Anesthesia Type: general    Transport from OR: Transported from OR on room air with adequate spontaneous ventilation    Post pain: adequate analgesia    Post assessment: no apparent anesthetic complications    Post vital signs: stable    Level of consciousness: sedated    Nausea/Vomiting: no nausea/vomiting    Complications: none    Transfer of care protocol was followed      Last vitals:   Visit Vitals  /62   Pulse 88   Temp 36   Resp 16   Ht 5' 6" (1.676 m)   Wt 120.7 kg (266 lb)   LMP 09/21/2015   SpO2 100%   Breastfeeding No   BMI 42.93 kg/m²     "

## 2022-07-20 NOTE — PROVATION PATIENT INSTRUCTIONS
Discharge Summary/Instructions after an Endoscopic Procedure  Patient Name: Arron Ron  Patient MRN: [id number]  Patient YOB: 1970 Wednesday, July 20, 2022  Diego Spear MD  Dear patient,  As a result of recent federal legislation (The Federal Cures Act), you may   receive lab or pathology results from your procedure in your [facility]   account before your physician is able to contact you. Your physician or   their representative will relay the results to you with their   recommendations at their soonest availability.  Thank you,  RESTRICTIONS:  During your procedure today, you received medications for sedation.  These   medications may affect your judgment, balance and coordination.  Therefore,   for 24 hours, you have the following restrictions:   - DO NOT drive a car, operate machinery, make legal/financial decisions,   sign important papers or drink alcohol.    ACTIVITY:  Today: no heavy lifting, straining or running due to procedural   sedation/anesthesia.  The following day: return to full activity including work.  DIET:  Eat and drink normally unless instructed otherwise.     TREATMENT FOR COMMON SIDE EFFECTS:  - Mild abdominal pain, nausea, belching, bloating or excessive gas:  rest,   eat lightly and use a heating pad.  - Sore Throat: treat with throat lozenges and/or gargle with warm salt   water.  - Because air was used during the procedure, expelling large amounts of air   from your rectum or belching is normal.  - If a bowel prep was taken, you may not have a bowel movement for 1-3 days.    This is normal.  SYMPTOMS TO WATCH FOR AND REPORT TO YOUR PHYSICIAN:  1. Abdominal pain or bloating, other than gas cramps.  2. Chest pain.  3. Back pain.  4. Signs of infection such as: chills or fever occurring within 24 hours   after the procedure.  5. Rectal bleeding, which would show as bright red, maroon, or black stools.   (A tablespoon of blood from the rectum is not serious, especially if    hemorrhoids are present.)  6. Vomiting.  7. Weakness or dizziness.  GO DIRECTLY TO THE NEAREST EMERGENCY ROOM IF YOU HAVE ANY OF THE FOLLOWING:      Difficulty breathing              Chills and/or fever over 101 F   Persistent vomiting and/or vomiting blood   Severe abdominal pain   Severe chest pain   Black, tarry stools   Bleeding- more than one tablespoon   Any other symptom or condition that you feel may need urgent attention  Your doctor recommends these additional instructions:  If any biopsies were taken, your doctors clinic will contact you in 1 to 2   weeks with any results.  - Patient has a contact number available for emergencies.  The signs and   symptoms of potential delayed complications were discussed with the   patient.  Return to normal activities tomorrow.  Written discharge   instructions were provided to the patient.   - Discharge patient to home.   - Await pathology results.   - Use a proton pump inhibitor PO daily.   - The findings and recommendations were discussed with the designated   responsible adult.  For questions, problems or results please call your physician - Diego Spear MD at Work:  [phone].  [facility], EMERGENCY ROOM PHONE NUMBER: [phone]  IF A COMPLICATION OR EMERGENCY SITUATION ARISES AND YOU ARE UNABLE TO REACH   YOUR PHYSICIAN - GO DIRECTLY TO THE EMERGENCY ROOM.  Diego Spear MD  7/20/2022 12:04:43 PM  This report has been verified and signed electronically.  Dear patient,  As a result of recent federal legislation (The Federal Cures Act), you may   receive lab or pathology results from your procedure in your [facility]   account before your physician is able to contact you. Your physician or   their representative will relay the results to you with their   recommendations at their soonest availability.  Thank you,  PROVATION

## 2022-07-20 NOTE — H&P
Short Stay Endoscopy History and Physical    PCP - Mahi Rojas MD     Procedure - EGD  ASA - per anesthesia  Mallampati - per anesthesia  History of Anesthesia problems - no  Family history Anesthesia problems -  no   Plan of anesthesia - General    HPI:  This is a 51 y.o. female here for evaluation of :     gerd, esophagitis      ROS:  Constitutional: No fevers, chills, No weight loss  CV: No chest pain  Pulm: No cough, No shortness of breath  Ophtho: No vision changes  GI: see HPI  Derm: No rash    Medical History:  has a past medical history of Acid reflux, Anemia, Back pain, Chronic back pain, Constipation, Elevated cholesterol, Encounter for blood transfusion (1987), Endometriosis, Hyperlipidemia, Hypertension, Nausea & vomiting, Sleep apnea, and Tendonitis of wrist, right.    Surgical History:  has a past surgical history that includes Tubal ligation; gallstone; Cholecystectomy; Endometrial ablation; Dilation and curettage of uterus; Breast biopsy; Hysterectomy (10/1/15); Colonoscopy (2017); Upper gastrointestinal endoscopy (2017); Oophorectomy; Colonoscopy (N/A, 4/29/2021); Gastric bypass (02/18/2020); Esophagogastroduodenoscopy (N/A, 4/1/2022); and Esophageal manometry with measurement of impedance (N/A, 7/13/2022).    Family History: family history includes Diabetes in her maternal aunt; Heart attack in her mother; Heart disease in her maternal grandmother; Hypertension in her brother, maternal grandmother, and mother.. Otherwise no colon cancer, inflammatory bowel disease, or GI malignancies.    Social History:  reports that she has never smoked. She has never used smokeless tobacco. She reports current alcohol use. She reports that she does not use drugs.    Review of patient's allergies indicates:  No Known Allergies    Medications:   Medications Prior to Admission   Medication Sig Dispense Refill Last Dose    albuterol (ACCUNEB) 1.25 mg/3 mL Nebu Take 3 mLs (1.25 mg total) by nebulization  every 6 (six) hours as needed (increased cough/wheeze/shortness of breath). Rescue 150 mL 5 7/19/2022 at Unknown time    albuterol (PROAIR HFA) 90 mcg/actuation inhaler Inhale 2 puffs into the lungs every 6 (six) hours as needed for Wheezing. Rescue 18 g 3 7/19/2022 at Unknown time    albuterol (PROVENTIL/VENTOLIN HFA) 90 mcg/actuation inhaler Inhale 2 puffs into the lungs every 4 (four) hours as needed for Wheezing or Shortness of Breath. Rescue 18 g 5 7/19/2022 at Unknown time    ALLERGY RELIEF, CETIRIZINE, 10 mg tablet Take 10 mg by mouth once daily.   7/19/2022 at Unknown time    ALPRAZolam (XANAX) 2 MG Tab Take 2 mg by mouth every evening.    7/19/2022 at Unknown time    aspirin (ECOTRIN) 81 MG EC tablet Take 81 mg by mouth every Mon, Wed, Fri.    Past Week at Unknown time    atorvastatin (LIPITOR) 20 MG tablet TAKE ONE TABLET BY MOUTH ONCE A DAY IN THE EVENING   7/19/2022 at Unknown time    budesonide-formoterol 160-4.5 mcg (SYMBICORT) 160-4.5 mcg/actuation HFAA Inhale 2 puffs into the lungs every 12 (twelve) hours. Controller 10.2 g 11 7/19/2022 at Unknown time    cyclobenzaprine (FLEXERIL) 10 MG tablet TAKE ONE TABLET BY MOUTH THREE TIMES DAILY AS NEEDED FOR MUSCLE SPASMS 60 tablet 0 7/19/2022 at Unknown time    diclofenac sodium (VOLTAREN) 1 % Gel APPLY 2 GRAMS TOPICALLY TO THE AFFECTED AREA TWICE DAILY AS DIRECTED 100 g 0 7/19/2022 at Unknown time    doxazosin (CARDURA) 1 MG tablet Take 1 mg by mouth once daily.   7/19/2022 at Unknown time    ergocalciferol, vitamin D2, (VITAMIN D ORAL) Take 2,000 Units by mouth once daily.    7/19/2022 at Unknown time    fish oil-omega-3 fatty acids 300-1,000 mg capsule Take by mouth once daily.   7/19/2022 at Unknown time    fluticasone propionate (FLONASE) 50 mcg/actuation nasal spray 1 spray (50 mcg total) by Each Nostril route once daily. 16 g 3 7/19/2022 at Unknown time    gabapentin (NEURONTIN) 100 MG capsule Take 1 capsule (100 mg total) by mouth  every evening. 30 capsule 11 7/19/2022 at Unknown time    linaCLOtide (LINZESS) 72 mcg Cap capsule Take 1 capsule (72 mcg total) by mouth before breakfast. 90 capsule 3 7/19/2022 at Unknown time    meloxicam (MOBIC) 15 MG tablet Take 1 tablet (15 mg total) by mouth once daily. 30 tablet 1 7/19/2022 at Unknown time    montelukast (SINGULAIR) 10 mg tablet Take 10 mg by mouth every evening.   7/19/2022 at Unknown time    NASCOBAL 500 mcg/spray nasal spray SMARTSIG:Both Nares   7/19/2022 at Unknown time    pantoprazole (PROTONIX) 40 MG tablet Take 1 tablet (40 mg total) by mouth 2 (two) times daily. 180 tablet 1 7/19/2022 at Unknown time    phentermine (ADIPEX-P) 37.5 mg tablet Take 37.5 mg by mouth every other day.    Past Week at Unknown time    polyethylene glycol (GLYCOLAX) 17 gram/dose powder Take 17 g by mouth once daily. 1530 g 3 7/19/2022 at Unknown time    REGLAN 10 mg tablet Take by mouth.   7/19/2022 at Unknown time    spironolactone (ALDACTONE) 50 MG tablet spironolactone 50 mg tablet   7/19/2022 at Unknown time    torsemide (DEMADEX) 20 MG Tab Take 20 mg by mouth once daily.    7/19/2022 at Unknown time    valsartan (DIOVAN) 320 MG tablet Take 320 mg by mouth once daily.  11 7/19/2022 at Unknown time       Physical Exam:    Vital Signs:   Vitals:    07/20/22 1121   BP: 113/65   Pulse: 86   Resp: 16   Temp: 98.2 °F (36.8 °C)       General Appearance: Well appearing in no acute distress  Eyes:    No scleral icterus  ENT: Neck supple, Lips, mucosa, and tongue normal; teeth and gums normal  Abdomen: Soft, non tender, non distended with normal bowel sounds. No hepatosplenomegaly, ascites, or mass.  Extremities: No edema  Skin: No rash    Labs:  Lab Results   Component Value Date    WBC 6.72 10/10/2020    HGB 10.5 (L) 10/10/2020    HCT 31.6 (L) 10/10/2020     10/10/2020    CHOL 162 10/10/2020    TRIG 64 10/10/2020    HDL 45 10/10/2020    ALT 12 10/10/2020    AST 13 10/10/2020      10/10/2020    K 3.9 10/10/2020     10/10/2020    CREATININE 1.2 10/10/2020    BUN 12 10/10/2020    CO2 27 10/10/2020    TSH 1.683 10/10/2020    HGBA1C 6.4 (H) 01/30/2017       I have explained the risks and benefits of endoscopy procedures to the patient including but not limited to bleeding, perforation, infection, and death.  The patient was asked if they understand and allowed to ask any further questions to their satisfaction.      Diego Spear MD

## 2022-07-20 NOTE — ANESTHESIA PREPROCEDURE EVALUATION
07/20/2022  Arron Ron is a 51 y.o., female. Scheduled for EGD. History of HTN, HLD, RODRI, GERD,       Pre-op Assessment    I have reviewed the Patient Summary Reports.     I have reviewed the Nursing Notes. I have reviewed the NPO Status.   I have reviewed the Medications.     Review of Systems  Anesthesia Hx:   Denies Personal Hx of Anesthesia complications.   Cardiovascular:   Hypertension Denies MI.  Denies CAD.    Denies Dysrhythmias.  hyperlipidemia    Pulmonary:   Denies COPD. Asthma  Denies Shortness of breath. Sleep Apnea    Renal/:   Denies Chronic Renal Disease.     Hepatic/GI:   Hiatal Hernia, GERD Denies Liver Disease.    Neurological:   Denies TIA. Denies CVA. Denies Seizures.    Endocrine:   Denies Diabetes. Denies Hypothyroidism. Denies Hyperthyroidism.        Physical Exam  General: Well nourished, Cooperative, Alert and Oriented    Airway:  Mallampati: II / I  Mouth Opening: Normal  TM Distance: Normal  Tongue: Normal  Neck ROM: Normal ROM    Dental:  Intact        Anesthesia Plan  Type of Anesthesia, risks & benefits discussed:    Anesthesia Type: Gen Natural Airway  Intra-op Monitoring Plan: Standard ASA Monitors  Post Op Pain Control Plan: multimodal analgesia  Induction:  IV  Informed Consent: Informed consent signed with the Patient and all parties understand the risks and agree with anesthesia plan.  All questions answered.   ASA Score: 3    Ready For Surgery From Anesthesia Perspective.     .

## 2022-07-22 NOTE — PROGRESS NOTES
"Subjective:      Patient ID: Arron Ron is a 51 y.o. female.    Chief Complaint: Pain and Swelling of the Left Knee      HPI  (Komal/French)    Last seen by Dr. Sauceda on 4/26/22 for left knee pain with known 90% medial narrowing with osteophytes.     She had left knee injection by Dr. Sauceda on 4/26/22. Weight loss discussed and recommended.     Also seen by Dr. Da Silva on 6/22/22 for right wrist pain with known chronic scapholunate ligament tear. She was to continue with prn wrist brace and continue on mobic. OT was reordered (has done 2 visits).     She is here for follow up.     She only had a few days of relief with knee injection at last visit. She has constant left knee pain that is worse with standing and walking. She has giving way of left knee and she fell last week. She rates her pain as a 6 on a scale of 1-10. Only a few days of relief with previous steroid and gel injections.     She is wearing wrist brace as needed and she thinks that OT is helping. She has intermittent pain in right wrist. She has improvement with ice.     She is using voltaren gel and taking prn OTC aleve. She took one mobic- it made her feel "different" and she stopped it. She was told by GI previously not to take NSAIDs.       Past Medical History:   Diagnosis Date    Acid reflux     Anemia     Back pain     was under therapy    Chronic back pain     Constipation     Elevated cholesterol     Encounter for blood transfusion 1987    Endometriosis     Hyperlipidemia     Hypertension     Nausea & vomiting     Sleep apnea     Tendonitis of wrist, right          Current Outpatient Medications:     albuterol (ACCUNEB) 1.25 mg/3 mL Nebu, Take 3 mLs (1.25 mg total) by nebulization every 6 (six) hours as needed (increased cough/wheeze/shortness of breath). Rescue, Disp: 150 mL, Rfl: 5    albuterol (PROAIR HFA) 90 mcg/actuation inhaler, Inhale 2 puffs into the lungs every 6 (six) hours as needed for Wheezing. Rescue, " Disp: 18 g, Rfl: 3    albuterol (PROVENTIL/VENTOLIN HFA) 90 mcg/actuation inhaler, Inhale 2 puffs into the lungs every 4 (four) hours as needed for Wheezing or Shortness of Breath. Rescue, Disp: 18 g, Rfl: 5    ALLERGY RELIEF, CETIRIZINE, 10 mg tablet, Take 10 mg by mouth once daily., Disp: , Rfl:     ALPRAZolam (XANAX) 2 MG Tab, Take 2 mg by mouth every evening. , Disp: , Rfl:     aspirin (ECOTRIN) 81 MG EC tablet, Take 81 mg by mouth every Mon, Wed, Fri. , Disp: , Rfl:     atorvastatin (LIPITOR) 20 MG tablet, TAKE ONE TABLET BY MOUTH ONCE A DAY IN THE EVENING, Disp: , Rfl:     budesonide-formoterol 160-4.5 mcg (SYMBICORT) 160-4.5 mcg/actuation HFAA, Inhale 2 puffs into the lungs every 12 (twelve) hours. Controller, Disp: 10.2 g, Rfl: 11    cyclobenzaprine (FLEXERIL) 10 MG tablet, TAKE ONE TABLET BY MOUTH THREE TIMES DAILY AS NEEDED FOR MUSCLE SPASMS, Disp: 60 tablet, Rfl: 0    diclofenac sodium (VOLTAREN) 1 % Gel, APPLY 2 GRAMS TOPICALLY TO THE AFFECTED AREA TWICE DAILY AS DIRECTED, Disp: 100 g, Rfl: 0    doxazosin (CARDURA) 1 MG tablet, Take 1 mg by mouth once daily., Disp: , Rfl:     ergocalciferol, vitamin D2, (VITAMIN D ORAL), Take 2,000 Units by mouth once daily. , Disp: , Rfl:     fish oil-omega-3 fatty acids 300-1,000 mg capsule, Take by mouth once daily., Disp: , Rfl:     fluticasone propionate (FLONASE) 50 mcg/actuation nasal spray, 1 spray (50 mcg total) by Each Nostril route once daily., Disp: 16 g, Rfl: 3    gabapentin (NEURONTIN) 100 MG capsule, Take 1 capsule (100 mg total) by mouth every evening., Disp: 30 capsule, Rfl: 11    linaCLOtide (LINZESS) 72 mcg Cap capsule, Take 1 capsule (72 mcg total) by mouth before breakfast., Disp: 90 capsule, Rfl: 3    montelukast (SINGULAIR) 10 mg tablet, Take 10 mg by mouth every evening., Disp: , Rfl:     NASCOBAL 500 mcg/spray nasal spray, SMARTSIG:Both Nares, Disp: , Rfl:     pantoprazole (PROTONIX) 40 MG tablet, Take 1 tablet (40 mg total) by  "mouth 2 (two) times daily., Disp: 180 tablet, Rfl: 1    phentermine (ADIPEX-P) 37.5 mg tablet, Take 37.5 mg by mouth every other day. , Disp: , Rfl:     polyethylene glycol (GLYCOLAX) 17 gram/dose powder, Take 17 g by mouth once daily., Disp: 1530 g, Rfl: 3    REGLAN 10 mg tablet, Take by mouth., Disp: , Rfl:     spironolactone (ALDACTONE) 50 MG tablet, spironolactone 50 mg tablet, Disp: , Rfl:     torsemide (DEMADEX) 20 MG Tab, Take 20 mg by mouth once daily. , Disp: , Rfl:     valsartan (DIOVAN) 320 MG tablet, Take 320 mg by mouth once daily., Disp: , Rfl: 11    Review of patient's allergies indicates:  No Known Allergies    Review of Systems   Constitutional: Negative for chills, fever, night sweats and weight gain.   Gastrointestinal: Negative for bowel incontinence, nausea and vomiting.   Genitourinary: Negative for bladder incontinence.   Neurological: Negative for disturbances in coordination and loss of balance.         Objective:        Ht 5' 6" (1.676 m)   Wt 134.5 kg (296 lb 9.6 oz)   LMP 09/21/2015   BMI 47.87 kg/m²     Ortho/SPM Exam    Exam of right wrist:   mild diffuse tenderness   No swelling  Reasonable ROM with minimal pain.       Body habitus is obese.  The patient walks with a limp.    Left knee exam:   Hip irritability  negative.   The skin over the knee is intact.  Knee effusion trace  Palpation- tender medial joint line  Range of motion- Flexion 100 deg, Extension 0 deg,     Ligament laxity exam:  2+ valgus laxity     Patellar apprehension negative.  Popliteal cyst negative  Patellar crepitation absent.    Motor normal 5/5 strength in all tested muscle groups.   Sensory normal        Assessment:       Encounter Diagnoses   Name Primary?    Right wrist pain Yes    Primary osteoarthritis of left knee           Plan:       Arron was seen today for pain and swelling.    Diagnoses and all orders for this visit:    Right wrist pain    Primary osteoarthritis of left knee  -     " Ambulatory referral/consult to Physical/Occupational Therapy; Future      She only had a few days of relief with knee injection at last visit (previous steroid and gel injections only helped a few days).  She has constant left knee pain that is worse with standing and walking. She has giving way of left knee.     Known 90% medial narrowing with osteophytes.     She has intermittent right wrist pain that is improving with OT. She is wearing brace prn.     Known chronic scapholunate ligament tear.     Treatment options reviewed with patient and following plan made:     - Do not recommend further knee injections as she's only had a few days relief with both steroid and gel injections.   - Given hinged knee sleeve for left knee to use prn standing/walking. If this does not help, can do order for medial  brace.   - Discussed and encouraged weight loss. She would need to get down to around 270 (BMI around 44) to consider surgery.   - PT for left knee ordered. Wants to go to [facility].   - Continue OT for right wrist.   - Continue with right wrist brace prn.   - Continue on prn voltaren gel. Hold on further NSAIDs (motrin/aleve) as patient was told to stop them per GI.     - Follow up with me in 3 months for recheck left knee.   - Follow up with Dr. Da Silva in 2 months for right wrist. She would like to avoid surgery if possible.     Follow up in about 3 months (around 10/26/2022).

## 2022-07-26 ENCOUNTER — OFFICE VISIT (OUTPATIENT)
Dept: ORTHOPEDICS | Facility: CLINIC | Age: 52
End: 2022-07-26
Payer: MEDICAID

## 2022-07-26 VITALS — BODY MASS INDEX: 47.09 KG/M2 | WEIGHT: 293 LBS | HEIGHT: 66 IN

## 2022-07-26 DIAGNOSIS — M17.12 PRIMARY OSTEOARTHRITIS OF LEFT KNEE: ICD-10-CM

## 2022-07-26 DIAGNOSIS — M25.531 RIGHT WRIST PAIN: Primary | ICD-10-CM

## 2022-07-26 PROCEDURE — 99214 OFFICE O/P EST MOD 30 MIN: CPT | Mod: S$PBB,,, | Performed by: PHYSICIAN ASSISTANT

## 2022-07-26 PROCEDURE — 1159F MED LIST DOCD IN RCRD: CPT | Mod: CPTII,,, | Performed by: PHYSICIAN ASSISTANT

## 2022-07-26 PROCEDURE — 99999 PR PBB SHADOW E&M-EST. PATIENT-LVL V: CPT | Mod: PBBFAC,,, | Performed by: PHYSICIAN ASSISTANT

## 2022-07-26 PROCEDURE — 99999 PR PBB SHADOW E&M-EST. PATIENT-LVL V: ICD-10-PCS | Mod: PBBFAC,,, | Performed by: PHYSICIAN ASSISTANT

## 2022-07-26 PROCEDURE — 99215 OFFICE O/P EST HI 40 MIN: CPT | Mod: PBBFAC,PN | Performed by: PHYSICIAN ASSISTANT

## 2022-07-26 PROCEDURE — 99214 PR OFFICE/OUTPT VISIT, EST, LEVL IV, 30-39 MIN: ICD-10-PCS | Mod: S$PBB,,, | Performed by: PHYSICIAN ASSISTANT

## 2022-07-26 PROCEDURE — 4010F ACE/ARB THERAPY RXD/TAKEN: CPT | Mod: CPTII,,, | Performed by: PHYSICIAN ASSISTANT

## 2022-07-26 PROCEDURE — 1160F RVW MEDS BY RX/DR IN RCRD: CPT | Mod: CPTII,,, | Performed by: PHYSICIAN ASSISTANT

## 2022-07-26 PROCEDURE — 4010F PR ACE/ARB THEARPY RXD/TAKEN: ICD-10-PCS | Mod: CPTII,,, | Performed by: PHYSICIAN ASSISTANT

## 2022-07-26 PROCEDURE — 3008F BODY MASS INDEX DOCD: CPT | Mod: CPTII,,, | Performed by: PHYSICIAN ASSISTANT

## 2022-07-26 PROCEDURE — 3008F PR BODY MASS INDEX (BMI) DOCUMENTED: ICD-10-PCS | Mod: CPTII,,, | Performed by: PHYSICIAN ASSISTANT

## 2022-07-26 PROCEDURE — 1160F PR REVIEW ALL MEDS BY PRESCRIBER/CLIN PHARMACIST DOCUMENTED: ICD-10-PCS | Mod: CPTII,,, | Performed by: PHYSICIAN ASSISTANT

## 2022-07-26 PROCEDURE — 1159F PR MEDICATION LIST DOCUMENTED IN MEDICAL RECORD: ICD-10-PCS | Mod: CPTII,,, | Performed by: PHYSICIAN ASSISTANT

## 2022-07-26 NOTE — PATIENT INSTRUCTIONS
It was nice to meet you today! I am sorry that you are hurting so much.     You can wear the knee brace as needed for standing and walking. If it is not helping, let me know and I can order the custom knee brace,     I would not do any further knee injections as they do not help.     I put in orders for PT for your knee. You can schedule next time you go to OT.     Continue to work on weight loss- you can do it!    I would continue OT for your wrist. Wear the brace as needed.     I would avoid advil or aleve. You can continue diclofenac gel as directed.     You will follow up with Dr. Da Silva in 2 months and with me in 3 months.     Please stay in touch and call me if you need anything. You can also send me a message in [facility].     Marlyn   [phone]

## 2022-07-27 ENCOUNTER — PATIENT MESSAGE (OUTPATIENT)
Dept: GASTROENTEROLOGY | Facility: CLINIC | Age: 52
End: 2022-07-27
Payer: MEDICAID

## 2022-07-27 ENCOUNTER — OFFICE VISIT (OUTPATIENT)
Dept: INTERNAL MEDICINE | Facility: CLINIC | Age: 52
End: 2022-07-27
Payer: MEDICAID

## 2022-07-27 VITALS
OXYGEN SATURATION: 95 % | RESPIRATION RATE: 16 BRPM | BODY MASS INDEX: 47.09 KG/M2 | HEART RATE: 95 BPM | WEIGHT: 293 LBS | HEIGHT: 66 IN | SYSTOLIC BLOOD PRESSURE: 116 MMHG | DIASTOLIC BLOOD PRESSURE: 72 MMHG

## 2022-07-27 DIAGNOSIS — E66.01 MORBID OBESITY: ICD-10-CM

## 2022-07-27 DIAGNOSIS — G47.33 OSA ON CPAP: ICD-10-CM

## 2022-07-27 DIAGNOSIS — M25.611 DECREASED SHOULDER MOBILITY, RIGHT: ICD-10-CM

## 2022-07-27 DIAGNOSIS — I10 PRIMARY HYPERTENSION: Primary | ICD-10-CM

## 2022-07-27 DIAGNOSIS — M17.12 PRIMARY OSTEOARTHRITIS OF LEFT KNEE: ICD-10-CM

## 2022-07-27 DIAGNOSIS — F32.0 MILD SINGLE CURRENT EPISODE OF MAJOR DEPRESSIVE DISORDER: ICD-10-CM

## 2022-07-27 DIAGNOSIS — E78.5 HYPERLIPIDEMIA, UNSPECIFIED HYPERLIPIDEMIA TYPE: ICD-10-CM

## 2022-07-27 DIAGNOSIS — K21.00 GASTROESOPHAGEAL REFLUX DISEASE WITH ESOPHAGITIS WITHOUT HEMORRHAGE: ICD-10-CM

## 2022-07-27 DIAGNOSIS — K59.00 CONSTIPATION, UNSPECIFIED CONSTIPATION TYPE: ICD-10-CM

## 2022-07-27 DIAGNOSIS — J45.20 MILD INTERMITTENT ASTHMA WITHOUT COMPLICATION: ICD-10-CM

## 2022-07-27 LAB
FINAL PATHOLOGIC DIAGNOSIS: NORMAL
Lab: NORMAL

## 2022-07-27 PROCEDURE — 4010F ACE/ARB THERAPY RXD/TAKEN: CPT | Mod: CPTII,,, | Performed by: INTERNAL MEDICINE

## 2022-07-27 PROCEDURE — 3008F PR BODY MASS INDEX (BMI) DOCUMENTED: ICD-10-PCS | Mod: CPTII,,, | Performed by: INTERNAL MEDICINE

## 2022-07-27 PROCEDURE — 4010F PR ACE/ARB THEARPY RXD/TAKEN: ICD-10-PCS | Mod: CPTII,,, | Performed by: INTERNAL MEDICINE

## 2022-07-27 PROCEDURE — 1159F PR MEDICATION LIST DOCUMENTED IN MEDICAL RECORD: ICD-10-PCS | Mod: CPTII,,, | Performed by: INTERNAL MEDICINE

## 2022-07-27 PROCEDURE — 3074F SYST BP LT 130 MM HG: CPT | Mod: CPTII,,, | Performed by: INTERNAL MEDICINE

## 2022-07-27 PROCEDURE — 99214 OFFICE O/P EST MOD 30 MIN: CPT | Mod: S$PBB,,, | Performed by: INTERNAL MEDICINE

## 2022-07-27 PROCEDURE — 3074F PR MOST RECENT SYSTOLIC BLOOD PRESSURE < 130 MM HG: ICD-10-PCS | Mod: CPTII,,, | Performed by: INTERNAL MEDICINE

## 2022-07-27 PROCEDURE — 99214 PR OFFICE/OUTPT VISIT, EST, LEVL IV, 30-39 MIN: ICD-10-PCS | Mod: S$PBB,,, | Performed by: INTERNAL MEDICINE

## 2022-07-27 PROCEDURE — 1159F MED LIST DOCD IN RCRD: CPT | Mod: CPTII,,, | Performed by: INTERNAL MEDICINE

## 2022-07-27 PROCEDURE — 3078F DIAST BP <80 MM HG: CPT | Mod: CPTII,,, | Performed by: INTERNAL MEDICINE

## 2022-07-27 PROCEDURE — 99499 RISK ADDL DX/OHS AUDIT: ICD-10-PCS | Mod: S$PBB,,, | Performed by: INTERNAL MEDICINE

## 2022-07-27 PROCEDURE — 99213 OFFICE O/P EST LOW 20 MIN: CPT | Mod: PBBFAC | Performed by: INTERNAL MEDICINE

## 2022-07-27 PROCEDURE — 1160F RVW MEDS BY RX/DR IN RCRD: CPT | Mod: CPTII,,, | Performed by: INTERNAL MEDICINE

## 2022-07-27 PROCEDURE — 3008F BODY MASS INDEX DOCD: CPT | Mod: CPTII,,, | Performed by: INTERNAL MEDICINE

## 2022-07-27 PROCEDURE — 1160F PR REVIEW ALL MEDS BY PRESCRIBER/CLIN PHARMACIST DOCUMENTED: ICD-10-PCS | Mod: CPTII,,, | Performed by: INTERNAL MEDICINE

## 2022-07-27 PROCEDURE — 99999 PR PBB SHADOW E&M-EST. PATIENT-LVL III: CPT | Mod: PBBFAC,,, | Performed by: INTERNAL MEDICINE

## 2022-07-27 PROCEDURE — 3078F PR MOST RECENT DIASTOLIC BLOOD PRESSURE < 80 MM HG: ICD-10-PCS | Mod: CPTII,,, | Performed by: INTERNAL MEDICINE

## 2022-07-27 PROCEDURE — 99499 UNLISTED E&M SERVICE: CPT | Mod: S$PBB,,, | Performed by: INTERNAL MEDICINE

## 2022-07-27 PROCEDURE — 99999 PR PBB SHADOW E&M-EST. PATIENT-LVL III: ICD-10-PCS | Mod: PBBFAC,,, | Performed by: INTERNAL MEDICINE

## 2022-07-27 NOTE — PROGRESS NOTES
Subjective:       Patient ID: Arron Ron is a 51 y.o. female.    Chief Complaint: Follow-up      HPI:    Patient is known to me and presents for follow up HTN, obesity, chronic low back and knee pain, depression/anxiety, HLD, asthma. Has not seen me for routine visit since 3/2021 but have seen her for a few acute visits since then. No new labs prior to today's visit. Last did labs April 2022 with Dr. Camacho.      HTN: on demedex daily (was every other day last visit), aldactone, valsartan, doxazosin; also sees Dr. Mays ; GFR was reduced again but had recovered whens he was taking demedex QOD. Also reports she was taking a lot of NSAIDs. BP is controlled today. Does not check regularly at home. Denies chest pains, SOB. Mild LE edema that is intermittent (Cr up to 1.4, GFR 51)--agreeable to repeating renal fxn panel here.      Depression/anxiety: on xanax PRN prescribed by Dr. Tello. Not on SSRI. Sx reported as controlled     HLD: On atorvastatin; last LDL controlled.. Denies medication side effects.      Asthma: diagnosed by Dr. Ocampo who started the Breo and now on symbicort. She denies  wheezing or cough today. Using albuterol PRN.      Chronic back and knee pain: She has done PT in the past but has been > 6 months per patient.  She was following Dr. Tello who was prescribing pain medication but she self stopped taking her pain medications due to concern for side effects. Still using Flexeril. Her knee pain is getting worse. She is trying to exercise but pain limits her. Last MRI and xray reviewed and consistent with arthritis. She saw ortho Was taking naproxen daily for pain controlb ut has stopped with worsening reflux/gastritis sx. Saw ortho yesterday.       RODRI; using CPAP every night     She was referred to Dr. Camacho. She is s/p braiatric surgery (gastric sleeve). She reports she is eating healthier; no more fast foods or soft drinks. She is feeling frustrated because her weight did not decrease  very much since last visit. Dr. Camacho started adipex, she only took 3 doses then stopped.     GERD: She is following with GI. Had EGD. On pantoprazole BID. Decreased appetite due to chronic nausea.  Considering bypass surgery as sleeve causing so many GI complications.     Chronic constipation: on linzess and miralax per GI    Past Medical History:   Diagnosis Date    Acid reflux     Anemia     Back pain     was under therapy    Chronic back pain     Constipation     Elevated cholesterol     Encounter for blood transfusion 1987    Endometriosis     Hyperlipidemia     Hypertension     Nausea & vomiting     Sleep apnea     Tendonitis of wrist, right        Family History   Problem Relation Age of Onset    Hypertension Mother     Heart attack Mother     Hypertension Brother     Heart disease Maternal Grandmother     Hypertension Maternal Grandmother     Diabetes Maternal Aunt     Breast cancer Neg Hx     Colon cancer Neg Hx     Ovarian cancer Neg Hx     Esophageal cancer Neg Hx        Social History     Socioeconomic History    Marital status: Single   Tobacco Use    Smoking status: Never Smoker    Smokeless tobacco: Never Used   Substance and Sexual Activity    Alcohol use: Yes     Comment: rare    Drug use: No    Sexual activity: Yes     Partners: Male     Birth control/protection: Surgical, See Surgical Hx     Comment: --BTL       Review of Systems   Constitutional: Negative for activity change, fatigue, fever and unexpected weight change.   HENT: Negative for congestion, ear pain, hearing loss, rhinorrhea and sore throat.    Eyes: Negative for redness and visual disturbance.   Respiratory: Negative for cough, shortness of breath and wheezing.    Cardiovascular: Negative for chest pain, palpitations and leg swelling.   Gastrointestinal: Positive for constipation and nausea. Negative for abdominal pain, diarrhea and vomiting.   Genitourinary: Negative for dysuria, frequency and  urgency.   Musculoskeletal: Positive for arthralgias and back pain. Negative for joint swelling and neck pain.   Skin: Negative for color change, rash and wound.   Neurological: Negative for dizziness, tremors, weakness, light-headedness and headaches.         Objective:      Physical Exam  Vitals reviewed.   Constitutional:       General: She is not in acute distress.     Appearance: She is well-developed. She is obese.   HENT:      Head: Normocephalic and atraumatic.      Right Ear: External ear normal.      Left Ear: External ear normal.      Nose: Nose normal.   Eyes:      General:         Right eye: No discharge.         Left eye: No discharge.      Extraocular Movements: Extraocular movements intact.      Conjunctiva/sclera: Conjunctivae normal.      Pupils: Pupils are equal, round, and reactive to light.   Neck:      Thyroid: No thyromegaly.   Cardiovascular:      Rate and Rhythm: Normal rate and regular rhythm.      Heart sounds: No murmur heard.  Pulmonary:      Effort: Pulmonary effort is normal. No respiratory distress.      Breath sounds: Normal breath sounds. No wheezing or rales.   Abdominal:      General: Bowel sounds are normal. There is no distension.      Palpations: Abdomen is soft.      Tenderness: There is no abdominal tenderness.   Skin:     General: Skin is warm and dry.   Neurological:      Mental Status: She is alert and oriented to person, place, and time.      Cranial Nerves: No cranial nerve deficit.   Psychiatric:         Behavior: Behavior normal.         Thought Content: Thought content normal.         Assessment:       1. Primary hypertension    2. Hyperlipidemia, unspecified hyperlipidemia type    3. Morbid obesity    4. Gastroesophageal reflux disease with esophagitis without hemorrhage    5. RODRI on CPAP    6. Mild intermittent asthma without complication    7. Mild single current episode of major depressive disorder    8. Constipation, unspecified constipation type    9. Primary  osteoarthritis of left knee    10. Decreased shoulder mobility, right        Plan:       Arron was seen today for follow-up.    Diagnoses and all orders for this visit:    Primary hypertension  -     CBC Auto Differential; Future  -     Comprehensive Metabolic Panel; Future  -     TSH; Future  -     Lipid Panel; Future  -     T4, Free; Future  -     Hemoglobin A1C; Future  Chronic stable  Continue medications at same dose  Low Na diet  Exercise, weight loss  Check BP and keep log for next visit  Sees Dr. Mays as well    Hyperlipidemia, unspecified hyperlipidemia type  -     CBC Auto Differential; Future  -     Comprehensive Metabolic Panel; Future  -     TSH; Future  -     Lipid Panel; Future  -     T4, Free; Future  -     Hemoglobin A1C; Future  Chronic stable  Update labs but cont statin pending labs  Sees Dr. Mays as well    Morbid obesity  -     CBC Auto Differential; Future  -     Comprehensive Metabolic Panel; Future  -     TSH; Future  -     Lipid Panel; Future  -     T4, Free; Future  -     Hemoglobin A1C; Future  Diet, exercise discussed  S/p gastric sleeve with GI complications--considering bypass surgery now    Gastroesophageal reflux disease with esophagitis without hemorrhage  Chronic   Cont PPI  Cont GI follow up  Avoid NSAIDs    RODRI on CPAP  Cont CPAP nightly  Cont working on weight loss    Mild intermittent asthma without complication  Chronic stable  Cont meds per Dr. Ocampo  No acute xacerbatoin today    Mild single current episode of major depressive disorder  Chronic stable  Outside MD is prescribing PRN Xanax,  reviewed    Constipation, unspecified constipation type  Chronic stable  Cont Linzess, miralax    Primary osteoarthritis of left knee  Decreased shoulder mobility, right  Chronic  Cont ortho follow up  Avoid NSAIDs due to reflux, gastritis  tyelnol  Topical voltaren    RTC 6months and PRN pending labs

## 2022-07-28 ENCOUNTER — CLINICAL SUPPORT (OUTPATIENT)
Dept: REHABILITATION | Facility: HOSPITAL | Age: 52
End: 2022-07-28
Attending: ORTHOPAEDIC SURGERY
Payer: MEDICAID

## 2022-07-28 DIAGNOSIS — M25.611 DECREASED SHOULDER MOBILITY, RIGHT: Primary | ICD-10-CM

## 2022-07-28 DIAGNOSIS — M25.531 PAIN IN JOINT OF RIGHT WRIST: ICD-10-CM

## 2022-07-28 DIAGNOSIS — R29.898 DECREASED STRENGTH OF UPPER EXTREMITY: ICD-10-CM

## 2022-07-28 PROCEDURE — 97530 THERAPEUTIC ACTIVITIES: CPT | Mod: PN

## 2022-07-28 NOTE — PROGRESS NOTES
"  Occupational Therapy Daily Treatment Note     Date: 7/28/2022  Name: Arron Ron  Clinic Number: [id number]    Therapy Diagnosis: No diagnosis found.  Physician: Tin Da Silva Jr., *    Physician Orders:eval and treat  Medical Diagnosis: (R) wrist and shoulder pain  Surgical Procedure and Date: n/a  Evaluation Date: 7/11/2022  Insurance Authorization Period Expiration: 7/18/2022 - 12/31/2022  Plan of Care Certification Period: 7/11/2022 - 9/11/2022  Date of Return to MD: n/a    Visit # / Visits authorized: 3 of 20  Time In: 8:45  Time Out: 9:45  Total Billable Time:  50 min  Total Treatment Time: 60 min    Precautions:  Fall      Subjective     Pt reports: "my wrist is much better but its my shoulder giving me more trouble and I cant move it."  per patient.  she was compliant with home exercise program given last session.   Response to previous treatment:  I do a little bit. I couldn't come last time bc of my knee..couldn't get up     Functional change: pt continues with increased pain and decreased mobility of (R)shoulder    Pain Prior to session: 5/10 constant  Pain End of session:    1 /10     Location: right shoulder  and wrist      Objective     Arron received the following supervised modalities after being cleared for contradictions for 10 minutes:   MHP x 10 min to (R) shoulder and wrist/hand to provide increased soft tissue mobility prior to stretches and strength activities with reduced joint inflamation.      Arron received the following manual therapy techniques for 5 minutes:   Manual muscle message with manual deep pressure Stim x 5 minutes with pain analgesic cream to reduce muscle spasm, promoted increase circulation to muscle complex of (R) shoulder and ST joint, decreased post session pain and inflammation with increased soft tissue stretch and capsule mobility.    Arron participated in dynamic functional therapeutic activities to improve functional performance for 37 minutes, " "including:   - Scapular strengthening/stabilzation 2# (R) scap retraction and elevation 3x5 (pt reported pain)   - Scapular active range of motion all (R) ST joint planes 3 x 10   - green Digiflex composite and isolated digits 3x10 (R) hand   - brown composite grippers 3x10 (R) hand   - Prayer stretches of (R) wrist flexion and extension    - yellow net grippers with cmc flexion/extension 3x10   - yellow foam exercises 3x10   - educated on HEP with wall climb demonstration x 5  *all wrist exercises completed with table support    Arron received the following ultrasound modalities after being cleared for contraindication for 8 minutes (with setup):  Ultrasound  3.3MHz with 1.0 Wcm2 100%dutycycle  x 8 minutes with prep  (R) shoulder region to anterior medial region to  promote improved muscle circulation, elasticity, relaxation, and reduce spasm as well as post session inflammation.     Home Exercises and Education Provided     Education provided:   Pt educated continue as previous HEP with reminder to ONLY complete  HEP in a seated position due to fall risk.       Written Home Exercises Provided: yes.  Exercises were reviewed and Arron was able to demonstrate them prior to the end of the session.  Arron demonstrated good  understanding of the HEP provided.   .        Assessment     Ms. Heller tolerated exercises fair to day with pain reported to increase to with exercises but reported decreased pain after ultrasound.  Pt reported great relief with ultrasound to (R) shoulder. Pt reports increases shoulder concerns and limitation with improvements in use of her (R) wrist.  Pt tolerated wrists exercises well with increased joint mobility and strength noted.  Continued with No edema and good joint mobility noted in (R) wrist area today.    Arron is progressing "fairly" towards her goals and there are no updates to goals at this time. Pt prognosis is Fair.     Pt will continue to benefit from skilled outpatient " occupational therapy to address the deficits listed in the problem list on initial evaluation provide pt/family education and to maximize pt's level of independence in the home and community environment.     Anticipated barriers to occupational therapy: none    Pt's spiritual, cultural and educational needs considered and pt agreeable to plan of care and goals.    Goals:  (ongoing)  The following goals were discussed with the patient and patient is in agreement with them as to be addressed in the treatment plan.   Long Term Goals (LTGs); to be met by discharge.  LTG #1: Pt will report a pain level of 2 out of 10 with functional use of (R)UE           LTG #2: Pt will demo improved FOTO score by 20 points.   LTG #3: Pt will return to prior level of function for ADLs and household management  LTG #4: Pt will display wfl active range of motion (R)UE all planes  LTG #5:  P will display WFL (R)UE strength all planes     Short Term Goals (STGs); to be met within 4 weeks   STG #1a: Pt will report consistently 4 out of 10 pain level with functional use of (R)UE.  STG #2a: Pt will report/demo [place] with UB dressing activities.  STG #2b: Pt will report/demo [place] with grooming activities.  STG #3a: Pt will demonstrate independence with issued HEP.   STG #3b: Pt will demo improved active range of motion (R)shoulder [id number] degrees.  STG # 4: Pt increased (R)  strength 15 #     Plan   Cont OT 2 x wk x 8 weeks with reassess on 8/11/2022        Berna Goldsmith, OT

## 2022-07-29 ENCOUNTER — LAB VISIT (OUTPATIENT)
Dept: LAB | Facility: HOSPITAL | Age: 52
End: 2022-07-29
Attending: INTERNAL MEDICINE
Payer: MEDICAID

## 2022-07-29 DIAGNOSIS — I10 PRIMARY HYPERTENSION: ICD-10-CM

## 2022-07-29 DIAGNOSIS — E78.5 HYPERLIPIDEMIA, UNSPECIFIED HYPERLIPIDEMIA TYPE: ICD-10-CM

## 2022-07-29 DIAGNOSIS — E66.01 MORBID OBESITY: ICD-10-CM

## 2022-07-29 LAB
ALBUMIN SERPL BCP-MCNC: 4.1 G/DL (ref 3.5–5.2)
ALP SERPL-CCNC: 117 U/L (ref 55–135)
ALT SERPL W/O P-5'-P-CCNC: 10 U/L (ref 10–44)
ANION GAP SERPL CALC-SCNC: 9 MMOL/L (ref 8–16)
AST SERPL-CCNC: 15 U/L (ref 10–40)
BASOPHILS # BLD AUTO: 0.1 K/UL (ref 0–0.2)
BASOPHILS NFR BLD: 1.2 % (ref 0–1.9)
BILIRUB SERPL-MCNC: 0.4 MG/DL (ref 0.1–1)
BUN SERPL-MCNC: 24 MG/DL (ref 6–20)
CALCIUM SERPL-MCNC: 10 MG/DL (ref 8.7–10.5)
CHLORIDE SERPL-SCNC: 102 MMOL/L (ref 95–110)
CHOLEST SERPL-MCNC: 155 MG/DL (ref 120–199)
CHOLEST/HDLC SERPL: 3.4 {RATIO} (ref 2–5)
CO2 SERPL-SCNC: 29 MMOL/L (ref 23–29)
CREAT SERPL-MCNC: 1.5 MG/DL (ref 0.5–1.4)
DIFFERENTIAL METHOD: ABNORMAL
EOSINOPHIL # BLD AUTO: 0.2 K/UL (ref 0–0.5)
EOSINOPHIL NFR BLD: 2.2 % (ref 0–8)
ERYTHROCYTE [DISTWIDTH] IN BLOOD BY AUTOMATED COUNT: 14 % (ref 11.5–14.5)
EST. GFR  (AFRICAN AMERICAN): 46 ML/MIN/1.73 M^2
EST. GFR  (NON AFRICAN AMERICAN): 40 ML/MIN/1.73 M^2
ESTIMATED AVG GLUCOSE: 131 MG/DL (ref 68–131)
GLUCOSE SERPL-MCNC: 100 MG/DL (ref 70–110)
HBA1C MFR BLD: 6.2 % (ref 4–5.6)
HCT VFR BLD AUTO: 35.2 % (ref 37–48.5)
HDLC SERPL-MCNC: 45 MG/DL (ref 40–75)
HDLC SERPL: 29 % (ref 20–50)
HGB BLD-MCNC: 11.3 G/DL (ref 12–16)
IMM GRANULOCYTES # BLD AUTO: 0.02 K/UL (ref 0–0.04)
IMM GRANULOCYTES NFR BLD AUTO: 0.2 % (ref 0–0.5)
LDLC SERPL CALC-MCNC: 98.6 MG/DL (ref 63–159)
LYMPHOCYTES # BLD AUTO: 2.7 K/UL (ref 1–4.8)
LYMPHOCYTES NFR BLD: 32.3 % (ref 18–48)
MCH RBC QN AUTO: 28.3 PG (ref 27–31)
MCHC RBC AUTO-ENTMCNC: 32.1 G/DL (ref 32–36)
MCV RBC AUTO: 88 FL (ref 82–98)
MONOCYTES # BLD AUTO: 0.7 K/UL (ref 0.3–1)
MONOCYTES NFR BLD: 8.8 % (ref 4–15)
NEUTROPHILS # BLD AUTO: 4.7 K/UL (ref 1.8–7.7)
NEUTROPHILS NFR BLD: 55.3 % (ref 38–73)
NONHDLC SERPL-MCNC: 110 MG/DL
NRBC BLD-RTO: 0 /100 WBC
PLATELET # BLD AUTO: 341 K/UL (ref 150–450)
PMV BLD AUTO: 10.2 FL (ref 9.2–12.9)
POTASSIUM SERPL-SCNC: 4.6 MMOL/L (ref 3.5–5.1)
PROT SERPL-MCNC: 8 G/DL (ref 6–8.4)
RBC # BLD AUTO: 3.99 M/UL (ref 4–5.4)
SODIUM SERPL-SCNC: 140 MMOL/L (ref 136–145)
T4 FREE SERPL-MCNC: 0.81 NG/DL (ref 0.71–1.51)
TRIGL SERPL-MCNC: 57 MG/DL (ref 30–150)
TSH SERPL DL<=0.005 MIU/L-ACNC: 1.88 UIU/ML (ref 0.4–4)
WBC # BLD AUTO: 8.45 K/UL (ref 3.9–12.7)

## 2022-07-29 PROCEDURE — 83036 HEMOGLOBIN GLYCOSYLATED A1C: CPT | Performed by: INTERNAL MEDICINE

## 2022-07-29 PROCEDURE — 84443 ASSAY THYROID STIM HORMONE: CPT | Performed by: INTERNAL MEDICINE

## 2022-07-29 PROCEDURE — 80061 LIPID PANEL: CPT | Performed by: INTERNAL MEDICINE

## 2022-07-29 PROCEDURE — 80053 COMPREHEN METABOLIC PANEL: CPT | Performed by: INTERNAL MEDICINE

## 2022-07-29 PROCEDURE — 85025 COMPLETE CBC W/AUTO DIFF WBC: CPT | Performed by: INTERNAL MEDICINE

## 2022-07-29 PROCEDURE — 84439 ASSAY OF FREE THYROXINE: CPT | Performed by: INTERNAL MEDICINE

## 2022-07-29 PROCEDURE — 36415 COLL VENOUS BLD VENIPUNCTURE: CPT | Performed by: INTERNAL MEDICINE

## 2022-08-02 ENCOUNTER — CLINICAL SUPPORT (OUTPATIENT)
Dept: REHABILITATION | Facility: HOSPITAL | Age: 52
End: 2022-08-02
Payer: MEDICAID

## 2022-08-02 DIAGNOSIS — M25.611 DECREASED SHOULDER MOBILITY, RIGHT: Primary | ICD-10-CM

## 2022-08-02 DIAGNOSIS — M25.531 PAIN IN JOINT OF RIGHT WRIST: ICD-10-CM

## 2022-08-02 DIAGNOSIS — R29.898 DECREASED STRENGTH OF UPPER EXTREMITY: ICD-10-CM

## 2022-08-02 PROCEDURE — 97530 THERAPEUTIC ACTIVITIES: CPT | Mod: PN

## 2022-08-02 NOTE — PROGRESS NOTES
"  Occupational Therapy Daily Treatment Note     Date: 8/2/2022  Name: Arron Ron  Clinic Number: [id number]    Therapy Diagnosis: No diagnosis found.  Physician: Tin Da Silva Jr., *    Physician Orders:eval and treat  Medical Diagnosis: (R) wrist and shoulder pain  Surgical Procedure and Date: n/a  Evaluation Date: 7/11/2022  Insurance Authorization Period Expiration: 7/18/2022 - 12/31/2022  Plan of Care Certification Period: 7/11/2022 - 9/11/2022  Date of Return to MD: n/a    Visit # / Visits authorized: 4 of 20  Time In: 8:10  Time Out: 9:10  Total Billable Time:  47 min  Total Treatment Time: 60 min    Precautions:  Fall      Subjective     Pt reports: " my shoulder is still bugging me." per patient.  she continues to report compliance with home exercise program.     Response to previous treatment:  Pt stated much relief from pain with the ultrasound last session.     Functional change: pt continues with increased pain and decreased mobility of (R)shoulder    Pain Prior to session: 5/10 constant  Pain End of session:     1/10     Location: right shoulder    Objective     Arron received the following supervised modalities after being cleared for contradictions for 10 minutes:   MHP x 10 min to (R) shoulder and wrist/hand to provide increased soft tissue mobility prior to stretches and strength activities with reduced joint inflamation.      Arron received the following manual therapy techniques for 3 minutes:   Manual muscle message with manual deep pressure Stim x 3 minutes with pain analgesic cream to reduce muscle spasm, promoted increase circulation to muscle complex of (R) shoulder and ST joint, decreased post session pain and inflammation with increased soft tissue stretch and capsule mobility.    Arron participated in dynamic functional therapeutic activities to improve functional performance for  39 minutes, including:   - Scapular strengthening/stabilzation 2# (R) scap retraction and " "elevation 3x5 (pt reported pain)   - Scapular active range of motion all (R) ST joint planes 3 x 10   - green Digiflex composite and isolated digits 3x10 (R) hand   - brown composite grippers 3x10 (R) hand   - Prayer stretches of (R) wrist flexion and extension    - velcro dowels (R)wrist supination/pronation 3x5   - yellow net grippers with cmc flexion/extension 3x10   - yellow theraband  activities of (R) wrist all planes with wedge support 3x5   - passive stretch (R) shoulder supine on mat all planes    - theraband ER/IR cuff strengthening 3x5 seated   - educated on HEP with added supine active range of motion (R)shoulder and RTC strengthening     Arron received the following ultrasound modalities after being cleared for contraindication for 8 minutes (with setup):  Ultrasound  3.3MHz with 1.0 Wcm2 100%dutycycle  x 8 minutes with prep  (R) shoulder region to anterior medial region to  promote improved muscle circulation, elasticity, relaxation, and reduce spasm as well as post session inflammation.     Home Exercises and Education Provided     Education provided:   Pt educated continue as previous HEP with added exercises and a seated position due to fall risk.       Written Home Exercises Provided: yes.  Exercises were reviewed and Arron was able to demonstrate them prior to the end of the session.  Arron demonstrated good  understanding of the HEP provided.   .        Assessment     Ms. Heller tolerated exercises very well with slight pain reported.   Pt reported continues  relief with ultrasound to (R) shoulder. Pt tolerated wrists exercises well and stated she would rather continue with shoulder treatments rather than wrist due to limitation primarily (R) shoulder. PT is improving and making progress towards all of her goals with increased tolerance and compliance..    Arron is progressing "fairly" towards her goals and there are no updates to goals at this time. Pt prognosis is Fair.     Pt will " continue to benefit from skilled outpatient occupational therapy to address the deficits listed in the problem list on initial evaluation provide pt/family education and to maximize pt's level of independence in the home and community environment.     Anticipated barriers to occupational therapy: none    Pt's spiritual, cultural and educational needs considered and pt agreeable to plan of care and goals.    Goals:  (ongoing)  The following goals were discussed with the patient and patient is in agreement with them as to be addressed in the treatment plan.   Long Term Goals (LTGs); to be met by discharge.  LTG #1: Pt will report a pain level of 2 out of 10 with functional use of (R)UE           LTG #2: Pt will demo improved FOTO score by 20 points.   LTG #3: Pt will return to prior level of function for ADLs and household management  LTG #4: Pt will display wfl active range of motion (R)UE all planes  LTG #5:  P will display WFL (R)UE strength all planes     Short Term Goals (STGs); to be met within 4 weeks   STG #1a: Pt will report consistently 4 out of 10 pain level with functional use of (R)UE.  STG #2a: Pt will report/demo [place] with UB dressing activities.  STG #2b: Pt will report/demo [place] with grooming activities.  STG #3a: Pt will demonstrate independence with issued HEP.   STG #3b: Pt will demo improved active range of motion (R)shoulder [id number] degrees.  STG # 4: Pt increased (R)  strength 15 #     Plan   Cont OT 2 x wk x 8 weeks with reassess on 8/11/2022        Berna Goldsmith OT

## 2022-08-11 ENCOUNTER — CLINICAL SUPPORT (OUTPATIENT)
Dept: REHABILITATION | Facility: HOSPITAL | Age: 52
End: 2022-08-11
Attending: ORTHOPAEDIC SURGERY
Payer: MEDICAID

## 2022-08-11 DIAGNOSIS — R29.898 DECREASED STRENGTH OF UPPER EXTREMITY: Primary | ICD-10-CM

## 2022-08-11 PROCEDURE — 97530 THERAPEUTIC ACTIVITIES: CPT | Mod: PN

## 2022-08-11 NOTE — PROGRESS NOTES
"  Occupational Therapy Daily Treatment Note     Date: 8/11/2022  Name: Arron Ron  Clinic Number: [id number]    Therapy Diagnosis: No diagnosis found.  Physician: Tin Da Silva Jr., *    Physician Orders:eval and treat  Medical Diagnosis: (R) wrist and shoulder pain  Surgical Procedure and Date: n/a  Evaluation Date: 7/11/2022  Insurance Authorization Period Expiration: 7/18/2022 - 12/31/2022  Plan of Care Certification Period: 7/11/2022 - 9/11/2022  Date of Return to MD: n/a    Visit # / Visits authorized: 5 of 20  Time In: 8:15  Time Out: 9:15  Total Billable Time: 50 min  Total Treatment Time: 60 min    Precautions:  Fall      Subjective     Pt reports: " I sorry to say but I fell again and hurt my wrist again but felipe been working it out and doing the exercises you gave me" per patient.  she continues to report compliance with home exercise program.     Response to previous treatment:  .good     Functional change: pt continues with pain and decreased mobility (R) shoulder and decreased strength (R) hand    Pain Prior to session: 8/10 constant  Pain End of session:   Did not display or report    Location: right shoulder    Objective     Arron received the following supervised modalities after being cleared for contradictions for 10 minutes:   MHP x 10 min to (R) shoulder to provide increased soft tissue mobility prior to stretches and strength activities with reduced joint inflamation.    Arron participated in dynamic functional therapeutic activities to improve functional performance for  42 minutes, including:   - Scapular strengthening/stabilzation 2# (R) scap retraction and elevation 3x5 (pt reported pain)   - Scapular active range of motion all (R) ST joint planes 3 x 10   - green Digiflex composite and isolated digits 3x10 (R) hand   - black composite grippers 3x10 (R) hand   - velcro dowels (R)wrist supination/pronation 3x5   - Red theraband  activities of (R) wrist all planes with wedge " "support 3x5   - passive  Range (R) shoulder supine on mat all planes    - Red theraband ER/IR cuff strengthening 3x5 supine on mat    - active range of motion dowel exercise flexion with red theraband resistance 3x10   - ABC activities with 2 # with unable to complete due to muscle fatigue    Arron received the following ultrasound modalities after being cleared for contraindication for 8 minutes (with setup):  Ultrasound  3.3MHz with 1.0 Wcm2 100%dutycycle  x 8 minutes with prep  (R) shoulder region to anterior medial region to  promote improved muscle circulation, elasticity, relaxation, and reduce spasm as well as post session inflammation.     Home Exercises and Education Provided     Education provided:   Pt educated continue as previous HEP with added exercises and a seated position due to fall risk.       Written Home Exercises Provided: yes.  Exercises were reviewed and Arron was able to demonstrate them prior to the end of the session.  Arron demonstrated good  understanding of the HEP provided.   .        Assessment     Ms. Heller continues report relief with ultrasound to (R) shoulder. She has noted today increased passive range of motion to WNL all (R) shoulder planes  And improved strength all planes.  She reports none to minimal pain with exercise but continued with difficulty with overhead reach and muscle endurance and joint stability.   Arron is progressing "fairly" towards her goals and there are no updates to goals at this time. Pt prognosis is Fair.     Pt will continue to benefit from skilled outpatient occupational therapy to address the deficits listed in the problem list on initial evaluation provide pt/family education and to maximize pt's level of independence in the home and community environment.     Anticipated barriers to occupational therapy: none    Pt's spiritual, cultural and educational needs considered and pt agreeable to plan of care and goals.    Goals:  (ongoing)  The " following goals were discussed with the patient and patient is in agreement with them as to be addressed in the treatment plan.   Long Term Goals (LTGs); to be met by discharge.  LTG #1: Pt will report a pain level of 2 out of 10 with functional use of (R)UE           LTG #2: Pt will demo improved FOTO score by 20 points.   LTG #3: Pt will return to prior level of function for ADLs and household management  LTG #4: Pt will display wfl active range of motion (R)UE all planes  LTG #5:  P will display WFL (R)UE strength all planes     Short Term Goals (STGs); to be met within 4 weeks   STG #1a: Pt will report consistently 4 out of 10 pain level with functional use of (R)UE.  STG #2a: Pt will report/demo [place] with UB dressing activities.  STG #2b: Pt will report/demo [place] with grooming activities.  STG #3a: Pt will demonstrate independence with issued HEP.   STG #3b: Pt will demo improved active range of motion (R)shoulder [id number] degrees.  STG # 4: Pt increased (R)  strength 15 #     Plan   Cont OT 2 x wk x 8 weeks with reassess on 8/11/2022        Berna Goldsmiht OT

## 2022-08-15 ENCOUNTER — CLINICAL SUPPORT (OUTPATIENT)
Dept: REHABILITATION | Facility: HOSPITAL | Age: 52
End: 2022-08-15
Attending: ORTHOPAEDIC SURGERY
Payer: MEDICAID

## 2022-08-15 DIAGNOSIS — M25.531 PAIN IN JOINT OF RIGHT WRIST: ICD-10-CM

## 2022-08-15 DIAGNOSIS — R29.898 DECREASED STRENGTH OF UPPER EXTREMITY: ICD-10-CM

## 2022-08-15 DIAGNOSIS — M25.611 DECREASED SHOULDER MOBILITY, RIGHT: Primary | ICD-10-CM

## 2022-08-15 PROCEDURE — 97530 THERAPEUTIC ACTIVITIES: CPT | Mod: PN

## 2022-08-15 NOTE — PROGRESS NOTES
"  Occupational Therapy Daily Treatment Note     Date: 8/15/2022  Name: Arron Ron  Clinic Number: [id number]    Therapy Diagnosis:   Encounter Diagnoses   Name Primary?    Decreased shoulder mobility, right Yes    Pain in joint of right wrist     Decreased strength of upper extremity      Physician: Tin Da Silva Jr., *    Physician Orders:eval and treat  Medical Diagnosis: (R) wrist and shoulder pain  Surgical Procedure and Date: n/a  Evaluation Date: 7/11/2022  Insurance Authorization Period Expiration: 7/18/2022 - 12/31/2022  Plan of Care Certification Period: 7/11/2022 - 9/11/2022  Date of Return to MD: n/a    Visit # / Visits authorized: 6 of 20  Time In: 8:54  Time Out: 9:50  Total Billable Time: 46 min  Total Treatment Time: 56 minutes    Precautions:  Fall      Subjective     Pt reports: She stumbled at [employer] and caught herself on a shelf, at the time her wrist experienced some slight pain but since has returned to its previous pain level.      Response to previous treatment: No adverse reactions     Functional change: Patient continues to report minor pain in wrist with pain and impaired range of motion in right shoulder.    Pain Prior to session: 2-3/10   Pain End of session: 5/10    Location: right shoulder    Objective     Arron received the following supervised modalities after being cleared for contradictions for 10 minutes:   MHP x 10 min to (R) shoulder to improve circulation and mobility while reducing pain to facilitate participation in therapy.     Arron participated in dynamic functional therapeutic activities to improve functional performance for  46 minutes, including:     - Black composite grippers 3x12 (R) hand   - White supra ball squeezes 1 x 20   - Prayer stretch 1 x 20   - ABC supine shoulder exercise with 2#: Required rest at "T", but able to complete full alphabet.   - Seated shoulder shrugs using 2# 3x7    - Seated bilateral shoulder rolls in both directions  3 " "x 12   - Active range of motion shoulder flexion with red theraband and 1# dowel  3x12  - Red theraband ER/IR strengthening 3x7 supine on mat    - ER/IR passive range of motion supine on mat 1 x 10 with 10 second end range hold  - Shoulder flexion passive range of motion supine on mat 1 x 10 with 10 second end range hold  - Shoulder abd/add passive range of motion supine on mat with 1 x 10 with 10 second end rage hold   - Active shoulder flexion with 1# dowel and red resistance band supine on mat 3 x 12      Home Exercises and Education Provided     Education provided:   Pt educated continue as previous HEP with added exercises and a seated position due to fall risk.       Written Home Exercises Provided: yes.  Exercises were reviewed and Arron was able to demonstrate them prior to the end of the session.  Arron demonstrated good  understanding of the HEP provided.     Assessment     Patient tolerated session well today with some reports of pain. She states that the minor pain she is experiencing is related to muscle fatigue and exercise. No additional pain was reported by patient in wrist or shoulder this session following recent near fall. Muscle endurance remains impacted as patient required several rest breaks throughout session to complete exercises. Passive range of motion observed to be WNL in shoulder external/internal rotation, abduction/adduction, and flexion this session. At the end of session, patient declined ultrasound to right shoulder for pain reduction stating that she wanted to go home.     Arron is progressing "fairly" towards her goals and there are no updates to goals at this time. Pt prognosis is Fair.     Pt will continue to benefit from skilled outpatient occupational therapy to address the deficits listed in the problem list on initial evaluation provide pt/family education and to maximize pt's level of independence in the home and community environment.     Anticipated barriers to " occupational therapy: none    Pt's spiritual, cultural and educational needs considered and pt agreeable to plan of care and goals.    Goals:  (ongoing)  The following goals were discussed with the patient and patient is in agreement with them as to be addressed in the treatment plan.   Long Term Goals (LTGs); to be met by discharge.  LTG #1: Pt will report a pain level of 2 out of 10 with functional use of (R)UE           LTG #2: Pt will demo improved FOTO score by 20 points.   LTG #3: Pt will return to prior level of function for ADLs and household management  LTG #4: Pt will display wfl active range of motion (R)UE all planes  LTG #5:  P will display WFL (R)UE strength all planes     Short Term Goals (STGs); to be met within 4 weeks   STG #1a: Pt will report consistently 4 out of 10 pain level with functional use of (R)UE.  STG #2a: Pt will report/demo [place] with UB dressing activities.  STG #2b: Pt will report/demo [place] with grooming activities.  STG #3a: Pt will demonstrate independence with issued HEP.   STG #3b: Pt will demo improved active range of motion (R)shoulder [id number] degrees.  STG # 4: Pt increased (R)  strength 15 #     Plan   Cont OT 2 x wk x 8 weeks with reassess on 8/11/2022        Eyad Yates OT

## 2022-08-18 ENCOUNTER — CLINICAL SUPPORT (OUTPATIENT)
Dept: REHABILITATION | Facility: HOSPITAL | Age: 52
End: 2022-08-18
Attending: ORTHOPAEDIC SURGERY
Payer: MEDICAID

## 2022-08-18 DIAGNOSIS — R29.898 DECREASED STRENGTH OF UPPER EXTREMITY: ICD-10-CM

## 2022-08-18 DIAGNOSIS — M25.611 DECREASED SHOULDER MOBILITY, RIGHT: Primary | ICD-10-CM

## 2022-08-18 DIAGNOSIS — M25.531 PAIN IN JOINT OF RIGHT WRIST: ICD-10-CM

## 2022-08-18 PROCEDURE — 97530 THERAPEUTIC ACTIVITIES: CPT | Mod: PN

## 2022-08-18 NOTE — PROGRESS NOTES
"  Occupational Therapy Daily Treatment Note     Date: 8/18/2022  Name: Arron Ron  Clinic Number: [id number]    Therapy Diagnosis:   Encounter Diagnoses   Name Primary?    Decreased shoulder mobility, right Yes    Pain in joint of right wrist     Decreased strength of upper extremity      Physician: Tin Da Silva Jr., *    Physician Orders:eval and treat  Medical Diagnosis: (R) wrist and shoulder pain  Surgical Procedure and Date: n/a  Evaluation Date: 7/11/2022  Insurance Authorization Period Expiration: 7/18/2022 - 12/31/2022  Plan of Care Certification Period: 7/11/2022 - 9/11/2022  Date of Return to MD: n/a    Visit # / Visits authorized: 7 of 20  Time In: 8:02  Time Out: 8:52  Total Billable Time: min  Total Treatment Time: 52 minutes    Precautions:  Fall      Subjective     Pt reports: pt walked into gym without cane.  She states " felipe been not using at much but I still keep it in the car.     Response to previous treatment: No adverse reactions     Functional change: Patient continues to report minor pain in wrist with pain and impaired range of motion in right shoulder.    Pain Prior to session: 1/10   Pain End of session: 1/10    Location: right shoulder    Objective     Arron received the following supervised modalities after being cleared for contradictions for 8 minutes:   MHP x 8 min to (R) shoulder to improve circulation and mobility while reducing pain to facilitate participation in therapy.     Arron participated in dynamic functional therapeutic activities to improve functional performance for  36 minutes, including:     - ABC supine shoulder exercise with 2#   - scapular strengthening all planes 3# 3x10  - Active range of motion shoulder flexion with red theraband and 1# dowel  3x10 supine on mat gravity eliminated  - Green theraband ER/IR strengthening 3x5 supine on mat    -UBE no resistance x 5 min  - Shoulder flexion passive range of motion supine on mat 1 x 10 with 10 second " "end range hold  - Shoulder active assistive range of motion abd/add supine on mat 2#     Arron received the following ultrasound modalities after being cleared for contraindication for 8 minutes (with setup):  Ultrasound  3.3MHz with 1.0 Wcm2 100%dutycycle  x 8 minutes with prep  (R) shoulder region to anterior medial region to  promote improved muscle circulation, elasticity, relaxation, and reduce spasm as well as post session inflammation      Home Exercises and Education Provided     Education provided:   Pt educated continue as previous HEP with added exercises and a seated position due to fall risk.       Written Home Exercises Provided: yes.  Exercises were reviewed and Arron was able to demonstrate them prior to the end of the session.  Arron demonstrated good  understanding of the HEP provided.     Assessment     Patient tolerated session well today with no report of pain. Muscle fatigue noted with some exercises but she was able to complete each one given.  Good joint mobility and wnl all range noted in supine.  Decreased rigidity with movements with exception of (R) shoulder abduction.   Increased ST and GH mobility in seated and very good progress noted. Pt continues with good (R) wrist mobility and strengthen. Will take  remeasure next session  Arron is progressing "fairly" towards her goals and there are no updates to goals at this time. Pt prognosis is Fair.     Pt will continue to benefit from skilled outpatient occupational therapy to address the deficits listed in the problem list on initial evaluation provide pt/family education and to maximize pt's level of independence in the home and community environment.     Anticipated barriers to occupational therapy: none    Pt's spiritual, cultural and educational needs considered and pt agreeable to plan of care and goals.    Goals:  (ongoing)  The following goals were discussed with the patient and patient is in agreement with them as to be " addressed in the treatment plan.   Long Term Goals (LTGs); to be met by discharge.  LTG #1: Pt will report a pain level of 2 out of 10 with functional use of (R)UE           LTG #2: Pt will demo improved FOTO score by 20 points.   LTG #3: Pt will return to prior level of function for ADLs and household management  LTG #4: Pt will display wfl active range of motion (R)UE all planes  LTG #5:  P will display WFL (R)UE strength all planes     Short Term Goals (STGs); to be met within 4 weeks   STG #1a: Pt will report consistently 4 out of 10 pain level with functional use of (R)UE.  STG #2a: Pt will report/demo [place] with UB dressing activities.  STG #2b: Pt will report/demo [place] with grooming activities.  STG #3a: Pt will demonstrate independence with issued HEP.   STG #3b: Pt will demo improved active range of motion (R)shoulder [id number] degrees.  STG # 4: Pt increased (R)  strength 15 #     Plan   Cont OT 2 x wk x 8 weeks with reassess next session        Berna Goldsmith, TEJ

## 2022-08-19 ENCOUNTER — PATIENT MESSAGE (OUTPATIENT)
Dept: GASTROENTEROLOGY | Facility: CLINIC | Age: 52
End: 2022-08-19
Payer: MEDICAID

## 2022-08-20 RX ORDER — PANTOPRAZOLE SODIUM 40 MG/1
40 TABLET, DELAYED RELEASE ORAL 2 TIMES DAILY
Qty: 180 TABLET | Refills: 1 | Status: SHIPPED | OUTPATIENT
Start: 2022-08-20 | End: 2022-08-25 | Stop reason: SDUPTHER

## 2022-08-20 RX ORDER — SUCRALFATE 1 G/1
1 TABLET ORAL 2 TIMES DAILY
Qty: 60 TABLET | Refills: 3 | Status: SHIPPED | OUTPATIENT
Start: 2022-08-20 | End: 2022-12-21 | Stop reason: SDUPTHER

## 2022-08-21 ENCOUNTER — PATIENT MESSAGE (OUTPATIENT)
Dept: GASTROENTEROLOGY | Facility: CLINIC | Age: 52
End: 2022-08-21
Payer: MEDICAID

## 2022-08-22 ENCOUNTER — CLINICAL SUPPORT (OUTPATIENT)
Dept: REHABILITATION | Facility: HOSPITAL | Age: 52
End: 2022-08-22
Attending: ORTHOPAEDIC SURGERY
Payer: MEDICAID

## 2022-08-22 DIAGNOSIS — M25.611 DECREASED SHOULDER MOBILITY, RIGHT: Primary | ICD-10-CM

## 2022-08-22 DIAGNOSIS — R29.898 DECREASED STRENGTH OF UPPER EXTREMITY: ICD-10-CM

## 2022-08-22 DIAGNOSIS — M25.531 PAIN IN JOINT OF RIGHT WRIST: ICD-10-CM

## 2022-08-22 PROCEDURE — 97530 THERAPEUTIC ACTIVITIES: CPT | Mod: PN

## 2022-08-22 NOTE — PROGRESS NOTES
"  Occupational Therapy Daily Treatment Note     Date: 8/22/2022  Name: Arron Ron  Clinic Number: [id number]    Therapy Diagnosis:   Encounter Diagnoses   Name Primary?    Decreased shoulder mobility, right Yes    Pain in joint of right wrist     Decreased strength of upper extremity      Physician: Tin Da Silva Jr., *    Physician Orders:eval and treat  Medical Diagnosis: (R) wrist and shoulder pain  Surgical Procedure and Date: n/a  Evaluation Date: 7/11/2022  Insurance Authorization Period Expiration: 7/18/2022 - 12/31/2022  Plan of Care Certification Period: 7/11/2022 - 9/11/2022  Date of Return to MD: n/a    Visit # / Visits authorized: 8 of 20  Time In: 8:05 am  Time Out: 9:05 am  Total Billable Time: 52 min  Total Treatment Time: 60 minutes    Precautions:  Fall      Subjective     Pt reports: "doing good my wrist is no problem" per pt     Response to previous treatment: No adverse reactions     Functional change: Patient continues to report minor pain in wrist with pain and impaired range of motion in right shoulder.    Pain Prior to session: 1/10   Pain End of session: 1/10    Location: right shoulder    Objective     Arron received the following supervised modalities after being cleared for contradictions for 8 minutes:   MHP x 8 min to (R) shoulder to improve circulation and mobility while reducing pain to facilitate participation in therapy.     Arron participated in dynamic functional therapeutic activities to improve functional performance for  44 minutes, including:     - ABC supine shoulder exercise with 2#   - scapular strengthening all planes 3# 3x10  - Active range of motion shoulder flexion with red theraband and 1# dowel  3x10 supine on mat gravity eliminated  - Green theraband ER/IR strengthening 3x5 supine on mat    -UBE no resistance x 6 min  - bicep curls with red theraband 3x10  - Shoulder active assistive range of motion abd/add supine on mat 2#     Arron received the " "following ultrasound modalities after being cleared for contraindication for 8 minutes (with setup):  Ultrasound  3.3MHz with 1.0 Wcm2 100%dutycycle  x 8 minutes with prep  (R) shoulder region to anterior medial region to  promote improved muscle circulation, elasticity, relaxation, and reduce spasm as well as post session inflammation      ** Measurements were taken today of the following (see initial eval for previous measurements)    Active Range of Motion:     Left    wrist Flexion 85   wrist Extension 50   Wrist rad dev wfl   Wrist uln dev wfl   Elbow Supination wfl   Elbow Pronation wfl              Strength (Dynamometer/Measured in pounds on Rung II) and Pinch Strength (Pinch Gauge)   8/22/2022    Right   Composite 46   Lateral Pinch 15   Tripod Pinch 9     Opposition (Fine Motor Skills/Dexterity): no defcits    Sensation: no deficits    Edema. None noted    Digit composite flexion/extension:  WNL (R) hand      Home Exercises and Education Provided     Education provided:   Pt educated continue as previous HEP with added exercises and a seated position due to fall risk.       Written Home Exercises Provided: yes.  Exercises were reviewed and Arron was able to demonstrate them prior to the end of the session.  Arron demonstrated good  understanding of the HEP provided.     Assessment     Patient tolerated session well today with no report of pain. She continue to report and display increased strength and enduance for exercises. Patinet cont with good joint mobility and wnl all range noted with no rigidity of movement with. Increased scapular stabilization and mobility. Very good progress noted. Mrs. Heller has met all goals for her wrist deficits and will continue to only focus on (R) shouder goals. Will take  remeasure next session  Arron is progressing "fairly" towards her goals and there are no updates to goals at this time. Pt prognosis is Good.     Pt will continue to benefit from skilled " outpatient occupational therapy to address the deficits listed in the problem list on initial evaluation provide pt/family education and to maximize pt's level of independence in the home and community environment.     Anticipated barriers to occupational therapy: none    Pt's spiritual, cultural and educational needs considered and pt agreeable to plan of care and goals.    Goals:  (ongoing)  The following goals were discussed with the patient and patient is in agreement with them as to be addressed in the treatment plan.   Long Term Goals (LTGs); to be met by discharge.  LTG #1: Pt will report a pain level of 2 out of 10 with functional use of (R)UE           LTG #2: Pt will demo improved FOTO score by 20 points.   LTG #3: Pt will return to prior level of function for ADLs and household management  LTG #4: Pt will display wfl active range of motion (R)UE all planes  LTG #5:  P will display WFL (R)UE strength all planes     Short Term Goals (STGs); to be met within 4 weeks   STG #1a: Pt will report consistently 4 out of 10 pain level with functional use of (R)UE. (met 8/22/22)  STG #2a: Pt will report/demo [place] with UB dressing activities. (met 8/22/22)  STG #2b: Pt will report/demo [place] with grooming activities. (met 8/22/22)  STG #3a: Pt will demonstrate independence with issued HEP. (met 8/22/22)  STG #3b: Pt will demo improved active range of motion (R)shoulder [id number] degrees.  STG # 4: Pt increased (R)  strength 15 # (met 8/22/22)    Plan   Cont OT 2 x wk x 8 weeks         Berna Goldsmith, OT

## 2022-08-25 RX ORDER — PANTOPRAZOLE SODIUM 40 MG/1
40 TABLET, DELAYED RELEASE ORAL 2 TIMES DAILY
Qty: 180 TABLET | Refills: 1 | Status: SHIPPED | OUTPATIENT
Start: 2022-08-25 | End: 2023-07-26

## 2022-08-29 RX ORDER — MELOXICAM 15 MG/1
TABLET ORAL
Qty: 30 TABLET | Refills: 1 | Status: SHIPPED | OUTPATIENT
Start: 2022-08-29 | End: 2022-09-28

## 2022-09-01 ENCOUNTER — CLINICAL SUPPORT (OUTPATIENT)
Dept: REHABILITATION | Facility: HOSPITAL | Age: 52
End: 2022-09-01
Attending: ORTHOPAEDIC SURGERY
Payer: MEDICARE

## 2022-09-01 DIAGNOSIS — M25.611 DECREASED SHOULDER MOBILITY, RIGHT: Primary | ICD-10-CM

## 2022-09-01 DIAGNOSIS — R29.898 DECREASED STRENGTH OF UPPER EXTREMITY: ICD-10-CM

## 2022-09-01 DIAGNOSIS — M25.531 PAIN IN JOINT OF RIGHT WRIST: ICD-10-CM

## 2022-09-01 PROCEDURE — 97530 THERAPEUTIC ACTIVITIES: CPT | Mod: PN

## 2022-09-01 NOTE — PROGRESS NOTES
"  Occupational Therapy Daily Treatment Note     Date: 9/1/2022  Name: Arron Ron  Clinic Number: [id number]    Therapy Diagnosis:   Encounter Diagnoses   Name Primary?    Decreased shoulder mobility, right Yes    Pain in joint of right wrist     Decreased strength of upper extremity        Physician: Tin Da Silva Jr., *    Physician Orders:eval and treat  Medical Diagnosis: (R) wrist and shoulder pain  Surgical Procedure and Date: n/a  Evaluation Date: 7/11/2022  Insurance Authorization Period Expiration: 7/18/2022 - 12/31/2022  Plan of Care Certification Period: 7/11/2022 - 9/11/2022  Date of Return to MD: n/a    Visit # / Visits authorized: 9 of 20  Time In: 8:02  Time Out: 8:55  Total Billable Time: 45  Total Treatment Time: 53    Precautions:  Fall      Subjective     Pt reports: "My shoulder is hurting a little bit today. My knee is giving me the most problems though."     Response to previous treatment: No adverse reactions     Functional change:     Pain Prior to session: 1/10   Pain End of session: 1/10    Location: right shoulder    Objective     Arron received the following supervised modalities after being cleared for contradictions for 8 minutes:   MHP x 8 min to (R) shoulder to improve circulation and mobility while reducing pain to facilitate participation in therapy.     Arron participated in dynamic functional therapeutic activities to improve functional performance for  45 minutes, including:     - Scapular movements:  Retraction: Blue theraband and 3# dowel 3 x 10  Protraction: Supine with 3# dowel 3 x 10  Elevation:  3# 3x12  - ABC supine shoulder exercise with 3# completed alphabet  - Active range of motion shoulder flexion: supine on mat   - 3# dowel 1 x 5 (too heavy)   - 2# dowel 2 x 10  - Blue theraband ER/IR strengthening 3x5 supine on mat: Used loose tension with blue band due to limited therabands.  - Shoulder active range of motion in gravity eliminated and gravity reduced " "planes 1 x 10  - Bicep curls with 3# dowel 2x10  - UBE no resistance x 6 min      Home Exercises and Education Provided     Education provided:   Pt educated continue as previous HEP with added exercises and a seated position due to fall risk.       Written Home Exercises Provided: yes.  Exercises were reviewed and Arron was able to demonstrate them prior to the end of the session.  Arron demonstrated good  understanding of the HEP provided.     Assessment     Patient tolerated session well today with minimal reports of pain or difficulty. Strength in shoulder showing improvement with increased resistance in flexion, scapular, and abduction movements. Short rest breaks required following shoulder flexion, bicep curls, and scapular protraction with Pt able to complete exercises but verbalizing that she felt her shoulder "working". Pt demonstrated full range of motion in shoulder abduction in gravity eliminated and gravity resisted plane with reports of "Stretching" in her shoulder but no pain. Blue resistance band used this session with reduced tension from therapist due to limited availability of resistance bands with Pt tolerating well.     Pt will continue to benefit from skilled outpatient occupational therapy to address the deficits listed in the problem list on initial evaluation provide pt/family education and to maximize pt's level of independence in the home and community environment.     Anticipated barriers to occupational therapy: none    Pt's spiritual, cultural and educational needs considered and pt agreeable to plan of care and goals.    Goals:  (ongoing)  The following goals were discussed with the patient and patient is in agreement with them as to be addressed in the treatment plan.   Long Term Goals (LTGs); to be met by discharge.  LTG #1: Pt will report a pain level of 2 out of 10 with functional use of (R)UE           LTG #2: Pt will demo improved FOTO score by 20 points.   LTG #3: Pt will " return to prior level of function for ADLs and household management  LTG #4: Pt will display wfl active range of motion (R)UE all planes  LTG #5:  P will display WFL (R)UE strength all planes     Short Term Goals (STGs); to be met within 4 weeks   STG #1a: Pt will report consistently 4 out of 10 pain level with functional use of (R)UE. (met 8/22/22)  STG #2a: Pt will report/demo [place] with UB dressing activities. (met 8/22/22)  STG #2b: Pt will report/demo [place] with grooming activities. (met 8/22/22)  STG #3a: Pt will demonstrate independence with issued HEP. (met 8/22/22)  STG #3b: Pt will demo improved active range of motion (R)shoulder [id number] degrees.  STG # 4: Pt increased (R)  strength 15 # (met 8/22/22)    Plan   Cont OT 2 x wk x 8 weeks         Eyad Yates OT

## 2022-09-14 ENCOUNTER — TELEPHONE (OUTPATIENT)
Dept: ORTHOPEDICS | Facility: CLINIC | Age: 52
End: 2022-09-14
Payer: MEDICARE

## 2022-09-14 NOTE — TELEPHONE ENCOUNTER
----- Message from Alfred Burnham sent at 9/14/2022 12:51 PM CDT -----  Type:  Needs Medical Advice    Who Called: self  Reason:regarding her appoitment  Would the patient rather a call back or a response via [facility]? call  Best Call Back Number: [phone]  Additional Information: none

## 2022-09-15 ENCOUNTER — CLINICAL SUPPORT (OUTPATIENT)
Dept: REHABILITATION | Facility: HOSPITAL | Age: 52
End: 2022-09-15
Attending: ORTHOPAEDIC SURGERY
Payer: MEDICARE

## 2022-09-15 DIAGNOSIS — M25.531 PAIN IN JOINT OF RIGHT WRIST: ICD-10-CM

## 2022-09-15 DIAGNOSIS — R29.898 DECREASED STRENGTH OF UPPER EXTREMITY: ICD-10-CM

## 2022-09-15 DIAGNOSIS — M25.611 DECREASED SHOULDER MOBILITY, RIGHT: Primary | ICD-10-CM

## 2022-09-15 PROCEDURE — 97530 THERAPEUTIC ACTIVITIES: CPT | Mod: PN

## 2022-09-15 NOTE — PROGRESS NOTES
"  Occupational Therapy Daily Treatment Note     Date: 9/15/2022  Name: Arron Ron  Clinic Number: [id number]    Therapy Diagnosis:   Encounter Diagnoses   Name Primary?    Decreased shoulder mobility, right Yes    Pain in joint of right wrist     Decreased strength of upper extremity          Physician: Tin Da Silva Jr., *    Physician Orders:eval and treat  Medical Diagnosis: (R) wrist and shoulder pain  Surgical Procedure and Date: n/a  Evaluation Date: 7/11/2022  Insurance Authorization Period Expiration: 7/18/2022 - 12/31/2022  Plan of Care Certification Period: 7/11/2022 - 9/26/2022  Date of Return to MD: n/a    Visit # / Visits authorized: 10 of 20  Time In: 8:00  Time Out: 8:50  Total Billable Time: 35      Precautions:  Fall      Subjective     Pt reports: "My shoulder isn't hurting today, it's mostly my knee.      Response to previous treatment: No adverse reactions     Functional change: Progressing along continuum of care.     Pain Prior to session: 0/10   Pain End of session: 3/10    Location: right shoulder    Objective     Arron received the following supervised modalities after being cleared for contradictions for 10 minutes:   MHP x 10 min to (R) shoulder to improve circulation and mobility while reducing pain to facilitate participation in therapy.     Arron participated in dynamic functional therapeutic activities to improve functional performance for  35 minutes, including:     - Scapular movements:  Retraction: Green theraband and 3# dowel 3 x 12  Elevation:  4# 3x12  Protraction: Supine with 3# dowel 3 x 12  - ABC supine shoulder exercise with 4#: completed alphabet  - Shoulder Abduction seated with no weight: 1 x 10  - Active range of motion shoulder flexion seated: 1 x 10  - Green theraband ER/IR strengthening: seated with green band 2 x 10    Pt received the following supervised modalities after being cleared for contradictions for 5 minutes:   Cold Pack x 5 min to (R) " "shoulder to reduce pain and inflammation following therapy session.       Home Exercises and Education Provided     Education provided:   Pt educated continue as previous HEP with added exercises and a seated position due to fall risk.       Written Home Exercises Provided: yes.  Exercises were reviewed and Arron was able to demonstrate them prior to the end of the session.  Arron demonstrated good  understanding of the HEP provided.     Assessment     Patient tolerated session well today with minimal reports of pain or difficulty. Pt with continued right shoulder weakness but progress continues to be made as evidenced by increased volume of repetitions completed and weight used for upper extremity exercises this session. Per Pt report she did not experience any pain during the session but did feel her shoulder "working" and reported fatigue in her shoulder. Progressed active range of motion shoulder flexion and abduction to gravity resisted plane this session with good tolerance. After the session was completed Pt reported 3/10 minor pain in shoulder improving with rest. Will continue to progress active range of motion and resistance exercises to reduce pain and improve functional use of right upper extremity for daily tasks.     Pt will continue to benefit from skilled outpatient occupational therapy to address the deficits listed in the problem list on initial evaluation provide pt/family education and to maximize pt's level of independence in the home and community environment.     Anticipated barriers to occupational therapy: none    Pt's spiritual, cultural and educational needs considered and pt agreeable to plan of care and goals.    Goals:  (ongoing)  The following goals were discussed with the patient and patient is in agreement with them as to be addressed in the treatment plan.   Long Term Goals (LTGs); to be met by discharge.  LTG #1: Pt will report a pain level of 2 out of 10 with functional use of " (R)UE           LTG #2: Pt will demo improved FOTO score by 20 points.   LTG #3: Pt will return to prior level of function for ADLs and household management  LTG #4: Pt will display wfl active range of motion (R)UE all planes  LTG #5:  P will display WFL (R)UE strength all planes      Short Term Goals (STGs); to be met within 4 weeks   STG #1a: Pt will report consistently 4 out of 10 pain level with functional use of (R)UE. (met 8/22/22)  STG #2a: Pt will report/demo [place] with UB dressing activities. (met 8/22/22)  STG #2b: Pt will report/demo [place] with grooming activities. (met 8/22/22)  STG #3a: Pt will demonstrate independence with issued HEP. (met 8/22/22)  STG #3b: Pt will demo improved active range of motion (R)shoulder [id number] degrees.  STG # 4: Pt increased (R)  strength 15 # (met 8/22/22)    Plan   Cont OT 2 x wk x 2 weeks to initiate discharge process.          Eyad Yates OT

## 2022-09-19 ENCOUNTER — CLINICAL SUPPORT (OUTPATIENT)
Dept: REHABILITATION | Facility: HOSPITAL | Age: 52
End: 2022-09-19
Attending: ORTHOPAEDIC SURGERY
Payer: MEDICARE

## 2022-09-19 DIAGNOSIS — R29.898 DECREASED STRENGTH OF UPPER EXTREMITY: ICD-10-CM

## 2022-09-19 DIAGNOSIS — M25.611 DECREASED SHOULDER MOBILITY, RIGHT: Primary | ICD-10-CM

## 2022-09-19 DIAGNOSIS — M25.531 PAIN IN JOINT OF RIGHT WRIST: ICD-10-CM

## 2022-09-19 PROCEDURE — 97530 THERAPEUTIC ACTIVITIES: CPT | Mod: PN

## 2022-09-19 NOTE — PROGRESS NOTES
"  Occupational Therapy Daily Treatment Note     Date: 9/19/2022  Name: Arron Ron  Clinic Number: [id number]    Therapy Diagnosis:   Encounter Diagnoses   Name Primary?    Decreased shoulder mobility, right Yes    Pain in joint of right wrist     Decreased strength of upper extremity            Physician: Tin Da Silva Jr., *    Physician Orders:eval and treat  Medical Diagnosis: (R) wrist and shoulder pain  Surgical Procedure and Date: n/a  Evaluation Date: 7/11/2022  Insurance Authorization Period Expiration: 7/18/2022 - 12/31/2022  Plan of Care Certification Period: 7/11/2022 - 9/26/2022  Date of Return to MD: n/a    Visit # / Visits authorized: 10 of 20  Time In: 8:10  Time Out: 8:50  Total Time: 40      Precautions:  Fall      Subjective     Pt reports: "My shoulder feels Ok today, but this weekend when using scissors my thumb became numb and it lasted for about 2 hours."     Response to previous treatment: No adverse reactions     Functional change: Progressing along continuum of care.     Pain Prior to session: 0/10   Pain End of session: 0/10    Location: right shoulder    Objective     Arron received the following supervised modalities after being cleared for contradictions for 5 minutes:   MHP x 5 min to (R) shoulder to improve circulation and mobility while reducing pain to facilitate participation in therapy.       Arron participated in dynamic functional therapeutic activities to improve functional performance for  30  minutes, including:      - Shoulder Abduction seated with no weight: 2 x 10  - Active range of motion shoulder flexion seated no weight: 2 x 10  - Blue theraband ER/IR strengthening: 2 x 5    - Scapular movements:  Retraction: blue theraband and 3# dowel 2 x 5  Elevation: 4# 2 x 10 with 2 second hold  Protraction: Supine with 4# dowel 2 x 5  - ABC supine shoulder exercise with 5#: completed alphabet  - UEB 6 minutes (3 minutes each way)    Ice pack - applied to Pt right " shoulder for 5 minutes to reduce pain and inflammation following therapy session.       Home Exercises and Education Provided     Education provided:   Pt educated continue as previous HEP with added exercises and a seated position due to fall risk.       Written Home Exercises Provided: yes.  Exercises were reviewed and Arron was able to demonstrate them prior to the end of the session.  Arron demonstrated good  understanding of the HEP provided.     Assessment     Patient tolerated session well today with minimal reports of pain or difficulty. Continued progress in shoulder strength, endurance, and pain continue to be made during therapy sessions. Resistance increased with each exercise performed this session with no pain reported by Pt, with Pt stating her right shoulder felt tight and fatigued following exercises. Reduced endurance during ADL still reported with Pt saying that she needs to take rest breaks during use of right shoulder at home. Pt with good range of motion this session in all planes, meeting shoulder flexion goals this session. Will continue to progress range of motion and strengthening to Pt tolerance.     Pt will continue to benefit from skilled outpatient occupational therapy to address the deficits listed in the problem list on initial evaluation provide pt/family education and to maximize pt's level of independence in the home and community environment.     Anticipated barriers to occupational therapy: none    Pt's spiritual, cultural and educational needs considered and pt agreeable to plan of care and goals.    Goals:  (ongoing)  The following goals were discussed with the patient and patient is in agreement with them as to be addressed in the treatment plan.   Long Term Goals (LTGs); to be met by discharge.  LTG #1: Pt will report a pain level of 2 out of 10 with functional use of (R)UE           LTG #2: Pt will demo improved FOTO score by 20 points.   LTG #3: Pt will return to prior  level of function for ADLs and household management  LTG #4: Pt will display wfl active range of motion (R)UE all planes  LTG #5:  P will display WFL (R)UE strength all planes      Short Term Goals (STGs); to be met within 4 weeks   STG #1a: Pt will report consistently 4 out of 10 pain level with functional use of (R)UE. (met 8/22/22)  STG #2a: Pt will report/demo [place] with UB dressing activities. (met 8/22/22)  STG #2b: Pt will report/demo [place] with grooming activities. (met 8/22/22)  STG #3a: Pt will demonstrate independence with issued HEP. (met 8/22/22)  STG #3b: Pt will demo improved active range of motion (R)shoulder [id number] degrees.(Met 9/19/2022)  STG # 4: Pt increased (R)  strength 15 # (met 8/22/22)    Plan   Cont OT 2 x wk x 2 weeks to initiate discharge process.        Eyad Yates OT

## 2022-09-22 ENCOUNTER — CLINICAL SUPPORT (OUTPATIENT)
Dept: REHABILITATION | Facility: HOSPITAL | Age: 52
End: 2022-09-22
Attending: ORTHOPAEDIC SURGERY
Payer: MEDICARE

## 2022-09-22 DIAGNOSIS — M17.12 PRIMARY OSTEOARTHRITIS OF LEFT KNEE: ICD-10-CM

## 2022-09-22 DIAGNOSIS — M25.611 DECREASED SHOULDER MOBILITY, RIGHT: Primary | ICD-10-CM

## 2022-09-22 DIAGNOSIS — M25.531 PAIN IN JOINT OF RIGHT WRIST: ICD-10-CM

## 2022-09-22 DIAGNOSIS — Z74.09 IMPAIRED FUNCTIONAL MOBILITY, BALANCE, GAIT, AND ENDURANCE: ICD-10-CM

## 2022-09-22 DIAGNOSIS — R29.898 DECREASED STRENGTH OF UPPER EXTREMITY: ICD-10-CM

## 2022-09-22 DIAGNOSIS — M25.662 DECREASED RANGE OF MOTION OF LEFT KNEE: ICD-10-CM

## 2022-09-22 PROCEDURE — 97530 THERAPEUTIC ACTIVITIES: CPT | Mod: PN

## 2022-09-22 PROCEDURE — 97161 PT EVAL LOW COMPLEX 20 MIN: CPT | Mod: PN

## 2022-09-22 NOTE — PROGRESS NOTES
Occupational Therapy Daily Treatment Note     Date: 9/22/2022  Name: Arron Ron  Clinic Number: [id number]    Therapy Diagnosis:   Encounter Diagnoses   Name Primary?    Decreased shoulder mobility, right Yes    Pain in joint of right wrist     Decreased strength of upper extremity        Physician: Tin Da Silva Jr., *    Physician Orders:eval and treat  Medical Diagnosis: (R) wrist and shoulder pain  Surgical Procedure and Date: n/a  Evaluation Date: 7/11/2022  Insurance Authorization Period Expiration: 7/18/2022 - 12/31/2022  Plan of Care Certification Period: 7/11/2022 - 9/11/2022  Date of Return to MD: n/a    Visit # / Visits authorized: 11 of 20  Time In: 8:08  Time Out:9:00  Total Time: 52      Precautions:  Fall      Subjective     Pt reports: No complaints     Response to previous treatment: No adverse reactions     Functional change: Progressing along continuum of care.     Pain Prior to session: 0/10   Pain End of session: 0/10    Location: right shoulder    Objective     Per [place] guidelines for Occupational Therapy billing therapeutic activities will be billed.    Arron participated in dynamic functional therapeutic activities to improve functional performance for  52 minutes, including:    - Pt received the following ultrasound modalities after being cleared for contraindication for 8 minutes (with setup):  Ultrasound  1.0 MHz with 0.8 Wcm2 100%dutycycle  x 8 minutes with prep  (R) shoulder region to anterior medial region to  promote improved muscle circulation, elasticity, relaxation, and reduce spasm as well as post session inflammation.    - UEB 8 minutes (4 minutes each way)     Active Range of Motion:  (Measured in degrees)    Right Eval Right 9/22   Shld Flexion 126 145   Shld Extension 37 43   Shld Abduction 110 143   Shld Adduction wnl WNL   Shld Ext Rotation NT due to pain 70   Shld Int Rotation NT due to pain 65   Elbow Flexion 130 WNL   Elbow Extension 130 WNL   Elbow  Supination 65 65   Elbow Pronation 75 85   Wrist Extension  84 50   Wrist Flexion 37 84   Radial Deviation 15 15   Ulnar Deviation 30 50        Strength (Dynamometer/Measured in pounds on Rung II) and Pinch Strength (Pinch Gauge)    9/22/2022 7/11/2022     Left Right   Composite 15 40   Lateral Pinch 15 13   Tripod Pinch 7 12   Tip Pinch 7 5     Manual Muscle Test    Right   Shld Flexion 4/5   Shld Extension 4/5   Shld Abduction 4/5   Shld Adduction 4/5   Shld Ext Rotation 5/5   Shld Int Rotation 4/5   Elbow Flexion 5/5   Elbow Extension 5/5   Elbow Supination 5/5   Elbow Pronation 5/5   Wrist Extension  5/5   Wrist Flexion 5/5   Radial Deviation 4/5   Ulnar Deviation 4/5         Home Exercises and Education Provided     Education provided:   Pt educated continue as previous HEP with added exercises and a seated position due to fall risk.       Written Home Exercises Provided: yes.  Exercises were reviewed and Arron was able to demonstrate them prior to the end of the session.  Arron demonstrated good  understanding of the HEP provided.     Assessment     Patient tolerated session well today with no reports of pain or difficulty. Improvements in range of motion and strength noted this session from initial evaluation as documented above with Pt progressing well. Pt reports that she does not have pain or difficulty during daily activity but experiences some shoulder fatigue requiring rest breaks when performing activities such as folding laundry or doing dishes. Will discuss discharge plan with Pt to assess Pt comfort and confidence in continuing HEP at home to continue to progress endurance for activities at home. Will begin to initiate discharge process moving forward due to Pt progress.     Pt will continue to benefit from skilled outpatient occupational therapy to address the deficits listed in the problem list on initial evaluation provide pt/family education and to maximize pt's level of independence in  the home and community environment.     Anticipated barriers to occupational therapy: none    Pt's spiritual, cultural and educational needs considered and pt agreeable to plan of care and goals.    Goals:  (ongoing)  The following goals were discussed with the patient and patient is in agreement with them as to be addressed in the treatment plan.   Long Term Goals (LTGs); to be met by discharge.  LTG #1: Pt will report a pain level of 2 out of 10 with functional use of (R)UE (Met)        LTG #2: Pt will demo improved FOTO score by 20 points. (Initial FOTO not taken)  LTG #3: Pt will return to prior level of function for ADLs and household management (MET requiring increased time.)   LTG #4: Pt will display wfl active range of motion (R)UE all planes (MET)   LTG #5:  Pt will display WFL (R)UE strength all planes (MET)     Short Term Goals (STGs); to be met within 4 weeks   STG #1a: Pt will report consistently 4 out of 10 pain level with functional use of (R)UE. (met 8/22/22)  STG #2a: Pt will report/demo [place] with UB dressing activities. (met 8/22/22)  STG #2b: Pt will report/demo [place] with grooming activities. (met 8/22/22)  STG #3a: Pt will demonstrate independence with issued HEP. (met 8/22/22)  STG #3b: Pt will demo improved active range of motion (R)shoulder [id number] degrees.(Met 9/19/2022)  STG # 4: Pt increased (R)  strength 15 # (met 8/22/22)    Plan   Cont OT to complete discharge next visit.         Eyad Yates OT

## 2022-09-22 NOTE — PLAN OF CARE
"[facility]   Physical Therapy Initial Evaluation     Date: 9/22/2022   Name: Arron Ron  Clinic Number: [id number]    Therapy Diagnosis:   Encounter Diagnosis   Name Primary?    Primary osteoarthritis of left knee      Physician: Tin Da Silva Jr., *    Physician Orders: PT Eval and Treat, per MD note: "PT for left knee pain. Eval and treat with all modalities. Core/hip strengthening with good HEP. 1-2 x per week x 6 weeks."  Medical Diagnosis from Referral: M17.12 (ICD-10-CM) - Primary osteoarthritis of left knee  Evaluation Date: 9/22/2022  Authorization Period Expiration: 7/26/2023 (Eval)  Plan of Care Expiration: 11/5/2022  Progress Note Due: 10/22/2022  Visit # / Visits authorized: 1/1 (Eval)  FOTO: 1/TBA     Precautions: Standard    Time In: 9:00 AM  Time Out: 9:30 AM  Total Appointment Time (timed & untimed codes): 30 minutes    SUBJECTIVE     History of current condition - Arron reports: chronic L knee pain for many years, with worsening symptoms to this date. She experiences symptoms of "popping" and reports her main symptoms occur on the front and inside of her knee. She reports she has difficulty with ambulating for extended periods of time, standing for extended periods of time, completing transfers, and with completing increased activity. Patient states she is able to walk approximately 25 feet prior to requiring a rest break. She reports she has completed multiple injections to her L knee, with limited relief. She reports she also has pain on her R knee, however feels stronger on that side in comparison to her L knee. For pain modulation, patient reports taking aleve arthritis prn, utilizing topical diclofenac gel; with report of limited relief.     Falls: frequent falls, with last once experienced in June. Reports her knee just "gives out" when she falls.    Imaging: Arron has completed the following relevant imaging. Findings are as follows:    X-Ray Knee " "Ortho Left with Flexion. Completed on 12/28/2021.   "Moderate degenerative change involving the left knee greatest along the medial knee compartment.  Similar findings present on the prior study.     There is no evidence of fracture, dislocation or other acute osseous abnormality. No significant joint effusion."    X-Ray Knee Ortho Bilat with Flexion. Completed on 9/27/2021.   "Right: No fracture or dislocation.  No joint effusion.  Medial compartment joint space narrowing with subchondral sclerosis.  Minimal dorsal spurring from the patella.     Left: No fracture or dislocation.  No joint effusion.  Moderate medial compartment joint space narrowing with subchondral sclerosis and marginal osteophytosis.  Minimal dorsal spurring from the patella."    Prior Therapy: Currently attending OT at [facility] for R wrist/shoulder pain; Reports previous PT history with treatment to low back; reports positive experience.   Social History: Currently resides in a single story home with no steps to enter, lives with daughter and family.  Occupation: Unemployed  DME: SPC, rolling walker  Prior Level of Function: Modified Independent with ADLs  Current Level of Function: Modified Independent with ADLs, requiring increased time. Reports getting in/out of the tub is the most difficult. Also reports she is unable to go to sleep due to pain. Has more difficulty with going up stairs vs down.     Pain:  Current 8/10, worst 10/10, best 8/10   Location: left knee, anteromedial   Description: Aching, Dull, and Tight  Aggravating Factors: Standing, Bending, Walking, Night Time, Extension, Lifting, and Getting into/out of bathroom   Easing Factors: pain medication, ice, rest, elevation, and topical gel ; with limited relief    Patients goals: To decrease pain levels and return to prior level of function. Patient would also like to partake in weight management.     Medical History:   Past Medical History:   Diagnosis Date    Acid reflux  "    Anemia     Back pain     was under therapy    Chronic back pain     Constipation     Elevated cholesterol     Encounter for blood transfusion 1987    Endometriosis     Hyperlipidemia     Hypertension     Nausea & vomiting     Sleep apnea     Tendonitis of wrist, right        Surgical History:   Arron Ron  has a past surgical history that includes Tubal ligation; gallstone; Cholecystectomy; Endometrial ablation; Dilation and curettage of uterus; Breast biopsy; Hysterectomy (10/1/15); Colonoscopy (2017); Upper gastrointestinal endoscopy (2017); Oophorectomy; Colonoscopy (N/A, 4/29/2021); Gastric bypass (02/18/2020); Esophagogastroduodenoscopy (N/A, 4/1/2022); Esophageal manometry with measurement of impedance (N/A, 7/13/2022); and Esophagogastroduodenoscopy (N/A, 7/20/2022).    Medications:   Arron has a current medication list which includes the following prescription(s): albuterol, albuterol, albuterol, allergy relief (cetirizine), alprazolam, aspirin, atorvastatin, budesonide-formoterol 160-4.5 mcg, cyclobenzaprine, diclofenac sodium, doxazosin, ergocalciferol (vitamin d2), fish oil-omega-3 fatty acids, fluticasone propionate, gabapentin, linaclotide, meloxicam, montelukast, nascobal, pantoprazole, polyethylene glycol, reglan, spironolactone, sucralfate, torsemide, and valsartan.    Allergies:   Review of patient's allergies indicates:  No Known Allergies       OBJECTIVE     Observation: pleasant and compliant patient    Posture: increased lumbar lordosis    Gait: decreased step length on RLE, with decreased weight bearing status on LLE. Requires SPC in RUE.     Range of Motion:     Knee Right active Right Passive Left Active Left passive   Flexion 95 110 80 95   Extension 0 5 -5 0     * = increased pain when performing ROM at end-range    Lower Extremity Strength:    Right LE  Left LE    Knee extension: 4-/5 Knee extension: 3/5*   Knee flexion: 4-/5 Knee flexion: 3-/5*   Hip flexion: 4-/5 Hip  flexion: 3-/5*     * = increased pain when performing MMT movement    Special Tests:     Right Left   Valgus Stress Test  (+) at 30°, subjective report of increased pain   Varus Stress test  (-)   Anterior Drawer  (-)   Posterior Drawer  (-)   Ramona's Test  Inconclusive results, due to patient unable to decrease muscular guarding   Apley's Compression       Apley's Distraction       Thessaly's Test          *All fields blank left intentionally and signify 'not tested'    Functional tests:     DL Squat: completed 30° of squat, with anteriorly placed COG; Patient requires LLE extended in order to perform STS with decreased symptoms  SL Squat: NT  SLS EO: NT  SLS EC: NT  5 Timed Sit to Stands: NT  TUG: NT    Joint Mobility: unable to assess patellar mobility bilaterally secondary to soft tissue      Palpation: increased tenderness along medial joint line of L knee, as well as distal quad tendon    Sensation: grossly intact to light touch    Flexibility: hamstrings: L = 80, R = 90    Edema: unable to assess due to patient donning pants today. Plan to assess at future visit.    Limitation/Restriction for FOTO Survey    Therapist reviewed FOTO scores for Arron Ron on 9/22/2022.   FOTO documents entered into [employer] - see Media section.    Limitation Score: TBA%         TREATMENT     Total Treatment time (time-based codes) separate from Evaluation: 0 minutes     Patient did not receive HEP today due to extensive history taken, along with objective measures taking full 30 minutes to complete. Plan to give patient HEP upon arrival to clinic for initial treatment visit.     PATIENT EDUCATION AND HOME EXERCISES     Education provided:   - Current medical status and being a candidate for skilled therapeutic intervention, HEP and importance of compliance, Role of PT, general joint arthrokinematics of knee, Relevant anatomy, goals of POC, general principles of well-being.    Written Home Exercises Provided: Patient did  not receive HEP today due to taking full 30 minutes for history and objective measures. Plan to give patient HEP upon her arrival for initial treatment session.     ASSESSMENT     Arron is a 52 y.o. female referred to outpatient Physical Therapy with a medical diagnosis of primary osteoarthritis of left knee. Patient presents with inability to perform ADLs independently due to difficulty performing proper gait, bending, lifting, squatting, walking/standing for extended periods of time and performing transfers secondary to limitations in strength, ROM, functional mobility, and increased pain levels in the presence of signs and symptoms consistent with limited functional mobility and ROM of L knee present.    Patient prognosis is Good.   Patientt will benefit from skilled outpatient Physical Therapy to address the deficits stated above and in the chart below, provide patient /family education, and to maximize patientt's level of independence.     Plan of care discussed with patient: Yes  Patient's spiritual, cultural and educational needs considered and patient is agreeable to the plan of care and goals as stated below:     Anticipated Barriers for therapy: Chronicity of symptoms    Medical Necessity is demonstrated by the following  History  Co-morbidities and personal factors that may impact the plan of care Co-morbidities:   See medical hx    Personal Factors:   no deficits     low   Examination  Body Structures and Functions, activity limitations and participation restrictions that may impact the plan of care Body Regions:   lower extremities    Body Systems:    gross symmetry  ROM  strength  gross coordinated movement  balance  gait  transfers    Participation Restrictions:   Completing ADLs independently    Activity limitations:   Learning and applying knowledge  no deficits    General Tasks and Commands  no deficits    Communication  no deficits    Mobility  lifting and carrying objects  walking  moving  around using equipment (WC)    Self care  looking after one's health    Domestic Life  shopping  cooking  doing house work (cleaning house, washing dishes, laundry)  assisting others    Interactions/Relationships  no deficits    Life Areas  no deficits    Community and Social Life  recreation and leisure         high   Clinical Presentation stable and uncomplicated low   Decision Making/ Complexity Score: low     Goals:  Short Term Goals: 4 weeks   1. The patient will subjectively report compliance with HEP to maximize functional outcomes.  2. The patient will demonstrate increase in BLE MMT by 1/2 grade where limited to improve pt's functional mobility.  3. Increase knee ROM > / = 5 degrees where limited in order to be able to perform ADLs without difficulty.  4. Patient will be able to demonstrate full range squat and report of minimal/no increase in pain levels to demonstrate improved AROM of bilateral knees and functional BLE strength required to perform STS transfers.     Long Term Goals: 8 weeks   1. Patient will demonstrate understanding and performance of advanced HEP to allow for independence once discharged from therapy.   2. Patient will demonstrate increase in BLE MMT by > / = 1 grade to improve pt's functional mobility  3. Patient will be able to lift 10 lbs from the ground to counter height with proper lift techniques to demonstrate improved ability to perform lifting ADLs with no increase to LE symptoms.   4. Patient will complete 5 STS with proper biomechanics and report of minimal/no increase in pain levels to demonstrate improved functional BLE strength required to complete daily transfers.    PLAN   Plan of care Certification: 9/22/2022 to 11/5/2022.    Outpatient Physical Therapy 1-2 times weekly for 6 weeks to include the following interventions: Aquatic Therapy, Cervical/Lumbar Traction, Electrical Stimulation  , Gait Training, Manual Therapy, Moist Heat/ Ice, Neuromuscular Re-ed, Patient  Education, Self Care, Therapeutic Activities, Therapeutic Exercise, Ultrasound, and Therapeutic Modalities .     Patient may be treated by PTA as part of her rehabilitation care team.     Radha Barreto, PT, DPT      I CERTIFY THE NEED FOR THESE SERVICES FURNISHED UNDER THIS PLAN OF TREATMENT AND WHILE UNDER MY CARE     Physician's comments:       Physician's Signature: ___________________________________________________

## 2022-09-22 NOTE — PROGRESS NOTES
See treatment note for today's evaluation and plan of care. Thank you.    Radha Barreto, PT, DPT  9/22/2022

## 2022-09-26 ENCOUNTER — CLINICAL SUPPORT (OUTPATIENT)
Dept: REHABILITATION | Facility: HOSPITAL | Age: 52
End: 2022-09-26
Attending: ORTHOPAEDIC SURGERY
Payer: MEDICARE

## 2022-09-26 DIAGNOSIS — M25.611 DECREASED SHOULDER MOBILITY, RIGHT: Primary | ICD-10-CM

## 2022-09-26 DIAGNOSIS — M25.531 PAIN IN JOINT OF RIGHT WRIST: ICD-10-CM

## 2022-09-26 DIAGNOSIS — R29.898 DECREASED STRENGTH OF UPPER EXTREMITY: ICD-10-CM

## 2022-09-26 PROCEDURE — 97530 THERAPEUTIC ACTIVITIES: CPT | Mod: PN

## 2022-09-26 NOTE — PROGRESS NOTES
"  Occupational Therapy Daily Treatment Note     Date: 9/26/2022  Name: Arron Ron  Clinic Number: [id number]    Therapy Diagnosis:   Encounter Diagnoses   Name Primary?    Decreased shoulder mobility, right Yes    Pain in joint of right wrist     Decreased strength of upper extremity        Physician: Tin Da Silva Jr., *    Physician Orders:eval and treat  Medical Diagnosis: (R) wrist and shoulder pain  Surgical Procedure and Date: n/a  Evaluation Date: 7/11/2022  Insurance Authorization Period Expiration: 7/18/2022 - 12/31/2022  Plan of Care Certification Period: 7/11/2022 - 9/11/2022  Date of Return to MD: n/a    Visit # / Visits authorized: 12 of 20  Time In: 1:00 pm  Time Out:1:45 pm  Total Time: 45 min      Precautions:  Fall      Subjective     Pt reports: "im doing well with my arm but my knee is bad.  I am calling the doctor to have my surgery on my knee."     Response to previous treatment: No adverse reactions     Functional change: Progressing along continuum of care.     Pain Prior to session: 0/10   Pain End of session: 0/10    Location: right shoulder    Objective     Per [place] guidelines for Occupational Therapy billing therapeutic activities will be billed.    Patient completed supervised modalities as cleared for contraindications for 8 minutes    MHP x 8 min (R)shoulder to improve joint elasticity and prep for therapy session.      Arron participated in dynamic functional therapeutic activities to improve functional performance for  37 minutes, including:    Scapular stabilization activities with green theraband all planes 3x10  RTC strengthen activities reviewed  Educated PT on HEP activities with theraband and written down to continue upon d/c.    Re-measurements were taken today for discharge  Active Range of Motion:  (Measured in degrees)    Right Eval Right 9/26   Shld Flexion 126 155   Shld Extension 37 wfl   Shld Abduction 110 148   Shld Adduction wnl WNL   Shld Ext " Rotation NT due to pain wfl   Shld Int Rotation NT due to pain wfl   Elbow Flexion 130 WNL   Elbow Extension 130 WNL   Elbow Supination 65 wnl   Elbow Pronation 75 wnl   Wrist Extension  84 wnl   Wrist Flexion 37 wnl   Radial Deviation 15 wnl   Ulnar Deviation 30 wnl        Strength (Dynamometer/Measured in pounds on Rung II) and Pinch Strength (Pinch Gauge)    9/22/2022 7/11/2022     Left Right   Composite 15 40   Lateral Pinch 15 13   Tripod Pinch 7 12   Tip Pinch 7 5     Manual Muscle Test    Right   Shld Flexion 4/5   Shld Extension 4/5   Shld Abduction 4/5   Shld Adduction 4/5   Shld Ext Rotation 5/5   Shld Int Rotation 4/5   Elbow Flexion 5/5   Elbow Extension 5/5   Elbow Supination 5/5   Elbow Pronation 5/5   Wrist Extension  5/5   Wrist Flexion 5/5   Radial Deviation 4/5   Ulnar Deviation 4/5         Home Exercises and Education Provided     Education provided:   Pt educated continue as previous HEP with added exercises and a seated position due to fall risk.       Written Home Exercises Provided: yes.  Exercises were reviewed and Arron was able to demonstrate them prior to the end of the session.  Arron demonstrated good  understanding of the HEP provided.     Assessment     Patient tolerated session well today with no reports of pain or difficulty. Patient reports no further concerns and has returned to normal daily functional use of her (R)UE for daily activities without pain, edema, or numbness. She is (I) with her HEP and recommended for discharge. Arron has met 11 of 11 goals    Anticipated barriers to occupational therapy: none    Pt's spiritual, cultural and educational needs considered and pt agreeable to plan of care and goals.    Goals:    The following goals were discussed with the patient and patient is in agreement with them as to be addressed in the treatment plan.   Long Term Goals (LTGs); to be met by discharge.  LTG #1: Pt will report a pain level of 2 out of 10 with functional  use of (R)UE (Met)        LTG #2: Pt will demo improved FOTO score by 20 points. (Initial FOTO not taken)  LTG #3: Pt will return to prior level of function for ADLs and household management (MET requiring increased time.)   LTG #4: Pt will display wfl active range of motion (R)UE all planes (MET)   LTG #5:  Pt will display WFL (R)UE strength all planes (MET)     Short Term Goals (STGs); to be met within 4 weeks   STG #1a: Pt will report consistently 4 out of 10 pain level with functional use of (R)UE. (met 8/22/22)  STG #2a: Pt will report/demo [place] with UB dressing activities. (met 8/22/22)  STG #2b: Pt will report/demo [place] with grooming activities. (met 8/22/22)  STG #3a: Pt will demonstrate independence with issued HEP. (met 8/22/22)  STG #3b: Pt will demo improved active range of motion (R)shoulder [id number] degrees.(Met 9/19/2022)  STG # 4: Pt increased (R)  strength 15 # (met 8/22/22)    Plan   Pt is Discharged from OT due pt has met all goals and reports no further concerns and is (I) with current HEP        Berna Goldsmith, OT

## 2022-09-28 ENCOUNTER — TELEPHONE (OUTPATIENT)
Dept: ORTHOPEDICS | Facility: CLINIC | Age: 52
End: 2022-09-28
Payer: MEDICARE

## 2022-09-28 ENCOUNTER — OFFICE VISIT (OUTPATIENT)
Dept: ORTHOPEDICS | Facility: CLINIC | Age: 52
End: 2022-09-28
Payer: MEDICAID

## 2022-09-28 DIAGNOSIS — M25.531 PAIN IN JOINT OF RIGHT WRIST: Primary | ICD-10-CM

## 2022-09-28 PROCEDURE — 99213 PR OFFICE/OUTPT VISIT, EST, LEVL III, 20-29 MIN: ICD-10-PCS | Mod: S$PBB,,, | Performed by: ORTHOPAEDIC SURGERY

## 2022-09-28 PROCEDURE — 99213 OFFICE O/P EST LOW 20 MIN: CPT | Mod: S$PBB,,, | Performed by: ORTHOPAEDIC SURGERY

## 2022-09-28 RX ORDER — SULINDAC 200 MG/1
200 TABLET ORAL 2 TIMES DAILY WITH MEALS
Qty: 60 TABLET | Refills: 1 | Status: SHIPPED | OUTPATIENT
Start: 2022-09-28 | End: 2023-07-26

## 2022-09-28 NOTE — TELEPHONE ENCOUNTER
Please call her about getting an appointment.     She has appointment today with Dr. Da Silva for her wrist.     She has appt with me in October for f/u of her knees.     If she wants to be seen earlier for her knees, you can put her in any open slot.

## 2022-09-28 NOTE — TELEPHONE ENCOUNTER
----- Message from Yancy Judd sent at 9/28/2022  3:48 PM CDT -----  Type:  Patient Returning Call    Who Called:Pt   Who Left Message for Patient:Lilia   Does the patient know what this is regarding?:yes   Would the patient rather a call back or a response via [email or web address]? Call back   Best Call Back Number:[phone]  Additional Information:

## 2022-09-28 NOTE — PROGRESS NOTES
Subjective:      Patient ID: Arron Ron is a 52 y.o. female.  Chief Complaint: No chief complaint on file.      HPI  Arron Ron is a  52 y.o. female presenting today for follow up of chronic tear scapholunate ligament right wrist.  She reports that she is improving with physical therapy   She also has a wrist brace   She really does not feel like the Mobic is helping however   She does use Voltaren gel topically.    Review of patient's allergies indicates:  No Known Allergies      Current Outpatient Medications   Medication Sig Dispense Refill    albuterol (ACCUNEB) 1.25 mg/3 mL Nebu Take 3 mLs (1.25 mg total) by nebulization every 6 (six) hours as needed (increased cough/wheeze/shortness of breath). Rescue 150 mL 5    albuterol (PROAIR HFA) 90 mcg/actuation inhaler Inhale 2 puffs into the lungs every 6 (six) hours as needed for Wheezing. Rescue 18 g 3    albuterol (PROVENTIL/VENTOLIN HFA) 90 mcg/actuation inhaler Inhale 2 puffs into the lungs every 4 (four) hours as needed for Wheezing or Shortness of Breath. Rescue 18 g 5    ALLERGY RELIEF, CETIRIZINE, 10 mg tablet Take 10 mg by mouth once daily.      ALPRAZolam (XANAX) 2 MG Tab Take 2 mg by mouth every evening.       aspirin (ECOTRIN) 81 MG EC tablet Take 81 mg by mouth every Mon, Wed, Fri.       atorvastatin (LIPITOR) 20 MG tablet TAKE ONE TABLET BY MOUTH ONCE A DAY IN THE EVENING      budesonide-formoterol 160-4.5 mcg (SYMBICORT) 160-4.5 mcg/actuation HFAA Inhale 2 puffs into the lungs every 12 (twelve) hours. Controller 10.2 g 11    cyclobenzaprine (FLEXERIL) 10 MG tablet TAKE ONE TABLET BY MOUTH THREE TIMES DAILY AS NEEDED FOR MUSCLE SPASMS 60 tablet 0    diclofenac sodium (VOLTAREN) 1 % Gel APPLY 2 GRAMS TOPICALLY TO THE AFFECTED AREA TWICE DAILY AS DIRECTED 100 g 0    doxazosin (CARDURA) 1 MG tablet Take 1 mg by mouth once daily.      ergocalciferol, vitamin D2, (VITAMIN D ORAL) Take 2,000 Units by mouth once daily.       fish  oil-omega-3 fatty acids 300-1,000 mg capsule Take by mouth once daily.      fluticasone propionate (FLONASE) 50 mcg/actuation nasal spray 1 spray (50 mcg total) by Each Nostril route once daily. 16 g 3    gabapentin (NEURONTIN) 100 MG capsule Take 1 capsule (100 mg total) by mouth every evening. 30 capsule 11    linaCLOtide (LINZESS) 72 mcg Cap capsule Take 1 capsule (72 mcg total) by mouth before breakfast. 90 capsule 3    montelukast (SINGULAIR) 10 mg tablet Take 10 mg by mouth every evening.      NASCOBAL 500 mcg/spray nasal spray SMARTSIG:Both Nares      pantoprazole (PROTONIX) 40 MG tablet Take 1 tablet (40 mg total) by mouth 2 (two) times daily. 180 tablet 1    polyethylene glycol (GLYCOLAX) 17 gram/dose powder Take 17 g by mouth once daily. 1530 g 3    REGLAN 10 mg tablet Take by mouth.      spironolactone (ALDACTONE) 50 MG tablet spironolactone 50 mg tablet      sucralfate (CARAFATE) 1 gram tablet Take 1 tablet (1 g total) by mouth 2 (two) times daily. 60 tablet 3    sulindac (CLINORIL) 200 MG Tab Take 1 tablet (200 mg total) by mouth 2 (two) times daily with meals. 60 tablet 1    torsemide (DEMADEX) 20 MG Tab Take 20 mg by mouth once daily.       valsartan (DIOVAN) 320 MG tablet Take 320 mg by mouth once daily.  11     No current facility-administered medications for this visit.       Past Medical History:   Diagnosis Date    Acid reflux     Anemia     Back pain     was under therapy    Chronic back pain     Constipation     Elevated cholesterol     Encounter for blood transfusion 1987    Endometriosis     Hyperlipidemia     Hypertension     Nausea & vomiting     Sleep apnea     Tendonitis of wrist, right        Past Surgical History:   Procedure Laterality Date    BREAST BIOPSY      left , benign    CHOLECYSTECTOMY      COLONOSCOPY  2017        COLONOSCOPY N/A 4/29/2021    Procedure: COLONOSCOPY;  Surgeon: Norbert Garcia MD;  [facility];  Service: Colon and Rectal;  Laterality: N/A;    DILATION  AND CURETTAGE OF UTERUS      ENDOMETRIAL ABLATION      ESOPHAGEAL MANOMETRY WITH MEASUREMENT OF IMPEDANCE N/A 7/13/2022    Procedure: MANOMETRY, ESOPHAGUS, WITH IMPEDANCE MEASUREMENT;  Surgeon: Alan Gmoez MD;  Location: [facility] (4TH FLR);  Service: Endoscopy;  Laterality: N/A;  Rapid not vaccinated  instructions mailed    ESOPHAGOGASTRODUODENOSCOPY N/A 4/1/2022    Procedure: EGD (ESOPHAGOGASTRODUODENOSCOPY);  Surgeon: Diego Spear MD;  Location: [facility] (4TH FLR);  Service: Endoscopy;  Laterality: N/A;  rapid covid test arrival is 12pm -   instructions handed -     ESOPHAGOGASTRODUODENOSCOPY N/A 7/20/2022    Procedure: ESOPHAGOGASTRODUODENOSCOPY (EGD);  Surgeon: Diego Spear MD;  Location: [facility] (2ND FLR);  Service: Endoscopy;  Laterality: N/A;  Rapid-not vaccinated  instructions mailed    [facility]      GASTRIC BYPASS  02/18/2020    HYSTERECTOMY  10/1/15    [facility]- bleeding/ endometriosis    OOPHORECTOMY      TUBAL LIGATION      UPPER GASTROINTESTINAL ENDOSCOPY  [address] [place] [address]        OBJECTIVE:   PHYSICAL EXAM:       There were no vitals filed for this visit.  Ortho/SPM Exam  Examination right wrist mild tenderness dorsally no swelling range of motion full  strength improved   Sensation intact   Tinel sign negative       RADIOGRAPHS:  None  Comments: I have personally reviewed the imaging and I agree with the above radiologist's report.    ASSESSMENT/PLAN:     IMPRESSION:  Chronic tear scapholunate ligament right wrist  Brace   Will try switching her to Clinoril and discontinue the Mobic   Avoid heavy lifting  PLAN:  Continue exercises part-time use of the wrist    FOLLOW UP:  4-6 weeks    Disclaimer: This note has been generated using voice-recognition software. There may be typographical errors that have been missed during proof-reading.

## 2022-09-30 ENCOUNTER — TELEPHONE (OUTPATIENT)
Dept: ORTHOPEDICS | Facility: CLINIC | Age: 52
End: 2022-09-30
Payer: MEDICARE

## 2022-09-30 NOTE — TELEPHONE ENCOUNTER
Spoke with Ms Ron and informed her that she was scheduled at the first available appointment. Patient stated that she had an appointment in October, and I informed the patient that the appointment is with Dr. Sauceda's physician assistant, Marlyn Car. Patient stated that she needs to see Dr. Sauceda. Patient kept appointment with Marlyn in October and will follow up with Dr. Sauceda in November. Patient also stated that she will be searching for another provider because she needs to be seen sooner. All questions answered and patient verbalized understanding.

## 2022-09-30 NOTE — TELEPHONE ENCOUNTER
----- Message from Nancy Toledo LPN sent at 9/30/2022  8:55 AM CDT -----  Good Morning,  Patient was in clinic on Wednesday and requested a message be sent to you guys to see if there was a sooner appointment available, than what she is scheduled for.     Sincerely,  Nancy

## 2022-10-04 ENCOUNTER — CLINICAL SUPPORT (OUTPATIENT)
Dept: REHABILITATION | Facility: HOSPITAL | Age: 52
End: 2022-10-04
Payer: MEDICARE

## 2022-10-04 ENCOUNTER — LAB VISIT (OUTPATIENT)
Dept: LAB | Facility: HOSPITAL | Age: 52
End: 2022-10-04
Payer: MEDICARE

## 2022-10-04 DIAGNOSIS — Z74.09 IMPAIRED FUNCTIONAL MOBILITY, BALANCE, GAIT, AND ENDURANCE: Primary | ICD-10-CM

## 2022-10-04 DIAGNOSIS — R31.0 GROSS HEMATURIA: Primary | ICD-10-CM

## 2022-10-04 DIAGNOSIS — M17.12 PRIMARY OSTEOARTHRITIS OF LEFT KNEE: ICD-10-CM

## 2022-10-04 DIAGNOSIS — M25.662 DECREASED RANGE OF MOTION OF LEFT KNEE: ICD-10-CM

## 2022-10-04 LAB
ANION GAP SERPL CALC-SCNC: 8 MMOL/L (ref 8–16)
BASOPHILS # BLD AUTO: 0.1 K/UL (ref 0–0.2)
BASOPHILS NFR BLD: 1.2 % (ref 0–1.9)
BUN SERPL-MCNC: 19 MG/DL (ref 6–20)
CALCIUM SERPL-MCNC: 9.5 MG/DL (ref 8.7–10.5)
CHLORIDE SERPL-SCNC: 106 MMOL/L (ref 95–110)
CO2 SERPL-SCNC: 25 MMOL/L (ref 23–29)
CREAT SERPL-MCNC: 1.2 MG/DL (ref 0.5–1.4)
DIFFERENTIAL METHOD: ABNORMAL
EOSINOPHIL # BLD AUTO: 0.2 K/UL (ref 0–0.5)
EOSINOPHIL NFR BLD: 2.6 % (ref 0–8)
ERYTHROCYTE [DISTWIDTH] IN BLOOD BY AUTOMATED COUNT: 14.5 % (ref 11.5–14.5)
EST. GFR  (NO RACE VARIABLE): 54 ML/MIN/1.73 M^2
GLUCOSE SERPL-MCNC: 90 MG/DL (ref 70–110)
HCT VFR BLD AUTO: 33.8 % (ref 37–48.5)
HGB BLD-MCNC: 10.6 G/DL (ref 12–16)
IMM GRANULOCYTES # BLD AUTO: 0.01 K/UL (ref 0–0.04)
IMM GRANULOCYTES NFR BLD AUTO: 0.1 % (ref 0–0.5)
LYMPHOCYTES # BLD AUTO: 3 K/UL (ref 1–4.8)
LYMPHOCYTES NFR BLD: 36.9 % (ref 18–48)
MCH RBC QN AUTO: 27.8 PG (ref 27–31)
MCHC RBC AUTO-ENTMCNC: 31.4 G/DL (ref 32–36)
MCV RBC AUTO: 89 FL (ref 82–98)
MONOCYTES # BLD AUTO: 0.6 K/UL (ref 0.3–1)
MONOCYTES NFR BLD: 7.7 % (ref 4–15)
NEUTROPHILS # BLD AUTO: 4.2 K/UL (ref 1.8–7.7)
NEUTROPHILS NFR BLD: 51.5 % (ref 38–73)
NRBC BLD-RTO: 0 /100 WBC
PLATELET # BLD AUTO: 333 K/UL (ref 150–450)
PMV BLD AUTO: 10.5 FL (ref 9.2–12.9)
POTASSIUM SERPL-SCNC: 4.4 MMOL/L (ref 3.5–5.1)
RBC # BLD AUTO: 3.81 M/UL (ref 4–5.4)
SODIUM SERPL-SCNC: 139 MMOL/L (ref 136–145)
WBC # BLD AUTO: 8.07 K/UL (ref 3.9–12.7)

## 2022-10-04 PROCEDURE — 84443 ASSAY THYROID STIM HORMONE: CPT | Performed by: OBSTETRICS & GYNECOLOGY

## 2022-10-04 PROCEDURE — 84439 ASSAY OF FREE THYROXINE: CPT | Performed by: OBSTETRICS & GYNECOLOGY

## 2022-10-04 PROCEDURE — 85025 COMPLETE CBC W/AUTO DIFF WBC: CPT | Performed by: OBSTETRICS & GYNECOLOGY

## 2022-10-04 PROCEDURE — 80048 BASIC METABOLIC PNL TOTAL CA: CPT | Performed by: OBSTETRICS & GYNECOLOGY

## 2022-10-04 PROCEDURE — 97110 THERAPEUTIC EXERCISES: CPT | Mod: PN,CQ

## 2022-10-04 PROCEDURE — 80076 HEPATIC FUNCTION PANEL: CPT | Performed by: OBSTETRICS & GYNECOLOGY

## 2022-10-04 PROCEDURE — 80061 LIPID PANEL: CPT | Performed by: OBSTETRICS & GYNECOLOGY

## 2022-10-04 PROCEDURE — 36415 COLL VENOUS BLD VENIPUNCTURE: CPT | Performed by: OBSTETRICS & GYNECOLOGY

## 2022-10-04 NOTE — PROGRESS NOTES
"  [facility]  Physical Therapy Daily Note     Name: Arron Ron  Clinic Number: [id number]    Therapy Diagnosis:   Encounter Diagnoses   Name Primary?    Impaired functional mobility, balance, gait, and endurance Yes    Decreased range of motion of left knee     Primary osteoarthritis of left knee      Physician: Marlyn Car PA-C    Visit Date: 10/4/2022    Physician Orders: PT Eval and Treat, per MD note: "PT for left knee pain. Eval and treat with all modalities. Core/hip strengthening with good HEP. 1-2 x per week x 6 weeks."  Medical Diagnosis from Referral: M17.12 (ICD-10-CM) - Primary osteoarthritis of left knee  Evaluation Date: 9/22/2022  Authorization Period Expiration: 7/26/2023 (Eval)  Plan of Care Expiration: 11/5/2022  Progress Note Due: 10/22/2022  Visit # / Visits authorized: 2/20 (Eval)  FOTO: 1/TBA      Precautions: Standard    Visit #: 2 of 20  PTA Visit #: 1  Time In: 900am (pt arrives late)   Time Out: 930am  Total Billable Time: 30 minutes    Subjective     Pt reports: her is really bad with bone on bone. She takes aleve and ibuprofen for L knee pain. Patient states she can feel her knee cracking and sometimes her would jose on her.    She was given home exercise program to complete at home.  Response to previous treatment: first treatment day  Functional change: Left knee pain, limited knee ROM, decrease endurance and tolerance to activities     Pain: 8/10  Location: left knee      Objective     Arron received therapeutic exercises to develop strength, endurance, ROM, flexibility, and posture for 30 minutes including:  Quad sets with ball under knee, 2x10  Heel slides using a sliding board with strap, 2x10   Supine hip adduction with ball squeezes, 2x10  Supine hip abduction with GTB, 2x10  Toe/heel raises bilateral, 2x10  Sit to stand from mat, x3    Arron received the following manual therapy techniques: Joint mobilizations and Soft tissue " Mobilization were applied to the: L knee for 0 minutes, including:  None today    Arron participated in neuromuscular re-education activities to improve: Coordination and Proprioception for 0 minutes. The following activities were included:  None today    Arron participated in dynamic functional therapeutic activities to improve functional performance for 0  minutes, including:  None today    Arron participated in gait training to improve functional mobility and safety for 0  minutes, including:  None today      Home Exercises Provided and Patient Education Provided     Written Home Exercises Provided: yes.  Exercises were reviewed and Arron was able to demonstrate them prior to the end of the session.  Arron demonstrated good  understanding of the education provided.     Education provided:   - HEP    Assessment     Patient with a medical diagnosis of primary osteoarthritis of left knee. Patient presents with L knee pain, limited ROM and decrease weight bearing through LLE. Limited time in therapy session due to patient was running late. Therefore, focused session knee mobility, stability, and strengthening in order to prevent knee buckling and to improve pain. Patient has poor tolerance to all exercises due to increase knee pain. Educated patient on knee anatomy and biomechanics. At this time, will progress patient as per tolerance.     Arron Is progressing slowly towards her goals.   Pt prognosis is Fair.     Pt will continue to benefit from skilled outpatient physical therapy to address the deficits listed in the problem list box on initial evaluation, provide pt/family education and to maximize pt's level of independence in the home and community environment.     Pt's spiritual, cultural and educational needs considered and pt agreeable to plan of care and goals.     Anticipated barriers to physical therapy: Chronicity of symptoms    Goals:   Short Term Goals: 4 weeks   1. The patient will  subjectively report compliance with HEP to maximize functional outcomes.  2. The patient will demonstrate increase in BLE MMT by 1/2 grade where limited to improve pt's functional mobility.  3. Increase knee ROM > / = 5 degrees where limited in order to be able to perform ADLs without difficulty.  4. Patient will be able to demonstrate full range squat and report of minimal/no increase in pain levels to demonstrate improved AROM of bilateral knees and functional BLE strength required to perform STS transfers.      Long Term Goals: 8 weeks   1. Patient will demonstrate understanding and performance of advanced HEP to allow for independence once discharged from therapy.   2. Patient will demonstrate increase in BLE MMT by > / = 1 grade to improve pt's functional mobility  3. Patient will be able to lift 10 lbs from the ground to counter height with proper lift techniques to demonstrate improved ability to perform lifting ADLs with no increase to LE symptoms.   4. Patient will complete 5 STS with proper biomechanics and report of minimal/no increase in pain levels to demonstrate improved functional BLE strength required to complete daily transfers.     Plan   Plan of care Certification: 9/22/2022 to 11/5/2022.     Outpatient Physical Therapy 1-2 times weekly for 6 weeks to include the following interventions: Aquatic Therapy, Cervical/Lumbar Traction, Electrical Stimulation  , Gait Training, Manual Therapy, Moist Heat/ Ice, Neuromuscular Re-ed, Patient Education, Self Care, Therapeutic Activities, Therapeutic Exercise, Ultrasound, and Therapeutic Modalities .      Patient may be treated by PTA as part of her rehabilitation care team.          Liz Busch, KRISTIE   10/4/2022

## 2022-10-05 LAB
ALBUMIN SERPL BCP-MCNC: 3.8 G/DL (ref 3.5–5.2)
ALP SERPL-CCNC: 92 U/L (ref 55–135)
ALT SERPL W/O P-5'-P-CCNC: 9 U/L (ref 10–44)
AST SERPL-CCNC: 18 U/L (ref 10–40)
BILIRUB DIRECT SERPL-MCNC: 0.2 MG/DL (ref 0.1–0.3)
BILIRUB SERPL-MCNC: 0.4 MG/DL (ref 0.1–1)
CHOLEST SERPL-MCNC: 147 MG/DL (ref 120–199)
CHOLEST/HDLC SERPL: 3.1 {RATIO} (ref 2–5)
HDLC SERPL-MCNC: 48 MG/DL (ref 40–75)
HDLC SERPL: 32.7 % (ref 20–50)
LDLC SERPL CALC-MCNC: 88 MG/DL (ref 63–159)
NONHDLC SERPL-MCNC: 99 MG/DL
PROT SERPL-MCNC: 7.7 G/DL (ref 6–8.4)
T4 FREE SERPL-MCNC: 0.74 NG/DL (ref 0.71–1.51)
TRIGL SERPL-MCNC: 55 MG/DL (ref 30–150)
TSH SERPL DL<=0.005 MIU/L-ACNC: 1.67 UIU/ML (ref 0.4–4)

## 2022-10-06 ENCOUNTER — CLINICAL SUPPORT (OUTPATIENT)
Dept: REHABILITATION | Facility: HOSPITAL | Age: 52
End: 2022-10-06
Payer: MEDICARE

## 2022-10-06 DIAGNOSIS — M25.662 DECREASED RANGE OF MOTION OF LEFT KNEE: ICD-10-CM

## 2022-10-06 DIAGNOSIS — Z74.09 IMPAIRED FUNCTIONAL MOBILITY, BALANCE, GAIT, AND ENDURANCE: Primary | ICD-10-CM

## 2022-10-06 DIAGNOSIS — M17.12 PRIMARY OSTEOARTHRITIS OF LEFT KNEE: ICD-10-CM

## 2022-10-06 PROCEDURE — 97110 THERAPEUTIC EXERCISES: CPT | Mod: PN,CQ

## 2022-10-06 NOTE — PROGRESS NOTES
"  [facility]  Physical Therapy Daily Note     Name: Arron Ron  Clinic Number: [id number]    Therapy Diagnosis:   Encounter Diagnoses   Name Primary?    Impaired functional mobility, balance, gait, and endurance Yes    Decreased range of motion of left knee     Primary osteoarthritis of left knee      Physician: Marlyn Car PA-C    Visit Date: 10/6/2022    Physician Orders: PT Eval and Treat, per MD note: "PT for left knee pain. Eval and treat with all modalities. Core/hip strengthening with good HEP. 1-2 x per week x 6 weeks."  Medical Diagnosis from Referral: M17.12 (ICD-10-CM) - Primary osteoarthritis of left knee  Evaluation Date: 9/22/2022  Authorization Period Expiration: 7/26/2023 (Eval)  Plan of Care Expiration: 11/5/2022  Progress Note Due: 10/22/2022  Visit # / Visits authorized: 3/20 (Eval)  FOTO: 1/TBA      Precautions: Standard    Visit #: 3 of 20  PTA Visit #: 2  Time In: 845am  Time Out: 925am  Total Billable Time: 40 minutes    Subjective     Pt reports: "I've been trying to help myself". Patient states she has been trying to work on her knee to alleviate pain. She uses ice and heat pack at home. She walks in [employer] store to build her endurance and strength to Left knee.   She was given home exercise program to complete at home.  Response to previous treatment: first treatment day  Functional change: Left knee pain, limited knee ROM, decrease endurance and tolerance to activities     Pain: 9/10  Location: left knee      Objective     Arron received therapeutic exercises to develop strength, endurance, ROM, flexibility, and posture for 40 minutes including:  Quad sets with ball under knee, 2x10  Heel slides using a sliding board with strap, 2x10   Supine hip adduction with ball squeezes, 2x10  Supine hip abduction with GTB, 2x10  Toe/heel raises bilateral, 2x10  Sit to stand from mat, x10    Arron received the following manual therapy techniques: Joint " Leonardo Pollard, PAC aware of pts ABG and further orders are to follow mobilizations and Soft tissue Mobilization were applied to the: L knee for 0 minutes, including:  None today    Arron participated in neuromuscular re-education activities to improve: Coordination and Proprioception for 0 minutes. The following activities were included:  None today    Arron participated in dynamic functional therapeutic activities to improve functional performance for 0  minutes, including:  None today    Arron participated in gait training to improve functional mobility and safety for 0  minutes, including:  None today      Home Exercises Provided and Patient Education Provided     Written Home Exercises Provided: yes.  Exercises were reviewed and Arron was able to demonstrate them prior to the end of the session.  Arron demonstrated good  understanding of the education provided.     Education provided:   - HEP    Assessment     Increase reps in sit to stand exercise today for even weight distribution to both legs as well as for strengthening. Attempted a couple of trials with no hands support, however, noted patient demonstrates difficulty with no UE supports, therefore cues to place hands on thighs to push herself up upon standing. Patient tolerated better with hands on thighs. I advised patient to do this exercise at home and progress herself to no hands support for legs strengthening. Patient voiced understanding. Overall, patient responded fairly well with all exercises this visit compared to last visit. Still has pain to Left knee, however, patient was able to complete all tasks with no further complaints of pain. Will continue to progress as tolerated.     Arron Is progressing slowly towards her goals.   Pt prognosis is Fair.     Pt will continue to benefit from skilled outpatient physical therapy to address the deficits listed in the problem list box on initial evaluation, provide pt/family education and to maximize pt's level of independence in the home and community  environment.     Pt's spiritual, cultural and educational needs considered and pt agreeable to plan of care and goals.     Anticipated barriers to physical therapy: Chronicity of symptoms    Goals:   Short Term Goals: 4 weeks   1. The patient will subjectively report compliance with HEP to maximize functional outcomes.  2. The patient will demonstrate increase in BLE MMT by 1/2 grade where limited to improve pt's functional mobility.  3. Increase knee ROM > / = 5 degrees where limited in order to be able to perform ADLs without difficulty.  4. Patient will be able to demonstrate full range squat and report of minimal/no increase in pain levels to demonstrate improved AROM of bilateral knees and functional BLE strength required to perform STS transfers.      Long Term Goals: 8 weeks   1. Patient will demonstrate understanding and performance of advanced HEP to allow for independence once discharged from therapy.   2. Patient will demonstrate increase in BLE MMT by > / = 1 grade to improve pt's functional mobility  3. Patient will be able to lift 10 lbs from the ground to counter height with proper lift techniques to demonstrate improved ability to perform lifting ADLs with no increase to LE symptoms.   4. Patient will complete 5 STS with proper biomechanics and report of minimal/no increase in pain levels to demonstrate improved functional BLE strength required to complete daily transfers.     Plan   Plan of care Certification: 9/22/2022 to 11/5/2022.     Outpatient Physical Therapy 1-2 times weekly for 6 weeks to include the following interventions: Aquatic Therapy, Cervical/Lumbar Traction, Electrical Stimulation  , Gait Training, Manual Therapy, Moist Heat/ Ice, Neuromuscular Re-ed, Patient Education, Self Care, Therapeutic Activities, Therapeutic Exercise, Ultrasound, and Therapeutic Modalities .      Patient may be treated by PTA as part of her rehabilitation care team.        Liz Busch PTA   10/6/2022

## 2022-10-10 PROBLEM — R06.09 DOE (DYSPNEA ON EXERTION): Status: ACTIVE | Noted: 2022-10-10

## 2022-10-11 ENCOUNTER — CLINICAL SUPPORT (OUTPATIENT)
Dept: REHABILITATION | Facility: HOSPITAL | Age: 52
End: 2022-10-11
Payer: MEDICARE

## 2022-10-11 ENCOUNTER — HOSPITAL ENCOUNTER (OUTPATIENT)
Dept: RADIOLOGY | Facility: HOSPITAL | Age: 52
Discharge: HOME | End: 2022-10-11
Payer: MEDICARE

## 2022-10-11 DIAGNOSIS — M17.12 PRIMARY OSTEOARTHRITIS OF LEFT KNEE: ICD-10-CM

## 2022-10-11 DIAGNOSIS — R31.0 GROSS HEMATURIA: ICD-10-CM

## 2022-10-11 DIAGNOSIS — M25.662 DECREASED RANGE OF MOTION OF LEFT KNEE: ICD-10-CM

## 2022-10-11 DIAGNOSIS — Z74.09 IMPAIRED FUNCTIONAL MOBILITY, BALANCE, GAIT, AND ENDURANCE: Primary | ICD-10-CM

## 2022-10-11 PROCEDURE — 74176 CT ABD & PELVIS W/O CONTRAST: CPT | Mod: 26,,, | Performed by: RADIOLOGY

## 2022-10-11 PROCEDURE — 74176 CT ABD & PELVIS W/O CONTRAST: CPT | Mod: TC

## 2022-10-11 PROCEDURE — 97110 THERAPEUTIC EXERCISES: CPT | Mod: PN,CQ

## 2022-10-11 PROCEDURE — 74176 CT RENAL STONE STUDY ABD PELVIS WO: ICD-10-PCS | Mod: 26,,, | Performed by: RADIOLOGY

## 2022-10-11 NOTE — PROGRESS NOTES
"  [facility]  Physical Therapy Daily Treatment Note     Name: Arron Ron  Clinic Number: [id number]    Therapy Diagnosis:   Encounter Diagnoses   Name Primary?    Impaired functional mobility, balance, gait, and endurance Yes    Decreased range of motion of left knee      Primary osteoarthritis of left knee        Physician: Mralyn Car PA-C    Visit Date: 10/11/2022    Physician Orders: PT Eval and Treat, per MD note: "PT for left knee pain. Eval and treat with all modalities. Core/hip strengthening with good HEP. 1-2 x per week x 6 weeks."  Medical Diagnosis from Referral: M17.12 (ICD-10-CM) - Primary osteoarthritis of left knee  Evaluation Date: 9/22/2022  Authorization Period Expiration: 7/26/2023 (Eval)  Plan of Care Expiration: 11/5/2022  Progress Note Due: 10/22/2022  Visit # / Visits authorized: 4/20 (Eval)  FOTO: 1/TBA      Precautions: Standard    Visit #: 4 of 20  PTA Visit #: 3  Time In: 845am  Time Out: 923am  Total Billable Time: 38 minutes    Subjective     Pt reports: she still has difficulty with the sit to stand exercises, but she has been doing her exercises at home. So far, patient states her Left knee still hurts rating at 7/10 today.     She was given home exercise program to complete at home.  Response to previous treatment: pain and soreness post therapy session   Functional change: Left knee pain, limited knee ROM, decrease endurance and tolerance to activities     Pain: 7/10  Location: left knee      Objective     Arron received therapeutic exercises to develop strength, endurance, ROM, flexibility, and posture for 38 minutes including:  Quad sets with ball under knee, 2x10  Heel slides using a sliding board with strap, 2x10 (slight pain with this exs)  Supine hip adduction with ball squeezes, 2x10  Supine hip abduction with GTB, 2x10  SLR, x10 - added today  Toe/heel raises bilateral, 2x10  Seated marches, 2x10 - added today   Sit to stand from " mat, x10    Arron received the following manual therapy techniques: Joint mobilizations and Soft tissue Mobilization were applied to the: L knee for 0 minutes, including:  None today    Arron participated in neuromuscular re-education activities to improve: Coordination and Proprioception for 0 minutes. The following activities were included:  None today    Arron participated in dynamic functional therapeutic activities to improve functional performance for 0  minutes, including:  None today    Arron participated in gait training to improve functional mobility and safety for 0  minutes, including:  None today      Home Exercises Provided and Patient Education Provided     Written Home Exercises Provided: yes.  Exercises were reviewed and Arron was able to demonstrate them prior to the end of the session.  Arron demonstrated good  understanding of the education provided.     Education provided:   - HEP    Assessment     Introduced more strengthening exercises today for Left knee such as SLR and seated marches. Patient demonstrates very weak quadriceps in SLR exercise, however, she was able to complete this tasks without further increase knee pain. At this time, patient is making minimal improvement in Left knee due to severe arthritis in knee. Will continue to work on strengthening as per plan of care.     Arron Is progressing slowly towards her goals.   Pt prognosis is Fair.     Pt will continue to benefit from skilled outpatient physical therapy to address the deficits listed in the problem list box on initial evaluation, provide pt/family education and to maximize pt's level of independence in the home and community environment.     Pt's spiritual, cultural and educational needs considered and pt agreeable to plan of care and goals.     Anticipated barriers to physical therapy: Chronicity of symptoms    Goals:   Short Term Goals: 4 weeks   1. The patient will subjectively report compliance with HEP  to maximize functional outcomes.  2. The patient will demonstrate increase in BLE MMT by 1/2 grade where limited to improve pt's functional mobility.  3. Increase knee ROM > / = 5 degrees where limited in order to be able to perform ADLs without difficulty.  4. Patient will be able to demonstrate full range squat and report of minimal/no increase in pain levels to demonstrate improved AROM of bilateral knees and functional BLE strength required to perform STS transfers.      Long Term Goals: 8 weeks   1. Patient will demonstrate understanding and performance of advanced HEP to allow for independence once discharged from therapy.   2. Patient will demonstrate increase in BLE MMT by > / = 1 grade to improve pt's functional mobility  3. Patient will be able to lift 10 lbs from the ground to counter height with proper lift techniques to demonstrate improved ability to perform lifting ADLs with no increase to LE symptoms.   4. Patient will complete 5 STS with proper biomechanics and report of minimal/no increase in pain levels to demonstrate improved functional BLE strength required to complete daily transfers.     Plan   Plan of care Certification: 9/22/2022 to 11/5/2022.     Outpatient Physical Therapy 1-2 times weekly for 6 weeks to include the following interventions: Aquatic Therapy, Cervical/Lumbar Traction, Electrical Stimulation  , Gait Training, Manual Therapy, Moist Heat/ Ice, Neuromuscular Re-ed, Patient Education, Self Care, Therapeutic Activities, Therapeutic Exercise, Ultrasound, and Therapeutic Modalities .      Patient may be treated by PTA as part of her rehabilitation care team.        Liz Busch, KRISTIE   10/11/2022

## 2022-10-21 NOTE — PROGRESS NOTES
"Subjective:      Patient ID: Arron Ron is a 52 y.o. female.    Chief Complaint: Pain and Swelling of the Left Knee (Patient presents today for left knee pain.)      LORRAINE  (Komal/French)    Last seen by me on 7/26/22 for left knee pain with known 90% medial narrowing with osteophytes.     Only a few days relief with knee injections. She was given knee brace and PT was ordered at her last visit (has done 4 visits so far).     Weight loss discussed and encouraged (would need to get down to around 270-280 lbs).     She is here for follow up.     She continues with constant left knee pain that is worse with standing and walking. She has giving way of left knee. She rates her pain as an 8 on a scale of 1-10. Only a few days of relief with previous steroid and gel injections. No relief with hinged knee brace. No relief with PT.     She was told by GI previously not to take NSAIDs. Is set up to see GI surgery at [facility] [place]- her gastric sleeve has "shifted."      Past Medical History:   Diagnosis Date    Acid reflux     Anemia     Back pain     was under therapy    Chronic back pain     Constipation     Elevated cholesterol     Encounter for blood transfusion 1987    Endometriosis     Hyperlipidemia     Hypertension     Nausea & vomiting     Sleep apnea     Tendonitis of wrist, right          Current Outpatient Medications:     albuterol (ACCUNEB) 1.25 mg/3 mL Nebu, Take 3 mLs (1.25 mg total) by nebulization every 6 (six) hours as needed (increased cough/wheeze/shortness of breath). Rescue, Disp: 150 mL, Rfl: 5    albuterol (PROAIR HFA) 90 mcg/actuation inhaler, Inhale 2 puffs into the lungs every 6 (six) hours as needed for Wheezing. Rescue, Disp: 18 g, Rfl: 3    albuterol (PROVENTIL/VENTOLIN HFA) 90 mcg/actuation inhaler, Inhale 2 puffs into the lungs every 4 (four) hours as needed for Wheezing or Shortness of Breath. Rescue, Disp: 18 g, Rfl: 5    ALLERGY RELIEF, CETIRIZINE, 10 mg tablet, Take 10 mg by mouth once " daily., Disp: , Rfl:     ALPRAZolam (XANAX) 2 MG Tab, Take 2 mg by mouth every evening. , Disp: , Rfl:     aspirin (ECOTRIN) 81 MG EC tablet, Take 81 mg by mouth every Mon, Wed, Fri. , Disp: , Rfl:     atorvastatin (LIPITOR) 20 MG tablet, TAKE ONE TABLET BY MOUTH ONCE A DAY IN THE EVENING, Disp: , Rfl:     budesonide-formoterol 160-4.5 mcg (SYMBICORT) 160-4.5 mcg/actuation HFAA, Inhale 2 puffs into the lungs every 12 (twelve) hours. Controller, Disp: 10.2 g, Rfl: 11    cyclobenzaprine (FLEXERIL) 10 MG tablet, TAKE ONE TABLET BY MOUTH THREE TIMES DAILY AS NEEDED FOR MUSCLE SPASMS, Disp: 60 tablet, Rfl: 0    diclofenac sodium (VOLTAREN) 1 % Gel, APPLY 2 GRAMS TOPICALLY TO THE AFFECTED AREA TWICE DAILY AS DIRECTED, Disp: 100 g, Rfl: 0    doxazosin (CARDURA) 1 MG tablet, Take 1 mg by mouth once daily., Disp: , Rfl:     ergocalciferol, vitamin D2, (VITAMIN D ORAL), Take 2,000 Units by mouth once daily. , Disp: , Rfl:     fish oil-omega-3 fatty acids 300-1,000 mg capsule, Take by mouth once daily., Disp: , Rfl:     fluticasone propionate (FLONASE) 50 mcg/actuation nasal spray, 1 spray (50 mcg total) by Each Nostril route once daily., Disp: 16 g, Rfl: 3    gabapentin (NEURONTIN) 100 MG capsule, Take 1 capsule (100 mg total) by mouth every evening., Disp: 30 capsule, Rfl: 11    linaCLOtide (LINZESS) 72 mcg Cap capsule, Take 1 capsule (72 mcg total) by mouth before breakfast., Disp: 90 capsule, Rfl: 3    meloxicam (MOBIC) 15 MG tablet, TAKE ONE TABLET BY MOUTH ONCE A DAY, Disp: 30 tablet, Rfl: 1    montelukast (SINGULAIR) 10 mg tablet, Take 10 mg by mouth every evening., Disp: , Rfl:     NASCOBAL 500 mcg/spray nasal spray, SMARTSIG:Both Nares, Disp: , Rfl:     pantoprazole (PROTONIX) 40 MG tablet, Take 1 tablet (40 mg total) by mouth 2 (two) times daily., Disp: 180 tablet, Rfl: 1    polyethylene glycol (GLYCOLAX) 17 gram/dose powder, Take 17 g by mouth once daily., Disp: 1530 g, Rfl: 3    REGLAN 10 mg tablet, Take by mouth.,  "Disp: , Rfl:     spironolactone (ALDACTONE) 50 MG tablet, spironolactone 50 mg tablet, Disp: , Rfl:     sucralfate (CARAFATE) 1 gram tablet, Take 1 tablet (1 g total) by mouth 2 (two) times daily., Disp: 60 tablet, Rfl: 3    sulindac (CLINORIL) 200 MG Tab, Take 1 tablet (200 mg total) by mouth 2 (two) times daily with meals., Disp: 60 tablet, Rfl: 1    torsemide (DEMADEX) 20 MG Tab, Take 20 mg by mouth once daily. , Disp: , Rfl:     valsartan (DIOVAN) 320 MG tablet, Take 320 mg by mouth once daily., Disp: , Rfl: 11    Review of patient's allergies indicates:  No Known Allergies    Review of Systems   Constitutional: Negative for chills, fever, night sweats and weight gain.   Gastrointestinal:  Negative for bowel incontinence, nausea and vomiting.   Genitourinary:  Negative for bladder incontinence.   Neurological:  Negative for disturbances in coordination and loss of balance.       Objective:        Ht 5' 6" (1.676 m)   Wt 135.6 kg (298 lb 14.4 oz)   LMP 09/21/2015   BMI 48.24 kg/m²     Ortho/SPM Exam    Body habitus is obese.  The patient walks with a limp and a cane.     Left knee exam:   Hip irritability  negative.   The skin over the knee is intact.  Knee effusion trace  Palpation- tender medial joint line  Range of motion- Flexion 100 deg, Extension 0 deg,     Ligament laxity exam:  2+ valgus laxity     Patellar apprehension negative.  Popliteal cyst negative  Patellar crepitation absent.    Motor normal 5/5 strength in all tested muscle groups.   Sensory normal        Assessment:       Encounter Diagnosis   Name Primary?    Primary osteoarthritis of left knee Yes            Plan:       Arron was seen today for pain and swelling.    Diagnoses and all orders for this visit:    Primary osteoarthritis of left knee  -     KNEE BRACE FOR HOME USE      She has constant left knee pain that is worse with standing and walking. She has giving way of left knee. No relief with injections (steroid and gel), " "medications (no NSAIDs per GI), PT, or time.     Known 90% medial narrowing with osteophytes left knee.     Treatment options reviewed with patient and following plan made:     - Discussed and encouraged weight loss. She would need to get down to around 270-280 (BMI around 45) to consider surgery.   - Follow up with GI- was told her sleeve is "rotated." Has f/u at [facility] [place] in November.   - Stop PT of not helping.   - Order for medial  knee brace for left knee. Message sent to Jeannette.   - Follow up with Dr. Sauceda in 6 weeks to discuss possible surgery options.    Follow up in about 6 weeks (around 12/7/2022).           "

## 2022-10-26 ENCOUNTER — OFFICE VISIT (OUTPATIENT)
Dept: ORTHOPEDICS | Facility: CLINIC | Age: 52
End: 2022-10-26
Payer: MEDICARE

## 2022-10-26 VITALS — WEIGHT: 293 LBS | HEIGHT: 66 IN | BODY MASS INDEX: 47.09 KG/M2

## 2022-10-26 DIAGNOSIS — M17.12 PRIMARY OSTEOARTHRITIS OF LEFT KNEE: Primary | ICD-10-CM

## 2022-10-26 PROCEDURE — 4010F ACE/ARB THERAPY RXD/TAKEN: CPT | Mod: CPTII,S$GLB,, | Performed by: PHYSICIAN ASSISTANT

## 2022-10-26 PROCEDURE — 1160F RVW MEDS BY RX/DR IN RCRD: CPT | Mod: CPTII,S$GLB,, | Performed by: PHYSICIAN ASSISTANT

## 2022-10-26 PROCEDURE — 99213 PR OFFICE/OUTPT VISIT, EST, LEVL III, 20-29 MIN: ICD-10-PCS | Mod: S$GLB,,, | Performed by: PHYSICIAN ASSISTANT

## 2022-10-26 PROCEDURE — 1160F PR REVIEW ALL MEDS BY PRESCRIBER/CLIN PHARMACIST DOCUMENTED: ICD-10-PCS | Mod: CPTII,S$GLB,, | Performed by: PHYSICIAN ASSISTANT

## 2022-10-26 PROCEDURE — 1159F PR MEDICATION LIST DOCUMENTED IN MEDICAL RECORD: ICD-10-PCS | Mod: CPTII,S$GLB,, | Performed by: PHYSICIAN ASSISTANT

## 2022-10-26 PROCEDURE — 3044F HG A1C LEVEL LT 7.0%: CPT | Mod: CPTII,S$GLB,, | Performed by: PHYSICIAN ASSISTANT

## 2022-10-26 PROCEDURE — 99213 OFFICE O/P EST LOW 20 MIN: CPT | Mod: S$GLB,,, | Performed by: PHYSICIAN ASSISTANT

## 2022-10-26 PROCEDURE — 3044F PR MOST RECENT HEMOGLOBIN A1C LEVEL <7.0%: ICD-10-PCS | Mod: CPTII,S$GLB,, | Performed by: PHYSICIAN ASSISTANT

## 2022-10-26 PROCEDURE — 1159F MED LIST DOCD IN RCRD: CPT | Mod: CPTII,S$GLB,, | Performed by: PHYSICIAN ASSISTANT

## 2022-10-26 PROCEDURE — 99999 PR PBB SHADOW E&M-EST. PATIENT-LVL V: ICD-10-PCS | Mod: PBBFAC,,, | Performed by: PHYSICIAN ASSISTANT

## 2022-10-26 PROCEDURE — 4010F PR ACE/ARB THEARPY RXD/TAKEN: ICD-10-PCS | Mod: CPTII,S$GLB,, | Performed by: PHYSICIAN ASSISTANT

## 2022-10-26 PROCEDURE — 99999 PR PBB SHADOW E&M-EST. PATIENT-LVL V: CPT | Mod: PBBFAC,,, | Performed by: PHYSICIAN ASSISTANT

## 2022-10-26 NOTE — PATIENT INSTRUCTIONS
It was nice to see you again today! I am sorry that you are hurting so much.     Can stop PT if it is not helping.     Continue to work on weight loss- you can do it! Need to be done to 270-280 pounds to consider surgery.     I ordered a medial  knee brace for your left knee. Melisssa with [facility] should be calling you. Call her at [phone] if you don't hear from her.     Follow up with HARRIETT and Dr. Sauceda as scheduled.     Please stay in touch and call me if you need anything. You can also send me a message in [facility].     Marlyn   [phone]

## 2022-11-07 DIAGNOSIS — G89.29 CHRONIC KNEE PAIN, UNSPECIFIED LATERALITY: Primary | ICD-10-CM

## 2022-11-07 DIAGNOSIS — M25.569 CHRONIC KNEE PAIN, UNSPECIFIED LATERALITY: Primary | ICD-10-CM

## 2022-11-08 ENCOUNTER — OFFICE VISIT (OUTPATIENT)
Dept: ORTHOPEDICS | Facility: CLINIC | Age: 52
End: 2022-11-08
Payer: MEDICARE

## 2022-11-08 VITALS — BODY MASS INDEX: 47.09 KG/M2 | HEIGHT: 66 IN | WEIGHT: 293 LBS

## 2022-11-08 DIAGNOSIS — M17.12 PRIMARY OSTEOARTHRITIS OF LEFT KNEE: Primary | ICD-10-CM

## 2022-11-08 PROCEDURE — 4010F ACE/ARB THERAPY RXD/TAKEN: CPT | Mod: CPTII,S$GLB,, | Performed by: ORTHOPAEDIC SURGERY

## 2022-11-08 PROCEDURE — 3044F HG A1C LEVEL LT 7.0%: CPT | Mod: CPTII,S$GLB,, | Performed by: ORTHOPAEDIC SURGERY

## 2022-11-08 PROCEDURE — 99999 PR PBB SHADOW E&M-EST. PATIENT-LVL IV: ICD-10-PCS | Mod: PBBFAC,,, | Performed by: ORTHOPAEDIC SURGERY

## 2022-11-08 PROCEDURE — 99999 PR PBB SHADOW E&M-EST. PATIENT-LVL IV: CPT | Mod: PBBFAC,,, | Performed by: ORTHOPAEDIC SURGERY

## 2022-11-08 PROCEDURE — 1159F MED LIST DOCD IN RCRD: CPT | Mod: CPTII,S$GLB,, | Performed by: ORTHOPAEDIC SURGERY

## 2022-11-08 PROCEDURE — 99214 OFFICE O/P EST MOD 30 MIN: CPT | Mod: S$GLB,,, | Performed by: ORTHOPAEDIC SURGERY

## 2022-11-08 PROCEDURE — 99214 PR OFFICE/OUTPT VISIT, EST, LEVL IV, 30-39 MIN: ICD-10-PCS | Mod: S$GLB,,, | Performed by: ORTHOPAEDIC SURGERY

## 2022-11-08 PROCEDURE — 3008F PR BODY MASS INDEX (BMI) DOCUMENTED: ICD-10-PCS | Mod: CPTII,S$GLB,, | Performed by: ORTHOPAEDIC SURGERY

## 2022-11-08 PROCEDURE — 1159F PR MEDICATION LIST DOCUMENTED IN MEDICAL RECORD: ICD-10-PCS | Mod: CPTII,S$GLB,, | Performed by: ORTHOPAEDIC SURGERY

## 2022-11-08 PROCEDURE — 4010F PR ACE/ARB THEARPY RXD/TAKEN: ICD-10-PCS | Mod: CPTII,S$GLB,, | Performed by: ORTHOPAEDIC SURGERY

## 2022-11-08 PROCEDURE — 3008F BODY MASS INDEX DOCD: CPT | Mod: CPTII,S$GLB,, | Performed by: ORTHOPAEDIC SURGERY

## 2022-11-08 PROCEDURE — 3044F PR MOST RECENT HEMOGLOBIN A1C LEVEL <7.0%: ICD-10-PCS | Mod: CPTII,S$GLB,, | Performed by: ORTHOPAEDIC SURGERY

## 2022-11-08 NOTE — PROGRESS NOTES
Subjective:      Patient ID: Arron Ron is a 52 y.o. female.    Chief Complaint: Knee Pain (bilateral )    HPI    Follow-up for osteoarthritis.  The patient reports that her left knee is getting much worse.  She has not gotten benefits from injection and tries not to use NSAIDs because of kidney concerns.          Review of Systems   Constitutional: Negative for fever and weight loss.   HENT:  Negative for congestion.    Eyes:  Negative for visual disturbance.   Cardiovascular:  Negative for chest pain.   Respiratory:  Negative for shortness of breath.    Hematologic/Lymphatic: Negative for bleeding problem. Does not bruise/bleed easily.   Skin:  Negative for poor wound healing.   Musculoskeletal:  Positive for joint pain.   Gastrointestinal:  Positive for heartburn. Negative for abdominal pain.   Genitourinary:  Negative for dysuria.   Neurological:  Negative for seizures.   Psychiatric/Behavioral:  Negative for altered mental status.    Allergic/Immunologic: Negative for persistent infections.       Objective:      Ortho/SPM Exam      Left knee     The patient is not in acute distress.   Sclerae normal  Body habitus is obese.  Respiratory distress:  none   The patient walks with a limp.  Hip irritability  negative.   The skin over the knee is intact.  Knee effusion trace  Palpation- nontender  Range of motion- Flexion 100 deg, Extension 0 deg,   Ligament laxity exam:  2+ valgus laxity   Patellar apprehension negative.  Popliteal cyst negative  Patellar crepitation absent.  Meniscal irritability not applicable  Pulses DP present, PT present.  Motor normal 5/5 strength in all tested muscle groups.   Sensory normal.    I reviewed the relevant imaging for the patient's condition:  Left knee films show complete medial narrowing with osteophytes.  The right knee shows mild degenerative change          Assessment:       Encounter Diagnosis   Name Primary?    Primary osteoarthritis of left knee Yes        The  condition is radiographically advanced and aggravated by obesity.    Surgery cannot be considered at this time because the patient's weight is outside of our institutional safety guidelines.    Furthermore, the patient has a pending General surgery consultation regarding problems with her previous gastric sleeve.          Plan:       Arron was seen today for knee pain.    Diagnoses and all orders for this visit:    Primary osteoarthritis of left knee  -     KNEE BRACE FOR HOME USE          I explained my diagnostic impression and the reasoning behind it in detail, using layman's terms.  Models and/or pictures were used to help in the explanation.    Guardian brace prescribed the patient request    Continued weight loss recommended      I explained the potential role of surgery in the treatment of this condition to the patient.  They understand that if nonsurgical measures do not adequately control symptoms, surgery will be considered in the future.

## 2022-11-16 ENCOUNTER — OFFICE VISIT (OUTPATIENT)
Dept: GASTROENTEROLOGY | Facility: CLINIC | Age: 52
End: 2022-11-16
Payer: MEDICARE

## 2022-11-16 VITALS
WEIGHT: 293 LBS | HEIGHT: 66 IN | SYSTOLIC BLOOD PRESSURE: 126 MMHG | DIASTOLIC BLOOD PRESSURE: 84 MMHG | BODY MASS INDEX: 47.09 KG/M2 | HEART RATE: 81 BPM

## 2022-11-16 DIAGNOSIS — K59.00 CONSTIPATION, UNSPECIFIED CONSTIPATION TYPE: ICD-10-CM

## 2022-11-16 DIAGNOSIS — Z90.3 HISTORY OF SLEEVE GASTRECTOMY: ICD-10-CM

## 2022-11-16 DIAGNOSIS — K21.00 GASTROESOPHAGEAL REFLUX DISEASE WITH ESOPHAGITIS WITHOUT HEMORRHAGE: Primary | ICD-10-CM

## 2022-11-16 DIAGNOSIS — R10.84 GENERALIZED POSTPRANDIAL ABDOMINAL PAIN: ICD-10-CM

## 2022-11-16 PROCEDURE — 4010F ACE/ARB THERAPY RXD/TAKEN: CPT | Mod: CPTII,GC,S$GLB, | Performed by: STUDENT IN AN ORGANIZED HEALTH CARE EDUCATION/TRAINING PROGRAM

## 2022-11-16 PROCEDURE — 3044F PR MOST RECENT HEMOGLOBIN A1C LEVEL <7.0%: ICD-10-PCS | Mod: CPTII,GC,S$GLB, | Performed by: STUDENT IN AN ORGANIZED HEALTH CARE EDUCATION/TRAINING PROGRAM

## 2022-11-16 PROCEDURE — 3008F BODY MASS INDEX DOCD: CPT | Mod: CPTII,GC,S$GLB, | Performed by: STUDENT IN AN ORGANIZED HEALTH CARE EDUCATION/TRAINING PROGRAM

## 2022-11-16 PROCEDURE — 3079F PR MOST RECENT DIASTOLIC BLOOD PRESSURE 80-89 MM HG: ICD-10-PCS | Mod: CPTII,GC,S$GLB, | Performed by: STUDENT IN AN ORGANIZED HEALTH CARE EDUCATION/TRAINING PROGRAM

## 2022-11-16 PROCEDURE — 3079F DIAST BP 80-89 MM HG: CPT | Mod: CPTII,GC,S$GLB, | Performed by: STUDENT IN AN ORGANIZED HEALTH CARE EDUCATION/TRAINING PROGRAM

## 2022-11-16 PROCEDURE — 3074F SYST BP LT 130 MM HG: CPT | Mod: CPTII,GC,S$GLB, | Performed by: STUDENT IN AN ORGANIZED HEALTH CARE EDUCATION/TRAINING PROGRAM

## 2022-11-16 PROCEDURE — 99214 OFFICE O/P EST MOD 30 MIN: CPT | Mod: GC,S$GLB,, | Performed by: STUDENT IN AN ORGANIZED HEALTH CARE EDUCATION/TRAINING PROGRAM

## 2022-11-16 PROCEDURE — 99999 PR PBB SHADOW E&M-EST. PATIENT-LVL V: CPT | Mod: PBBFAC,GC,, | Performed by: STUDENT IN AN ORGANIZED HEALTH CARE EDUCATION/TRAINING PROGRAM

## 2022-11-16 PROCEDURE — 3074F PR MOST RECENT SYSTOLIC BLOOD PRESSURE < 130 MM HG: ICD-10-PCS | Mod: CPTII,GC,S$GLB, | Performed by: STUDENT IN AN ORGANIZED HEALTH CARE EDUCATION/TRAINING PROGRAM

## 2022-11-16 PROCEDURE — 3044F HG A1C LEVEL LT 7.0%: CPT | Mod: CPTII,GC,S$GLB, | Performed by: STUDENT IN AN ORGANIZED HEALTH CARE EDUCATION/TRAINING PROGRAM

## 2022-11-16 PROCEDURE — 99214 PR OFFICE/OUTPT VISIT, EST, LEVL IV, 30-39 MIN: ICD-10-PCS | Mod: GC,S$GLB,, | Performed by: STUDENT IN AN ORGANIZED HEALTH CARE EDUCATION/TRAINING PROGRAM

## 2022-11-16 PROCEDURE — 1159F PR MEDICATION LIST DOCUMENTED IN MEDICAL RECORD: ICD-10-PCS | Mod: CPTII,GC,S$GLB, | Performed by: STUDENT IN AN ORGANIZED HEALTH CARE EDUCATION/TRAINING PROGRAM

## 2022-11-16 PROCEDURE — 3008F PR BODY MASS INDEX (BMI) DOCUMENTED: ICD-10-PCS | Mod: CPTII,GC,S$GLB, | Performed by: STUDENT IN AN ORGANIZED HEALTH CARE EDUCATION/TRAINING PROGRAM

## 2022-11-16 PROCEDURE — 99999 PR PBB SHADOW E&M-EST. PATIENT-LVL V: ICD-10-PCS | Mod: PBBFAC,GC,, | Performed by: STUDENT IN AN ORGANIZED HEALTH CARE EDUCATION/TRAINING PROGRAM

## 2022-11-16 PROCEDURE — 4010F PR ACE/ARB THEARPY RXD/TAKEN: ICD-10-PCS | Mod: CPTII,GC,S$GLB, | Performed by: STUDENT IN AN ORGANIZED HEALTH CARE EDUCATION/TRAINING PROGRAM

## 2022-11-16 PROCEDURE — 1159F MED LIST DOCD IN RCRD: CPT | Mod: CPTII,GC,S$GLB, | Performed by: STUDENT IN AN ORGANIZED HEALTH CARE EDUCATION/TRAINING PROGRAM

## 2022-11-16 RX ORDER — NORTRIPTYLINE HYDROCHLORIDE 25 MG/1
25 CAPSULE ORAL NIGHTLY
Qty: 90 CAPSULE | Refills: 1 | Status: SHIPPED | OUTPATIENT
Start: 2022-11-16 | End: 2023-05-09 | Stop reason: SDUPTHER

## 2022-11-17 NOTE — PROGRESS NOTES
I have reviewed the notes, assessments, and/or procedures performed this visit, and I concur with the documentation.    Longstanding abdominal pain and GI symptoms since her gastric sleeve surgery  The constipation was a problem before then.  She says that the surgeon who did the gastric sleeve repair the hiatal hernia at the same time, but on our endoscopy, there is a persistent hiatal hernia and on retroflex view I do not see any signs of a fundoplication  She does have reflux symptoms, I think they are fairly controlled on the PPI b.I.d.  I think the postprandial pain and vomiting may fall within the realm of visceral hypersensitivity, and that will be our treatment approach now

## 2022-12-21 RX ORDER — SUCRALFATE 1 G/1
1 TABLET ORAL 2 TIMES DAILY
Qty: 60 TABLET | Refills: 3 | Status: SHIPPED | OUTPATIENT
Start: 2022-12-21 | End: 2024-03-04

## 2022-12-21 NOTE — TELEPHONE ENCOUNTER
Per the patient need a refill for medication Carafate 1 gm [facility].   Rx pend and routed to Dr. Ford last seen patient in clinic on 11/16/2022

## 2023-01-12 ENCOUNTER — CLINICAL SUPPORT (OUTPATIENT)
Dept: REHABILITATION | Facility: HOSPITAL | Age: 53
End: 2023-01-12
Attending: INTERNAL MEDICINE
Payer: MEDICARE

## 2023-01-12 DIAGNOSIS — M25.662 DECREASED RANGE OF MOTION OF LEFT KNEE: ICD-10-CM

## 2023-01-12 DIAGNOSIS — G89.29 CHRONIC BILATERAL LOW BACK PAIN WITH BILATERAL SCIATICA: ICD-10-CM

## 2023-01-12 DIAGNOSIS — M17.12 PRIMARY OSTEOARTHRITIS OF LEFT KNEE: Primary | ICD-10-CM

## 2023-01-12 DIAGNOSIS — M54.41 CHRONIC BILATERAL LOW BACK PAIN WITH BILATERAL SCIATICA: ICD-10-CM

## 2023-01-12 DIAGNOSIS — M54.42 CHRONIC BILATERAL LOW BACK PAIN WITH BILATERAL SCIATICA: ICD-10-CM

## 2023-01-12 DIAGNOSIS — R60.0 BILATERAL EDEMA OF LOWER EXTREMITY: ICD-10-CM

## 2023-01-12 DIAGNOSIS — Z74.09 IMPAIRED FUNCTIONAL MOBILITY, BALANCE, GAIT, AND ENDURANCE: ICD-10-CM

## 2023-01-12 PROCEDURE — 97110 THERAPEUTIC EXERCISES: CPT | Mod: PN

## 2023-01-12 PROCEDURE — 97162 PT EVAL MOD COMPLEX 30 MIN: CPT | Mod: PN

## 2023-01-12 NOTE — PLAN OF CARE
"  [facility]   Physical Therapy Initial Evaluation     Date: 1/12/2023   Name: Arron Ron  Clinic Number: [id number]    Therapy Diagnosis:   Encounter Diagnoses   Name Primary?    Chronic bilateral low back pain with bilateral sciatica     Decreased range of motion of left knee     Primary osteoarthritis of left knee Yes    Bilateral edema of lower extremity     Impaired functional mobility, balance, gait, and endurance      Physician: Mahi Rojas MD    Physician Orders: PT Eval and Treat   Medical Diagnosis from Referral: M17.12 (ICD-10-CM) - Primary osteoarthritis of left knee   Evaluation Date: 1/12/2023  Authorization Period Expiration: 1/31/2023  Plan of Care Expiration: 3/9/2023  Progress Note Due: 2/10/2023  Visit # / Visits authorized: 1/ pending   FOTO: 1/TBD    Precautions: Standard     Time In: 8:55am    Time Out: 9:35am  Total Appointment Time (timed & untimed codes): 45 minutes      SUBJECTIVE     Date of onset: several years    History of current condition - Arron presents to physical therapy evaluation with complaints of left knee pain  that started several years ago that gradually worsened. Patient stated sharp and throbbing pain with occasional "pins and needles" with functional activities such as bending, sitting, standing, walking.  Patient reports a history of injections and physical therapy that provided temporary relief.  Patient states their goals are to decrease pain and improve mobility to get back to activities of daily living  without discomfort.    Falls: yes; does not recall how many.  A few resulted to wrist and shoulder problems    Imaging, : x-rays done 11/8/22. See imaging.    Prior Therapy: yes; provides temporary relief.  Knee pain fluctuates randomly.  Social History:  lives alone  Occupation: disabled  Prior Level of Function: performing activities of daily living without pain  Current Level of Function: difficulty with activities of " daily living     Pain:  Current 8/10, worst 10/10, best 4/10   Location: left knee    Description: Throbbing, Sharp, and Shooting  Aggravating Factors: Sitting, Standing, Walking, Night Time, Morning, Lifting, and Getting out of bed/chair  Easing Factors: meditation, ice, and heating pad    Patients goals: get back to moving without pain to lose weigth     Medical History:   Past Medical History:   Diagnosis Date    Acid reflux     Anemia     Back pain     was under therapy    Chronic back pain     Constipation     Elevated cholesterol     Encounter for blood transfusion 1987    Endometriosis     Hyperlipidemia     Hypertension     Nausea & vomiting     Sleep apnea     Tendonitis of wrist, right        Surgical History:   Arron Ron  has a past surgical history that includes Tubal ligation; gallstone; Cholecystectomy; Endometrial ablation; Dilation and curettage of uterus; Breast biopsy; Hysterectomy (10/1/15); Colonoscopy (2017); Upper gastrointestinal endoscopy (2017); Oophorectomy; Colonoscopy (N/A, 4/29/2021); Gastric bypass (02/18/2020); Esophagogastroduodenoscopy (N/A, 4/1/2022); Esophageal manometry with measurement of impedance (N/A, 7/13/2022); and Esophagogastroduodenoscopy (N/A, 7/20/2022).    Medications:   Arron has a current medication list which includes the following prescription(s): albuterol, albuterol, allergy relief (cetirizine), alprazolam, aspirin, atorvastatin, budesonide-formoterol 160-4.5 mcg, cyclobenzaprine, diclofenac sodium, doxazosin, ergocalciferol (vitamin d2), fish oil-omega-3 fatty acids, fluticasone propionate, gabapentin, linaclotide, meloxicam, montelukast, nascobal, nortriptyline, pantoprazole, polyethylene glycol, reglan, spironolactone, sucralfate, sulindac, torsemide, and valsartan.    Allergies:   Review of patient's allergies indicates:  No Known Allergies       OBJECTIVE     Observation:   Gait: Patient ambulated independently with a single point cane  Gait  deviations: trendelenburg,     Functional tests:   squat: unable to do  SLS EO: unable to perform    Sit to Stand: slow movement, uses assistance    Knee  AROM/PROM Right Left Pain/Dysfunction with Movement   flexion 85 90 Pain at end range   extension  -4 -4 Pain at range       Lower Extremity Strength  Right LE  Left LE    Quadriceps: 4-/5 Quadriceps: 4/5   Hamstrings: 4-/5 Hamstrings: 4+/5   Hip flexion (seated): 4/5 Hip flexion (seated): 4/5   Hip extension:  4/5 Hip extension: 4/5   PGM: 4/5 PGM:  4/5   Hip ER:  4-/5 Hip ER:  4-/5   Hip IR: 4-/5 Hip IR: 4-/5     SPECIAL TESTS     LEFT RIGHT   Obers Not tested Not tested   Eyad Not tested Not tested   Thessaly Not tested Not tested   Jointline Tenderness + +   Hip Scour + +   Hip KAILASH + +   LIGAMENTOUS TESTS   Lachman + +   Anterior Drawer + +   Posterior Drawer + +   Varus + +   Valgus + +   McMurreys + +   Patellar tapping + +       Joint Mobility: hypomobility in all directions, increased patellar medial tracking bilaterally     Palpation: tenderness along medial and lateral joint line, left quadricep    Sensation: intact    Flexibility:    Hamstrings: R = moderate tightness ; L = moderate tightness    Ely's test: R =  not tested ; L = not tested           Limitation/Restriction for FOTO Knee Survey    Therapist reviewed FOTO scores for Arron Ron on 1/12/2023.   FOTO documents entered into [employer] - see Media section.    Limitation Score: 76%         TREATMENT     Total Treatment time (time-based codes) separate from Evaluation: 15 minutes      Arron received the treatments listed below:      therapeutic exercises to develop strength, endurance, ROM, and flexibility for 15 minutes including:  Quad sets x10  SAQs  with external rotation x10  LAQs x10  Seated knee slides x10  Ball squeezes x10  Seated hip abductions with GTB x10      PATIENT EDUCATION AND HOME EXERCISES     Education provided:   - Educated on plan of care and home exercise  program.    Written Home Exercises Provided: yes. Exercises were reviewed and Arron was able to demonstrate them prior to the end of the session.  Arron demonstrated fair  understanding of the education provided. See EMR under Patient Instructions for exercises provided during therapy sessions.    ASSESSMENT     Arron is a 52 y.o. female referred to outpatient Physical Therapy with a medical diagnosis of left knee osteoarthritis. Patient presents with limited knee range of motion, lower extremity strength, and increased pain with functional activities such as bending, lifting, and walking.  Multiple positive special tests bilaterally indicate possible acute flare up secondary to medical diagnosis.  Patient will benefit from skilled outpatient physical therapy services to address the above listed deficits to return to prior level of function.       Patient prognosis is Fair.   Patient will benefit from skilled outpatient Physical Therapy to address the deficits stated above and in the chart below, provide patient /family education, and to maximize patientt's level of independence.     Plan of care discussed with patient: Yes  Patient's spiritual, cultural and educational needs considered and patient is agreeable to the plan of care and goals as stated below:     Anticipated Barriers for therapy: chronicity of condition, age, high BMI    Medical Necessity is demonstrated by the following  History  Co-morbidities and personal factors that may impact the plan of care Co-morbidities:   chronic constipation, high BMI, and HTN, back pain, elevated cholesterol, anemia, hyperlipidemia    Personal Factors:   age     high   Examination  Body Structures and Functions, activity limitations and participation restrictions that may impact the plan of care Body Regions:   lower extremities    Body Systems:    ROM  strength  gross coordinated movement  balance  gait  transfers    Participation Restrictions:   Community  ambulation    Activity limitations:   Learning and applying knowledge  no deficits    General Tasks and Commands  no deficits    Communication  no deficits    Mobility  lifting and carrying objects  walking  moving around using equipment (WC)    Self care  no deficits    Domestic Life  shopping  doing house work (cleaning house, washing dishes, laundry)         high   Clinical Presentation evolving clinical presentation with changing clinical characteristics moderate   Decision Making/ Complexity Score: moderate     GOALS: Short Term Goals:  4 weeks  1.Report decreased knee pain  < / =  7/10  to increase tolerance for walking  2. Increase knee ROM by 6 degrees in order to be able to perform ADLs without difficulty.  3. Increase strength by 1/3 MMT grade in hip and knee musculature  to increase tolerance for ADL and work activities.  4. Pt to tolerate HEP to improve ROM and independence with ADL's    Long Term Goals: 8 weeks  1.Report decreased knee pain < / = 4/10  to increase tolerance for walking  2.To perform 10 sit to stands with no increase in pain to demonstrate independence with lifting and carrying objects.  3.Increase strength to >/= 4+/5 in hip and knee musculature  to increase tolerance for ADL and work activities.  4. Pt will report at CJ level (20-40% impaired) on FOTO knee to demonstrate increase in LE function with every day tasks.      PLAN   Plan of care Certification: 1/12/2023 to 3/9/2023.    Outpatient Physical Therapy 2 times weekly for 8 weeks to include the following interventions: Electrical Stimulation , Gait Training, Manual Therapy, Moist Heat/ Ice, Neuromuscular Re-ed, Patient Education, Self Care, Therapeutic Activities, and Therapeutic Exercise.     Pt may be seen by PTA as part of the rehabilitation team.     Taryn Townsend, PT      I CERTIFY THE NEED FOR THESE SERVICES FURNISHED UNDER THIS PLAN OF TREATMENT AND WHILE UNDER MY CARE     Physician's Signature:  ___________________________________________________

## 2023-01-23 ENCOUNTER — CLINICAL SUPPORT (OUTPATIENT)
Dept: REHABILITATION | Facility: HOSPITAL | Age: 53
End: 2023-01-23
Attending: INTERNAL MEDICINE
Payer: MEDICARE

## 2023-01-23 DIAGNOSIS — M54.42 CHRONIC BILATERAL LOW BACK PAIN WITH BILATERAL SCIATICA: ICD-10-CM

## 2023-01-23 DIAGNOSIS — Z74.09 IMPAIRED FUNCTIONAL MOBILITY, BALANCE, GAIT, AND ENDURANCE: ICD-10-CM

## 2023-01-23 DIAGNOSIS — M17.12 PRIMARY OSTEOARTHRITIS OF LEFT KNEE: ICD-10-CM

## 2023-01-23 DIAGNOSIS — M54.41 CHRONIC BILATERAL LOW BACK PAIN WITH BILATERAL SCIATICA: ICD-10-CM

## 2023-01-23 DIAGNOSIS — M25.662 DECREASED RANGE OF MOTION OF LEFT KNEE: ICD-10-CM

## 2023-01-23 DIAGNOSIS — G89.29 CHRONIC PAIN OF LEFT KNEE: Primary | ICD-10-CM

## 2023-01-23 DIAGNOSIS — G89.29 CHRONIC BILATERAL LOW BACK PAIN WITH BILATERAL SCIATICA: ICD-10-CM

## 2023-01-23 DIAGNOSIS — M25.562 CHRONIC PAIN OF LEFT KNEE: Primary | ICD-10-CM

## 2023-01-23 PROCEDURE — 97110 THERAPEUTIC EXERCISES: CPT | Mod: PN,CQ

## 2023-01-27 ENCOUNTER — OFFICE VISIT (OUTPATIENT)
Dept: INTERNAL MEDICINE | Facility: CLINIC | Age: 53
End: 2023-01-27
Payer: MEDICARE

## 2023-01-27 VITALS
HEART RATE: 81 BPM | OXYGEN SATURATION: 95 % | DIASTOLIC BLOOD PRESSURE: 78 MMHG | WEIGHT: 293 LBS | BODY MASS INDEX: 47.09 KG/M2 | SYSTOLIC BLOOD PRESSURE: 108 MMHG | RESPIRATION RATE: 16 BRPM | HEIGHT: 66 IN

## 2023-01-27 DIAGNOSIS — K21.00 GASTROESOPHAGEAL REFLUX DISEASE WITH ESOPHAGITIS WITHOUT HEMORRHAGE: ICD-10-CM

## 2023-01-27 DIAGNOSIS — Z12.31 ENCOUNTER FOR SCREENING MAMMOGRAM FOR BREAST CANCER: ICD-10-CM

## 2023-01-27 DIAGNOSIS — Z28.21 VACCINATION DECLINED: ICD-10-CM

## 2023-01-27 DIAGNOSIS — G47.33 OSA ON CPAP: ICD-10-CM

## 2023-01-27 DIAGNOSIS — J45.20 MILD INTERMITTENT ASTHMA WITHOUT COMPLICATION: ICD-10-CM

## 2023-01-27 DIAGNOSIS — I70.0 AORTIC ATHEROSCLEROSIS: ICD-10-CM

## 2023-01-27 DIAGNOSIS — F32.0 MILD SINGLE CURRENT EPISODE OF MAJOR DEPRESSIVE DISORDER: ICD-10-CM

## 2023-01-27 DIAGNOSIS — E66.01 MORBID OBESITY: ICD-10-CM

## 2023-01-27 DIAGNOSIS — N18.31 CHRONIC KIDNEY DISEASE, STAGE 3A: ICD-10-CM

## 2023-01-27 DIAGNOSIS — E78.5 HYPERLIPIDEMIA, UNSPECIFIED HYPERLIPIDEMIA TYPE: ICD-10-CM

## 2023-01-27 DIAGNOSIS — H40.1114 PRIMARY OPEN ANGLE GLAUCOMA (POAG) OF RIGHT EYE, INDETERMINATE STAGE: ICD-10-CM

## 2023-01-27 DIAGNOSIS — R73.01 IFG (IMPAIRED FASTING GLUCOSE): ICD-10-CM

## 2023-01-27 DIAGNOSIS — I10 PRIMARY HYPERTENSION: Primary | ICD-10-CM

## 2023-01-27 PROBLEM — R06.09 DOE (DYSPNEA ON EXERTION): Status: RESOLVED | Noted: 2022-10-10 | Resolved: 2023-01-27

## 2023-01-27 PROCEDURE — 99215 OFFICE O/P EST HI 40 MIN: CPT | Mod: S$PBB | Performed by: INTERNAL MEDICINE

## 2023-01-27 PROCEDURE — 99999 PR STA SHADOW: ICD-10-PCS | Mod: PBBFAC,,, | Performed by: INTERNAL MEDICINE

## 2023-01-27 PROCEDURE — 99499 UNLISTED E&M SERVICE: CPT | Mod: S$PBB,,, | Performed by: INTERNAL MEDICINE

## 2023-01-27 PROCEDURE — 99999 PR STA SHADOW: CPT | Mod: PBBFAC,,, | Performed by: INTERNAL MEDICINE

## 2023-01-27 PROCEDURE — 99499 RISK ADDL DX/OHS AUDIT: ICD-10-PCS | Mod: S$PBB,,, | Performed by: INTERNAL MEDICINE

## 2023-01-27 PROCEDURE — 99999 PR PBB SHADOW E&M-EST. PATIENT-LVL V: CPT | Mod: PBBFAC,,, | Performed by: INTERNAL MEDICINE

## 2023-01-27 PROCEDURE — 99215 OFFICE O/P EST HI 40 MIN: CPT | Mod: PBBFAC | Performed by: INTERNAL MEDICINE

## 2023-01-27 NOTE — ASSESSMENT & PLAN NOTE
Chronic stable  Follow labs, A1C next visit  Diet, exercise weight loss  Would benefit from GLP-1 for IFG and obesity. Discussing with outside bariatric medicine team

## 2023-01-27 NOTE — ASSESSMENT & PLAN NOTE
Spent > 10 mins on diet and exercise counseling  Saw bariatrics at [facility] per her report  Considering GLP-1----cost may be an issue but I think medically would be an excellent medication for her

## 2023-01-27 NOTE — ASSESSMENT & PLAN NOTE
Chronic stable  Cont meds same dose per pulm recommendations  Still having some MERCER--deconditioning/obesity likely playing a role as well. I agree with pulm notes from last visit

## 2023-01-27 NOTE — ASSESSMENT & PLAN NOTE
Chronic controlled  Continue medications at same dose  Low Na diet  Exercise, weight loss  Check BP and keep log for next visit

## 2023-01-27 NOTE — PROGRESS NOTES
"Subjective:       Patient ID: Arron Ron is a 52 y.o. female.    Chief Complaint: Follow-up      HPI:  Patient is known to me and presents for follow up HTN, obesity, chronic low back and knee pain, depression/anxiety, HLD, asthma.  No new labs prior to today's visit.      HTN: on torsemide 20mg, aldactone, valsartan, doxazosin; also sees Dr. Mays ; BP is controlled today. Does not check regularly at home. Denies chest pains, SOB. GFR improved off so much diurectic previously)     Depression/anxiety: on xanax PRN prescribed by Dr. Tello. Not on SSRI. Sx reported as controlled     HLD: On atorvastatin; last LDL controlled.. Denies medication side effects.      Asthma: diagnosed by Dr. Ocampo who started the Breo and now on symbicort pRN. She denies  wheezing or cough today. Using albuterol PRN. Deconditioning likely also playing a role in MERCER.      Chronic back and knee pain: She has done PT in the past but has been > 6 months per patient.  She was following Dr. Tello who was prescribing pain medication but she self stopped taking her pain medications due to concern for side effects. Still using Flexeril. Her knee pain is getting worse. She is trying to exercise but pain limits her. Last MRI and xray reviewed and consistent with arthritis. She saw ortho Was taking naproxen daily for pain controlb ut has stopped with worsening reflux/gastritis sx.      RODRI; using CPAP every night     She was referred to Dr. Camacho. She is s/p braiatric surgery (gastric sleeve [id number]).  She is feeling frustrated because she regained her weight did not decrease very much since last visit. she reports she has established with bariatric medicine at [facility]; has follow up appointment to discuss injections (presumed GLP-1)     GERD: She is following with GI. Had EGD. On pantoprazole BID. Decreased appetite due to chronic nausea.  Considering bypass surgery as sleeve causing so many GI complications.    Per GI note 11/2022: "She " "might need PPI b.i.d. indefinitely given having esophagitis on PPI daily."  GI also recently added nortriptyline 25mg for GI symptoms (notes 11/2022)    Chronic constipation: on linzess and miralax per GI    Past Medical History:   Diagnosis Date    Acid reflux     Anemia     Back pain     was under therapy    Chronic back pain     Constipation     Elevated cholesterol     Encounter for blood transfusion 1987    Endometriosis     Hyperlipidemia     Hypertension     Nausea & vomiting     Sleep apnea     Tendonitis of wrist, right        Family History   Problem Relation Age of Onset    Hypertension Mother     Heart attack Mother     Hypertension Brother     Heart disease Maternal Grandmother     Hypertension Maternal Grandmother     Diabetes Maternal Aunt     Breast cancer Neg Hx     Colon cancer Neg Hx     Ovarian cancer Neg Hx     Esophageal cancer Neg Hx        Social History     Socioeconomic History    Marital status: Single   Tobacco Use    Smoking status: Never    Smokeless tobacco: Never   Substance and Sexual Activity    Alcohol use: Yes     Comment: rare    Drug use: No    Sexual activity: Yes     Partners: Male     Birth control/protection: Surgical, See Surgical Hx     Comment: --BTL       Review of Systems   Constitutional:  Negative for activity change, fatigue, fever and unexpected weight change.   HENT:  Negative for congestion, ear pain, hearing loss, rhinorrhea and sore throat.    Eyes:  Negative for redness and visual disturbance.   Respiratory:  Positive for shortness of breath (MERCER). Negative for cough and wheezing.    Cardiovascular:  Negative for chest pain, palpitations and leg swelling.   Gastrointestinal:  Positive for constipation. Negative for abdominal pain, diarrhea, nausea and vomiting.        Reflux     Genitourinary:  Negative for dysuria, frequency and urgency.   Musculoskeletal:  Negative for back pain, joint swelling and neck pain.   Skin:  Negative for color change, rash " and wound.   Neurological:  Negative for dizziness, tremors, weakness, light-headedness and headaches.       Objective:      Physical Exam  Vitals reviewed.   Constitutional:       General: She is not in acute distress.     Appearance: She is well-developed. She is obese.   HENT:      Head: Normocephalic and atraumatic.      Right Ear: External ear normal.      Left Ear: External ear normal.      Nose: Nose normal.   Eyes:      General:         Right eye: No discharge.         Left eye: No discharge.      Extraocular Movements: Extraocular movements intact.      Conjunctiva/sclera: Conjunctivae normal.      Pupils: Pupils are equal, round, and reactive to light.   Neck:      Thyroid: No thyromegaly.   Cardiovascular:      Rate and Rhythm: Normal rate and regular rhythm.      Heart sounds: No murmur heard.  Pulmonary:      Effort: Pulmonary effort is normal. No respiratory distress.      Breath sounds: Normal breath sounds. No wheezing.   Abdominal:      General: There is no distension.      Palpations: Abdomen is soft.   Skin:     General: Skin is warm and dry.   Neurological:      Mental Status: She is alert and oriented to person, place, and time.      Cranial Nerves: No cranial nerve deficit.   Psychiatric:         Behavior: Behavior normal.         Thought Content: Thought content normal.       Assessment:       1. Primary hypertension    2. Hyperlipidemia, unspecified hyperlipidemia type    3. RODRI on CPAP    4. Morbid obesity    5. Mild single current episode of major depressive disorder    6. Mild intermittent asthma without complication    7. Primary open angle glaucoma (POAG) of right eye, indeterminate stage    8. IFG (impaired fasting glucose)    9. Encounter for screening mammogram for breast cancer    10. Gastroesophageal reflux disease with esophagitis without hemorrhage    11. Aortic atherosclerosis    12. Chronic kidney disease, stage 3a    13. Vaccination declined        Plan:       1. Primary  hypertension    Assessment & Plan:  Chronic controlled  Continue medications at same dose  Low Na diet  Exercise, weight loss  Check BP and keep log for next visit      Orders:  -     CBC Auto Differential; Future; Expected date: 07/26/2023  -     Comprehensive Metabolic Panel; Future; Expected date: 07/26/2023  -     TSH; Future; Expected date: 07/26/2023  -     Lipid Panel; Future; Expected date: 07/26/2023  -     Hemoglobin A1C; Future; Expected date: 07/26/2023    2. Hyperlipidemia, unspecified hyperlipidemia type    Assessment & Plan:  Chronic controlled  Cont meds same dose      Orders:  -     CBC Auto Differential; Future; Expected date: 07/26/2023  -     Comprehensive Metabolic Panel; Future; Expected date: 07/26/2023  -     TSH; Future; Expected date: 07/26/2023  -     Lipid Panel; Future; Expected date: 07/26/2023  -     Hemoglobin A1C; Future; Expected date: 07/26/2023    3. RODRI on CPAP    Assessment & Plan:  Chronic stable  Cont CPAP nightly as she is doing. Working well for her  Weight loss      4. Morbid obesity    Assessment & Plan:  Spent > 10 mins on diet and exercise counseling  Saw bariatrics at [facility] per her report  Considering GLP-1----cost may be an issue but I think medically would be an excellent medication for her    Orders:  -     CBC Auto Differential; Future; Expected date: 07/26/2023  -     Comprehensive Metabolic Panel; Future; Expected date: 07/26/2023  -     TSH; Future; Expected date: 07/26/2023  -     Lipid Panel; Future; Expected date: 07/26/2023  -     Hemoglobin A1C; Future; Expected date: 07/26/2023    5. Mild single current episode of major depressive disorder    Assessment & Plan:  Chronic stable  On Xanax per outside physician  Not on SSRI currently but sx stable. Will follow      6. Mild intermittent asthma without complication    Assessment & Plan:  Chronic stable  Cont meds same dose per pulm recommendations  Still having some MERCER--deconditioning/obesity likely playing a  role as well. I agree with pulm notes from last visit       7. Primary open angle glaucoma (POAG) of right eye, indeterminate stage    Assessment & Plan:  Chronic stable  Management per optho      8. IFG (impaired fasting glucose)    Assessment & Plan:  Chronic stable  Follow labs, A1C next visit  Diet, exercise weight loss  Would benefit from GLP-1 for IFG and obesity. Discussing with outside bariatric medicine team    Orders:  -     CBC Auto Differential; Future; Expected date: 07/26/2023  -     Comprehensive Metabolic Panel; Future; Expected date: 07/26/2023  -     TSH; Future; Expected date: 07/26/2023  -     Lipid Panel; Future; Expected date: 07/26/2023  -     Hemoglobin A1C; Future; Expected date: 07/26/2023    9. Encounter for screening mammogram for breast cancer    -     Mammo Digital Screening Bilat w/ Michael; Future; Expected date: 01/27/2023    10. Gastroesophageal reflux disease with esophagitis without hemorrhage    Assessment & Plan:  Chronic stable  Cont PPI BID per GI recommendations  Weight loss      11. Aortic atherosclerosis    Assessment & Plan:  Noted on prior imaging  Cont statin  Weight loss    Orders:  -     CBC Auto Differential; Future; Expected date: 07/26/2023  -     Comprehensive Metabolic Panel; Future; Expected date: 07/26/2023  -     TSH; Future; Expected date: 07/26/2023  -     Lipid Panel; Future; Expected date: 07/26/2023  -     Hemoglobin A1C; Future; Expected date: 07/26/2023    12. Chronic kidney disease, stage 3a    Assessment & Plan:  Chronic improving  Last GFR was better on less diuretic. Reduce NSAID use as much as possible  Avoid nephrotoxic agents  Stay hydrated  Follow labs    Orders:  -     CBC Auto Differential; Future; Expected date: 07/26/2023  -     Comprehensive Metabolic Panel; Future; Expected date: 07/26/2023  -     TSH; Future; Expected date: 07/26/2023  -     Lipid Panel; Future; Expected date: 07/26/2023  -     Hemoglobin A1C; Future; Expected date:  07/26/2023    13. Vaccination declined  Discussed risks/benefits  Declines flu, COVID and pneumonia vaccine today       RTC 6 months with labs and PRN

## 2023-01-27 NOTE — ASSESSMENT & PLAN NOTE
Chronic improving  Last GFR was better on less diuretic. Reduce NSAID use as much as possible  Avoid nephrotoxic agents  Stay hydrated  Follow labs

## 2023-01-30 ENCOUNTER — CLINICAL SUPPORT (OUTPATIENT)
Dept: REHABILITATION | Facility: HOSPITAL | Age: 53
End: 2023-01-30
Attending: INTERNAL MEDICINE
Payer: MEDICARE

## 2023-01-30 DIAGNOSIS — M54.42 CHRONIC BILATERAL LOW BACK PAIN WITH BILATERAL SCIATICA: ICD-10-CM

## 2023-01-30 DIAGNOSIS — G89.29 CHRONIC PAIN OF LEFT KNEE: ICD-10-CM

## 2023-01-30 DIAGNOSIS — G89.29 CHRONIC BILATERAL LOW BACK PAIN WITH BILATERAL SCIATICA: ICD-10-CM

## 2023-01-30 DIAGNOSIS — M54.41 CHRONIC BILATERAL LOW BACK PAIN WITH BILATERAL SCIATICA: ICD-10-CM

## 2023-01-30 DIAGNOSIS — Z74.09 IMPAIRED FUNCTIONAL MOBILITY, BALANCE, GAIT, AND ENDURANCE: ICD-10-CM

## 2023-01-30 DIAGNOSIS — M25.562 CHRONIC PAIN OF LEFT KNEE: ICD-10-CM

## 2023-01-30 DIAGNOSIS — M25.662 DECREASED RANGE OF MOTION OF LEFT KNEE: Primary | ICD-10-CM

## 2023-01-30 DIAGNOSIS — M17.12 PRIMARY OSTEOARTHRITIS OF LEFT KNEE: ICD-10-CM

## 2023-01-30 PROCEDURE — 97110 THERAPEUTIC EXERCISES: CPT | Mod: PN,CQ

## 2023-01-30 NOTE — PROGRESS NOTES
"[facility]  Physical Therapy Treatment Note     Name: Arron Ron  Clinic Number: [id number]    Therapy Diagnosis:   Encounter Diagnoses   Name Primary?    Decreased range of motion of left knee     Impaired functional mobility, balance, gait, and endurance     Primary osteoarthritis of left knee     Chronic pain of left knee Yes    Chronic bilateral low back pain with bilateral sciatica      Physician: Mahi Rojas MD    Visit Date: 1/30/2023    Physician Orders: PT Eval and Treat   Medical Diagnosis from Referral: M17.12 (ICD-10-CM) - Primary osteoarthritis of left knee   Evaluation Date: 1/12/2023  Authorization Period Expiration: 1/31/2023  Plan of Care Expiration: 3/9/2023  Progress Note Due: 2/10/2023  Visit # / Visits authorized: 3/20  FOTO: 1/TBD     Precautions: Standard        Visit #: 3 of 20  PTA Visit #: 2  Time In: 1520 (pt arrives late)   Time Out: 1600   Total Billable Time: 40 minutes    Subjective     Pt reports: of left knee and low back pain today. Patient thinks it the arthritis in the knee that is causing more pain to her knee.     She was compliant with home exercise program.  Response to previous treatment: no concerns   Functional change: limited knee ROM and strength, decrease walking tolerance and prolong standing due to knee pain     Pain: 7/10 to left knee, 7-8 to lower back mainly on Left side   Location: Left knee and low back     Objective     Arron received therapeutic exercises to develop strength, endurance, ROM, flexibility, and posture for 40 minutes including:    Supine hip ADD ball squeezes, 3x10  Double knee to chest with physioball, 3x10   Lateral trunk rotation, x10   Bent knee fall out bilateral, 2x10   Seated hamstrings stretch bilateral, 2x30" holds   Seated ball roll out in flexion, x10  Seated ball roll out in lateral side bending, x5 each side   Seated alternating knee marches, 2x10   Seated toe/heel raises, 2x10   Sit to stand " from mat, x10      Not performed:   Seated hip ABD with GTB, 3x10    Arron received the following manual therapy techniques: Joint mobilizations and Soft tissue Mobilization were applied to the: Left knee for 0 minutes, including:    None today     Arron participated in neuromuscular re-education activities to improve: Balance, Coordination, and Proprioception for 0 minutes. The following activities were included:    None today     Arron participated in dynamic functional therapeutic activities to improve functional performance for 0  minutes, including:    None today     Arron participated in gait training to improve functional mobility and safety for 0  minutes, including:    None today       Home Exercises Provided and Patient Education Provided     Written Home Exercises Provided: yes.  Exercises were reviewed and Arron was able to demonstrate them prior to the end of the session.  Arron demonstrated good  understanding of the education provided.     Education provided:   - home exercise program    Assessment   Patient with fair tolerance in all activities, demonstrating mild discomforts to left knee and lower back mainly on Left side. Continued with low impact exercises to help eases pain and to improve knee mobility, stability, and strengthening. Provided cues as needed for proper techniques. Will need to modified or add exercises as appropriate for patient due to pain on Left knee with clicking or popping sensations as well as sciatic low back flare up.     Arron Is progressing towards her goals.   Pt prognosis is Fair.     Pt will continue to benefit from skilled outpatient physical therapy to address the deficits listed in the problem list box on initial evaluation, provide pt/family education and to maximize pt's level of independence in the home and community environment.     Pt's spiritual, cultural and educational needs considered and pt agreeable to plan of care and goals.     Anticipated  barriers to physical therapy: chronicity of condition, age, high BMI     Goals:   Short Term Goals:  4 weeks  1.Report decreased knee pain  < / =  7/10  to increase tolerance for walking  2. Increase knee ROM by 6 degrees in order to be able to perform ADLs without difficulty.  3. Increase strength by 1/3 MMT grade in hip and knee musculature  to increase tolerance for ADL and work activities.  4. Pt to tolerate HEP to improve ROM and independence with ADL's     Long Term Goals: 8 weeks  1.Report decreased knee pain < / = 4/10  to increase tolerance for walking  2.To perform 10 sit to stands with no increase in pain to demonstrate independence with lifting and carrying objects.  3.Increase strength to >/= 4+/5 in hip and knee musculature  to increase tolerance for ADL and work activities.  4. Pt will report at CJ level (20-40% impaired) on FOTO knee to demonstrate increase in LE function with every day tasks.      Plan   Plan of care Certification: 1/12/2023 to 3/9/2023.     Outpatient Physical Therapy 2 times weekly for 8 weeks to include the following interventions: Electrical Stimulation , Gait Training, Manual Therapy, Moist Heat/ Ice, Neuromuscular Re-ed, Patient Education, Self Care, Therapeutic Activities, and Therapeutic Exercise. Pt may be seen by PTA as part of the rehabilitation team.     Continue with plan of care     Liz Busch PTA   1/30/2023

## 2023-02-02 ENCOUNTER — DOCUMENTATION ONLY (OUTPATIENT)
Dept: REHABILITATION | Facility: HOSPITAL | Age: 53
End: 2023-02-02

## 2023-02-02 NOTE — PROGRESS NOTES
[facility]                                 Physical Therapy       Arron Ron  MRN: [id number]    Patient canceled today's therapy appointment on 2/2/2023 for 11:45am due to appt time no longer works.     Liz Busch, PTA  2/2/2023

## 2023-02-07 ENCOUNTER — DOCUMENTATION ONLY (OUTPATIENT)
Dept: REHABILITATION | Facility: HOSPITAL | Age: 53
End: 2023-02-07
Payer: MEDICARE

## 2023-02-08 NOTE — PROGRESS NOTES
[facility]                                 Physical Therapy       Arron Ron  MRN: [id number]    Patient was scheduled for therapy appointment on 2/7/2023 for 7:30am, however, patient did not show up for appt.     Liz Busch, PTA  2/7/2023

## 2023-02-09 ENCOUNTER — CLINICAL SUPPORT (OUTPATIENT)
Dept: REHABILITATION | Facility: HOSPITAL | Age: 53
End: 2023-02-09
Attending: INTERNAL MEDICINE
Payer: MEDICARE

## 2023-02-09 DIAGNOSIS — G89.29 CHRONIC BILATERAL LOW BACK PAIN WITH BILATERAL SCIATICA: ICD-10-CM

## 2023-02-09 DIAGNOSIS — M17.12 PRIMARY OSTEOARTHRITIS OF LEFT KNEE: ICD-10-CM

## 2023-02-09 DIAGNOSIS — Z74.09 IMPAIRED FUNCTIONAL MOBILITY, BALANCE, GAIT, AND ENDURANCE: ICD-10-CM

## 2023-02-09 DIAGNOSIS — G89.29 CHRONIC PAIN OF LEFT KNEE: Primary | ICD-10-CM

## 2023-02-09 DIAGNOSIS — M54.41 CHRONIC BILATERAL LOW BACK PAIN WITH BILATERAL SCIATICA: ICD-10-CM

## 2023-02-09 DIAGNOSIS — M25.562 CHRONIC PAIN OF LEFT KNEE: Primary | ICD-10-CM

## 2023-02-09 DIAGNOSIS — M25.662 DECREASED RANGE OF MOTION OF LEFT KNEE: ICD-10-CM

## 2023-02-09 DIAGNOSIS — M54.42 CHRONIC BILATERAL LOW BACK PAIN WITH BILATERAL SCIATICA: ICD-10-CM

## 2023-02-09 PROCEDURE — 97110 THERAPEUTIC EXERCISES: CPT | Mod: PN,CQ

## 2023-02-09 NOTE — PROGRESS NOTES
"[facility]  Physical Therapy Treatment Note     Name: Arron Ron  Clinic Number: [id number]    Therapy Diagnosis:   Encounter Diagnoses   Name Primary?    Decreased range of motion of left knee     Impaired functional mobility, balance, gait, and endurance     Primary osteoarthritis of left knee     Chronic pain of left knee Yes    Chronic bilateral low back pain with bilateral sciatica      Physician: Mahi Rojas MD    Visit Date: 2/9/2023    Physician Orders: PT Eval and Treat   Medical Diagnosis from Referral: M17.12 (ICD-10-CM) - Primary osteoarthritis of left knee   Evaluation Date: 1/12/2023  Authorization Period Expiration: 1/31/2023  Plan of Care Expiration: 3/9/2023  Progress Note Due: 2/10/2023 (NEEDS TO UPDATE PROGRESS NOTE)   Visit # / Visits authorized: 4/20  FOTO: 1/TBD     Precautions: Standard        Visit #: 4 of 20  PTA Visit #: 3  Time In: 8:08am  Time Out: 8:50am  Total Billable Time: 42 minutes    Subjective     Pt reports: she just took 2 Aleve this morning because the knee is hurting and she knows that when she comes here to do her exercises, it would aggravate the knee more. Patient states she did not come to therapy last appointment due to increase left knee pain.     She was compliant with home exercise program.  Response to previous treatment: no concerns   Functional change: limited knee ROM and strength, decrease walking tolerance and prolong standing due to knee pain     Pain: 7/10 to left knee  Location: Left knee and low back     Objective     Arron received therapeutic exercises to develop strength, endurance, ROM, flexibility, and posture for 42 minutes including:    Supine hip ADD ball squeezes, 3x10  Double knee to chest with physioball, 3x10   Lateral trunk rotation, 2x10   Bent knee fall out bilateral with GTB, 2x10   Seated hamstrings stretch bilateral, 2x30" holds   Seated ball roll out in flexion, x10  Seated ball roll out in lateral " "side bending, x5 each side   Seated alternating knee marches, 2x10   Seated toe/heel raises, 2x10   Sit to stand from mat, x10  Recumbent bike for 3 minutes at Level 1    Not performed:   Seated hip ABD with GTB, 3x10    Arron received the following manual therapy techniques: Joint mobilizations and Soft tissue Mobilization were applied to the: Left knee for 0 minutes, including:    None today     Arron participated in neuromuscular re-education activities to improve: Balance, Coordination, and Proprioception for 0 minutes. The following activities were included:    None today     Arron participated in dynamic functional therapeutic activities to improve functional performance for 0  minutes, including:    None today     Arron participated in gait training to improve functional mobility and safety for 0  minutes, including:    None today       Home Exercises Provided and Patient Education Provided     Written Home Exercises Provided: yes.  Exercises were reviewed and Arron was able to demonstrate them prior to the end of the session.  Arron demonstrated good  understanding of the education provided.     Education provided:   - home exercise program    Assessment   Patient remains to experience Left knee pain with increase pain post therapeutic exercises since the care of therapy. At this time, continue with low impact exercises as per patient tolerance. Introduce stationary bike for range of motion, strengthening, and endurance which patient tolerated fairly stating that she can feel mild pain to Left knee. Patient states of audible "popping" to bilateral knees today, however, no pain with the "popping" sensation. I explained to patient that it may be air bubble or fluid within the knee joint that may cause the audible sound to bilateral knees. Patient expressed understanding. Patient appears to be motivated to improve her symptoms and overall functional mobility. At this time, patient is making minimal " progress towards her treatment goals.     Arron Is progressing towards her goals.   Pt prognosis is Fair.     Pt will continue to benefit from skilled outpatient physical therapy to address the deficits listed in the problem list box on initial evaluation, provide pt/family education and to maximize pt's level of independence in the home and community environment.     Pt's spiritual, cultural and educational needs considered and pt agreeable to plan of care and goals.     Anticipated barriers to physical therapy: chronicity of condition, age, high BMI     Goals:   Short Term Goals:  4 weeks  1.Report decreased knee pain  < / =  7/10  to increase tolerance for walking  2. Increase knee ROM by 6 degrees in order to be able to perform ADLs without difficulty.  3. Increase strength by 1/3 MMT grade in hip and knee musculature  to increase tolerance for ADL and work activities.  4. Pt to tolerate HEP to improve ROM and independence with ADL's     Long Term Goals: 8 weeks  1.Report decreased knee pain < / = 4/10  to increase tolerance for walking  2.To perform 10 sit to stands with no increase in pain to demonstrate independence with lifting and carrying objects.  3.Increase strength to >/= 4+/5 in hip and knee musculature  to increase tolerance for ADL and work activities.  4. Pt will report at CJ level (20-40% impaired) on FOTO knee to demonstrate increase in LE function with every day tasks.      Plan   Plan of care Certification: 1/12/2023 to 3/9/2023.     Outpatient Physical Therapy 2 times weekly for 8 weeks to include the following interventions: Electrical Stimulation , Gait Training, Manual Therapy, Moist Heat/ Ice, Neuromuscular Re-ed, Patient Education, Self Care, Therapeutic Activities, and Therapeutic Exercise. Pt may be seen by PTA as part of the rehabilitation team.     Continue with plan of care     Liz Busch PTA   2/9/2023

## 2023-02-16 ENCOUNTER — CLINICAL SUPPORT (OUTPATIENT)
Dept: REHABILITATION | Facility: HOSPITAL | Age: 53
End: 2023-02-16
Attending: INTERNAL MEDICINE
Payer: MEDICARE

## 2023-02-16 DIAGNOSIS — R60.0 BILATERAL EDEMA OF LOWER EXTREMITY: ICD-10-CM

## 2023-02-16 DIAGNOSIS — Z74.09 IMPAIRED FUNCTIONAL MOBILITY, BALANCE, GAIT, AND ENDURANCE: ICD-10-CM

## 2023-02-16 DIAGNOSIS — M25.662 DECREASED RANGE OF MOTION OF LEFT KNEE: Primary | ICD-10-CM

## 2023-02-16 PROCEDURE — 97110 THERAPEUTIC EXERCISES: CPT | Mod: PN

## 2023-02-16 NOTE — PROGRESS NOTES
[facility]  Physical Therapy Progress Note     Name: Arron Ron  Clinic Number: [id number]      Therapy Diagnosis:   Encounter Diagnoses   Name Primary?    Decreased range of motion of left knee Yes    Impaired functional mobility, balance, gait, and endurance     Bilateral edema of lower extremity        Physician: No ref. provider found    Visit Date: 2/16/2023    Physician Orders: PT Eval and Treat   Medical Diagnosis from Referral: M17.12 (ICD-10-CM) - Primary osteoarthritis of left knee   Evaluation Date: 1/12/2023  Authorization Period Expiration: 1/31/2023  Plan of Care Expiration: 3/9/2023  Progress Note Due: 3/16/2023  Visit # / Visits authorized: 5/20  FOTO: 1/TBD     Precautions: Standard      Visit #: 5 of 20  PTA Visit #: 3  Time In: 8:05am  Time Out: 8:50am  Total Billable Time: 45 minutes    Subjective     Pt reports: significant knee pain even after taking 4 alieves prior to treatment.  Patient states she has not made much progress since the start of care.  She states she continues to have significant knee pain and pain medication does help, and now her back and right knee is hurting.  She states she has been sick which limited her from coming to therapy, but she has been keeping up with home exercise program daily.  She states she wants to continue physical therapy services because the sessions has been helping her pain, and she is awaiting a call from a knee specialist in [facility] that was recommended by TEJ Coronel, that would possibly perform her knee replacement.    She was compliant with home exercise program.  Response to previous treatment: no concerns   Functional change: limited knee ROM and strength, decrease walking tolerance and prolong standing due to knee pain     Pain: 7/10 to left knee  Location: Left knee and low back     Objective     Observation:   Gait: Patient ambulated independently with no use of an assistive device.  Gait deviations:  "trendelenburg, antalgic gait pattern     Functional tests:   squat: unable to do  SLS EO: unable to perform     Sit to Stand: slow movement, uses assistance     Knee  AROM/PROM Right Left Pain/Dysfunction with Movement   flexion 101 103 Pain at end range   extension  0 -4 Pain at range         Lower Extremity Strength  Right LE   Left LE     Quadriceps: 3+/5 Quadriceps: 3+/5   Hamstrings: 4-/5 Hamstrings: 4-/5   Hip flexion (seated): 4/5 Hip flexion (seated): 4/5   Hip extension:  4/5 Hip extension: 4/5   PGM: 4/5 PGM:  4/5   Hip ER:  4-/5 Hip ER:  4-/5   Hip IR: 4-/5 Hip IR: 4-/5           SPECIAL TESTS     LEFT RIGHT   Obers Not tested Not tested   Eyad Not tested Not tested   Thessaly Not tested Not tested   Jointline Tenderness + +   Hip Scour + +   Hip KAILASH + +   LIGAMENTOUS TESTS   Lachman + +   Anterior Drawer + +   Posterior Drawer + +   Varus + +   Valgus + +   McMurreys + +   Patellar tapping + +         Joint Mobility: hypomobility in all directions, increased patellar medial tracking bilaterally      Palpation: tenderness along medial and lateral joint line, left quadricep     Sensation: intact     Flexibility:               Hamstrings: R = moderate tightness ; L = moderate tightness               Ely's test: R =  not tested ; L = not tested   Gastrocnemius: Moderate tightness bilaterally       Treatment     Ice pack applied to bilateral knees for 10 minutes while performing supine therapeutic exercises.    Arron received therapeutic exercises to develop strength, endurance, ROM, flexibility, and posture for 45 minutes including:    Supine Quad sets 2x10 3" holds  Supine SAQs 2x10 3" holds  Gastrocnemius stretch with strap bilaterally 2x30" holds  Double knee to chest with physioball, 2x10   Lateral trunk rotation, x10   Seated toe/heel raises x10   Partial squats using table for assistance x10  Lateral weight shifts x10  Front weight shifts LLE forward x10  Seated alternating knee marches, x10 " "  Seated toe and heel raises x10  Sit to stand from mat, x10    Not performed:   Seated hip ABD with GTB, 3x10  Seated ball roll out in flexion, x10  Seated ball roll out in lateral side bending, x5 each side     Recumbent bike for 3 minutes at Level 1  Bent knee fall out bilateral with GTB, 2x10 Supine hip ADD ball squeezes, 2x10  Seated hamstrings stretch bilateral, 2x30" holds     Arron received the following manual therapy techniques: Joint mobilizations and Soft tissue Mobilization were applied to the: Left knee for 0 minutes, including:    None today     Arron participated in neuromuscular re-education activities to improve: Balance, Coordination, and Proprioception for 0 minutes. The following activities were included:    None today     Arron participated in dynamic functional therapeutic activities to improve functional performance for 0  minutes, including:    None today     Arron participated in gait training to improve functional mobility and safety for 0  minutes, including:    None today       Home Exercises Provided and Patient Education Provided     Written Home Exercises Provided: yes.  Exercises were reviewed and Arron was able to demonstrate them prior to the end of the session.  Arron demonstrated good  understanding of the education provided.     Education provided:   - home exercise program      Assessment     Arron  with a medical diagnosis left knee osteoarthritis  displays improvements with knee flexion range of motion, however, patient continues to display limitations with pain intensity, knee range of motion, strength, and functional activity tolerance.  Patient is able to perform 10 sit to stands with moderate increase in pain.  Continued with established exercises improve knee mobility, flexibility, strength and functional activity tolerance in which she tolerated well.  Ice was applied to bilateral knee while patient performs supine exercises to modulate the pain in which " she responded well. Overall, Arron Is progressing slowly towards her goals, meeting 2 out of 8 goals.  Home exercise program was updated and went over with patient to improve tolerance while walking around the home.  Patient voiced understanding. Patient will continue to benefit from skilled outpatient physical therapy to address the deficits listed above to return to prior level of function.       Arron Is progressing towards her goals.   Pt prognosis is Fair.     Pt will continue to benefit from skilled outpatient physical therapy to address the deficits listed in the problem list box on initial evaluation, provide pt/family education and to maximize pt's level of independence in the home and community environment.     Pt's spiritual, cultural and educational needs considered and pt agreeable to plan of care and goals.     Anticipated barriers to physical therapy: chronicity of condition, age, high BMI     Goals:   Short Term Goals:  4 weeks  1.Report decreased knee pain  < / =  7/10  to increase tolerance for walking Goal progressing 2/16/2023  2. Increase knee ROM by 6 degrees in order to be able to perform ADLs without difficulty. Goal met 2/16/2023  3. Increase strength by 1/3 MMT grade in hip and knee musculature  to increase tolerance for ADL and work activities.Goal progressing 2/16/2023  4. Pt to tolerate HEP to improve ROM and independence with ADL's Goal met 2/16/2023     Long Term Goals: 8 weeks  1.Report decreased knee pain < / = 4/10  to increase tolerance for walking Goal progressing 2/16/2023  2.To perform 10 sit to stands with no increase in pain to demonstrate independence with lifting and carrying objects. Goal progressing 2/16/2023  3.Increase strength to >/= 4+/5 in hip and knee musculature  to increase tolerance for ADL and work activities. Goal progressing 2/16/2023  4. Pt will report at CJ level (20-40% impaired) on FOTO knee to demonstrate increase in LE function with every day tasks.   76% limitation. Goal not met 2/16/2023    Plan   Plan of care Certification: 1/12/2023 to 3/9/2023.     Outpatient Physical Therapy 2 times weekly for 8 weeks to include the following interventions: Electrical Stimulation , Gait Training, Manual Therapy, Moist Heat/ Ice, Neuromuscular Re-ed, Patient Education, Self Care, Therapeutic Activities, and Therapeutic Exercise. Pt may be seen by PTA as part of the rehabilitation team.     Continue with plan of care     Taryn Townsend, PT   2/16/2023

## 2023-02-20 ENCOUNTER — CLINICAL SUPPORT (OUTPATIENT)
Dept: REHABILITATION | Facility: HOSPITAL | Age: 53
End: 2023-02-20
Attending: INTERNAL MEDICINE
Payer: MEDICARE

## 2023-02-20 DIAGNOSIS — M17.12 PRIMARY OSTEOARTHRITIS OF LEFT KNEE: ICD-10-CM

## 2023-02-20 DIAGNOSIS — M54.42 CHRONIC BILATERAL LOW BACK PAIN WITH BILATERAL SCIATICA: ICD-10-CM

## 2023-02-20 DIAGNOSIS — G89.29 CHRONIC PAIN OF LEFT KNEE: Primary | ICD-10-CM

## 2023-02-20 DIAGNOSIS — M54.41 CHRONIC BILATERAL LOW BACK PAIN WITH BILATERAL SCIATICA: ICD-10-CM

## 2023-02-20 DIAGNOSIS — M25.662 DECREASED RANGE OF MOTION OF LEFT KNEE: ICD-10-CM

## 2023-02-20 DIAGNOSIS — Z74.09 IMPAIRED FUNCTIONAL MOBILITY, BALANCE, GAIT, AND ENDURANCE: ICD-10-CM

## 2023-02-20 DIAGNOSIS — M25.562 CHRONIC PAIN OF LEFT KNEE: Primary | ICD-10-CM

## 2023-02-20 DIAGNOSIS — G89.29 CHRONIC BILATERAL LOW BACK PAIN WITH BILATERAL SCIATICA: ICD-10-CM

## 2023-02-20 PROCEDURE — 97110 THERAPEUTIC EXERCISES: CPT | Mod: PN,CQ

## 2023-02-20 NOTE — PROGRESS NOTES
[facility]  Physical Therapy Treatment date      Name: Arron Ron  Clinic Number: [id number]      Therapy Diagnosis:   Encounter Diagnoses   Name Primary?    Decreased range of motion of left knee     Impaired functional mobility, balance, gait, and endurance     Primary osteoarthritis of left knee     Chronic pain of left knee Yes    Chronic bilateral low back pain with bilateral sciatica        Physician: Mahi Rojas MD    Visit Date: 2/20/2023    Physician Orders: PT Eval and Treat   Medical Diagnosis from Referral: M17.12 (ICD-10-CM) - Primary osteoarthritis of left knee   Evaluation Date: 1/12/2023  Authorization Period Expiration: 1/31/2023  Plan of Care Expiration: 3/9/2023  Progress Note Due: 3/16/2023  Visit # / Visits authorized: 6/20  FOTO: 1/VICK     Precautions: Standard      Visit #: 6 of 20  PTA Visit #: 1  Time In: 8:00am  Time Out: 8:45am  Total Billable Time: 40 minutes    Subjective     Pt reports: she went to [employer] yesterday and had to climb up some stairs (about 13 steps) as stated by patient. Her Left knee is not hurting as bad this morning, but definitely hurting yesterday due to increase steps on stairs.     She was compliant with home exercise program.  Response to previous treatment: no concerns   Functional change: limited knee ROM and strength, decrease walking tolerance and prolong standing due to knee pain     Pain: 7/10 to left knee  Location: Left knee and low back     Objective     Tests and measurements were taken on 2/16/2023. Please review note on that date for more info.      Treatment     Ice pack applied to bilateral knees for 10 minutes while performing supine therapeutic exercises.    Arron received therapeutic exercises to develop strength, endurance, ROM, flexibility, and posture for 40 minutes including:    Sit to stand from elevated mat while holding onto one 8lb weight, 2x10   Lateral weight shifts with UE supports as needed,  2x10  Front weight shifts LLE forward with UE supports as needed, 2x10  Alternate steps taps on 4 inch, x10   Standing balance (one foot on 4 inch step, other foot on ground) while bouncing yellow physioball with therapist  Standing hip abduction with 2lb on LLE, 2x10  Standing marches with 2lb on LLE, 2x10   Standing heel raises with 2lb on LLE, 2x10   Treadmill for 3 minutes at speed 1.3mph for endurance    Patient received CP x 5 minutes to Left knee after workout for decrease pain and inflammation.       Arron received the following manual therapy techniques: Joint mobilizations and Soft tissue Mobilization were applied to the: Left knee for 0 minutes, including:    None today     Arron participated in neuromuscular re-education activities to improve: Balance, Coordination, and Proprioception for 0 minutes. The following activities were included:    None today     Arron participated in dynamic functional therapeutic activities to improve functional performance for 0  minutes, including:    None today     Arron participated in gait training to improve functional mobility and safety for 0  minutes, including:    None today     Home Exercises Provided and Patient Education Provided     Written Home Exercises Provided: yes.  Exercises were reviewed and Arron was able to demonstrate them prior to the end of the session.  Arron demonstrated good  understanding of the education provided.     Education provided:   - home exercise program    Assessment   Focused on functional exercises for increase Left knee strengthening and weight bearing tolerance. Patient responded fairly with reports of mild back pain during knee exercises. However, patient was able to complete most exercises including treadmill for walking endurance without further increase in knee pain. Ended session on cryotherapy for decrease pain and inflammation. Will continue to progress as tolerated.     Arron Is progressing towards her goals.    Pt prognosis is Fair.     Pt will continue to benefit from skilled outpatient physical therapy to address the deficits listed in the problem list box on initial evaluation, provide pt/family education and to maximize pt's level of independence in the home and community environment.     Pt's spiritual, cultural and educational needs considered and pt agreeable to plan of care and goals.     Anticipated barriers to physical therapy: chronicity of condition, age, high BMI     Goals:   Short Term Goals:  4 weeks  1.Report decreased knee pain  < / =  7/10  to increase tolerance for walking Goal progressing 2/16/2023  2. Increase knee ROM by 6 degrees in order to be able to perform ADLs without difficulty. Goal met 2/16/2023  3. Increase strength by 1/3 MMT grade in hip and knee musculature  to increase tolerance for ADL and work activities.Goal progressing 2/16/2023  4. Pt to tolerate HEP to improve ROM and independence with ADL's Goal met 2/16/2023     Long Term Goals: 8 weeks  1.Report decreased knee pain < / = 4/10  to increase tolerance for walking Goal progressing 2/16/2023  2.To perform 10 sit to stands with no increase in pain to demonstrate independence with lifting and carrying objects. Goal progressing 2/16/2023  3.Increase strength to >/= 4+/5 in hip and knee musculature to increase tolerance for ADL and work activities. Goal progressing 2/16/2023  4. Pt will report at CJ level (20-40% impaired) on FOTO knee to demonstrate increase in LE function with every day tasks.  76% limitation. Goal not met 2/16/2023    Plan   Plan of care Certification: 1/12/2023 to 3/9/2023.     Outpatient Physical Therapy 2 times weekly for 8 weeks to include the following interventions: Electrical Stimulation , Gait Training, Manual Therapy, Moist Heat/ Ice, Neuromuscular Re-ed, Patient Education, Self Care, Therapeutic Activities, and Therapeutic Exercise. Pt may be seen by PTA as part of the rehabilitation team.     Continue  with plan of care     Liz Busch, PTA   2/20/2023

## 2023-02-22 ENCOUNTER — HOSPITAL ENCOUNTER (OUTPATIENT)
Dept: RADIOLOGY | Facility: HOSPITAL | Age: 53
Discharge: HOME | End: 2023-02-22
Attending: INTERNAL MEDICINE
Payer: MEDICARE

## 2023-02-22 VITALS — BODY MASS INDEX: 47.09 KG/M2 | WEIGHT: 293 LBS | HEIGHT: 66 IN

## 2023-02-22 DIAGNOSIS — Z12.31 ENCOUNTER FOR SCREENING MAMMOGRAM FOR BREAST CANCER: ICD-10-CM

## 2023-02-22 PROCEDURE — 77067 MAMMO DIGITAL SCREENING BILAT WITH TOMO: ICD-10-PCS | Mod: 26,,, | Performed by: RADIOLOGY

## 2023-02-22 PROCEDURE — 77067 SCR MAMMO BI INCL CAD: CPT | Mod: TC

## 2023-02-22 PROCEDURE — 77063 BREAST TOMOSYNTHESIS BI: CPT | Mod: 26,,, | Performed by: RADIOLOGY

## 2023-02-22 PROCEDURE — 77063 MAMMO DIGITAL SCREENING BILAT WITH TOMO: ICD-10-PCS | Mod: 26,,, | Performed by: RADIOLOGY

## 2023-02-22 PROCEDURE — 77067 SCR MAMMO BI INCL CAD: CPT | Mod: 26,,, | Performed by: RADIOLOGY

## 2023-02-23 ENCOUNTER — CLINICAL SUPPORT (OUTPATIENT)
Dept: REHABILITATION | Facility: HOSPITAL | Age: 53
End: 2023-02-23
Attending: INTERNAL MEDICINE
Payer: MEDICARE

## 2023-02-23 DIAGNOSIS — M25.562 CHRONIC PAIN OF LEFT KNEE: Primary | ICD-10-CM

## 2023-02-23 DIAGNOSIS — M54.42 CHRONIC BILATERAL LOW BACK PAIN WITH BILATERAL SCIATICA: ICD-10-CM

## 2023-02-23 DIAGNOSIS — G89.29 CHRONIC BILATERAL LOW BACK PAIN WITH BILATERAL SCIATICA: ICD-10-CM

## 2023-02-23 DIAGNOSIS — M54.41 CHRONIC BILATERAL LOW BACK PAIN WITH BILATERAL SCIATICA: ICD-10-CM

## 2023-02-23 DIAGNOSIS — M25.662 DECREASED RANGE OF MOTION OF LEFT KNEE: ICD-10-CM

## 2023-02-23 DIAGNOSIS — Z74.09 IMPAIRED FUNCTIONAL MOBILITY, BALANCE, GAIT, AND ENDURANCE: ICD-10-CM

## 2023-02-23 DIAGNOSIS — G89.29 CHRONIC PAIN OF LEFT KNEE: Primary | ICD-10-CM

## 2023-02-23 PROCEDURE — 97110 THERAPEUTIC EXERCISES: CPT | Mod: PN,CQ

## 2023-02-23 NOTE — PROGRESS NOTES
"[facility]  Physical Therapy Treatment day     Name: Arron Ron  Clinic Number: [id number]      Therapy Diagnosis:   Encounter Diagnoses   Name Primary?    Decreased range of motion of left knee     Impaired functional mobility, balance, gait, and endurance     Chronic bilateral low back pain with bilateral sciatica     Chronic pain of left knee Yes       Physician: Mahi Rojas MD    Visit Date: 2/23/2023    Physician Orders: PT Eval and Treat   Medical Diagnosis from Referral: M17.12 (ICD-10-CM) - Primary osteoarthritis of left knee   Evaluation Date: 1/12/2023  Authorization Period Expiration: 1/31/2023  Plan of Care Expiration: 3/9/2023  Progress Note Due: 3/16/2023  Visit # / Visits authorized: 7/20  FOTO: 1/TBD     Precautions: Standard      Visit #: 7 of 20  PTA Visit #: 2  Time In: 8:05am  Time Out: 8:41am  Total Billable Time: 36 minutes    Subjective     Pt reports: "It's hurting today", referring to Left knee. Patient states her knees are swollen today because of the arthritis in the knees. She took 4 aleve this morning to ease the pain. Patient states she has been trying to help herself and is compliance with her home exercises at home.     She was compliant with home exercise program.  Response to previous treatment: no concerns   Functional change: limited knee ROM and strength, decrease walking tolerance and prolong standing due to knee pain     Pain: 6/10 to left knee  Location: Left knee and low back     Objective     Tests and measurements were taken on 2/16/2023. Please review note on that date for more info.      Treatment     Arron received therapeutic exercises to develop strength, endurance, ROM, flexibility, and posture for 36 minutes including:    Sit to stand from elevated mat while holding onto one 8lb weight, 2x10   Lateral weight shifts with UE supports as needed, 2x10  Front weight shifts LLE forward with UE supports as needed, 2x10  Alternate " steps taps on 4 inch, 2x10   Standing balance (one foot on 4 inch step, other foot on ground) while bouncing yellow physioball with therapist  Standing hip abduction with 0lb on LLE, 2x10 (NO WEIGHTS TODAY)   Standing marches with 0lb on LLE, 2x10 (NO WEIGHTS TODAY)   Standing heel raises with 0lb on LLE, 2x10 (NO WEIGHTS TODAY)   Treadmill for 5 minutes at speed 1.6mph for endurance (pt slowly increases the speed the herself)    Patient received CP x 0 minutes to Left knee after workout for decrease pain and inflammation. - deferred today.       Arron received the following manual therapy techniques: Joint mobilizations and Soft tissue Mobilization were applied to the: Left knee for 0 minutes, including:    None today     Arron participated in neuromuscular re-education activities to improve: Balance, Coordination, and Proprioception for 0 minutes. The following activities were included:    None today     Arron participated in dynamic functional therapeutic activities to improve functional performance for 0  minutes, including:    None today     Arron participated in gait training to improve functional mobility and safety for 0  minutes, including:    None today     Home Exercises Provided and Patient Education Provided     Written Home Exercises Provided: yes.  Exercises were reviewed and Arron was able to demonstrate them prior to the end of the session.  Arron demonstrated good  understanding of the education provided.     Education provided:   - home exercise program    Assessment   Patient with increase Left knee pain this AM, however, patient was able to complete her exercises with minimal difficulties. At times, patient does required hands support only in closed chain exercises. Despite having knee pain, patient appears to be motivated and eager to get stronger and better in order to perform activities of daily living without major difficulties. Will continue to progress patient as tolerated.      Arron Is progressing towards her goals.   Pt prognosis is Fair.     Pt will continue to benefit from skilled outpatient physical therapy to address the deficits listed in the problem list box on initial evaluation, provide pt/family education and to maximize pt's level of independence in the home and community environment.     Pt's spiritual, cultural and educational needs considered and pt agreeable to plan of care and goals.     Anticipated barriers to physical therapy: chronicity of condition, age, high BMI     Goals:   Short Term Goals:  4 weeks  1.Report decreased knee pain  < / =  7/10  to increase tolerance for walking Goal progressing 2/16/2023  2. Increase knee ROM by 6 degrees in order to be able to perform ADLs without difficulty. Goal met 2/16/2023  3. Increase strength by 1/3 MMT grade in hip and knee musculature  to increase tolerance for ADL and work activities.Goal progressing 2/16/2023  4. Pt to tolerate HEP to improve ROM and independence with ADL's Goal met 2/16/2023     Long Term Goals: 8 weeks  1.Report decreased knee pain < / = 4/10  to increase tolerance for walking Goal progressing 2/16/2023  2.To perform 10 sit to stands with no increase in pain to demonstrate independence with lifting and carrying objects. Goal progressing 2/16/2023  3.Increase strength to >/= 4+/5 in hip and knee musculature to increase tolerance for ADL and work activities. Goal progressing 2/16/2023  4. Pt will report at CJ level (20-40% impaired) on FOTO knee to demonstrate increase in LE function with every day tasks.  76% limitation. Goal not met 2/16/2023    Plan   Plan of care Certification: 1/12/2023 to 3/9/2023.     Outpatient Physical Therapy 2 times weekly for 8 weeks to include the following interventions: Electrical Stimulation , Gait Training, Manual Therapy, Moist Heat/ Ice, Neuromuscular Re-ed, Patient Education, Self Care, Therapeutic Activities, and Therapeutic Exercise. Pt may be seen by PTA as  part of the rehabilitation team.     Continue with plan of care     Liz Busch, PTA   2/23/2023

## 2023-03-01 ENCOUNTER — CLINICAL SUPPORT (OUTPATIENT)
Dept: REHABILITATION | Facility: HOSPITAL | Age: 53
End: 2023-03-01
Attending: INTERNAL MEDICINE
Payer: MEDICARE

## 2023-03-01 DIAGNOSIS — G89.29 CHRONIC PAIN OF LEFT KNEE: ICD-10-CM

## 2023-03-01 DIAGNOSIS — M25.562 CHRONIC PAIN OF LEFT KNEE: ICD-10-CM

## 2023-03-01 DIAGNOSIS — M54.41 CHRONIC BILATERAL LOW BACK PAIN WITH BILATERAL SCIATICA: Primary | ICD-10-CM

## 2023-03-01 DIAGNOSIS — G89.29 CHRONIC BILATERAL LOW BACK PAIN WITH BILATERAL SCIATICA: Primary | ICD-10-CM

## 2023-03-01 DIAGNOSIS — Z74.09 IMPAIRED FUNCTIONAL MOBILITY, BALANCE, GAIT, AND ENDURANCE: ICD-10-CM

## 2023-03-01 DIAGNOSIS — M54.42 CHRONIC BILATERAL LOW BACK PAIN WITH BILATERAL SCIATICA: Primary | ICD-10-CM

## 2023-03-01 PROCEDURE — 97110 THERAPEUTIC EXERCISES: CPT | Mod: PN,CQ

## 2023-03-01 NOTE — PROGRESS NOTES
[facility]  Physical Therapy Treatment day     Name: Arron Ron  Clinic Number: [id number]    Therapy Diagnosis:   Encounter Diagnoses   Name Primary?    Impaired functional mobility, balance, gait, and endurance     Chronic bilateral low back pain with bilateral sciatica Yes    Chronic pain of left knee      Physician: Mahi Rojas MD    Visit Date: 3/1/2023    Physician Orders: PT Eval and Treat   Medical Diagnosis from Referral:   Diagnosis   M54.42,M54.41,G89.29 (ICD-10-CM) - Chronic bilateral low back pain with bilateral sciatica     Evaluation Date: 1/12/2023  Authorization Period Expiration: 1/31/2023  Plan of Care Expiration: 3/9/2023 (NEEDS TO UPDATE plan of care)   Progress Note Due: 3/16/2023  Visit # / Visits authorized: 8/20  FOTO: 1/TBD     Precautions: Standard      Visit #: 8 of 20  PTA Visit #: 3  Time In: 1520  Time Out: 1550  Total Billable Time: 35 minutes    Subjective     Pt reports: she thinks her may have overdid working on her garage today. She still walks her dog 3 times a week. Her back and left knee still bothers her, but it's more in the knee than the back. Patient states she tries to keep herself moving and help herself.     She was compliant with home exercise program.  Response to previous treatment: no concerns   Functional change: limited knee ROM and strength, decrease walking tolerance and prolong standing due to knee pain     Pain: 6/10 to left knee  Location: Left knee and low back     Objective     Tests and measurements were taken on 2/16/2023. Please review note on that date for more info.      Treatment     Arron received therapeutic exercises to develop strength, endurance, ROM, flexibility, and posture for 35 minutes including:    Sit to stand from elevated mat while holding onto one 8lb weight, 2x10   Lateral weight shifts with UE supports as needed, 2x10  Front weight shifts LLE forward with UE supports as needed, 2x10  Alternate  steps taps on 4 inch, 2x10   Standing hip abduction with 0lb on LLE, 2x10 (NO WEIGHTS TODAY)   Standing marches with 0lb on LLE, 2x10 (NO WEIGHTS TODAY)   Standing heel raises with 0lb on LLE, 2x10 (NO WEIGHTS TODAY)   Treadmill for 5 minutes at speed 1.6mph for endurance (pt slowly increases the speed the herself)    Patient received CP x 0 minutes to Left knee after workout for decrease pain and inflammation. - deferred today.     Arron received the following manual therapy techniques: Joint mobilizations and Soft tissue Mobilization were applied to the: Left knee for 0 minutes, including:    None today     Arron participated in neuromuscular re-education activities to improve: Balance, Coordination, and Proprioception for 0 minutes. The following activities were included:    None today     Arron participated in dynamic functional therapeutic activities to improve functional performance for 0  minutes, including:    None today     Arron participated in gait training to improve functional mobility and safety for 0  minutes, including:    None today     Home Exercises Provided and Patient Education Provided     Written Home Exercises Provided: yes.  Exercises were reviewed and Arron was able to demonstrate them prior to the end of the session.  Arron demonstrated good  understanding of the education provided.     Education provided:   - home exercise program    Assessment   Patient pain intensity appears to remain the same, however, patient is motivated to get stronger and better. As of now, patient still demonstrates generalized weakness in left hip and knee muscle groups. Her Left knee is worse than the lower back. Therefore, continued with closed chain exercises for legs strengthening, endurance, and to prevent further knee and back pain. Patient needs to be re-assess next visit to update her plan of care.     Arron Is progressing towards her goals.   Pt prognosis is Fair.     Pt will continue to  benefit from skilled outpatient physical therapy to address the deficits listed in the problem list box on initial evaluation, provide pt/family education and to maximize pt's level of independence in the home and community environment.     Pt's spiritual, cultural and educational needs considered and pt agreeable to plan of care and goals.     Anticipated barriers to physical therapy: chronicity of condition, age, high BMI     Goals:   Short Term Goals:  4 weeks  1.Report decreased knee pain  < / =  7/10  to increase tolerance for walking Goal progressing 2/16/2023  2. Increase knee ROM by 6 degrees in order to be able to perform ADLs without difficulty. Goal met 2/16/2023  3. Increase strength by 1/3 MMT grade in hip and knee musculature  to increase tolerance for ADL and work activities.Goal progressing 2/16/2023  4. Pt to tolerate HEP to improve ROM and independence with ADL's Goal met 2/16/2023     Long Term Goals: 8 weeks  1.Report decreased knee pain < / = 4/10  to increase tolerance for walking Goal progressing 2/16/2023  2.To perform 10 sit to stands with no increase in pain to demonstrate independence with lifting and carrying objects. Goal progressing 2/16/2023  3.Increase strength to >/= 4+/5 in hip and knee musculature to increase tolerance for ADL and work activities. Goal progressing 2/16/2023  4. Pt will report at CJ level (20-40% impaired) on FOTO knee to demonstrate increase in LE function with every day tasks.  76% limitation. Goal not met 2/16/2023    Plan   Plan of care Certification: 1/12/2023 to 3/9/2023.     Outpatient Physical Therapy 2 times weekly for 8 weeks to include the following interventions: Electrical Stimulation , Gait Training, Manual Therapy, Moist Heat/ Ice, Neuromuscular Re-ed, Patient Education, Self Care, Therapeutic Activities, and Therapeutic Exercise. Pt may be seen by PTA as part of the rehabilitation team.     NEEDS TO UPDATE plan of care NEXT VISIT!!!    Liz Busch PTA    3/1/2023

## 2023-03-09 ENCOUNTER — CLINICAL SUPPORT (OUTPATIENT)
Dept: REHABILITATION | Facility: HOSPITAL | Age: 53
End: 2023-03-09
Attending: INTERNAL MEDICINE
Payer: MEDICARE

## 2023-03-09 DIAGNOSIS — M25.662 DECREASED RANGE OF MOTION OF LEFT KNEE: Primary | ICD-10-CM

## 2023-03-09 DIAGNOSIS — Z74.09 IMPAIRED FUNCTIONAL MOBILITY, BALANCE, GAIT, AND ENDURANCE: ICD-10-CM

## 2023-03-09 DIAGNOSIS — R60.0 BILATERAL EDEMA OF LOWER EXTREMITY: ICD-10-CM

## 2023-03-09 PROCEDURE — 97110 THERAPEUTIC EXERCISES: CPT | Mod: PN

## 2023-03-09 NOTE — PROGRESS NOTES
[facility]  Physical Therapy Discharge Note     Name: Arron Ron  [facility] Number: [id number]    Therapy Diagnosis:   Encounter Diagnoses   Name Primary?    Decreased range of motion of left knee Yes    Impaired functional mobility, balance, gait, and endurance     Bilateral edema of lower extremity        Physician: Mahi Rojas MD    Visit Date: 3/9/2023    Physician Orders: PT Eval and Treat   Medical Diagnosis from Referral:   Diagnosis   M54.42,M54.41,G89.29 (ICD-10-CM) - Chronic bilateral low back pain with bilateral sciatica     Evaluation Date: 1/12/2023  Authorization Period Expiration: 1/31/2023  Plan of Care Expiration: 3/9/2023   Progress Note Due: 3/16/2023  Visit # / Visits authorized: 9/20  FOTO: 1/TBD     Precautions: Standard      Visit #: 9 of 20  PTA Visit #: 3  Time In: 11:10 AM   Time Out: 11:50am  Total Billable Time: 40 minutes    Subjective     Pt reports: still being in significant pain due to cleaning out her garage this week.  Patient states that she continues to have constant severe pain in her knees.  She performs the home exercise program along with other exercises and walking everyday and doesn't feel she is getting relief.  She states the only time she gets relief is in therapy and it is very minimal.  Overall, she feels she is not making any progress, and is okay with being discharged to find a doctor who will perform a knee replacement.    She was compliant with home exercise program.  Response to previous treatment: no concerns   Functional change: limited knee ROM and strength, decrease walking tolerance and prolong standing due to knee pain     Pain: 8/10 to left knee  Location: Left knee and low back     Objective     Observation:   Gait: Patient ambulated independently with no use of an assistive device.  Gait deviations: trendelenburg, antalgic gait pattern     Functional tests:   squat: unable to do  SLS EO: unable to perform     Sit to  "Stand: slow movement, uses assistance     Knee  AROM/PROM Right Left Pain/Dysfunction with Movement   flexion 117 110 Pain at end range   extension  0 0 Pain at end range         Lower Extremity Strength  Right LE   Left LE     Quadriceps: 5/5 Quadriceps: 5/5   Hamstrings: 5/5 Hamstrings: 5/5   Hip flexion (seated): 5/5 Hip flexion (seated): 5/5   Hip extension:  5/5 Hip extension: 5/5   PGM: 5/5 PGM:  5/5   Hip ER:  5/5 Hip ER:  5/5   Hip IR: 5/5 Hip IR: 5/5              SPECIAL TESTS     LEFT RIGHT   Jointline Tenderness + +   Hip Scour + +   Hip KAILASH + +   LIGAMENTOUS TESTS   Lachman + +   Anterior Drawer + +   Posterior Drawer + +   Varus + +   Valgus + +   McMurreys + +   Patellar tapping + +         Joint Mobility: hypomobility in all directions, increased patellar medial tracking bilaterally      Palpation: tenderness along medial and lateral joint line, left quadricep     Sensation: intact     Flexibility:               Hamstrings: R = moderate tightness ; L = moderate tightness               Ely's test: R =  not tested ; L = not tested              Gastrocnemius: Moderate tightness bilaterally    Treatment     Arron received therapeutic exercises to develop strength, endurance, ROM, flexibility, and posture for 40 minutes including:    Sit to stand from elevated mat , x10   Lateral weight shifts with UE supports as needed, x10  Front weight shifts LLE forward with UE supports as needed, x10  Alternate steps taps on 4 inch, x10   Standing hip abduction with 0lb on LLE, 2x10  Standing marches with 0lb on LLE, 2x10   Standing heel raises with 0lb on LLE, 2x10   Butterfly stretch 1x30" holds    Home Exercises Provided and Patient Education Provided     Written Home Exercises Provided: yes.  Exercises were reviewed and Arron was able to demonstrate them prior to the end of the session.  Arron demonstrated good  understanding of the education provided.     Education provided:   - home exercise " program      ASSESSMENT      Arron presents to today's session significant bilateral knee pain.  Patient has made significant improvements in bilateral range of motion, strength, and functional activity tolerance.  However, she continues to have some limitations with functional activities due to the significant pain in her knees.  Overall, Arron progressing slowly towards her goals with meeting 4 out of 8 goals. Patient is cleared for discharge due to reaching the maximal progress at this time.  I encouraged patient to continue performing home exercise program to continue progressions that were initiated in therapy.  Patient voiced understanding.    Discharge reason: Patient has reached the maximum rehab potential for the present time    Discharge FOTO Score: 57%      Goals:   Short Term Goals:  4 weeks  1.Report decreased knee pain  < / =  7/10  to increase tolerance for walking Goal not met 3/9/2023  2. Increase knee ROM by 6 degrees in order to be able to perform ADLs without difficulty. Goal met 2/16/2023  3. Increase strength by 1/3 MMT grade in hip and knee musculature  to increase tolerance for ADL and work activities.Goal met 3/9/2023  4. Pt to tolerate HEP to improve ROM and independence with ADL's Goal met 2/16/2023     Long Term Goals: 8 weeks  1.Report decreased knee pain < / = 4/10  to increase tolerance for walking Goal not met 3/9/2023  2.To perform 10 sit to stands with no increase in pain to demonstrate independence with lifting and carrying objects. Goal not met 3/9/2023  3.Increase strength to >/= 4+/5 in hip and knee musculature to increase tolerance for ADL and work activities. Goal met 3/9/2023  4. Pt will report at CJ level (20-40% impaired) on FOTO knee to demonstrate increase in LE function with every day tasks.  57% limitation. Goal not met 3/9/2023      PLAN   This patient is discharged from Physical Therapy      Taryn Townsend, PT   3/9/2023

## 2023-03-09 NOTE — PLAN OF CARE
[facility]  Physical Therapy Discharge Note     Name: Arron Ron  [facility] Number: [id number]    Therapy Diagnosis:   Encounter Diagnoses   Name Primary?    Decreased range of motion of left knee Yes    Impaired functional mobility, balance, gait, and endurance     Bilateral edema of lower extremity        Physician: Mahi Rojas MD    Visit Date: 3/9/2023    Physician Orders: PT Eval and Treat   Medical Diagnosis from Referral:   Diagnosis   M54.42,M54.41,G89.29 (ICD-10-CM) - Chronic bilateral low back pain with bilateral sciatica     Evaluation Date: 1/12/2023  Authorization Period Expiration: 1/31/2023  Plan of Care Expiration: 3/9/2023   Progress Note Due: 3/16/2023  Visit # / Visits authorized: 9/20  FOTO: 1/TBD     Precautions: Standard      Visit #: 9 of 20  PTA Visit #: 3  Time In: 11:10 AM   Time Out: 11:50am  Total Billable Time: 35 minutes    Subjective     Pt reports: still being in significant pain due to cleaning out her garage this week.  Patient states that she continues to have constant severe pain in her knees.  She performs the home exercise program along with other exercises and walking everyday and doesn't feel she is getting relief.  She states the only time she gets relief is in therapy and it is very minimal.  Overall, she feels she is not making any progress, and is okay with being discharged to find a doctor who will perform a knee replacement.    She was compliant with home exercise program.  Response to previous treatment: no concerns   Functional change: limited knee ROM and strength, decrease walking tolerance and prolong standing due to knee pain     Pain: 8/10 to left knee  Location: Left knee and low back     Objective     Observation:   Gait: Patient ambulated independently with no use of an assistive device.  Gait deviations: trendelenburg, antalgic gait pattern     Functional tests:   squat: unable to do  SLS EO: unable to perform     Sit to  "Stand: slow movement, uses assistance     Knee  AROM/PROM Right Left Pain/Dysfunction with Movement   flexion 117 110 Pain at end range   extension  0 0 Pain at end range         Lower Extremity Strength  Right LE   Left LE     Quadriceps: 5/5 Quadriceps: 5/5   Hamstrings: 5/5 Hamstrings: 5/5   Hip flexion (seated): 5/5 Hip flexion (seated): 5/5   Hip extension:  5/5 Hip extension: 5/5   PGM: 5/5 PGM:  5/5   Hip ER:  5/5 Hip ER:  5/5   Hip IR: 5/5 Hip IR: 5/5              SPECIAL TESTS     LEFT RIGHT   Jointline Tenderness + +   Hip Scour + +   Hip KAILASH + +   LIGAMENTOUS TESTS   Lachman + +   Anterior Drawer + +   Posterior Drawer + +   Varus + +   Valgus + +   McMurreys + +   Patellar tapping + +         Joint Mobility: hypomobility in all directions, increased patellar medial tracking bilaterally      Palpation: tenderness along medial and lateral joint line, left quadricep     Sensation: intact     Flexibility:               Hamstrings: R = moderate tightness ; L = moderate tightness               Ely's test: R =  not tested ; L = not tested              Gastrocnemius: Moderate tightness bilaterally    Treatment     Arron received therapeutic exercises to develop strength, endurance, ROM, flexibility, and posture for 40 minutes including:    Sit to stand from elevated mat , x10   Lateral weight shifts with UE supports as needed, x10  Front weight shifts LLE forward with UE supports as needed, x10  Alternate steps taps on 4 inch, x10   Standing hip abduction with 0lb on LLE, 2x10  Standing marches with 0lb on LLE, 2x10   Standing heel raises with 0lb on LLE, 2x10   Butterfly stretch 1x30" holds    Home Exercises Provided and Patient Education Provided     Written Home Exercises Provided: yes.  Exercises were reviewed and Arron was able to demonstrate them prior to the end of the session.  Arron demonstrated good  understanding of the education provided.     Education provided:   - home exercise " program      ASSESSMENT      Arron presents to today's session significant bilateral knee pain.  Patient has made significant improvements in bilateral range of motion, strength, and functional activity tolerance.  However, she continues to have some limitations with functional activities due to the significant pain in her knees.  Overall, Arron progressing slowly towards her goals with meeting 4 out of 8 goals. Patient is cleared for discharge due to reaching the maximal progress at this time.  I encouraged patient to continue performing home exercise program to continue progressions that were initiated in therapy.  Patient voiced understanding.    Discharge reason: Patient has reached the maximum rehab potential for the present time    Discharge FOTO Score: 57%      Goals:   Short Term Goals:  4 weeks  1.Report decreased knee pain  < / =  7/10  to increase tolerance for walking Goal not met 3/9/2023  2. Increase knee ROM by 6 degrees in order to be able to perform ADLs without difficulty. Goal met 2/16/2023  3. Increase strength by 1/3 MMT grade in hip and knee musculature  to increase tolerance for ADL and work activities.Goal met 3/9/2023  4. Pt to tolerate HEP to improve ROM and independence with ADL's Goal met 2/16/2023     Long Term Goals: 8 weeks  1.Report decreased knee pain < / = 4/10  to increase tolerance for walking Goal not met 3/9/2023  2.To perform 10 sit to stands with no increase in pain to demonstrate independence with lifting and carrying objects. Goal not met 3/9/2023  3.Increase strength to >/= 4+/5 in hip and knee musculature to increase tolerance for ADL and work activities. Goal met 3/9/2023  4. Pt will report at CJ level (20-40% impaired) on FOTO knee to demonstrate increase in LE function with every day tasks.  57% limitation. Goal not met 3/9/2023      PLAN   This patient is discharged from Physical Therapy      Taryn Townsend, PT   3/9/2023

## 2023-03-13 ENCOUNTER — PATIENT MESSAGE (OUTPATIENT)
Dept: ORTHOPEDICS | Facility: CLINIC | Age: 53
End: 2023-03-13
Payer: MEDICARE

## 2023-03-13 ENCOUNTER — PATIENT MESSAGE (OUTPATIENT)
Dept: INTERNAL MEDICINE | Facility: CLINIC | Age: 53
End: 2023-03-13
Payer: MEDICARE

## 2023-03-13 ENCOUNTER — PATIENT MESSAGE (OUTPATIENT)
Dept: SURGERY | Facility: CLINIC | Age: 53
End: 2023-03-13
Payer: MEDICARE

## 2023-03-13 ENCOUNTER — PATIENT MESSAGE (OUTPATIENT)
Dept: GASTROENTEROLOGY | Facility: CLINIC | Age: 53
End: 2023-03-13
Payer: MEDICARE

## 2023-04-20 ENCOUNTER — TELEPHONE (OUTPATIENT)
Dept: INTERNAL MEDICINE | Facility: CLINIC | Age: 53
End: 2023-04-20
Payer: MEDICAID

## 2023-04-20 DIAGNOSIS — G47.33 OSA (OBSTRUCTIVE SLEEP APNEA): Primary | ICD-10-CM

## 2023-04-20 NOTE — TELEPHONE ENCOUNTER
----- Message from Yumiko Gan sent at 2023  9:49 AM CDT -----  Contact: [facility]  Arron Ron  MRN: [id number]  : 1970  PCP: Mahi Rojas  Home Phone      [phone]  Work Phone      Not on file.  Mobile          [phone]      MESSAGE:     Vandana With [facility] called to see if we got the fax requesting the CPAP dr evaluation request.       Vandana   [phone]

## 2023-04-20 NOTE — TELEPHONE ENCOUNTER
Pt requesting CPAP supplies. Pt has dx RODRI with sleep study in chart. Please print Rx for supplies so we can fax.

## 2023-04-26 ENCOUNTER — TELEPHONE (OUTPATIENT)
Dept: INTERNAL MEDICINE | Facility: CLINIC | Age: 53
End: 2023-04-26
Payer: MEDICAID

## 2023-04-26 NOTE — TELEPHONE ENCOUNTER
----- Message from Yue Thompson sent at 2023 12:02 PM CDT -----  Contact: Bronwyn Ron  MRN: [id number]  : 1970  PCP: Mahi Rojas  Home Phone      [phone]  Work Phone      Not on file.  Mobile          [phone]      MESSAGE: Bronwyn with [facility] left a message on the machine in regards to the pt. She states they sent several faxes stating they need doctor notes or an Eval for the pt to get her Cpap supplies.   They received a prescription but need doctor notes. Please advise    Bronwyn [phone]

## 2023-05-09 DIAGNOSIS — K58.1 IRRITABLE BOWEL SYNDROME WITH CONSTIPATION: ICD-10-CM

## 2023-05-09 RX ORDER — NORTRIPTYLINE HYDROCHLORIDE 25 MG/1
25 CAPSULE ORAL NIGHTLY
Qty: 90 CAPSULE | Refills: 1 | Status: SHIPPED | OUTPATIENT
Start: 2023-05-09

## 2023-05-18 ENCOUNTER — OFFICE VISIT (OUTPATIENT)
Dept: GASTROENTEROLOGY | Facility: CLINIC | Age: 53
End: 2023-05-18
Payer: MEDICARE

## 2023-05-18 VITALS
DIASTOLIC BLOOD PRESSURE: 83 MMHG | SYSTOLIC BLOOD PRESSURE: 124 MMHG | HEIGHT: 66 IN | HEART RATE: 97 BPM | WEIGHT: 280.19 LBS | BODY MASS INDEX: 45.03 KG/M2

## 2023-05-18 DIAGNOSIS — K59.04 CHRONIC IDIOPATHIC CONSTIPATION: ICD-10-CM

## 2023-05-18 DIAGNOSIS — Z86.010 HISTORY OF COLON POLYPS: ICD-10-CM

## 2023-05-18 DIAGNOSIS — K21.00 GASTROESOPHAGEAL REFLUX DISEASE WITH ESOPHAGITIS WITHOUT HEMORRHAGE: Primary | ICD-10-CM

## 2023-05-18 PROBLEM — Z86.0100 HISTORY OF COLON POLYPS: Status: ACTIVE | Noted: 2023-05-18

## 2023-05-18 PROCEDURE — 99214 OFFICE O/P EST MOD 30 MIN: CPT | Mod: S$PBB,,, | Performed by: INTERNAL MEDICINE

## 2023-05-18 PROCEDURE — 99214 PR OFFICE/OUTPT VISIT, EST, LEVL IV, 30-39 MIN: ICD-10-PCS | Mod: S$PBB,,, | Performed by: INTERNAL MEDICINE

## 2023-05-18 PROCEDURE — 99999 PR PBB SHADOW E&M-EST. PATIENT-LVL V: CPT | Mod: PBBFAC,,, | Performed by: INTERNAL MEDICINE

## 2023-05-18 PROCEDURE — 99215 OFFICE O/P EST HI 40 MIN: CPT | Mod: PBBFAC | Performed by: INTERNAL MEDICINE

## 2023-05-18 PROCEDURE — 99999 PR PBB SHADOW E&M-EST. PATIENT-LVL V: ICD-10-PCS | Mod: PBBFAC,,, | Performed by: INTERNAL MEDICINE

## 2023-05-18 NOTE — PROGRESS NOTES
Subjective:       Patient ID: Arron Ron is a 52 y.o. female.    Chief Complaint: Gastroesophageal Reflux (Problems going to bathroom)    Gastroesophageal Reflux    Follow-up visit.  52-year-old lady seen last in the [facility].  Complicated history.  She had gastric sleeve surgery in 2018.  Has had significant problems since then in terms of reflux, abdominal tenderness and dysphagia.  On her last visit we proposed Linzess 72 mcg for her IBS C and starting nortriptyline low dose for the visceral hypersensitivity related to the chronic abdominal tenderness.      Since that visit the Linzess was started.  Initially gave her some improvement.  But now she is back to the baseline constipation.  She can go 3-4 days without a bowel movement right now.  Difficult hard stool to pass.  Also since her last visit she was having persistent reflux symptoms so we tried sucralfate in addition to her twice a day PPI and that did not help her.  She stopped the Pamelor I am not sure whether that was because of she just did not get refilled.  It does not sound like there was a side effect.  She continues with problems with reflux and chest pain.  In fact the chest pain led to a cardiac evaluation recently that was negative.  Has chronic epigastric tenderness.  She has breakthrough reflux usually during the day.  Episodes of regurgitation occasional vomiting.  Early satiety.  Recently put on Mounjaro, does not think her GI symptoms have been worse after that.    Past Medical History:   Diagnosis Date    Acid reflux     Anemia     Back pain     was under therapy    Chronic back pain     Constipation     Elevated cholesterol     Encounter for blood transfusion 1987    Endometriosis     Hyperlipidemia     Hypertension     Nausea & vomiting     Sleep apnea     Tendonitis of wrist, right        Review of patient's allergies indicates:  No Known Allergies     Family History   Problem Relation Age of Onset    Hypertension  Mother     Heart attack Mother     Hypertension Brother     Heart disease Maternal Grandmother     Hypertension Maternal Grandmother     Diabetes Maternal Aunt     Breast cancer Neg Hx     Colon cancer Neg Hx     Ovarian cancer Neg Hx     Esophageal cancer Neg Hx        Social History     Tobacco Use    Smoking status: Never    Smokeless tobacco: Never   Substance Use Topics    Alcohol use: Yes     Comment: rare    Drug use: No        Review of Systems        No results found for this or any previous visit from the past 365 days.             Objective:      Physical Exam  Abdominal:      General: Abdomen is protuberant. Bowel sounds are normal. There is no distension or abdominal bruit. There are no signs of injury.      Palpations: Abdomen is soft. There is no shifting dullness, hepatomegaly or mass.      Tenderness: There is abdominal tenderness in the epigastric area.       Assessment & Plan:       Gastroesophageal reflux disease with esophagitis without hemorrhage    Chronic idiopathic constipation    History of colon polyps     Assessment.  Multiple problems including significant reflux, prior sleeve gastrectomy, and chronic idiopathic constipation.  For this visits a I reviewed her last EGD in the summer of 22 including the endoscopic photos.  This shows significant hiatal hernia.  Those they so no sign of a prior fundoplication.  But they do show complete healing of the esophagitis that was present on the  earlier endoscopy.  So as esophagitis has increased completely with the current regimen.  Nonetheless she considers with refractory symptoms.  Sucralfate did not help that.  Curiously she was given Reglan by an orthopedic surgeon about a year ago probably for some postop nausea she reports some side effects from that was shakiness, so that is probably not an option available to us in the future.  In continued constipation.  I reviewed her colonoscopy in April 21 which had a single tubular adenoma  removed.    We discussed realistic goals.  I discussed surgical options.  Once surgical option would be advancement to traditional Venancio-en-Y gastric bypass.  More than likely she has significantly less reflux after that surgery.  Of course that is a big step and she is not interested in pursuing that.  I told her that unfortunately there is probably not much more I can do for her reflux symptoms.  She can continue on the pantoprazole 40 twice a day.  During the day she can supplement with Pepcid or antacids if that gives her some relief.  I can hopefully try to find a few ways to improve her constipation.  Right now we will increase her dose Linzess.  Although I will have time to follow her up in the office for probably 6 months I did ask her to please get in touch with me via the portal in 1-2 months to report on the constipation.  Four if Linzess 145 is not effective will go to 290 if that is not effective will try to get either Trulance or Motegrity covered by her insurance.    This note was created with voice recognition dictation technology.  There may be errors that I did not see, detect or correct.      Ervin Hyman MD

## 2023-06-01 NOTE — PROGRESS NOTES
Subjective:      Patient ID: Arron Ron is a 52 y.o. female.    Chief Complaint: Pain of the Left Knee (Patient presents today for a follow up for her left knee pain.)      LORRAINE  ([place])    Last seen by Dr. Sauceda on 11/8/22 for left knee pain with known complete medial narrowing left knee with osteophytes and mild degenerative change in right knee. MRI of left knee 9/13/19 showed large popliteal fossa cyst.     She was given guardian brace at her last visit. She was to f/u with general surgery about issues with her previous gastric sleeve. Weight loss was also discussed prior to any surgery for her knee. Only a few days of relief with previous steroid and gel injections.     She is here for follow up.     She's had 2 falls on right knee in last few months. Was seen by outside ortho and given right knee steroid injection about 2 weeks ago with some improvement in her right knee pain. She still has some constant posterior pain in right knee that is worse at night. Some relief with voltaren gel.     Still with constant pain in left knee that is worse with standing and walking. She rates her pain as a 4 on a scale of 1-10.     She is using voltaren gel and taking mobic.     She has been seeing general surgery for her hiatal hernia. Was told to try different medication prior to any surgery.        Past Medical History:   Diagnosis Date    Acid reflux     Anemia     Back pain     was under therapy    Chronic back pain     Constipation     Elevated cholesterol     Encounter for blood transfusion 1987    Endometriosis     Hyperlipidemia     Hypertension     Nausea & vomiting     Sleep apnea     Tendonitis of wrist, right          Current Outpatient Medications:     albuterol (PROAIR HFA) 90 mcg/actuation inhaler, Inhale 2 puffs into the lungs every 6 (six) hours as needed for Wheezing. Rescue, Disp: 18 g, Rfl: 3    albuterol (PROVENTIL/VENTOLIN HFA) 90 mcg/actuation inhaler, Inhale 2 puffs into the lungs every 4  (four) hours as needed for Wheezing or Shortness of Breath. Rescue, Disp: 18 g, Rfl: 5    ALLERGY RELIEF, CETIRIZINE, 10 mg tablet, Take 10 mg by mouth once daily., Disp: , Rfl:     ALPRAZolam (XANAX) 2 MG Tab, Take 2 mg by mouth every evening. , Disp: , Rfl:     aspirin (ECOTRIN) 81 MG EC tablet, Take 81 mg by mouth every Mon, Wed, Fri. , Disp: , Rfl:     atorvastatin (LIPITOR) 20 MG tablet, TAKE ONE TABLET BY MOUTH ONCE A DAY IN THE EVENING, Disp: , Rfl:     cyclobenzaprine (FLEXERIL) 10 MG tablet, TAKE ONE TABLET BY MOUTH THREE TIMES DAILY AS NEEDED FOR MUSCLE SPASMS, Disp: 60 tablet, Rfl: 0    diclofenac sodium (VOLTAREN) 1 % Gel, APPLY 2 GRAMS TOPICALLY TO THE AFFECTED AREA TWICE DAILY AS DIRECTED, Disp: 100 g, Rfl: 0    doxazosin (CARDURA) 1 MG tablet, Take 1 mg by mouth once daily., Disp: , Rfl:     ergocalciferol, vitamin D2, (VITAMIN D ORAL), Take 2,000 Units by mouth once daily. , Disp: , Rfl:     fish oil-omega-3 fatty acids 300-1,000 mg capsule, Take by mouth once daily., Disp: , Rfl:     fluticasone propionate (FLONASE) 50 mcg/actuation nasal spray, 1 spray (50 mcg total) by Each Nostril route once daily., Disp: 16 g, Rfl: 3    gabapentin (NEURONTIN) 100 MG capsule, TAKE ONE CAPSULE BY MOUTH ONCE A DAY IN THE EVENING, Disp: 30 capsule, Rfl: 11    linaCLOtide (LINZESS) 145 mcg Cap capsule, Take 1 capsule (145 mcg total) by mouth before breakfast., Disp: 30 capsule, Rfl: 6    meloxicam (MOBIC) 15 MG tablet, TAKE ONE TABLET BY MOUTH ONCE A DAY, Disp: 30 tablet, Rfl: 1    mometasone-formoterol (DULERA) 100-5 mcg/actuation HFAA, Inhale 2 puffs into the lungs 2 (two) times a day. Controller, Disp: 13 g, Rfl: 11    montelukast (SINGULAIR) 10 mg tablet, Take 10 mg by mouth every evening., Disp: , Rfl:     MOUNJARO 7.5 mg/0.5 mL PnIj, Inject into the skin., Disp: , Rfl:     NASCOBAL 500 mcg/spray nasal spray, SMARTSIG:Both Nares, Disp: , Rfl:     nortriptyline (PAMELOR) 25 MG capsule, Take 1 capsule (25 mg  "total) by mouth every evening., Disp: 90 capsule, Rfl: 1    pantoprazole (PROTONIX) 40 MG tablet, Take 1 tablet (40 mg total) by mouth 2 (two) times daily., Disp: 180 tablet, Rfl: 1    polyethylene glycol (GLYCOLAX) 17 gram/dose powder, Take 17 g by mouth once daily., Disp: 1530 g, Rfl: 3    REGLAN 10 mg tablet, Take by mouth., Disp: , Rfl:     spironolactone (ALDACTONE) 50 MG tablet, spironolactone 50 mg tablet, Disp: , Rfl:     sucralfate (CARAFATE) 1 gram tablet, Take 1 tablet (1 g total) by mouth 2 (two) times daily., Disp: 60 tablet, Rfl: 3    sulindac (CLINORIL) 200 MG Tab, Take 1 tablet (200 mg total) by mouth 2 (two) times daily with meals., Disp: 60 tablet, Rfl: 1    torsemide (DEMADEX) 20 MG Tab, Take 20 mg by mouth once daily. , Disp: , Rfl:     valsartan (DIOVAN) 320 MG tablet, Take 320 mg by mouth once daily., Disp: , Rfl: 11    Review of patient's allergies indicates:  No Known Allergies    Review of Systems   Constitutional: Negative for chills, fever, night sweats and weight gain.   Gastrointestinal:  Negative for bowel incontinence, nausea and vomiting.   Genitourinary:  Negative for bladder incontinence.   Neurological:  Negative for disturbances in coordination and loss of balance.         Objective:        Ht 5' 6" (1.676 m)   Wt 124.1 kg (273 lb 9.6 oz)   LMP 09/21/2015   BMI 44.16 kg/m²     Ortho/SPM Exam    Body habitus is obese.  The patient walks with a limp.    Left knee exam:   Hip irritability  negative.   The skin over the knee is intact.  Knee effusion trace  Palpation- medial tenderness  Range of motion- Flexion 110 deg, Extension 0 deg,     Ligament laxity exam:  2+ valgus laxity     Patellar apprehension negative.  Popliteal cyst negative  Patellar crepitation absent.    Motor normal 5/5 strength in all tested muscle groups.   Sensory normal.    Right knee exam:   Hip irritability  negative.   The skin over the knee is intact.  Knee effusion trace  Palpation- posterior " tenderness  Range of motion- Flexion 120 deg, Extension 0 deg,     Ligament laxity exam:  trace valgus laxity     Patellar apprehension negative.  Popliteal cyst not felt, but she has tenderness.   Patellar crepitation absent.    Motor normal 5/5 strength in all tested muscle groups.   Sensory normal.      XRAY INTERPRETATION:   X-rays of bilateral knees dated 6/2/23 and done on her way out are personally reviewed and show moderate to severe medial narrowing left knee with osteophytes and mild medial joint space narrowing in right knee.       Assessment:       Encounter Diagnoses   Name Primary?    Primary osteoarthritis of right knee Yes    Primary osteoarthritis of left knee           Plan:       Arron was seen today for pain.    Diagnoses and all orders for this visit:    Primary osteoarthritis of right knee  -     X-ray Knee Ortho Bilateral with Flexion; Future    Primary osteoarthritis of left knee  -     X-ray Knee Ortho Bilateral with Flexion; Future      She's had 2 falls on right knee in last few months. Was seen by outside ortho and given right knee steroid injection about 2 weeks ago with some improvement in her right knee pain. She still has some constant posterior pain in right knee that is worse at night.     She continues with more constant pain in left knee that is worse with standing and walking.     Known moderate to severe medial narrowing left knee with osteophytes and mild medial joint space narrowing in right knee. MRI of left knee 9/13/19 showed large popliteal fossa cyst.     Treatment options reviewed with patient and following plan made:     - Discussed baker's cyst at length and she understands this is from her underlying arthritis. Would given right knee injection more time to see if it helps (given 2 weeks ago).   - She has lost weight and her current BMI is 44. She would like to discuss possible left TKA with Dr. Sauceda.   - Will get updated bilateral knee XRs on her way out and  follow up with Dr. Sauceda to discuss possible surgery.   - Of note, she is following with general surgery for her previous gastric sleeve/current hiatal hernia. Was told no surgery necessary at this time and they were going to try medications.     Follow up in about 3 weeks (around 6/23/2023).

## 2023-06-02 ENCOUNTER — OFFICE VISIT (OUTPATIENT)
Dept: ORTHOPEDICS | Facility: CLINIC | Age: 53
End: 2023-06-02
Payer: MEDICARE

## 2023-06-02 VITALS — BODY MASS INDEX: 43.98 KG/M2 | WEIGHT: 273.63 LBS | HEIGHT: 66 IN

## 2023-06-02 DIAGNOSIS — M17.11 PRIMARY OSTEOARTHRITIS OF RIGHT KNEE: Primary | ICD-10-CM

## 2023-06-02 DIAGNOSIS — M17.12 PRIMARY OSTEOARTHRITIS OF LEFT KNEE: ICD-10-CM

## 2023-06-02 PROCEDURE — 99214 OFFICE O/P EST MOD 30 MIN: CPT | Mod: S$PBB,,, | Performed by: PHYSICIAN ASSISTANT

## 2023-06-02 PROCEDURE — 99214 PR OFFICE/OUTPT VISIT, EST, LEVL IV, 30-39 MIN: ICD-10-PCS | Mod: S$PBB,,, | Performed by: PHYSICIAN ASSISTANT

## 2023-06-02 PROCEDURE — 99999 PR PBB SHADOW E&M-EST. PATIENT-LVL III: CPT | Mod: PBBFAC,,, | Performed by: PHYSICIAN ASSISTANT

## 2023-06-02 PROCEDURE — 99999 PR PBB SHADOW E&M-EST. PATIENT-LVL III: ICD-10-PCS | Mod: PBBFAC,,, | Performed by: PHYSICIAN ASSISTANT

## 2023-06-02 PROCEDURE — 99213 OFFICE O/P EST LOW 20 MIN: CPT | Mod: PBBFAC,PN | Performed by: PHYSICIAN ASSISTANT

## 2023-06-02 RX ORDER — TIRZEPATIDE 7.5 MG/.5ML
INJECTION, SOLUTION SUBCUTANEOUS
COMMUNITY
Start: 2023-05-15 | End: 2024-01-26

## 2023-06-16 ENCOUNTER — PATIENT MESSAGE (OUTPATIENT)
Dept: PODIATRY | Facility: CLINIC | Age: 53
End: 2023-06-16
Payer: MEDICARE

## 2023-06-16 NOTE — TELEPHONE ENCOUNTER
"Subjective   Patient ID: Jose Felipe is a 76 y.o. male who presents for Cough (Pt. C/o productive cough and SOB x 3 weeks.).    Cough  Associated symptoms include chills, postnasal drip and shortness of breath. Pertinent negatives include no chest pain, fever, sore throat or wheezing.      Prod cough yellow white     HD 3 days per week with up to 4 pounds fluid         Echo annually yesterday reviewed has severe aortic stenosis and regurgitation with redu EF         Review of Systems   Constitutional:  Positive for chills. Negative for fever.   HENT:  Positive for postnasal drip. Negative for sinus pressure, sinus pain and sore throat.    Respiratory:  Positive for cough and shortness of breath. Negative for wheezing.    Cardiovascular:  Positive for leg swelling. Negative for chest pain.       Objective   /78   Pulse (!) 117   Ht 1.702 m (5' 7\")   Wt 119 kg (262 lb 5.6 oz)   SpO2 100%   BMI 41.09 kg/m²     Physical Exam  Constitutional:       Appearance: Normal appearance.   HENT:      Head: Normocephalic and atraumatic.      Right Ear: Tympanic membrane, ear canal and external ear normal.      Left Ear: Tympanic membrane and ear canal normal.      Nose: No congestion or rhinorrhea.      Mouth/Throat:      Mouth: Mucous membranes are moist.      Pharynx: Oropharynx is clear. No oropharyngeal exudate or posterior oropharyngeal erythema.   Eyes:      Conjunctiva/sclera: Conjunctivae normal.      Pupils: Pupils are equal, round, and reactive to light.   Cardiovascular:      Rate and Rhythm: Normal rate and regular rhythm.      Heart sounds: Murmur (systolic and diastolic) heard.   Pulmonary:      Effort: Pulmonary effort is normal.      Breath sounds: Normal breath sounds.   Lymphadenopathy:      Cervical: No cervical adenopathy.   Skin:     Coloration: Skin is not jaundiced.   Neurological:      General: No focal deficit present.      Mental Status: He is alert and oriented to person, place, and time. " Left message to contact clinic re: appointment.      Psychiatric:         Mood and Affect: Mood normal.         Behavior: Behavior normal.         Thought Content: Thought content normal.         Judgment: Judgment normal.         Assessment/Plan   Diagnoses and all orders for this visit:  Bronchitis  -     XR chest 2 views; Future  -     azithromycin (Zithromax) 250 mg tablet; Take 2 tablets (500 mg) by mouth once daily for 1 day, THEN 1 tablet (250 mg) once daily for 4 days. Take 2 tabs (500 mg) by mouth today, than 1 daily for 4 days..

## 2023-06-20 ENCOUNTER — OFFICE VISIT (OUTPATIENT)
Dept: ORTHOPEDICS | Facility: CLINIC | Age: 53
End: 2023-06-20
Payer: MEDICARE

## 2023-06-20 VITALS — BODY MASS INDEX: 43.96 KG/M2 | HEIGHT: 66 IN | WEIGHT: 273.56 LBS

## 2023-06-20 DIAGNOSIS — M17.12 PRIMARY OSTEOARTHRITIS OF LEFT KNEE: Primary | ICD-10-CM

## 2023-06-20 DIAGNOSIS — S80.01XD CONTUSION OF RIGHT KNEE, SUBSEQUENT ENCOUNTER: ICD-10-CM

## 2023-06-20 PROCEDURE — 99999 PR PBB SHADOW E&M-EST. PATIENT-LVL III: ICD-10-PCS | Mod: PBBFAC,,, | Performed by: ORTHOPAEDIC SURGERY

## 2023-06-20 PROCEDURE — 20610 LARGE JOINT ASPIRATION/INJECTION: L KNEE: ICD-10-PCS | Mod: S$PBB,LT,, | Performed by: ORTHOPAEDIC SURGERY

## 2023-06-20 PROCEDURE — 99214 PR OFFICE/OUTPT VISIT, EST, LEVL IV, 30-39 MIN: ICD-10-PCS | Mod: 25,S$PBB,, | Performed by: ORTHOPAEDIC SURGERY

## 2023-06-20 PROCEDURE — 99213 OFFICE O/P EST LOW 20 MIN: CPT | Mod: PBBFAC,PN,25 | Performed by: ORTHOPAEDIC SURGERY

## 2023-06-20 PROCEDURE — 20610 DRAIN/INJ JOINT/BURSA W/O US: CPT | Mod: PBBFAC,PN | Performed by: ORTHOPAEDIC SURGERY

## 2023-06-20 PROCEDURE — 99214 OFFICE O/P EST MOD 30 MIN: CPT | Mod: 25,S$PBB,, | Performed by: ORTHOPAEDIC SURGERY

## 2023-06-20 PROCEDURE — 99999 PR PBB SHADOW E&M-EST. PATIENT-LVL III: CPT | Mod: PBBFAC,,, | Performed by: ORTHOPAEDIC SURGERY

## 2023-06-20 RX ORDER — TIMOLOL MALEATE 5 MG/ML
1 SOLUTION/ DROPS OPHTHALMIC 2 TIMES DAILY
COMMUNITY
Start: 2023-05-22

## 2023-06-20 RX ORDER — TRIAMCINOLONE ACETONIDE 40 MG/ML
40 INJECTION, SUSPENSION INTRA-ARTICULAR; INTRAMUSCULAR
Status: DISCONTINUED | OUTPATIENT
Start: 2023-06-20 | End: 2023-06-20 | Stop reason: HOSPADM

## 2023-06-20 RX ADMIN — TRIAMCINOLONE ACETONIDE 40 MG: 40 INJECTION, SUSPENSION INTRA-ARTICULAR; INTRAMUSCULAR at 11:06

## 2023-06-20 NOTE — PROCEDURES
Large Joint Aspiration/Injection: L knee    Date/Time: 6/20/2023 11:00 AM  Performed by: Pipe Sauceda MD  Authorized by: Pipe Sauceda MD     Location:  Knee  Site:  L knee  Medications:  40 mg triamcinolone acetonide 40 mg/mL     After obtaining verbal informed consent the patient's left knee was prepped aseptically and injected through an inferior lateral approach using 40 mg of triamcinolone and 1 cc of 1% plain Xylocaine.  The patient was warned about postinjection flare and how to manage it with ice, rest and over-the-counter analgesics.  They're advised to contact me for any severe, uncontrolled pain.

## 2023-06-20 NOTE — PROGRESS NOTES
Subjective:      Patient ID: Arron Ron is a 52 y.o. female.    Chief Complaint: Follow-up and Knee Pain (bilateral )    HPI    Follow-up severe osteoarthritis the left knee.  The patient has lost some weight and feels somewhat better.  She fell on her right knee a couple of weeks ago and had some pain there which is now resolving.          Review of Systems   Constitutional: Negative for fever and weight loss.   HENT:  Negative for congestion.    Eyes:  Negative for visual disturbance.   Cardiovascular:  Negative for chest pain.   Respiratory:  Negative for shortness of breath.    Hematologic/Lymphatic: Negative for bleeding problem. Does not bruise/bleed easily.   Skin:  Negative for poor wound healing.   Musculoskeletal:  Positive for joint pain.   Gastrointestinal:  Negative for abdominal pain.   Genitourinary:  Negative for dysuria.   Neurological:  Negative for seizures.   Psychiatric/Behavioral:  Negative for altered mental status.    Allergic/Immunologic: Negative for persistent infections.       Objective:      Ortho/SPM Exam           The patient is not in acute distress.   Sclerae normal  Body habitus is obese.  Respiratory distress:  none   The patient walks with a minimal limp    Left knee    Hip irritability  negative.   The skin over the knee is intact.  Knee effusion trace  Palpation- nontender  Range of motion- Flexion 100 deg, Extension 0 deg,   Ligament laxity exam:  2+ valgus laxity   Patellar apprehension negative.  Popliteal cyst negative  Patellar crepitation absent.  Meniscal irritability not applicable  Pulses DP present, PT present.  Motor normal 5/5 strength in all tested muscle groups.   Sensory normal.    Right knee    Nontender  Full range of motion  Trace valgus laxity    I reviewed the relevant imaging for the patient's condition:  Left knee radiographs done a couple of weeks ago show complete loss of medial joint space with sclerosis and osteophytes, Kellgren stage  IV.    Right knee radiographs show no acute fracture.  Minimal joint space loss.            Assessment:       Encounter Diagnoses   Name Primary?    Primary osteoarthritis of left knee Yes    Contusion of right knee, subsequent encounter         The left knee has structurally advanced degeneration with significant pain and functional impairment.  The patient has responded reasonably well to occasional injection and recent weight loss has been somewhat helpful.  Although palliative management is still reasonable, progressive arthritic symptom and impairment is expected.    The recent right knee injury is expected to resolve without further intervention          Plan:       Arron was seen today for follow-up and knee pain.    Diagnoses and all orders for this visit:    Primary osteoarthritis of left knee    Contusion of right knee, subsequent encounter           I explained my diagnostic impression and the reasoning behind it in detail, using layman's terms.      I explained the role of left knee replacement in the treatment of this condition.  I also explained the typical clinical course.  The usual complications that that can occur were also discussed.  The patient has decided to hold off on the left knee surgery for now.  The patient I agree to discuss this again at follow-up, if clinically warranted.    I encouraged the patient to continue efforts at weight loss    Injection requested and consent give

## 2023-07-26 ENCOUNTER — LAB VISIT (OUTPATIENT)
Dept: LAB | Facility: HOSPITAL | Age: 53
End: 2023-07-26
Attending: INTERNAL MEDICINE
Payer: MEDICARE

## 2023-07-26 ENCOUNTER — OFFICE VISIT (OUTPATIENT)
Dept: INTERNAL MEDICINE | Facility: CLINIC | Age: 53
End: 2023-07-26
Payer: MEDICARE

## 2023-07-26 VITALS
HEIGHT: 66 IN | WEIGHT: 255.06 LBS | OXYGEN SATURATION: 95 % | HEART RATE: 85 BPM | RESPIRATION RATE: 16 BRPM | BODY MASS INDEX: 40.99 KG/M2 | SYSTOLIC BLOOD PRESSURE: 112 MMHG | DIASTOLIC BLOOD PRESSURE: 82 MMHG

## 2023-07-26 DIAGNOSIS — E66.01 MORBID OBESITY: ICD-10-CM

## 2023-07-26 DIAGNOSIS — I10 PRIMARY HYPERTENSION: ICD-10-CM

## 2023-07-26 DIAGNOSIS — N18.31 CHRONIC KIDNEY DISEASE, STAGE 3A: ICD-10-CM

## 2023-07-26 DIAGNOSIS — K21.9 GASTROESOPHAGEAL REFLUX DISEASE WITHOUT ESOPHAGITIS: ICD-10-CM

## 2023-07-26 DIAGNOSIS — R73.01 IFG (IMPAIRED FASTING GLUCOSE): ICD-10-CM

## 2023-07-26 DIAGNOSIS — F32.0 MILD SINGLE CURRENT EPISODE OF MAJOR DEPRESSIVE DISORDER: ICD-10-CM

## 2023-07-26 DIAGNOSIS — I10 PRIMARY HYPERTENSION: Primary | ICD-10-CM

## 2023-07-26 DIAGNOSIS — H40.89 OTHER GLAUCOMA OF BOTH EYES: ICD-10-CM

## 2023-07-26 DIAGNOSIS — G47.33 OSA ON CPAP: ICD-10-CM

## 2023-07-26 DIAGNOSIS — E78.5 HYPERLIPIDEMIA, UNSPECIFIED HYPERLIPIDEMIA TYPE: ICD-10-CM

## 2023-07-26 DIAGNOSIS — I70.0 AORTIC ATHEROSCLEROSIS: ICD-10-CM

## 2023-07-26 DIAGNOSIS — J45.20 MILD INTERMITTENT ASTHMA WITHOUT COMPLICATION: ICD-10-CM

## 2023-07-26 LAB
ALBUMIN SERPL BCP-MCNC: 3.9 G/DL (ref 3.5–5.2)
ALP SERPL-CCNC: 100 U/L (ref 55–135)
ALT SERPL W/O P-5'-P-CCNC: 31 U/L (ref 10–44)
ANION GAP SERPL CALC-SCNC: 9 MMOL/L (ref 8–16)
AST SERPL-CCNC: 45 U/L (ref 10–40)
BASOPHILS # BLD AUTO: 0.05 K/UL (ref 0–0.2)
BASOPHILS NFR BLD: 1.4 % (ref 0–1.9)
BILIRUB SERPL-MCNC: 0.3 MG/DL (ref 0.1–1)
BUN SERPL-MCNC: 25 MG/DL (ref 6–20)
CALCIUM SERPL-MCNC: 9.8 MG/DL (ref 8.7–10.5)
CHLORIDE SERPL-SCNC: 101 MMOL/L (ref 95–110)
CHOLEST SERPL-MCNC: 135 MG/DL (ref 120–199)
CHOLEST/HDLC SERPL: 3.6 {RATIO} (ref 2–5)
CO2 SERPL-SCNC: 26 MMOL/L (ref 23–29)
CREAT SERPL-MCNC: 2.1 MG/DL (ref 0.5–1.4)
DIFFERENTIAL METHOD: ABNORMAL
EOSINOPHIL # BLD AUTO: 0 K/UL (ref 0–0.5)
EOSINOPHIL NFR BLD: 0 % (ref 0–8)
ERYTHROCYTE [DISTWIDTH] IN BLOOD BY AUTOMATED COUNT: 14.4 % (ref 11.5–14.5)
EST. GFR  (NO RACE VARIABLE): 28 ML/MIN/1.73 M^2
ESTIMATED AVG GLUCOSE: 120 MG/DL (ref 68–131)
GLUCOSE SERPL-MCNC: 84 MG/DL (ref 70–110)
HBA1C MFR BLD: 5.8 % (ref 4–5.6)
HCT VFR BLD AUTO: 36.8 % (ref 37–48.5)
HDLC SERPL-MCNC: 37 MG/DL (ref 40–75)
HDLC SERPL: 27.4 % (ref 20–50)
HGB BLD-MCNC: 12.1 G/DL (ref 12–16)
IMM GRANULOCYTES # BLD AUTO: 0 K/UL (ref 0–0.04)
IMM GRANULOCYTES NFR BLD AUTO: 0 % (ref 0–0.5)
LDLC SERPL CALC-MCNC: 87.2 MG/DL (ref 63–159)
LYMPHOCYTES # BLD AUTO: 2.3 K/UL (ref 1–4.8)
LYMPHOCYTES NFR BLD: 64.5 % (ref 18–48)
MCH RBC QN AUTO: 28.4 PG (ref 27–31)
MCHC RBC AUTO-ENTMCNC: 32.9 G/DL (ref 32–36)
MCV RBC AUTO: 86 FL (ref 82–98)
MONOCYTES # BLD AUTO: 0.5 K/UL (ref 0.3–1)
MONOCYTES NFR BLD: 13.7 % (ref 4–15)
NEUTROPHILS # BLD AUTO: 0.7 K/UL (ref 1.8–7.7)
NEUTROPHILS NFR BLD: 20.4 % (ref 38–73)
NONHDLC SERPL-MCNC: 98 MG/DL
NRBC BLD-RTO: 0 /100 WBC
PLATELET # BLD AUTO: 250 K/UL (ref 150–450)
PMV BLD AUTO: 11.6 FL (ref 9.2–12.9)
POTASSIUM SERPL-SCNC: 3.7 MMOL/L (ref 3.5–5.1)
PROT SERPL-MCNC: 8.2 G/DL (ref 6–8.4)
RBC # BLD AUTO: 4.26 M/UL (ref 4–5.4)
SODIUM SERPL-SCNC: 136 MMOL/L (ref 136–145)
TRIGL SERPL-MCNC: 54 MG/DL (ref 30–150)
TSH SERPL DL<=0.005 MIU/L-ACNC: 3.47 UIU/ML (ref 0.4–4)
WBC # BLD AUTO: 3.58 K/UL (ref 3.9–12.7)

## 2023-07-26 PROCEDURE — 3008F BODY MASS INDEX DOCD: CPT | Mod: HCNC,CPTII,S$GLB, | Performed by: INTERNAL MEDICINE

## 2023-07-26 PROCEDURE — 3079F DIAST BP 80-89 MM HG: CPT | Mod: HCNC,CPTII,S$GLB, | Performed by: INTERNAL MEDICINE

## 2023-07-26 PROCEDURE — 1160F PR REVIEW ALL MEDS BY PRESCRIBER/CLIN PHARMACIST DOCUMENTED: ICD-10-PCS | Mod: HCNC,CPTII,S$GLB, | Performed by: INTERNAL MEDICINE

## 2023-07-26 PROCEDURE — 84443 ASSAY THYROID STIM HORMONE: CPT | Mod: HCNC | Performed by: INTERNAL MEDICINE

## 2023-07-26 PROCEDURE — 85025 COMPLETE CBC W/AUTO DIFF WBC: CPT | Mod: HCNC | Performed by: INTERNAL MEDICINE

## 2023-07-26 PROCEDURE — 99999 PR PBB SHADOW E&M-EST. PATIENT-LVL V: CPT | Mod: PBBFAC,HCNC,, | Performed by: INTERNAL MEDICINE

## 2023-07-26 PROCEDURE — 3074F SYST BP LT 130 MM HG: CPT | Mod: HCNC,CPTII,S$GLB, | Performed by: INTERNAL MEDICINE

## 2023-07-26 PROCEDURE — 3079F PR MOST RECENT DIASTOLIC BLOOD PRESSURE 80-89 MM HG: ICD-10-PCS | Mod: HCNC,CPTII,S$GLB, | Performed by: INTERNAL MEDICINE

## 2023-07-26 PROCEDURE — 99215 PR OFFICE/OUTPT VISIT, EST, LEVL V, 40-54 MIN: ICD-10-PCS | Mod: HCNC,S$GLB,, | Performed by: INTERNAL MEDICINE

## 2023-07-26 PROCEDURE — 36415 COLL VENOUS BLD VENIPUNCTURE: CPT | Mod: HCNC | Performed by: INTERNAL MEDICINE

## 2023-07-26 PROCEDURE — 1160F RVW MEDS BY RX/DR IN RCRD: CPT | Mod: HCNC,CPTII,S$GLB, | Performed by: INTERNAL MEDICINE

## 2023-07-26 PROCEDURE — 3074F PR MOST RECENT SYSTOLIC BLOOD PRESSURE < 130 MM HG: ICD-10-PCS | Mod: HCNC,CPTII,S$GLB, | Performed by: INTERNAL MEDICINE

## 2023-07-26 PROCEDURE — 80053 COMPREHEN METABOLIC PANEL: CPT | Mod: HCNC | Performed by: INTERNAL MEDICINE

## 2023-07-26 PROCEDURE — 1159F PR MEDICATION LIST DOCUMENTED IN MEDICAL RECORD: ICD-10-PCS | Mod: HCNC,CPTII,S$GLB, | Performed by: INTERNAL MEDICINE

## 2023-07-26 PROCEDURE — 80061 LIPID PANEL: CPT | Mod: HCNC | Performed by: INTERNAL MEDICINE

## 2023-07-26 PROCEDURE — 83036 HEMOGLOBIN GLYCOSYLATED A1C: CPT | Mod: HCNC | Performed by: INTERNAL MEDICINE

## 2023-07-26 PROCEDURE — 1159F MED LIST DOCD IN RCRD: CPT | Mod: HCNC,CPTII,S$GLB, | Performed by: INTERNAL MEDICINE

## 2023-07-26 PROCEDURE — 99215 OFFICE O/P EST HI 40 MIN: CPT | Mod: HCNC,S$GLB,, | Performed by: INTERNAL MEDICINE

## 2023-07-26 PROCEDURE — 3008F PR BODY MASS INDEX (BMI) DOCUMENTED: ICD-10-PCS | Mod: HCNC,CPTII,S$GLB, | Performed by: INTERNAL MEDICINE

## 2023-07-26 PROCEDURE — 99999 PR PBB SHADOW E&M-EST. PATIENT-LVL V: ICD-10-PCS | Mod: PBBFAC,HCNC,, | Performed by: INTERNAL MEDICINE

## 2023-07-26 RX ORDER — PANTOPRAZOLE SODIUM 40 MG/1
40 TABLET, DELAYED RELEASE ORAL 2 TIMES DAILY
Qty: 180 TABLET | Refills: 1 | Status: SHIPPED | OUTPATIENT
Start: 2023-07-26 | End: 2024-01-12

## 2023-07-26 NOTE — PROGRESS NOTES
Subjective:       Patient ID: Arron Ron is a 52 y.o. female.    Chief Complaint: Follow-up      HPI:  Patient is known to me and presents for follow up HTN, obesity, chronic low back and knee pain, depression/anxiety, HLD, asthma.  No new labs prior to today's visit.      HTN: on torsemide 20mg, aldactone, valsartan, doxazosin; also sees Dr. Mays ; BP is controlled today. Does not check regularly at home. Denies chest pains, SOB. GFR improved off so much diurectic previously)     Depression/anxiety: on xanax PRN prescribed by Dr. Tello. Not on SSRI. Sx reported as controlled     HLD: On atorvastatin; last LDL controlled.. Denies medication side effects.      Asthma: diagnosed by Dr. Ocampo who started the Breo and then switched to symbicort PRN. She denies  wheezing or cough today. Using albuterol PRN but for some reason off her controller inhaler so now needed--was again switched to dulera. Deconditioning likely also playing a role in MERCER.        Chronic back and knee pain: She has done PT in the past but has been > 6 months per patient.  She was following Dr. Tello who was prescribing pain medication but she self stopped taking her pain medications due to concern for side effects. Still using Flexeril. Her knee pain is getting BETTER WITH WEIGHT LOSS!!! (SEE BELOW). She is trying to exercise but pain limits her. Last MRI and xray reviewed and consistent with arthritis. She saw ortho Was taking naproxen daily for pain controlb ut has stopped with worsening reflux/gastritis sx.      RODRI; using CPAP every night. However, machine is malfunctioning. Called for assitance and tried trouble shooting but is not operating correctly. Ordered for new machine today.      She was referred to Dr. Camacho. She is s/p braiatric surgery (gastric sleeve [phone]).  She is feeling frustrated because she regained her weight did not decrease very much since last visit. she reports she has established with bariatric medicine  "at [facility]--now on Mounjaro. She has had significant weight loss since last visit. Has some constipatin but otherwise tolerating well.      GERD: She is following with GI. Had EGD. On pantoprazole BID. Decreased appetite due to chronic nausea.  Considering bypass surgery as sleeve causing so many GI complications.    Per GI note 11/2022: "She might need PPI b.i.d. indefinitely given having esophagitis on PPI daily."  GI also recently added nortriptyline 25mg for GI symptoms (notes 11/2022) but no longer taking     Chronic constipation: on linzess and miralax per GI. She notes her sx are worse right now and discussed possibly due to GLP-1     Past Medical History:   Diagnosis Date    Acid reflux     Anemia     Back pain     was under therapy    Chronic back pain     Constipation     Elevated cholesterol     Encounter for blood transfusion 1987    Endometriosis     Hyperlipidemia     Hypertension     Nausea & vomiting     Sleep apnea     Tendonitis of wrist, right        Family History   Problem Relation Age of Onset    Hypertension Mother     Heart attack Mother     Hypertension Brother     Heart disease Maternal Grandmother     Hypertension Maternal Grandmother     Diabetes Maternal Aunt     Breast cancer Neg Hx     Colon cancer Neg Hx     Ovarian cancer Neg Hx     Esophageal cancer Neg Hx        Social History     Socioeconomic History    Marital status: Single   Tobacco Use    Smoking status: Never    Smokeless tobacco: Never   Substance and Sexual Activity    Alcohol use: Yes     Comment: rare    Drug use: No    Sexual activity: Yes     Partners: Male     Birth control/protection: Surgical, See Surgical Hx     Comment: --BTL       Review of Systems   Constitutional:  Negative for activity change, fatigue, fever and unexpected weight change.   HENT:  Negative for congestion, ear pain, hearing loss, rhinorrhea and sore throat.    Eyes:  Negative for redness and visual disturbance.   Respiratory:  Negative for " cough, shortness of breath and wheezing.    Cardiovascular:  Negative for chest pain, palpitations and leg swelling.   Gastrointestinal:  Positive for constipation. Negative for abdominal pain, diarrhea, nausea and vomiting.   Genitourinary:  Negative for dysuria, frequency and urgency.   Musculoskeletal:  Positive for arthralgias. Negative for back pain, joint swelling and neck pain.   Skin:  Negative for color change, rash and wound.   Neurological:  Negative for dizziness, tremors, weakness, light-headedness and headaches.       Objective:      Physical Exam  Vitals reviewed.   Constitutional:       General: She is not in acute distress.     Appearance: She is well-developed. She is obese.   HENT:      Head: Normocephalic and atraumatic.      Right Ear: External ear normal.      Left Ear: External ear normal.      Nose: Nose normal.   Eyes:      General:         Right eye: No discharge.         Left eye: No discharge.      Conjunctiva/sclera: Conjunctivae normal.   Neck:      Thyroid: No thyromegaly.   Cardiovascular:      Rate and Rhythm: Normal rate and regular rhythm.      Heart sounds: No murmur heard.  Pulmonary:      Effort: Pulmonary effort is normal. No respiratory distress.      Breath sounds: Normal breath sounds. No wheezing.   Abdominal:      General: Bowel sounds are normal. There is no distension.      Palpations: Abdomen is soft.      Tenderness: There is no abdominal tenderness.   Skin:     General: Skin is warm and dry.   Neurological:      Mental Status: She is alert and oriented to person, place, and time. Mental status is at baseline.   Psychiatric:         Behavior: Behavior normal.         Thought Content: Thought content normal.       Assessment:       1. Primary hypertension    2. Hyperlipidemia, unspecified hyperlipidemia type    3. Aortic atherosclerosis    4. IFG (impaired fasting glucose)    5. Chronic kidney disease, stage 3a    6. Morbid obesity    7. RODRI on CPAP    8. Mild single  current episode of major depressive disorder    9. Other glaucoma of both eyes    10. Mild intermittent asthma without complication    11. Gastroesophageal reflux disease without esophagitis        Plan:       1. Primary hypertension  Chronic controlled  Continue medications at same dose  Low Na diet  Exercise, weight loss  Check BP and keep log for next visit    -     CPAP FOR HOME USE  -     CBC Auto Differential; Future; Expected date: 01/22/2024  -     Comprehensive Metabolic Panel; Future; Expected date: 01/22/2024  -     Lipid Panel; Future; Expected date: 01/22/2024  -     Hemoglobin A1C; Future; Expected date: 01/22/2024    2. Hyperlipidemia, unspecified hyperlipidemia type  Chronic stable  Cont meds same dose  Cont weigh tloss  -     CPAP FOR HOME USE  -     CBC Auto Differential; Future; Expected date: 01/22/2024  -     Comprehensive Metabolic Panel; Future; Expected date: 01/22/2024  -     Lipid Panel; Future; Expected date: 01/22/2024  -     Hemoglobin A1C; Future; Expected date: 01/22/2024    3. Aortic atherosclerosis  Noted on prior imaging  Cont statin  -     CPAP FOR HOME USE  -     CBC Auto Differential; Future; Expected date: 01/22/2024  -     Comprehensive Metabolic Panel; Future; Expected date: 01/22/2024  -     Lipid Panel; Future; Expected date: 01/22/2024  -     Hemoglobin A1C; Future; Expected date: 01/22/2024    4. IFG (impaired fasting glucose)  chronic stable  Cont GLP-1  Diet, exercise, weight loss  -     CPAP FOR HOME USE  -     CBC Auto Differential; Future; Expected date: 01/22/2024  -     Comprehensive Metabolic Panel; Future; Expected date: 01/22/2024  -     Lipid Panel; Future; Expected date: 01/22/2024  -     Hemoglobin A1C; Future; Expected date: 01/22/2024    5. Chronic kidney disease, stage 3a  Chronic stable  Avoid neprhotoxic agents  Stay hydrated  -     CPAP FOR HOME USE  -     CBC Auto Differential; Future; Expected date: 01/22/2024  -     Comprehensive Metabolic Panel;  Future; Expected date: 01/22/2024  -     Lipid Panel; Future; Expected date: 01/22/2024  -     Hemoglobin A1C; Future; Expected date: 01/22/2024    6. Morbid obesity  Chronic improving  Cont GLP-1  -     CPAP FOR HOME USE  -     CBC Auto Differential; Future; Expected date: 01/22/2024  -     Comprehensive Metabolic Panel; Future; Expected date: 01/22/2024  -     Lipid Panel; Future; Expected date: 01/22/2024  -     Hemoglobin A1C; Future; Expected date: 01/22/2024    7. RODRI on CPAP  Current machine is malfunctioning  New orders placed today  -     CPAP FOR HOME USE    8. Mild single current episode of major depressive disorder  Chronic stable  Cont meds same dose    9. Other glaucoma of both eyes  New diagnosis  Cont gtt per optho recommendations    10. Mild intermittent asthma without complication  Chronic uncontrolled  Resume dulera daily and albuterol PRN  No acute exacerbation today    11. Gastroesophageal reflux disease without esophagitis  Chornic stable  Cont PPI  -     pantoprazole (PROTONIX) 40 MG tablet; Take 1 tablet (40 mg total) by mouth 2 (two) times daily.  Dispense: 180 tablet; Refill: 1       RTC as scheudled with labs and PRN

## 2023-08-07 ENCOUNTER — TELEPHONE (OUTPATIENT)
Dept: INTERNAL MEDICINE | Facility: CLINIC | Age: 53
End: 2023-08-07
Payer: MEDICARE

## 2023-08-07 DIAGNOSIS — N17.9 AKI (ACUTE KIDNEY INJURY): Primary | ICD-10-CM

## 2023-08-07 NOTE — TELEPHONE ENCOUNTER
----- Message from Yumiko Gan sent at 2023  2:51 PM CDT -----  Contact: pt  Arron Ron  MRN: [id number]  : 1970  PCP: Mahi Rojas  Home Phone      [phone]  Work Phone      Not on file.  Mobile          [phone]      MESSAGE:     Pt is wanting a call back in regards to number for the kidneys as she is drinking enough water.She is wanting to know if she should continue to take the monjaro shot.       [phone]

## 2023-08-07 NOTE — TELEPHONE ENCOUNTER
Mounjaro doesn't often cause kidney issues but it is not impossible I'd have her check in with the physician prescribing it to discuss more. I also question if we could now be overdosing some of her other medications if she is continuing to loose weight. I'd have her hold her torsemide for a few days and then repeat her labs. Drink plenty of water and let's see if that improves the kidney numbers. I placed repeat labs orders. (Torsemide, aldactone and valsartan are all potential contributors but I want to stop one a time and see how she responds).

## 2023-08-11 ENCOUNTER — LAB VISIT (OUTPATIENT)
Dept: LAB | Facility: HOSPITAL | Age: 53
End: 2023-08-11
Attending: INTERNAL MEDICINE
Payer: MEDICARE

## 2023-08-11 DIAGNOSIS — N17.9 AKI (ACUTE KIDNEY INJURY): ICD-10-CM

## 2023-08-11 LAB
ANION GAP SERPL CALC-SCNC: 11 MMOL/L (ref 8–16)
BUN SERPL-MCNC: 21 MG/DL (ref 6–20)
CALCIUM SERPL-MCNC: 10 MG/DL (ref 8.7–10.5)
CHLORIDE SERPL-SCNC: 104 MMOL/L (ref 95–110)
CO2 SERPL-SCNC: 24 MMOL/L (ref 23–29)
CREAT SERPL-MCNC: 2.1 MG/DL (ref 0.5–1.4)
EST. GFR  (NO RACE VARIABLE): 28 ML/MIN/1.73 M^2
GLUCOSE SERPL-MCNC: 84 MG/DL (ref 70–110)
POTASSIUM SERPL-SCNC: 4.1 MMOL/L (ref 3.5–5.1)
SODIUM SERPL-SCNC: 139 MMOL/L (ref 136–145)

## 2023-08-11 PROCEDURE — 36415 COLL VENOUS BLD VENIPUNCTURE: CPT | Mod: HCNC | Performed by: INTERNAL MEDICINE

## 2023-08-11 PROCEDURE — 80048 BASIC METABOLIC PNL TOTAL CA: CPT | Mod: HCNC | Performed by: INTERNAL MEDICINE

## 2023-08-15 ENCOUNTER — TELEPHONE (OUTPATIENT)
Dept: FAMILY MEDICINE | Facility: CLINIC | Age: 53
End: 2023-08-15
Payer: MEDICARE

## 2023-08-15 ENCOUNTER — PATIENT MESSAGE (OUTPATIENT)
Dept: HEPATOLOGY | Facility: HOSPITAL | Age: 53
End: 2023-08-15
Payer: MEDICARE

## 2023-08-15 DIAGNOSIS — N28.9 ACUTE RENAL INSUFFICIENCY: Primary | ICD-10-CM

## 2023-08-15 NOTE — TELEPHONE ENCOUNTER
Please assist with scheduling nephrology. Referral placed. Sent [employer] message about kidney function not getting better.

## 2023-08-15 NOTE — TELEPHONE ENCOUNTER
----- Message from Yue Thompson sent at 8/15/2023 12:51 PM CDT -----  Contact: pt  Arron Ron  MRN: [id number]  : 1970  PCP: Mahi Rojas  Home Phone      [phone]  Work Phone      Not on file.  [place]          [phone]      MESSAGE: pt would like the results from her blood work    [phone]

## 2023-09-13 ENCOUNTER — OFFICE VISIT (OUTPATIENT)
Dept: HOME HEALTH SERVICES | Facility: CLINIC | Age: 53
End: 2023-09-13
Payer: MEDICARE

## 2023-09-13 VITALS
OXYGEN SATURATION: 99 % | DIASTOLIC BLOOD PRESSURE: 134 MMHG | HEART RATE: 96 BPM | BODY MASS INDEX: 39.05 KG/M2 | WEIGHT: 243 LBS | HEIGHT: 66 IN | SYSTOLIC BLOOD PRESSURE: 196 MMHG | RESPIRATION RATE: 16 BRPM

## 2023-09-13 DIAGNOSIS — N18.4 CHRONIC KIDNEY DISEASE (CKD), STAGE IV (SEVERE): ICD-10-CM

## 2023-09-13 DIAGNOSIS — E66.01 MORBID OBESITY: ICD-10-CM

## 2023-09-13 DIAGNOSIS — I70.0 AORTIC ATHEROSCLEROSIS: ICD-10-CM

## 2023-09-13 DIAGNOSIS — D70.9 NEUTROPENIA, UNSPECIFIED TYPE: ICD-10-CM

## 2023-09-13 DIAGNOSIS — F32.0 MILD SINGLE CURRENT EPISODE OF MAJOR DEPRESSIVE DISORDER: ICD-10-CM

## 2023-09-13 DIAGNOSIS — Z00.00 ENCOUNTER FOR PREVENTIVE HEALTH EXAMINATION: Primary | ICD-10-CM

## 2023-09-13 PROCEDURE — G0439 PR MEDICARE ANNUAL WELLNESS SUBSEQUENT VISIT: ICD-10-PCS | Mod: S$GLB,,, | Performed by: NURSE PRACTITIONER

## 2023-09-13 PROCEDURE — 3077F PR MOST RECENT SYSTOLIC BLOOD PRESSURE >= 140 MM HG: ICD-10-PCS | Mod: CPTII,S$GLB,, | Performed by: NURSE PRACTITIONER

## 2023-09-13 PROCEDURE — 99499 UNLISTED E&M SERVICE: CPT | Mod: S$GLB,,, | Performed by: NURSE PRACTITIONER

## 2023-09-13 PROCEDURE — 3077F SYST BP >= 140 MM HG: CPT | Mod: CPTII,S$GLB,, | Performed by: NURSE PRACTITIONER

## 2023-09-13 PROCEDURE — 3080F PR MOST RECENT DIASTOLIC BLOOD PRESSURE >= 90 MM HG: ICD-10-PCS | Mod: CPTII,S$GLB,, | Performed by: NURSE PRACTITIONER

## 2023-09-13 PROCEDURE — 3044F HG A1C LEVEL LT 7.0%: CPT | Mod: CPTII,S$GLB,, | Performed by: NURSE PRACTITIONER

## 2023-09-13 PROCEDURE — G9919 SCRN ND POS ND PROV OF REC: HCPCS | Mod: CPTII,S$GLB,, | Performed by: NURSE PRACTITIONER

## 2023-09-13 PROCEDURE — 3080F DIAST BP >= 90 MM HG: CPT | Mod: CPTII,S$GLB,, | Performed by: NURSE PRACTITIONER

## 2023-09-13 PROCEDURE — 99499 RISK ADDL DX/OHS AUDIT: ICD-10-PCS | Mod: S$GLB,,, | Performed by: NURSE PRACTITIONER

## 2023-09-13 PROCEDURE — G0439 PPPS, SUBSEQ VISIT: HCPCS | Mod: S$GLB,,, | Performed by: NURSE PRACTITIONER

## 2023-09-13 PROCEDURE — 3044F PR MOST RECENT HEMOGLOBIN A1C LEVEL <7.0%: ICD-10-PCS | Mod: CPTII,S$GLB,, | Performed by: NURSE PRACTITIONER

## 2023-09-13 PROCEDURE — G9919 PR SCREENING AND POSITIVE: ICD-10-PCS | Mod: CPTII,S$GLB,, | Performed by: NURSE PRACTITIONER

## 2023-09-13 PROCEDURE — 3008F BODY MASS INDEX DOCD: CPT | Mod: CPTII,S$GLB,, | Performed by: NURSE PRACTITIONER

## 2023-09-13 PROCEDURE — 3008F PR BODY MASS INDEX (BMI) DOCUMENTED: ICD-10-PCS | Mod: CPTII,S$GLB,, | Performed by: NURSE PRACTITIONER

## 2023-09-13 RX ORDER — DORZOLAMIDE HCL 20 MG/ML
1 SOLUTION/ DROPS OPHTHALMIC 2 TIMES DAILY
COMMUNITY
Start: 2023-09-01

## 2023-09-13 RX ORDER — PROMETHAZINE HYDROCHLORIDE AND DEXTROMETHORPHAN HYDROBROMIDE 6.25; 15 MG/5ML; MG/5ML
SYRUP ORAL
COMMUNITY
Start: 2023-08-04 | End: 2024-01-26

## 2023-09-13 RX ORDER — TIRZEPATIDE 12.5 MG/.5ML
INJECTION, SOLUTION SUBCUTANEOUS
COMMUNITY
Start: 2023-09-08 | End: 2024-01-26

## 2023-09-13 RX ORDER — DOXAZOSIN 2 MG/1
2 TABLET ORAL
COMMUNITY
Start: 2023-09-01 | End: 2024-01-26 | Stop reason: DRUGHIGH

## 2023-09-13 NOTE — PROGRESS NOTES
"  Arron Ron presented for a  Medicare AWV and comprehensive Health Risk Assessment today. The following components were reviewed and updated:    Medical history  Family History  Social history  Allergies and Current Medications  Health Risk Assessment  Health Maintenance  Care Team         ** See Completed Assessments for Annual Wellness Visit within the encounter summary.**         The following assessments were completed:  Living Situation  CAGE  Depression Screening  Timed Get Up and Go  Whisper Test  Cognitive Function Screening      Nutrition Screening  ADL Screening  PAQ Screening        Vitals:    09/13/23 0832   BP: (!) 196/134   Pulse: 96   Resp: 16   SpO2: 99%   Weight: 110.2 kg (243 lb)   Height: 5' 6" (1.676 m)     Body mass index is 39.22 kg/m².  Physical Exam  Vitals and nursing note reviewed.   Constitutional:       General: She is not in acute distress.     Appearance: She is obese. She is not ill-appearing.   HENT:      Head: Normocephalic and atraumatic.      Nose: Nose normal.      Mouth/Throat:      Mouth: Mucous membranes are moist.   Eyes:      Pupils: Pupils are equal, round, and reactive to light.   Cardiovascular:      Rate and Rhythm: Normal rate and regular rhythm.      Pulses: Normal pulses.      Heart sounds: Normal heart sounds.   Pulmonary:      Effort: Pulmonary effort is normal.      Breath sounds: Normal breath sounds.   Abdominal:      General: Bowel sounds are normal.      Palpations: Abdomen is soft.   Musculoskeletal:         General: Normal range of motion.      Cervical back: Normal range of motion.      Right lower leg: Edema present.      Left lower leg: Edema present.   Skin:     General: Skin is warm and dry.   Neurological:      Mental Status: She is alert and oriented to person, place, and time.   Psychiatric:         Mood and Affect: Mood normal.         Behavior: Behavior normal.         Thought Content: Thought content normal.               Diagnoses and health " risks identified today and associated recommendations/orders:    1. Encounter for preventive health examination  Assessments completed.  HM recommendations reviewed.  F/u with PCP as instructed.     Patient not on chronic opioids.   Risk factors reviewed for any potential opioid use disorder   Pain evaluated during visit.  Current treatment plan documented.  Will refer to specialist, as appropriate.        2. Chronic kidney disease (CKD), stage IV (severe)  Chronic, stable on current regimen. Followed by nephrology    3. Aortic atherosclerosis  Chronic, stable on current regimen. Followed by cardiology    4. Neutropenia, unspecified type  Chronic, stable on current regimen. Followed by PCP    5. Mild single current episode of major depressive disorder  Chronic, stable on current regimen. Followed by PCP    6. Morbid obesity  Chronic, stable on current regimen. Followed by PCP  -taking mounjaro for weight loss      Provided Arron with a 5-10 year written screening schedule and personal prevention plan. Recommendations were developed using the USPSTF age appropriate recommendations. Education, counseling, and referrals were provided as needed. After Visit Summary printed and given to patient which includes a list of additional screenings\tests needed.    Follow up in about 1 year (around 9/13/2024) for Medicare AWV.    Dori Scales NP  I offered to discuss advanced care planning, including how to pick a person who would make decisions for you if you were unable to make them for yourself, called a health care power of , and what kind of decisions you might make such as use of life sustaining treatments such as ventilators and tube feeding when faced with a life limiting illness recorded on a living will that they will need to know. (How you want to be cared for as you near the end of your natural life)     X  Patient is unwilling to engage in a discussion regarding advance directives at this time.

## 2023-09-18 ENCOUNTER — TELEPHONE (OUTPATIENT)
Dept: ORTHOPEDICS | Facility: CLINIC | Age: 53
End: 2023-09-18
Payer: MEDICARE

## 2023-09-18 NOTE — TELEPHONE ENCOUNTER
----- Message from Jeannette Martinez sent at 9/18/2023 10:32 AM CDT -----  Type:  Sooner Appointment Request    Caller is requesting a sooner appointment.  Caller declined first available appointment listed below.  Caller will not accept being placed on the waitlist and is requesting a message be sent to doctor.  Name of Caller:pt   When is the first available appointment?01/02  Symptoms:reschedule 09/196 appointment   Would the patient rather a call back or a response via [facility]? Call   Best Call Back Number:[phone]  Additional Information:

## 2023-09-27 ENCOUNTER — TELEPHONE (OUTPATIENT)
Dept: ORTHOPEDICS | Facility: CLINIC | Age: 53
End: 2023-09-27
Payer: MEDICARE

## 2023-09-27 NOTE — TELEPHONE ENCOUNTER
----- Message from Colton Gannon sent at 9/27/2023  1:49 PM CDT -----  Name Of Caller: Arron        Provider Name: Madeline Rollins        Does patient feel the need to be seen today? no        Relationship to the Pt?: patient        Contact Preference?:  [phone]         What is the nature of the call?:  Patient states that she would like to speak with someone in the office in regards to getting an injection/shot in her left knee.

## 2023-10-02 ENCOUNTER — OFFICE VISIT (OUTPATIENT)
Dept: ORTHOPEDICS | Facility: CLINIC | Age: 53
End: 2023-10-02
Payer: MEDICARE

## 2023-10-02 VITALS — BODY MASS INDEX: 40.9 KG/M2 | WEIGHT: 254.5 LBS | HEIGHT: 66 IN

## 2023-10-02 DIAGNOSIS — M17.12 PRIMARY OSTEOARTHRITIS OF LEFT KNEE: Primary | ICD-10-CM

## 2023-10-02 DIAGNOSIS — G89.29 CHRONIC PAIN OF LEFT KNEE: ICD-10-CM

## 2023-10-02 DIAGNOSIS — M25.562 CHRONIC PAIN OF LEFT KNEE: ICD-10-CM

## 2023-10-02 PROBLEM — D70.9 NEUTROPENIA, UNSPECIFIED TYPE: Status: ACTIVE | Noted: 2023-10-02

## 2023-10-02 PROBLEM — N18.4 CHRONIC KIDNEY DISEASE (CKD), STAGE IV (SEVERE): Status: ACTIVE | Noted: 2023-10-02

## 2023-10-02 PROCEDURE — 1159F PR MEDICATION LIST DOCUMENTED IN MEDICAL RECORD: ICD-10-PCS | Mod: HCNC,CPTII,S$GLB, | Performed by: PHYSICIAN ASSISTANT

## 2023-10-02 PROCEDURE — 99999 PR PBB SHADOW E&M-EST. PATIENT-LVL IV: CPT | Mod: PBBFAC,HCNC,, | Performed by: PHYSICIAN ASSISTANT

## 2023-10-02 PROCEDURE — 20610 DRAIN/INJ JOINT/BURSA W/O US: CPT | Mod: HCNC,LT,S$GLB, | Performed by: PHYSICIAN ASSISTANT

## 2023-10-02 PROCEDURE — 1160F RVW MEDS BY RX/DR IN RCRD: CPT | Mod: HCNC,CPTII,S$GLB, | Performed by: PHYSICIAN ASSISTANT

## 2023-10-02 PROCEDURE — 3044F HG A1C LEVEL LT 7.0%: CPT | Mod: HCNC,CPTII,S$GLB, | Performed by: PHYSICIAN ASSISTANT

## 2023-10-02 PROCEDURE — 99214 PR OFFICE/OUTPT VISIT, EST, LEVL IV, 30-39 MIN: ICD-10-PCS | Mod: HCNC,25,S$GLB, | Performed by: PHYSICIAN ASSISTANT

## 2023-10-02 PROCEDURE — 99999 PR PBB SHADOW E&M-EST. PATIENT-LVL IV: ICD-10-PCS | Mod: PBBFAC,HCNC,, | Performed by: PHYSICIAN ASSISTANT

## 2023-10-02 PROCEDURE — 3008F BODY MASS INDEX DOCD: CPT | Mod: HCNC,CPTII,S$GLB, | Performed by: PHYSICIAN ASSISTANT

## 2023-10-02 PROCEDURE — 99214 OFFICE O/P EST MOD 30 MIN: CPT | Mod: HCNC,25,S$GLB, | Performed by: PHYSICIAN ASSISTANT

## 2023-10-02 PROCEDURE — 1160F PR REVIEW ALL MEDS BY PRESCRIBER/CLIN PHARMACIST DOCUMENTED: ICD-10-PCS | Mod: HCNC,CPTII,S$GLB, | Performed by: PHYSICIAN ASSISTANT

## 2023-10-02 PROCEDURE — 3044F PR MOST RECENT HEMOGLOBIN A1C LEVEL <7.0%: ICD-10-PCS | Mod: HCNC,CPTII,S$GLB, | Performed by: PHYSICIAN ASSISTANT

## 2023-10-02 PROCEDURE — 20610 PR DRAIN/INJECT LARGE JOINT/BURSA: ICD-10-PCS | Mod: HCNC,LT,S$GLB, | Performed by: PHYSICIAN ASSISTANT

## 2023-10-02 PROCEDURE — 3008F PR BODY MASS INDEX (BMI) DOCUMENTED: ICD-10-PCS | Mod: HCNC,CPTII,S$GLB, | Performed by: PHYSICIAN ASSISTANT

## 2023-10-02 PROCEDURE — 1159F MED LIST DOCD IN RCRD: CPT | Mod: HCNC,CPTII,S$GLB, | Performed by: PHYSICIAN ASSISTANT

## 2023-10-02 PROCEDURE — 99214 OFFICE O/P EST MOD 30 MIN: CPT | Mod: PBBFAC,HCNC,PN | Performed by: PHYSICIAN ASSISTANT

## 2023-10-02 RX ORDER — TRIAMCINOLONE ACETONIDE 40 MG/ML
40 INJECTION, SUSPENSION INTRA-ARTICULAR; INTRAMUSCULAR ONCE
Status: COMPLETED | OUTPATIENT
Start: 2023-10-02 | End: 2023-10-02

## 2023-10-02 RX ADMIN — TRIAMCINOLONE ACETONIDE 40 MG: 40 INJECTION, SUSPENSION INTRA-ARTICULAR; INTRAMUSCULAR at 02:10

## 2023-10-02 NOTE — PROGRESS NOTES
Subjective:      Patient ID: Arron Ron is a 53 y.o. female.    Chief Complaint: Pain of the Left Knee    Review of patient's allergies indicates:  No Known Allergies   Pt returns to clinic with c/o left knee pain.  No new injury or trauma.  She c/o achy pain mostly to medial knee.  Worse with walking and improves some with rest.  No swelling, redness, warmth.  No catching, locking, giving way.  She recently was seen at urgent care due to pain and prescribed norco which she has been taking with some relief of pain.  She is requesting CSI today.  She says that she is also interested in knee replacement surgery.        HPI 6/20/23 by Dr. Sauceda:  Follow-up severe osteoarthritis the left knee.  The patient has lost some weight and feels somewhat better.  She fell on her right knee a couple of weeks ago and had some pain there which is now resolving.       HPI 6/2/23 by Marlyn Car:  Last seen by Dr. Sauceda on 11/8/22 for left knee pain with known complete medial narrowing left knee with osteophytes and mild degenerative change in right knee. MRI of left knee 9/13/19 showed large popliteal fossa cyst.      She was given guardian brace at her last visit. She was to f/u with general surgery about issues with her previous gastric sleeve. Weight loss was also discussed prior to any surgery for her knee. Only a few days of relief with previous steroid and gel injections.      She is here for follow up.      She's had 2 falls on right knee in last few months. Was seen by outside ortho and given right knee steroid injection about 2 weeks ago with some improvement in her right knee pain. She still has some constant posterior pain in right knee that is worse at night. Some relief with voltaren gel.      Still with constant pain in left knee that is worse with standing and walking. She rates her pain as a 4 on a scale of 1-10.      She is using voltaren gel and taking mobic.      She has been seeing general surgery for  her hiatal hernia. Was told to try different medication prior to any surgery.         Review of Systems   Constitutional: Negative for chills, diaphoresis and fever.   HENT:  Negative for congestion, ear discharge and ear pain.    Eyes:  Negative for blurred vision, discharge, double vision and pain.   Cardiovascular:  Negative for chest pain, claudication and cyanosis.   Respiratory:  Negative for cough, hemoptysis and shortness of breath.    Endocrine: Negative for cold intolerance and heat intolerance.   Skin:  Negative for color change, dry skin, itching and rash.   Musculoskeletal:  Positive for joint pain. Negative for arthritis, back pain, falls, gout, joint swelling, muscle weakness and neck pain.   Gastrointestinal:  Negative for abdominal pain and change in bowel habit.   Neurological:  Negative for brief paralysis, disturbances in coordination, dizziness, numbness and paresthesias.   Psychiatric/Behavioral:  Negative for altered mental status and depression.          Objective:          General    Constitutional: She is oriented to person, place, and time. She appears well-developed and well-nourished. No distress.   HENT:   Head: Atraumatic.   Eyes: EOM are normal. Right eye exhibits no discharge. Left eye exhibits no discharge.   Cardiovascular:  Normal rate.            Pulmonary/Chest: Effort normal. No respiratory distress.   Abdominal: Soft.   Neurological: She is alert and oriented to person, place, and time.   Psychiatric: She has a normal mood and affect. Her behavior is normal.           Right Knee Exam     Inspection   Erythema: absent  Scars: absent  Swelling: absent  Effusion: absent  Deformity: absent  Bruising: absent    Tenderness   The patient is experiencing no tenderness.     Crepitus   The patient has crepitus of the medial joint line and patella.    Range of Motion   Extension:  normal   Flexion:  abnormal     Tests   Ligament Examination   MCL - Valgus: normal (0 to 2mm)  LCL - Varus:  normal    Other   Sensation: normal    Left Knee Exam     Inspection   Erythema: absent  Scars: absent  Swelling: absent  Effusion: absent  Deformity: absent  Bruising: absent    Tenderness   The patient tender to palpation of the medial joint line.    Crepitus   The patient has crepitus of the medial joint line and patella.    Range of Motion   Extension:  normal   Flexion:  abnormal     Tests   Stability   MCL - Valgus: normal (0 to 2mm)  LCL - Varus: normal (0 to 2mm)    Other   Sensation: normal    Vascular Exam       Edema  Right Lower Leg: absent  Left Lower Leg: absent              Assessment:         Xray Left Knee 6/2/23:  Degenerative changes, most prominent in the medial compartment of the left knee, noting moderate joint space loss with osteophytosis.  No fractures.  No marrow replacement process.    Encounter Diagnoses   Name Primary?    Primary osteoarthritis of left knee Yes    Chronic pain of left knee     Primary osteoarthritis of left knee  -     triamcinolone acetonide injection 40 mg    Chronic pain of left knee  -     triamcinolone acetonide injection 40 mg               Plan:         The total face-to-face encounter time with this patient was 30 minutes and greater than 50% of of the encounter time was spent counseling the patient, coordinating care, and education regarding the pathology and natural history of her diagnosis. We have discussed a variety of treatment options including medications, injections, physical therapy and other alternative treatments. Pt is requesting CSI at this time. She would also like to see Dr. Sauceda to discuss surgery.  Today, the patient chooses  a CSI and understands a minimum of 3 months time must lapse after injection prior to a surgical procedure due to increased risk of infection.     I made the decision to obtain old records of the patient including previous notes and imaging.       1. Injection Procedure  A time out was performed, including verification of  patient ID, procedure, site and side, availability of information and equipment, review of safety issues, and agreement with consent, the procedure site was marked.    After time out was performed, the patient was prepped aseptically with chloraprep swabsticks. A diagnostic and therapeutic injection of 1:3cc Kenalog/Marcaine was given under sterile technique using a 22g x 1.5 needle from the Anterolateral aspect of the left Knee Joint in the sitting position.      Arron Ron had no adverse reactions to the medication. Pain decreased. She was instructed to apply ice to the joint for 20 minutes and avoid strenuous activities for 24-36 hours following the injection. She was warned of possible blood sugar and/or blood pressure changes during that time. Following that time, she can resume regular activities.    She was reminded to call the clinic immediately for any adverse side effects as explained in clinic today.      2. Ice compress to the affected area 2-3x a day for 15-20 minutes as needed for pain management.  3. RTC for appointment with Dr. Sauceda, sooner if needed.    Patient voices understanding of and agreement with treatment plan. All of the patient's questions were answered and the patient will contact us if she has any questions or concerns in the interim.

## 2023-10-02 NOTE — PATIENT INSTRUCTIONS
Counseling and Referral of Other Preventative  (Italic type indicates deductible and co-insurance are waived)    Patient Name: Arron Ron  Today's Date: 10/2/2023    Health Maintenance       Date Due Completion Date    COVID-19 Vaccine (1) Never done ---    Pneumococcal Vaccines (Age 0-64) (1 - PCV) Never done ---    Shingles Vaccine (1 of 2) Never done ---    Influenza Vaccine (1) 09/01/2023 1/30/2023 (Declined)    Override on 1/30/2023: Declined    Mammogram 02/22/2024 2/22/2023    Override on 4/27/2012: Done    High Dose Statin 07/26/2024 7/26/2023    Hemoglobin A1c (Prediabetes) 07/26/2024 7/26/2023    Lipid Panel 07/26/2024 7/26/2023    Colorectal Cancer Screening 04/29/2026 4/29/2021    TETANUS VACCINE 08/22/2027 8/22/2017        No orders of the defined types were placed in this encounter.    The following information is provided to all patients.  This information is to help you find resources for any of the problems found today that may be affecting your health:                Living healthy guide: www.[facility].[place].gov      Understanding Diabetes: www.diabetes.org      Eating healthy: www.cdc.gov/healthyweight      CDC home safety checklist: www.cdc.gov/steadi/patient.html      Agency on Aging: www.goea.[place].[facility]      Alcoholics anonymous (AA): www.aa.org      Physical Activity: www.[place].nih.gov/gg6wyks      Tobacco use: www.quitwithusla.org

## 2023-10-09 PROBLEM — J45.50 SEVERE PERSISTENT ASTHMA WITHOUT COMPLICATION: Status: ACTIVE | Noted: 2023-10-09

## 2023-11-28 ENCOUNTER — OFFICE VISIT (OUTPATIENT)
Dept: GASTROENTEROLOGY | Facility: CLINIC | Age: 53
End: 2023-11-28
Payer: MEDICARE

## 2023-11-28 VITALS
DIASTOLIC BLOOD PRESSURE: 86 MMHG | HEART RATE: 88 BPM | WEIGHT: 245.56 LBS | HEIGHT: 66 IN | SYSTOLIC BLOOD PRESSURE: 126 MMHG | BODY MASS INDEX: 39.46 KG/M2

## 2023-11-28 DIAGNOSIS — K59.04 CHRONIC IDIOPATHIC CONSTIPATION: ICD-10-CM

## 2023-11-28 DIAGNOSIS — K21.00 GASTROESOPHAGEAL REFLUX DISEASE WITH ESOPHAGITIS WITHOUT HEMORRHAGE: Primary | ICD-10-CM

## 2023-11-28 PROCEDURE — 3008F PR BODY MASS INDEX (BMI) DOCUMENTED: ICD-10-PCS | Mod: HCNC,CPTII,S$GLB, | Performed by: INTERNAL MEDICINE

## 2023-11-28 PROCEDURE — 99999 PR PBB SHADOW E&M-EST. PATIENT-LVL V: CPT | Mod: PBBFAC,HCNC,, | Performed by: INTERNAL MEDICINE

## 2023-11-28 PROCEDURE — 1159F MED LIST DOCD IN RCRD: CPT | Mod: HCNC,CPTII,S$GLB, | Performed by: INTERNAL MEDICINE

## 2023-11-28 PROCEDURE — 99999 PR PBB SHADOW E&M-EST. PATIENT-LVL V: ICD-10-PCS | Mod: PBBFAC,HCNC,, | Performed by: INTERNAL MEDICINE

## 2023-11-28 PROCEDURE — 3079F PR MOST RECENT DIASTOLIC BLOOD PRESSURE 80-89 MM HG: ICD-10-PCS | Mod: HCNC,CPTII,S$GLB, | Performed by: INTERNAL MEDICINE

## 2023-11-28 PROCEDURE — 99214 PR OFFICE/OUTPT VISIT, EST, LEVL IV, 30-39 MIN: ICD-10-PCS | Mod: HCNC,S$GLB,, | Performed by: INTERNAL MEDICINE

## 2023-11-28 PROCEDURE — 1160F PR REVIEW ALL MEDS BY PRESCRIBER/CLIN PHARMACIST DOCUMENTED: ICD-10-PCS | Mod: HCNC,CPTII,S$GLB, | Performed by: INTERNAL MEDICINE

## 2023-11-28 PROCEDURE — 1160F RVW MEDS BY RX/DR IN RCRD: CPT | Mod: HCNC,CPTII,S$GLB, | Performed by: INTERNAL MEDICINE

## 2023-11-28 PROCEDURE — 3008F BODY MASS INDEX DOCD: CPT | Mod: HCNC,CPTII,S$GLB, | Performed by: INTERNAL MEDICINE

## 2023-11-28 PROCEDURE — 4010F ACE/ARB THERAPY RXD/TAKEN: CPT | Mod: HCNC,CPTII,S$GLB, | Performed by: INTERNAL MEDICINE

## 2023-11-28 PROCEDURE — 3074F SYST BP LT 130 MM HG: CPT | Mod: HCNC,CPTII,S$GLB, | Performed by: INTERNAL MEDICINE

## 2023-11-28 PROCEDURE — 3044F PR MOST RECENT HEMOGLOBIN A1C LEVEL <7.0%: ICD-10-PCS | Mod: HCNC,CPTII,S$GLB, | Performed by: INTERNAL MEDICINE

## 2023-11-28 PROCEDURE — 1159F PR MEDICATION LIST DOCUMENTED IN MEDICAL RECORD: ICD-10-PCS | Mod: HCNC,CPTII,S$GLB, | Performed by: INTERNAL MEDICINE

## 2023-11-28 PROCEDURE — 4010F PR ACE/ARB THEARPY RXD/TAKEN: ICD-10-PCS | Mod: HCNC,CPTII,S$GLB, | Performed by: INTERNAL MEDICINE

## 2023-11-28 PROCEDURE — 3074F PR MOST RECENT SYSTOLIC BLOOD PRESSURE < 130 MM HG: ICD-10-PCS | Mod: HCNC,CPTII,S$GLB, | Performed by: INTERNAL MEDICINE

## 2023-11-28 PROCEDURE — 99214 OFFICE O/P EST MOD 30 MIN: CPT | Mod: HCNC,S$GLB,, | Performed by: INTERNAL MEDICINE

## 2023-11-28 PROCEDURE — 3079F DIAST BP 80-89 MM HG: CPT | Mod: HCNC,CPTII,S$GLB, | Performed by: INTERNAL MEDICINE

## 2023-11-28 PROCEDURE — 3044F HG A1C LEVEL LT 7.0%: CPT | Mod: HCNC,CPTII,S$GLB, | Performed by: INTERNAL MEDICINE

## 2023-11-28 RX ORDER — LUBIPROSTONE 8 UG/1
8 CAPSULE ORAL 2 TIMES DAILY
Qty: 60 CAPSULE | Refills: 3 | Status: SHIPPED | OUTPATIENT
Start: 2023-11-28 | End: 2024-03-04

## 2023-11-28 RX ORDER — TRIAMCINOLONE ACETONIDE 40 MG/ML
INJECTION, SUSPENSION INTRA-ARTICULAR; INTRAMUSCULAR
COMMUNITY
Start: 2023-10-02

## 2023-11-28 NOTE — PROGRESS NOTES
"GENERAL GI PATIENT INTAKE:    COVID symptoms in the last 7 days (runny nose, sore throat, congestion, cough, fever): No  PCP: Mahi Rojas  If not PCP-  number given to establish [phone]: N/A    ALLERGIES REVIEWED:  Yes    CHIEF COMPLAINT:    Chief Complaint   Patient presents with    Constipation    Gastroesophageal Reflux       VITAL SIGNS:  /86   Pulse 88   Ht 5' 6" (1.676 m)   Wt 111.4 kg (245 lb 9.5 oz)   LMP 09/21/2015   BMI 39.64 kg/m²      Change in medical, surgical, family or social history: No      REVIEWED MEDICATION LIST RECONCILED INCLUDING ABOVE MEDS:  Yes     "

## 2023-11-28 NOTE — PROGRESS NOTES
Subjective:       Patient ID: Arron Ron is a 53 y.o. female.    Chief Complaint: Constipation and Gastroesophageal Reflux    Constipation    Gastroesophageal Reflux    Follow-up visit.  My 1st visit was in May this year.  Previously she would seen Dr. Spear and the [facility].  She has a remote history of a gastric sleeve in 2018.  It was her understanding that the hiatal hernia was repaired at the time of the surgery.  When we have done the endoscopy and that is fairly recently in 22 she did have a 5 cm hiatal hernia.  But she did have healing of the esophagitis.    She comes back today.  We discussed her reflux.  She still has reflux symptoms.  She feels like they are not as bad as they have been in the past.  She still has to be very careful about how and when she eats.  She still has take the pantoprazole twice daily.  Just be very careful about eating early dinner.  And she is supplemented with a redd over-the-counter supplement and this has helped with the nausea and with the reflux symptoms.  She does describe dysphagia for pills.  On her last visit I was using Linzess 145 to help with the constipation.  Taking it every day she got to the point where it is consistently causing diarrhea.  She is backed off now to every other day.  And taking it every other day it is not quite adequate and require supplemental laxatives like Dulcolax to enable her to have a bowel movement.  She did have to stop the MiraLax as there was concern for her kidney function.  She continues have early satiety and prolonged fullness and she recognizes as those are some features of having the medicine Mounjaro.  She complains of abdominal pain but that seems to be related to when she does not have a bowel movement.  And she complains of bloating in general.  Now she has been losing weight on the Mounjaro so she is pleased with that    Social history  A lot of water every day  Good fruit and vegetable intake  Dinner before  6   Small portions   Trying to be as active as possible    Past Medical History:   Diagnosis Date    Acid reflux     Anemia     Back pain     was under therapy    Chronic back pain     Constipation     Elevated cholesterol     Encounter for blood transfusion 1987    Endometriosis     Hyperlipidemia     Hypertension     Nausea & vomiting     Sleep apnea     Tendonitis of wrist, right        Review of patient's allergies indicates:  No Known Allergies     Family History   Problem Relation Age of Onset    Hypertension Mother     Heart attack Mother     Hypertension Brother     Heart disease Maternal Grandmother     Hypertension Maternal Grandmother     Diabetes Maternal Aunt     Breast cancer Neg Hx     Colon cancer Neg Hx     Ovarian cancer Neg Hx     Esophageal cancer Neg Hx        Social History     Tobacco Use    Smoking status: Never    Smokeless tobacco: Never   Substance Use Topics    Alcohol use: Yes     Comment: rare    Drug use: No        Review of Systems   Gastrointestinal:  Positive for constipation.       CMP   Lab Results   Component Value Date     08/11/2023     07/26/2023    K 4.1 08/11/2023    K 3.7 07/26/2023     08/11/2023     07/26/2023    CO2 24 08/11/2023    CO2 26 07/26/2023    GLU 84 08/11/2023    GLU 84 07/26/2023    BUN 21 (H) 08/11/2023    BUN 25 (H) 07/26/2023    CREATININE 2.1 (H) 08/11/2023    CREATININE 2.1 (H) 07/26/2023    CALCIUM 10.0 08/11/2023    CALCIUM 9.8 07/26/2023    PROT 8.2 07/26/2023    ALBUMIN 3.9 07/26/2023    BILITOT 0.3 07/26/2023    ALKPHOS 100 07/26/2023    AST 45 (H) 07/26/2023    ALT 31 07/26/2023    ANIONGAP 11 08/11/2023    ANIONGAP 9 07/26/2023    and CBC   Lab Results   Component Value Date    WBC 3.58 (L) 07/26/2023    HGB 12.1 07/26/2023    HCT 36.8 (L) 07/26/2023     07/26/2023       No results found for this or any previous visit from the past 365 days.             Objective:      Physical Exam    Assessment & Plan:        Gastroesophageal reflux disease with esophagitis without hemorrhage    Chronic idiopathic constipation  -     lubiprostone (AMITIZA) 8 MCG Cap; Take 1 capsule (8 mcg total) by mouth 2 (two) times daily.  Dispense: 60 capsule; Refill: 3     Assessment.  Gastroesophageal reflux but without esophagitis in a patient with sleeve surgery, Mounjaro and a significant size hiatal hernia.  I advised her to continue on the pantoprazole twice a day.  She can continue take the redd supplement as that helps.  And continue the dietary measures that she has been doing.  There is no reason for a follow-up EGD.  She does have dysphagia for pills but there is no pathology on the scope to explain the dysphagia.  This probably represents some degree of ineffective esophageal motility.  I do not think any further workup is needed on this aspect either.  Regarding her constipation she did have a colon within the past couple years she does have a history of polyps but does not need another exam until at least 2026 so there is no reason to repeat colonoscopy now.  The Linzess is ineffective.  She can not use the MiraLax.  So I have proposed that we switch to Amitiza.  Will try the 8 mg dosage it 1st.  I have asked her to get back in touch me right away if ineffective so I can increase to 24.  I did try to be optimistic with her and express that there other medications I can try after that and I am confident we will find something that improves her quality of life she already has good habits in general so I have reinforced those    This note was created with voice recognition dictation technology.  There may be errors that I did not see, detect or correct.      Ervin Hyman MD

## 2023-11-29 ENCOUNTER — PATIENT MESSAGE (OUTPATIENT)
Dept: GASTROENTEROLOGY | Facility: CLINIC | Age: 53
End: 2023-11-29
Payer: MEDICARE

## 2023-11-29 ENCOUNTER — TELEPHONE (OUTPATIENT)
Dept: GASTROENTEROLOGY | Facility: CLINIC | Age: 53
End: 2023-11-29
Payer: MEDICARE

## 2023-11-29 ENCOUNTER — DOCUMENTATION ONLY (OUTPATIENT)
Dept: PHARMACY | Facility: CLINIC | Age: 53
End: 2023-11-29
Payer: MEDICARE

## 2023-11-29 NOTE — TELEPHONE ENCOUNTER
----- Message from Crystal Hyman sent at 11/29/2023 11:43 AM CST -----  Regarding: [place] APPROVED  Hello,    The prior authorization for the attached patient's (LUBIPROSTONE 8MCG) has been APPROVED until (12/31/2024).     Jelani Hyman [facility]  Prior Authorization  Medication Access Specialist

## 2023-11-29 NOTE — PROGRESS NOTES
[place] APPR[place] UNTIL 12/31/2024 LUBIPROSTONE (AMITIZA) [facility] [place] [facility]

## 2023-12-05 ENCOUNTER — OFFICE VISIT (OUTPATIENT)
Dept: ORTHOPEDICS | Facility: CLINIC | Age: 53
End: 2023-12-05
Payer: MEDICARE

## 2023-12-05 DIAGNOSIS — M17.12 PRIMARY OSTEOARTHRITIS OF LEFT KNEE: Primary | ICD-10-CM

## 2023-12-05 DIAGNOSIS — M17.11 PRIMARY OSTEOARTHRITIS OF RIGHT KNEE: ICD-10-CM

## 2023-12-05 PROCEDURE — 20610 LARGE JOINT ASPIRATION/INJECTION: BILATERAL KNEE: ICD-10-PCS | Mod: 50,HCNC,S$GLB, | Performed by: ORTHOPAEDIC SURGERY

## 2023-12-05 PROCEDURE — 4010F ACE/ARB THERAPY RXD/TAKEN: CPT | Mod: HCNC,CPTII,S$GLB, | Performed by: ORTHOPAEDIC SURGERY

## 2023-12-05 PROCEDURE — 3044F PR MOST RECENT HEMOGLOBIN A1C LEVEL <7.0%: ICD-10-PCS | Mod: HCNC,CPTII,S$GLB, | Performed by: ORTHOPAEDIC SURGERY

## 2023-12-05 PROCEDURE — 1160F PR REVIEW ALL MEDS BY PRESCRIBER/CLIN PHARMACIST DOCUMENTED: ICD-10-PCS | Mod: HCNC,CPTII,S$GLB, | Performed by: ORTHOPAEDIC SURGERY

## 2023-12-05 PROCEDURE — 3044F HG A1C LEVEL LT 7.0%: CPT | Mod: HCNC,CPTII,S$GLB, | Performed by: ORTHOPAEDIC SURGERY

## 2023-12-05 PROCEDURE — 99999 PR PBB SHADOW E&M-EST. PATIENT-LVL III: CPT | Mod: PBBFAC,HCNC,, | Performed by: ORTHOPAEDIC SURGERY

## 2023-12-05 PROCEDURE — 4010F PR ACE/ARB THEARPY RXD/TAKEN: ICD-10-PCS | Mod: HCNC,CPTII,S$GLB, | Performed by: ORTHOPAEDIC SURGERY

## 2023-12-05 PROCEDURE — 99214 OFFICE O/P EST MOD 30 MIN: CPT | Mod: HCNC,25,S$GLB, | Performed by: ORTHOPAEDIC SURGERY

## 2023-12-05 PROCEDURE — 99214 PR OFFICE/OUTPT VISIT, EST, LEVL IV, 30-39 MIN: ICD-10-PCS | Mod: HCNC,25,S$GLB, | Performed by: ORTHOPAEDIC SURGERY

## 2023-12-05 PROCEDURE — 1160F RVW MEDS BY RX/DR IN RCRD: CPT | Mod: HCNC,CPTII,S$GLB, | Performed by: ORTHOPAEDIC SURGERY

## 2023-12-05 PROCEDURE — 99999 PR PBB SHADOW E&M-EST. PATIENT-LVL III: ICD-10-PCS | Mod: PBBFAC,HCNC,, | Performed by: ORTHOPAEDIC SURGERY

## 2023-12-05 PROCEDURE — 20610 DRAIN/INJ JOINT/BURSA W/O US: CPT | Mod: 50,HCNC,S$GLB, | Performed by: ORTHOPAEDIC SURGERY

## 2023-12-05 PROCEDURE — 1159F PR MEDICATION LIST DOCUMENTED IN MEDICAL RECORD: ICD-10-PCS | Mod: HCNC,CPTII,S$GLB, | Performed by: ORTHOPAEDIC SURGERY

## 2023-12-05 PROCEDURE — 1159F MED LIST DOCD IN RCRD: CPT | Mod: HCNC,CPTII,S$GLB, | Performed by: ORTHOPAEDIC SURGERY

## 2023-12-05 RX ORDER — TRIAMCINOLONE ACETONIDE 40 MG/ML
80 INJECTION, SUSPENSION INTRA-ARTICULAR; INTRAMUSCULAR
Status: DISCONTINUED | OUTPATIENT
Start: 2023-12-05 | End: 2023-12-05 | Stop reason: HOSPADM

## 2023-12-05 RX ADMIN — TRIAMCINOLONE ACETONIDE 80 MG: 40 INJECTION, SUSPENSION INTRA-ARTICULAR; INTRAMUSCULAR at 09:12

## 2023-12-05 NOTE — PROGRESS NOTES
Subjective:      Patient ID: Arron Ron is a 53 y.o. female.    Chief Complaint: Surgical Consult of the Left Knee    HPI    Follow-up for osteoarthritis.  The patient reports that she is lost some weight and that her knee feels generally better.  She has some joint line pain in both knees, worse on the left.  She does report good relief with injection.  The patient reports that she had to stop taking NSAIDs because of renal difficulties.          Review of Systems   Constitutional: Negative for fever and weight loss.   HENT:  Negative for congestion.    Eyes:  Negative for visual disturbance.   Cardiovascular:  Negative for chest pain.   Respiratory:  Negative for shortness of breath.    Hematologic/Lymphatic: Negative for bleeding problem. Does not bruise/bleed easily.   Skin:  Negative for poor wound healing.   Musculoskeletal:  Positive for joint pain.   Gastrointestinal:  Negative for abdominal pain.   Genitourinary:  Negative for dysuria.   Neurological:  Negative for seizures.   Psychiatric/Behavioral:  Negative for altered mental status.    Allergic/Immunologic: Negative for persistent infections.         Objective:      Ortho/SPM Exam      Left knee    Hip irritability  negative.   The skin over the knee is intact.  Knee effusion trace  Palpation- nontender  Range of motion- Flexion 100 deg, Extension 0 deg,   Ligament laxity exam:  2+ valgus laxity   Patellar apprehension negative.  Popliteal cyst negative  Patellar crepitation absent.  Meniscal irritability not applicable  Pulses DP present, PT present.  Motor normal 5/5 strength in all tested muscle groups.   Sensory normal.    Right knee    Nontender  Full range of motion  Trace valgus laxity      Previous knee radiographs show show bilateral DJD, more on the left        Assessment:       Encounter Diagnoses   Name Primary?    Primary osteoarthritis of left knee Yes    Primary osteoarthritis of right knee         Although the patient has  significant radiographic findings on the left, her palliation is currently satisfactory.  Future progression is likely.          Plan:       Arron was seen today for surgical consult.    Diagnoses and all orders for this visit:    Primary osteoarthritis of left knee    Primary osteoarthritis of right knee            I explained my diagnostic impression and the reasoning behind it in detail, using layman's terms.  Models and/or pictures were used to help in the explanation.    Continued weight loss recommended    NSAIDs are longer appropriate, given the renal history    Injections were requested and consent was given    I explained the potential role of surgery in the treatment of this condition to the patient.  They understand that if nonsurgical measures do not adequately control symptoms, surgery will be considered in the future.    X-ray next visit

## 2023-12-05 NOTE — PROCEDURES
Large Joint Aspiration/Injection: bilateral knee    Date/Time: 12/5/2023 9:00 AM    Performed by: Pipe Sauceda MD  Authorized by: Pipe Sauceda MD    Consent Done?:  Yes (Verbal)  Approach:  Anterolateral  Location:  Knee  Laterality:  Bilateral  Site:  Bilateral knee  Medications (Left):  80 mg triamcinolone acetonide 40 mg/mL     After obtaining verbal informed consent both of the patient's knees were prepped aseptically and injected through an inferior lateral approach using 40 mg of triamcinolone and 1 cc of 1% plain Xylocaine.  The patient was warned about postinjection flare and how to manage it with ice, rest and over-the-counter analgesics.  They're advised to contact me for any severe, uncontrolled pain.

## 2023-12-13 ENCOUNTER — PATIENT OUTREACH (OUTPATIENT)
Dept: ADMINISTRATIVE | Facility: HOSPITAL | Age: 53
End: 2023-12-13
Payer: MEDICARE

## 2023-12-16 DIAGNOSIS — M54.42 CHRONIC BILATERAL LOW BACK PAIN WITH BILATERAL SCIATICA: ICD-10-CM

## 2023-12-16 DIAGNOSIS — G89.29 CHRONIC BILATERAL LOW BACK PAIN WITH BILATERAL SCIATICA: ICD-10-CM

## 2023-12-16 DIAGNOSIS — M54.41 CHRONIC BILATERAL LOW BACK PAIN WITH BILATERAL SCIATICA: ICD-10-CM

## 2023-12-16 NOTE — TELEPHONE ENCOUNTER
No care due was identified.  [facility] Embedded Care Due Messages. Reference number: [id number].   12/16/2023 10:44:22 AM CST

## 2023-12-18 RX ORDER — GABAPENTIN 100 MG/1
CAPSULE ORAL
Qty: 30 CAPSULE | Refills: 11 | Status: SHIPPED | OUTPATIENT
Start: 2023-12-18

## 2024-01-12 DIAGNOSIS — K21.9 GASTROESOPHAGEAL REFLUX DISEASE WITHOUT ESOPHAGITIS: ICD-10-CM

## 2024-01-12 RX ORDER — PANTOPRAZOLE SODIUM 40 MG/1
40 TABLET, DELAYED RELEASE ORAL 2 TIMES DAILY
Qty: 180 TABLET | Refills: 1 | Status: SHIPPED | OUTPATIENT
Start: 2024-01-12

## 2024-01-12 NOTE — TELEPHONE ENCOUNTER
No care due was identified.  [facility] Embedded Care Due Messages. Reference number: [id number].   1/12/2024 8:02:00 AM CST

## 2024-01-12 NOTE — TELEPHONE ENCOUNTER
Refill Routing Note   Medication(s) are not appropriate for processing by [facility] for the following reason(s):        Outside of protocol    [facility] action(s):  Route        Medication Therapy Plan: PRN USAGE(OP)      Appointments  past 12m or future 3m with PCP    Date Provider   Last Visit   7/26/2023 Mahi Rojas MD   Next Visit   1/26/2024 Mahi Rojas MD   ED visits in past 90 days: 0        Note composed:10:34 AM 01/12/2024

## 2024-01-16 ENCOUNTER — PATIENT OUTREACH (OUTPATIENT)
Dept: INTERNAL MEDICINE | Facility: CLINIC | Age: 54
End: 2024-01-16
Payer: MEDICARE

## 2024-01-16 NOTE — TELEPHONE ENCOUNTER
Introduced pt to LDMP.  Gave overview of program.  [facility] to review.

## 2024-01-19 ENCOUNTER — TELEPHONE (OUTPATIENT)
Dept: INTERNAL MEDICINE | Facility: CLINIC | Age: 54
End: 2024-01-19
Payer: MEDICARE

## 2024-01-19 ENCOUNTER — LAB VISIT (OUTPATIENT)
Dept: LAB | Facility: HOSPITAL | Age: 54
End: 2024-01-19
Attending: INTERNAL MEDICINE
Payer: MEDICARE

## 2024-01-19 DIAGNOSIS — R30.0 DYSURIA: Primary | ICD-10-CM

## 2024-01-19 DIAGNOSIS — I70.0 AORTIC ATHEROSCLEROSIS: ICD-10-CM

## 2024-01-19 DIAGNOSIS — N18.31 CHRONIC KIDNEY DISEASE, STAGE 3A: ICD-10-CM

## 2024-01-19 DIAGNOSIS — R73.01 IFG (IMPAIRED FASTING GLUCOSE): ICD-10-CM

## 2024-01-19 DIAGNOSIS — E78.5 HYPERLIPIDEMIA, UNSPECIFIED HYPERLIPIDEMIA TYPE: ICD-10-CM

## 2024-01-19 DIAGNOSIS — I10 PRIMARY HYPERTENSION: ICD-10-CM

## 2024-01-19 DIAGNOSIS — R30.0 DYSURIA: ICD-10-CM

## 2024-01-19 DIAGNOSIS — E66.01 MORBID OBESITY: ICD-10-CM

## 2024-01-19 LAB
ALBUMIN SERPL BCP-MCNC: 3.6 G/DL (ref 3.5–5.2)
ALP SERPL-CCNC: 77 U/L (ref 55–135)
ALT SERPL W/O P-5'-P-CCNC: 9 U/L (ref 10–44)
ANION GAP SERPL CALC-SCNC: 7 MMOL/L (ref 8–16)
AST SERPL-CCNC: 14 U/L (ref 10–40)
BACTERIA #/AREA URNS HPF: ABNORMAL /HPF
BASOPHILS # BLD AUTO: 0.07 K/UL (ref 0–0.2)
BASOPHILS NFR BLD: 1 % (ref 0–1.9)
BILIRUB SERPL-MCNC: 0.5 MG/DL (ref 0.1–1)
BILIRUB UR QL STRIP: NEGATIVE
BUN SERPL-MCNC: 18 MG/DL (ref 6–20)
CALCIUM SERPL-MCNC: 9.3 MG/DL (ref 8.7–10.5)
CHLORIDE SERPL-SCNC: 108 MMOL/L (ref 95–110)
CHOLEST SERPL-MCNC: 144 MG/DL (ref 120–199)
CHOLEST/HDLC SERPL: 3.9 {RATIO} (ref 2–5)
CLARITY UR: CLEAR
CO2 SERPL-SCNC: 25 MMOL/L (ref 23–29)
COLOR UR: YELLOW
CREAT SERPL-MCNC: 1.5 MG/DL (ref 0.5–1.4)
DIFFERENTIAL METHOD BLD: ABNORMAL
EOSINOPHIL # BLD AUTO: 0.1 K/UL (ref 0–0.5)
EOSINOPHIL NFR BLD: 1 % (ref 0–8)
ERYTHROCYTE [DISTWIDTH] IN BLOOD BY AUTOMATED COUNT: 14.3 % (ref 11.5–14.5)
EST. GFR  (NO RACE VARIABLE): 41 ML/MIN/1.73 M^2
ESTIMATED AVG GLUCOSE: 120 MG/DL (ref 68–131)
GLUCOSE SERPL-MCNC: 82 MG/DL (ref 70–110)
GLUCOSE UR QL STRIP: NEGATIVE
HBA1C MFR BLD: 5.8 % (ref 4–5.6)
HCT VFR BLD AUTO: 34.5 % (ref 37–48.5)
HDLC SERPL-MCNC: 37 MG/DL (ref 40–75)
HDLC SERPL: 25.7 % (ref 20–50)
HGB BLD-MCNC: 11.6 G/DL (ref 12–16)
HGB UR QL STRIP: ABNORMAL
HYALINE CASTS #/AREA URNS LPF: 2 /LPF
IMM GRANULOCYTES # BLD AUTO: 0.01 K/UL (ref 0–0.04)
IMM GRANULOCYTES NFR BLD AUTO: 0.1 % (ref 0–0.5)
KETONES UR QL STRIP: NEGATIVE
LDLC SERPL CALC-MCNC: 99.8 MG/DL (ref 63–159)
LEUKOCYTE ESTERASE UR QL STRIP: ABNORMAL
LYMPHOCYTES # BLD AUTO: 2.4 K/UL (ref 1–4.8)
LYMPHOCYTES NFR BLD: 34.1 % (ref 18–48)
MCH RBC QN AUTO: 29.3 PG (ref 27–31)
MCHC RBC AUTO-ENTMCNC: 33.6 G/DL (ref 32–36)
MCV RBC AUTO: 87 FL (ref 82–98)
MICROSCOPIC COMMENT: ABNORMAL
MONOCYTES # BLD AUTO: 0.6 K/UL (ref 0.3–1)
MONOCYTES NFR BLD: 8.3 % (ref 4–15)
NEUTROPHILS # BLD AUTO: 3.9 K/UL (ref 1.8–7.7)
NEUTROPHILS NFR BLD: 55.5 % (ref 38–73)
NITRITE UR QL STRIP: NEGATIVE
NONHDLC SERPL-MCNC: 107 MG/DL
NRBC BLD-RTO: 0 /100 WBC
PH UR STRIP: 6 [PH] (ref 5–8)
PLATELET # BLD AUTO: 251 K/UL (ref 150–450)
PMV BLD AUTO: 12.1 FL (ref 9.2–12.9)
POTASSIUM SERPL-SCNC: 4.2 MMOL/L (ref 3.5–5.1)
PROT SERPL-MCNC: 7.3 G/DL (ref 6–8.4)
PROT UR QL STRIP: NEGATIVE
RBC # BLD AUTO: 3.96 M/UL (ref 4–5.4)
RBC #/AREA URNS HPF: 0 /HPF (ref 0–4)
SODIUM SERPL-SCNC: 140 MMOL/L (ref 136–145)
SP GR UR STRIP: >=1.03 (ref 1–1.03)
TRIGL SERPL-MCNC: 36 MG/DL (ref 30–150)
URN SPEC COLLECT METH UR: ABNORMAL
UROBILINOGEN UR STRIP-ACNC: NEGATIVE EU/DL
WBC # BLD AUTO: 6.97 K/UL (ref 3.9–12.7)
WBC #/AREA URNS HPF: 35 /HPF (ref 0–5)

## 2024-01-19 PROCEDURE — 85025 COMPLETE CBC W/AUTO DIFF WBC: CPT | Mod: HCNC | Performed by: INTERNAL MEDICINE

## 2024-01-19 PROCEDURE — 81000 URINALYSIS NONAUTO W/SCOPE: CPT | Mod: HCNC | Performed by: INTERNAL MEDICINE

## 2024-01-19 PROCEDURE — 80061 LIPID PANEL: CPT | Mod: HCNC | Performed by: INTERNAL MEDICINE

## 2024-01-19 PROCEDURE — 87086 URINE CULTURE/COLONY COUNT: CPT | Mod: HCNC | Performed by: INTERNAL MEDICINE

## 2024-01-19 PROCEDURE — 36415 COLL VENOUS BLD VENIPUNCTURE: CPT | Mod: HCNC | Performed by: INTERNAL MEDICINE

## 2024-01-19 PROCEDURE — 80053 COMPREHEN METABOLIC PANEL: CPT | Mod: HCNC | Performed by: INTERNAL MEDICINE

## 2024-01-19 PROCEDURE — 83036 HEMOGLOBIN GLYCOSYLATED A1C: CPT | Mod: HCNC | Performed by: INTERNAL MEDICINE

## 2024-01-19 RX ORDER — CEPHALEXIN 500 MG/1
500 CAPSULE ORAL EVERY 8 HOURS
Qty: 15 CAPSULE | Refills: 0 | Status: SHIPPED | OUTPATIENT
Start: 2024-01-19 | End: 2024-01-24

## 2024-01-19 NOTE — TELEPHONE ENCOUNTER
----- Message from Yumiko Gan sent at 2024  3:05 PM CST -----  Contact: pt  Arron Ron  MRN: [id number]  : 1970  PCP: Mahi Rojas  Home Phone      [phone]  Work Phone      Not on file.  Mobile          [phone]      MESSAGE:     Pt would like results of urine.       Please advise  [phone]

## 2024-01-19 NOTE — TELEPHONE ENCOUNTER
Patient is concerned about her abnormal labs and kidney function.   Patient did mention that she feels she may have a UTI as well. I ordered UA. Patient will go to the hospital to do a urine.   Please advise on labs.

## 2024-01-19 NOTE — TELEPHONE ENCOUNTER
Kidneys are actually IMPROVED from the last check. They are back to same level they were 1 year ago.

## 2024-01-19 NOTE — TELEPHONE ENCOUNTER
----- Message from Yumiko Gan sent at 2024 10:33 AM CST -----  Contact: pt  Arron Ron  MRN: [id number]  : 1970  PCP: Mahi Rojas  Home Phone      [phone]  Work Phone      Not on file.  Mobile          [phone]      MESSAGE:     Pt states she got lab results through her pt portal and would like Dr. Rojas to call her as she has concerns. Pt would also like an appt before the 296th.     Please advise   [phone]

## 2024-01-20 LAB
BACTERIA UR CULT: NORMAL
BACTERIA UR CULT: NORMAL

## 2024-01-26 ENCOUNTER — OFFICE VISIT (OUTPATIENT)
Dept: INTERNAL MEDICINE | Facility: CLINIC | Age: 54
End: 2024-01-26
Payer: MEDICARE

## 2024-01-26 VITALS
SYSTOLIC BLOOD PRESSURE: 104 MMHG | HEART RATE: 88 BPM | RESPIRATION RATE: 18 BRPM | HEIGHT: 66 IN | DIASTOLIC BLOOD PRESSURE: 60 MMHG | BODY MASS INDEX: 37.14 KG/M2 | WEIGHT: 231.06 LBS

## 2024-01-26 DIAGNOSIS — I70.0 AORTIC ATHEROSCLEROSIS: ICD-10-CM

## 2024-01-26 DIAGNOSIS — J45.50 SEVERE PERSISTENT ASTHMA WITHOUT COMPLICATION: ICD-10-CM

## 2024-01-26 DIAGNOSIS — I10 PRIMARY HYPERTENSION: Primary | ICD-10-CM

## 2024-01-26 DIAGNOSIS — Z12.31 ENCOUNTER FOR SCREENING MAMMOGRAM FOR MALIGNANT NEOPLASM OF BREAST: ICD-10-CM

## 2024-01-26 DIAGNOSIS — E78.2 MIXED HYPERLIPIDEMIA: ICD-10-CM

## 2024-01-26 DIAGNOSIS — K21.00 GASTROESOPHAGEAL REFLUX DISEASE WITH ESOPHAGITIS WITHOUT HEMORRHAGE: ICD-10-CM

## 2024-01-26 DIAGNOSIS — E66.01 CLASS 2 SEVERE OBESITY DUE TO EXCESS CALORIES WITH SERIOUS COMORBIDITY AND BODY MASS INDEX (BMI) OF 37.0 TO 37.9 IN ADULT: ICD-10-CM

## 2024-01-26 DIAGNOSIS — K44.9 HIATAL HERNIA: ICD-10-CM

## 2024-01-26 DIAGNOSIS — N18.32 STAGE 3B CHRONIC KIDNEY DISEASE: ICD-10-CM

## 2024-01-26 DIAGNOSIS — R73.01 IFG (IMPAIRED FASTING GLUCOSE): ICD-10-CM

## 2024-01-26 DIAGNOSIS — G47.33 OSA ON CPAP: ICD-10-CM

## 2024-01-26 DIAGNOSIS — K59.04 CHRONIC IDIOPATHIC CONSTIPATION: ICD-10-CM

## 2024-01-26 PROBLEM — D70.9 NEUTROPENIA, UNSPECIFIED TYPE: Status: RESOLVED | Noted: 2023-10-02 | Resolved: 2024-01-26

## 2024-01-26 PROBLEM — R22.32 MASS OF ARM, LEFT: Status: RESOLVED | Noted: 2019-07-16 | Resolved: 2024-01-26

## 2024-01-26 PROBLEM — F32.0 MILD SINGLE CURRENT EPISODE OF MAJOR DEPRESSIVE DISORDER: Status: RESOLVED | Noted: 2017-01-23 | Resolved: 2024-01-26

## 2024-01-26 PROBLEM — Z12.11 COLON CANCER SCREENING: Status: RESOLVED | Noted: 2021-04-29 | Resolved: 2024-01-26

## 2024-01-26 PROBLEM — N18.4 CHRONIC KIDNEY DISEASE (CKD), STAGE IV (SEVERE): Status: RESOLVED | Noted: 2023-10-02 | Resolved: 2024-01-26

## 2024-01-26 PROBLEM — Z74.09 IMPAIRED FUNCTIONAL MOBILITY, BALANCE, GAIT, AND ENDURANCE: Status: RESOLVED | Noted: 2022-09-22 | Resolved: 2024-01-26

## 2024-01-26 PROCEDURE — 3008F BODY MASS INDEX DOCD: CPT | Mod: HCNC,CPTII,S$GLB, | Performed by: INTERNAL MEDICINE

## 2024-01-26 PROCEDURE — 3078F DIAST BP <80 MM HG: CPT | Mod: HCNC,CPTII,S$GLB, | Performed by: INTERNAL MEDICINE

## 2024-01-26 PROCEDURE — 99215 OFFICE O/P EST HI 40 MIN: CPT | Mod: HCNC,S$GLB,, | Performed by: INTERNAL MEDICINE

## 2024-01-26 PROCEDURE — 99999 PR PBB SHADOW E&M-EST. PATIENT-LVL III: CPT | Mod: PBBFAC,HCNC,, | Performed by: INTERNAL MEDICINE

## 2024-01-26 PROCEDURE — 1160F RVW MEDS BY RX/DR IN RCRD: CPT | Mod: HCNC,CPTII,S$GLB, | Performed by: INTERNAL MEDICINE

## 2024-01-26 PROCEDURE — 4010F ACE/ARB THERAPY RXD/TAKEN: CPT | Mod: HCNC,CPTII,S$GLB, | Performed by: INTERNAL MEDICINE

## 2024-01-26 PROCEDURE — 1159F MED LIST DOCD IN RCRD: CPT | Mod: HCNC,CPTII,S$GLB, | Performed by: INTERNAL MEDICINE

## 2024-01-26 PROCEDURE — 3044F HG A1C LEVEL LT 7.0%: CPT | Mod: HCNC,CPTII,S$GLB, | Performed by: INTERNAL MEDICINE

## 2024-01-26 PROCEDURE — 3074F SYST BP LT 130 MM HG: CPT | Mod: HCNC,CPTII,S$GLB, | Performed by: INTERNAL MEDICINE

## 2024-01-26 RX ORDER — VALSARTAN 160 MG/1
160 TABLET ORAL NIGHTLY
COMMUNITY
Start: 2023-12-23

## 2024-01-26 RX ORDER — CYCLOSPORINE 0.5 MG/ML
1 EMULSION OPHTHALMIC 2 TIMES DAILY
COMMUNITY
Start: 2024-01-24

## 2024-01-26 RX ORDER — TIRZEPATIDE 15 MG/.5ML
15 INJECTION, SOLUTION SUBCUTANEOUS WEEKLY
COMMUNITY
Start: 2024-01-15

## 2024-01-26 RX ORDER — CHLORHEXIDINE GLUCONATE ORAL RINSE 1.2 MG/ML
15 SOLUTION DENTAL DAILY PRN
COMMUNITY
Start: 2023-12-04

## 2024-01-26 RX ORDER — TRAMADOL HYDROCHLORIDE 50 MG/1
50 TABLET ORAL
COMMUNITY
Start: 2024-01-17

## 2024-01-26 NOTE — PROGRESS NOTES
Subjective:       Patient ID: Arron Ron is a 53 y.o. female.    Chief Complaint: Follow-up (6 months)      HPI:  Patient is known to me and presents for follow up HTN, obesity, chronic low back and knee pain, depression/anxiety, HLD, asthma.  Labs from 1/19/24 personally reviewed, interpreted and discussed today    A1C 5.8%, stable   LDL 99, at goal  GFR 41, improving  Plt 251, normal  UA few bacteria and 25 WBC; urine cx multiple organisms    CKD 3: saw Dr. Villalta since I saw her last. She reports drinking plenty of water.   Stopped NSAIDs, stopped torsemide, decreased valsartan. GFR recovered from 28 to 41. Has follow up in March with nephrology  Right renal cyst was also found on US per her report.     HTN: on aldactone, valsartan, doxazosin; also sees Dr. Mays ; BP is controlled today. Does not check regularly at home. Denies chest pains, SOB. GFR improved off so much diurectic previously     Depression/anxiety: on xanax PRN prescribed by Dr. Tello. Not on SSRI. Sx reported as controlled     HLD: On atorvastatin; last LDL controlled.. Denies medication side effects.      Asthma: diagnosed by Dr. Ocampo who started the Breo and then switched to symbicort PRN. She denies  wheezing or cough today. Has now established with pulm at [facility].         Chronic back and knee pain: She has done PT in the past but has been > 6 months per patient.  She was following Dr. Tello who was prescribing pain medication but she self stopped taking her pain medications due to concern for side effects. Still using Flexeril. Her knee pain is getting BETTER WITH WEIGHT LOSS!!! (SEE BELOW). She is trying to exercise but pain limits her. Last MRI and xray reviewed and consistent with arthritis. She saw ortho Was taking naproxen daily for pain controlb ut has stopped with worsening reflux/gastritis sx and kidney disease.      RODRI; using CPAP every night.      She was referred to Dr. Camacho. She is s/p braiatric surgery (gastric  "sleeve [id number]).  She is feeling frustrated because she regained her weight did not decrease very much since last visit. she reports she has established with bariatric medicine at [facility]--now on Mounjaro. She has had significant weight loss since last visit. Has some constipatin but otherwise tolerating well.      GERD: She is following with GI. Had EGD. On pantoprazole BID. Decreased appetite due to chronic nausea.  Considering bypass surgery as sleeve causing so many GI complications.    Per GI note 11/2022: "She might need PPI b.i.d. indefinitely given having esophagitis on PPI daily."  GI also recently added nortriptyline 25mg for GI symptoms      Chronic constipation: on amitiza per GI. She notes her sx are worse right now and discussed possibly due to GLP-1        Past Medical History:   Diagnosis Date    Acid reflux     Anemia     Back pain     was under therapy    Chronic back pain     Constipation     Elevated cholesterol     Encounter for blood transfusion 1987    Endometriosis     Hyperlipidemia     Hypertension     Nausea & vomiting     Sleep apnea     Tendonitis of wrist, right        Family History   Problem Relation Age of Onset    Hypertension Mother     Heart attack Mother     Hypertension Brother     Heart disease Maternal Grandmother     Hypertension Maternal Grandmother     Diabetes Maternal Aunt     Breast cancer Neg Hx     Colon cancer Neg Hx     Ovarian cancer Neg Hx     Esophageal cancer Neg Hx        Social History     Socioeconomic History    Marital status: Single   Tobacco Use    Smoking status: Never    Smokeless tobacco: Never   Substance and Sexual Activity    Alcohol use: Yes     Comment: rare    Drug use: No    Sexual activity: Yes     Partners: Male     Birth control/protection: Surgical, See Surgical Hx     Comment: --BTL     Social Determinants of Health     Financial Resource Strain: Low Risk  (9/13/2023)    Overall Financial Resource Strain (CARDIA)     Difficulty of " Paying Living Expenses: Not hard at all   Food Insecurity: No Food Insecurity (9/13/2023)    Hunger Vital Sign     Worried About Running Out of Food in the Last Year: Never true     Ran Out of Food in the Last Year: Never true   Transportation Needs: No Transportation Needs (9/13/2023)    PRAPARE - Transportation     Lack of Transportation (Medical): No     Lack of Transportation (Non-Medical): No   Physical Activity: Inactive (9/13/2023)    Exercise Vital Sign     Days of Exercise per Week: 0 days     Minutes of Exercise per Session: 0 min   Stress: Stress Concern Present (9/13/2023)    [employer] [place] [employer] - Occupational Stress Questionnaire     Feeling of Stress : To some extent   Social Connections: Moderately Integrated (9/13/2023)    Social Connection and Isolation Panel [NHANES]     Frequency of Communication with Friends and Family: More than three times a week     Frequency of Social Gatherings with Friends and Family: More than three times a week     Attends Hoahaoism Services: More than 4 times per year     Active Member of Clubs or Organizations: Yes     Attends Club or Organization Meetings: More than 4 times per year     Marital Status:    Housing Stability: Low Risk  (9/13/2023)    Housing Stability Vital Sign     Unable to Pay for Housing in the Last Year: No     Number of Places Lived in the Last Year: 1     Unstable Housing in the Last Year: No       Review of Systems   Constitutional:  Negative for activity change, fatigue, fever and unexpected weight change.   HENT:  Negative for congestion, ear pain, hearing loss, rhinorrhea and sore throat.    Eyes:  Negative for redness and visual disturbance.   Respiratory:  Negative for cough, shortness of breath and wheezing.    Cardiovascular:  Negative for chest pain, palpitations and leg swelling.   Gastrointestinal:  Positive for constipation. Negative for abdominal pain, diarrhea, nausea and vomiting.   Genitourinary:   Negative for dysuria, frequency and urgency.   Musculoskeletal:  Positive for arthralgias. Negative for back pain, joint swelling and neck pain.   Skin:  Negative for color change, rash and wound.   Neurological:  Negative for dizziness, tremors, weakness, light-headedness and headaches.         Objective:      Physical Exam  Vitals reviewed.   Constitutional:       General: She is not in acute distress.     Appearance: She is well-developed.   HENT:      Head: Normocephalic and atraumatic.      Right Ear: External ear normal.      Left Ear: External ear normal.      Nose: Nose normal.   Eyes:      General:         Right eye: No discharge.         Left eye: No discharge.      Conjunctiva/sclera: Conjunctivae normal.   Neck:      Thyroid: No thyromegaly.   Cardiovascular:      Rate and Rhythm: Normal rate and regular rhythm.      Heart sounds: No murmur heard.  Pulmonary:      Effort: Pulmonary effort is normal. No respiratory distress.      Breath sounds: Normal breath sounds.   Abdominal:      Palpations: Abdomen is soft.      Tenderness: There is no abdominal tenderness.   Skin:     General: Skin is warm and dry.   Neurological:      Mental Status: She is alert and oriented to person, place, and time.   Psychiatric:         Behavior: Behavior normal.         Thought Content: Thought content normal.         Assessment:       1. Primary hypertension    2. Mixed hyperlipidemia    3. Aortic atherosclerosis    4. Chronic kidney disease, stage 3a    5. Class 2 severe obesity due to excess calories with serious comorbidity and body mass index (BMI) of 37.0 to 37.9 in adult    6. IFG (impaired fasting glucose)    7. Encounter for screening mammogram for malignant neoplasm of breast    8. Gastroesophageal reflux disease with esophagitis without hemorrhage    9. Hiatal hernia    10. Chronic idiopathic constipation    11. RODRI on CPAP    12. Stage 3b chronic kidney disease    13. Severe persistent asthma without complication         Plan:       1. Primary hypertension  Chronic controlled  Continue medications at same dose--did discussed weaning off aldactone. Consider cutting down to 25mg then follow up nephrology with repeat GFR as planned  Low Na diet  Exercise, weight loss  Check BP and keep log for next visit    -     CBC Auto Differential; Future; Expected date: 07/24/2024  -     Comprehensive Metabolic Panel; Future; Expected date: 07/24/2024  -     TSH; Future; Expected date: 07/24/2024  -     Lipid Panel; Future; Expected date: 07/24/2024  -     Hemoglobin A1C; Future; Expected date: 07/24/2024    2. Mixed hyperlipidemia  Chronic controlled  Cont meds same dose  -     CBC Auto Differential; Future; Expected date: 07/24/2024  -     Comprehensive Metabolic Panel; Future; Expected date: 07/24/2024  -     TSH; Future; Expected date: 07/24/2024  -     Lipid Panel; Future; Expected date: 07/24/2024  -     Hemoglobin A1C; Future; Expected date: 07/24/2024    3. Aortic atherosclerosis  Noted on prior imaging  Cont statin  -     CBC Auto Differential; Future; Expected date: 07/24/2024  -     Comprehensive Metabolic Panel; Future; Expected date: 07/24/2024  -     TSH; Future; Expected date: 07/24/2024  -     Lipid Panel; Future; Expected date: 07/24/2024  -     Hemoglobin A1C; Future; Expected date: 07/24/2024    4. Stage 3b chronic kidney disease  Chronic improving  GFR recovered into the 40s with med changes  Can cut back on aldactone to 25mg (half tablet) and monitor BP and LE edema  Plan for labs March with nephrology then follow up me 6 months with labs--sooner if BP rising. If BP and edema stable with nephrology can stop aldactone  -     CBC Auto Differential; Future; Expected date: 07/24/2024  -     Comprehensive Metabolic Panel; Future; Expected date: 07/24/2024  -     TSH; Future; Expected date: 07/24/2024  -     Lipid Panel; Future; Expected date: 07/24/2024  -     Hemoglobin A1C; Future; Expected date: 07/24/2024    5. Class 2  severe obesity due to excess calories with serious comorbidity and body mass index (BMI) of 37.0 to 37.9 in adult  Chronic improving  Cont GLP-1  Diet, exercise, weight loss  -     CBC Auto Differential; Future; Expected date: 07/24/2024  -     Comprehensive Metabolic Panel; Future; Expected date: 07/24/2024  -     TSH; Future; Expected date: 07/24/2024  -     Lipid Panel; Future; Expected date: 07/24/2024  -     Hemoglobin A1C; Future; Expected date: 07/24/2024    6. IFG (impaired fasting glucose)  [place] stable  Cont GLP-1  Cont weight loss  -     CBC Auto Differential; Future; Expected date: 07/24/2024  -     Comprehensive Metabolic Panel; Future; Expected date: 07/24/2024  -     TSH; Future; Expected date: 07/24/2024  -     Lipid Panel; Future; Expected date: 07/24/2024  -     Hemoglobin A1C; Future; Expected date: 07/24/2024    7. Encounter for screening mammogram for malignant neoplasm of breast  scheduled  -     Mammo Digital Screening Bilat w/ Michael; Future; Expected date: 01/26/2024    8. Gastroesophageal reflux disease with esophagitis without hemorrhage  Chronic stable  Cont PPI    9. Hiatal hernia  Chronic stable  Cont PPI    10. Chronic idiopathic constipation  Chronic uncontrolled  ? GLP-1 exacerbating  Cont GI follow up  Cont amitiza per GI recommendations; failed linzess  -     CBC Auto Differential; Future; Expected date: 07/24/2024  -     Comprehensive Metabolic Panel; Future; Expected date: 07/24/2024  -     TSH; Future; Expected date: 07/24/2024    11. RODRI on CPAP  Chronic stable  CPAP  Weight loss    12. Severe persistent asthma without complication  Chronic stable  No acute exacerbatoin today  Cont meds same dose  Cont pulm follow up as planned       RTC 6 months (labs 3 months with nephrology then follow up with me)

## 2024-02-26 ENCOUNTER — HOSPITAL ENCOUNTER (OUTPATIENT)
Dept: RADIOLOGY | Facility: HOSPITAL | Age: 54
Discharge: HOME | End: 2024-02-26
Attending: INTERNAL MEDICINE
Payer: MEDICARE

## 2024-02-26 VITALS — BODY MASS INDEX: 37.45 KG/M2 | HEIGHT: 66 IN | WEIGHT: 233 LBS

## 2024-02-26 DIAGNOSIS — Z12.31 ENCOUNTER FOR SCREENING MAMMOGRAM FOR MALIGNANT NEOPLASM OF BREAST: ICD-10-CM

## 2024-02-26 PROCEDURE — 77067 SCR MAMMO BI INCL CAD: CPT | Mod: TC,HCNC

## 2024-02-26 PROCEDURE — 77067 SCR MAMMO BI INCL CAD: CPT | Mod: 26,HCNC,, | Performed by: RADIOLOGY

## 2024-02-26 PROCEDURE — 77063 BREAST TOMOSYNTHESIS BI: CPT | Mod: 26,HCNC,, | Performed by: RADIOLOGY

## 2024-03-04 ENCOUNTER — PATIENT MESSAGE (OUTPATIENT)
Dept: GASTROENTEROLOGY | Facility: CLINIC | Age: 54
End: 2024-03-04

## 2024-03-04 ENCOUNTER — OFFICE VISIT (OUTPATIENT)
Dept: GASTROENTEROLOGY | Facility: CLINIC | Age: 54
End: 2024-03-04
Payer: MEDICARE

## 2024-03-04 VITALS
HEIGHT: 66 IN | DIASTOLIC BLOOD PRESSURE: 89 MMHG | BODY MASS INDEX: 37.56 KG/M2 | SYSTOLIC BLOOD PRESSURE: 124 MMHG | HEART RATE: 77 BPM | WEIGHT: 233.69 LBS

## 2024-03-04 DIAGNOSIS — Z86.010 HISTORY OF COLON POLYPS: ICD-10-CM

## 2024-03-04 DIAGNOSIS — K58.1 IRRITABLE BOWEL SYNDROME WITH CONSTIPATION: ICD-10-CM

## 2024-03-04 DIAGNOSIS — K21.00 GASTROESOPHAGEAL REFLUX DISEASE WITH ESOPHAGITIS WITHOUT HEMORRHAGE: Primary | ICD-10-CM

## 2024-03-04 PROCEDURE — 99214 OFFICE O/P EST MOD 30 MIN: CPT | Mod: HCNC,S$GLB,, | Performed by: INTERNAL MEDICINE

## 2024-03-04 PROCEDURE — 3079F DIAST BP 80-89 MM HG: CPT | Mod: HCNC,CPTII,S$GLB, | Performed by: INTERNAL MEDICINE

## 2024-03-04 PROCEDURE — 1159F MED LIST DOCD IN RCRD: CPT | Mod: HCNC,CPTII,S$GLB, | Performed by: INTERNAL MEDICINE

## 2024-03-04 PROCEDURE — 4010F ACE/ARB THERAPY RXD/TAKEN: CPT | Mod: HCNC,CPTII,S$GLB, | Performed by: INTERNAL MEDICINE

## 2024-03-04 PROCEDURE — 3074F SYST BP LT 130 MM HG: CPT | Mod: HCNC,CPTII,S$GLB, | Performed by: INTERNAL MEDICINE

## 2024-03-04 PROCEDURE — 3044F HG A1C LEVEL LT 7.0%: CPT | Mod: HCNC,CPTII,S$GLB, | Performed by: INTERNAL MEDICINE

## 2024-03-04 PROCEDURE — 99999 PR PBB SHADOW E&M-EST. PATIENT-LVL V: CPT | Mod: PBBFAC,HCNC,, | Performed by: INTERNAL MEDICINE

## 2024-03-04 PROCEDURE — 1160F RVW MEDS BY RX/DR IN RCRD: CPT | Mod: HCNC,CPTII,S$GLB, | Performed by: INTERNAL MEDICINE

## 2024-03-04 PROCEDURE — 3008F BODY MASS INDEX DOCD: CPT | Mod: HCNC,CPTII,S$GLB, | Performed by: INTERNAL MEDICINE

## 2024-03-04 RX ORDER — LUBIPROSTONE 24 UG/1
24 CAPSULE ORAL 2 TIMES DAILY
Qty: 30 CAPSULE | Refills: 3 | Status: SHIPPED | OUTPATIENT
Start: 2024-03-04

## 2024-03-04 NOTE — PROGRESS NOTES
Subjective:       Patient ID: Arron Ron is a 53 y.o. female.    Chief Complaint: Follow-up (Medication not helping)    HPI    Follow-up visit.  My 1st visit with her was in November of 2023 but she is already seen multiple GI doctors.  She has a complicated history.  She has a gastric sleeve with a hiatal hernia repair.  But the hiatal hernia recurred.  She has a fairly large hiatal hernia 5 cm and did have proven reflux esophagitis.  But we done a follow-up EGD and have proven that on her current regimen the esophagitis has healed.  She also has a very long history of constipation.  She says the constipation even predates her use of Mounjaro.  On my last visit the constipation was refractory or nonresponsive to Linzess.  Therefore we started Amitiza.  Unfortunately the Amitiza in the lower dosage did not help her.  So she is back to using Dulcolax.  Currently she goes 2-5 days without having a bowel movement until she does the Dulcolax which causes a prompt evacuation.  She does complain of fullness in the mid abdomen and associated abdominal pain.  This is relieved with a bowel movement.  She continues with reflux.  But in general that does fairly well if she avoids the foods which aggravate it.  She is taken over-the-counter redd and she feels like that helps with nausea and reflux.  She does have nausea and occasional vomiting.  She describes early satiety as well.      Social history  Lots of water   No soda   Small meals in general  Activity is limited because of the a bad knee but she has follow-up with orthopedic surgery about possible knee replacement    Past Medical History:   Diagnosis Date    Acid reflux     Anemia     Back pain     was under therapy    Chronic back pain     Constipation     Elevated cholesterol     Encounter for blood transfusion 1987    Endometriosis     Hyperlipidemia     Hypertension     Nausea & vomiting     Sleep apnea     Tendonitis of wrist, right        Review of  patient's allergies indicates:  No Known Allergies     Family History   Problem Relation Age of Onset    Hypertension Mother     Heart attack Mother     Hypertension Brother     Heart disease Maternal Grandmother     Hypertension Maternal Grandmother     Diabetes Maternal Aunt     Breast cancer Neg Hx     Colon cancer Neg Hx     Ovarian cancer Neg Hx     Esophageal cancer Neg Hx        Social History     Tobacco Use    Smoking status: Never    Smokeless tobacco: Never   Substance Use Topics    Alcohol use: Yes     Comment: rare    Drug use: No        Review of Systems        No results found for this or any previous visit from the past 365 days.             Objective:      Physical Exam    Assessment & Plan:       Gastroesophageal reflux disease with esophagitis without hemorrhage    Irritable bowel syndrome with constipation    History of colon polyps    Other orders  -     lubiprostone (AMITIZA) 24 MCG Cap; Take 1 capsule (24 mcg total) by mouth 2 (two) times daily.  Dispense: 30 capsule; Refill: 3     Assessment.  Multiple GI issues in this 53-year-old lady.  She does have significant gastroesophageal reflux.  It is certainly possible Mounjaro was making that worse with a component of gastroparesis.  And her symptoms certainly sound like a component of gastroparesis.  She does have the prior sleeve surgery.  She has a documented hiatal hernia.  I do not think a repeat surgery is going to be effective.  For now we should just continue the diet and continue the aggressive twice daily PPI dosing.  With regards to the constipation again it is possible Mounjaro was making it worse but she feels like it is the same as before she was taking that.  I will try the higher dose of Amitiza.  But if it does not work over the next 2-3 weeks I have asked her to get back in touch with me via the portal.  We can certainly change then to either Trulance or Motegrity.  She does have a history of colon polyps with last exam being 3  years ago it was just a single small polyp at that time and it was a good prep.  I reviewed those details today.  She does not need a follow-up colonoscopy until at least 2 years from now.    This note was created with voice recognition dictation technology.  There may be errors that I did not see, detect or correct.      Ervin Hyman MD

## 2024-03-04 NOTE — PROGRESS NOTES
"GENERAL GI PATIENT INTAKE:    COVID symptoms in the last 7 days (runny nose, sore throat, congestion, cough, fever): No  PCP: Mahi Rojas  If not PCP-  number given to establish [phone]: N/A    ALLERGIES REVIEWED:  Yes    CHIEF COMPLAINT:    Chief Complaint   Patient presents with    Follow-up     Medication not helping       VITAL SIGNS:  /89   Pulse 77   Ht 5' 6" (1.676 m)   Wt 106 kg (233 lb 11 oz)   LMP 09/21/2015   BMI 37.72 kg/m²      Change in medical, surgical, family or social history: No      REVIEWED MEDICATION LIST RECONCILED INCLUDING ABOVE MEDS:  Yes     "

## 2024-03-05 ENCOUNTER — OFFICE VISIT (OUTPATIENT)
Dept: ORTHOPEDICS | Facility: CLINIC | Age: 54
End: 2024-03-05
Payer: MEDICARE

## 2024-03-05 VITALS — WEIGHT: 233 LBS | BODY MASS INDEX: 37.45 KG/M2 | HEIGHT: 66 IN

## 2024-03-05 DIAGNOSIS — Z96.652 STATUS POST TOTAL LEFT KNEE REPLACEMENT: Primary | ICD-10-CM

## 2024-03-05 DIAGNOSIS — M17.12 PRIMARY OSTEOARTHRITIS OF LEFT KNEE: Primary | ICD-10-CM

## 2024-03-05 PROCEDURE — 1159F MED LIST DOCD IN RCRD: CPT | Mod: HCNC,CPTII,S$GLB, | Performed by: ORTHOPAEDIC SURGERY

## 2024-03-05 PROCEDURE — 1160F RVW MEDS BY RX/DR IN RCRD: CPT | Mod: HCNC,CPTII,S$GLB, | Performed by: ORTHOPAEDIC SURGERY

## 2024-03-05 PROCEDURE — 3044F HG A1C LEVEL LT 7.0%: CPT | Mod: HCNC,CPTII,S$GLB, | Performed by: ORTHOPAEDIC SURGERY

## 2024-03-05 PROCEDURE — 99999 PR PBB SHADOW E&M-EST. PATIENT-LVL II: CPT | Mod: PBBFAC,HCNC,, | Performed by: ORTHOPAEDIC SURGERY

## 2024-03-05 PROCEDURE — 3008F BODY MASS INDEX DOCD: CPT | Mod: HCNC,CPTII,S$GLB, | Performed by: ORTHOPAEDIC SURGERY

## 2024-03-05 PROCEDURE — 4010F ACE/ARB THERAPY RXD/TAKEN: CPT | Mod: HCNC,CPTII,S$GLB, | Performed by: ORTHOPAEDIC SURGERY

## 2024-03-05 PROCEDURE — 99214 OFFICE O/P EST MOD 30 MIN: CPT | Mod: HCNC,S$GLB,, | Performed by: ORTHOPAEDIC SURGERY

## 2024-03-05 NOTE — PROGRESS NOTES
Subjective:      Patient ID: Arron Ron is a 53 y.o. female.    Chief Complaint: Follow-up of the Left Knee    HPI    The patient reports increasing pain in the left knee.  She got some relief from the last injection, but reports that she still has pain with all community ambulation.  The patient states that the injections are working less and less over time.  She can not take NSAIDs because of renal concerns.          Review of Systems   Constitutional: Negative for fever and weight loss.   HENT:  Negative for congestion.    Eyes:  Negative for visual disturbance.   Cardiovascular:  Negative for chest pain.   Respiratory:  Negative for shortness of breath.    Hematologic/Lymphatic: Negative for bleeding problem. Does not bruise/bleed easily.   Skin:  Negative for poor wound healing.   Musculoskeletal:  Positive for joint pain.   Gastrointestinal:  Negative for abdominal pain.   Genitourinary:  Negative for dysuria.   Neurological:  Negative for seizures.   Psychiatric/Behavioral:  Negative for altered mental status.    Allergic/Immunologic: Negative for persistent infections.         Objective:      Ortho/SPM Exam      Left knee    Alert and oriented, no acute distress  Body habitus is obese  Sclera are clear  No respiratory distress  Hip irritability  negative.   The skin over the knee is intact.  Knee effusion trace  Palpation- nontender  Range of motion- Flexion 90 deg, Extension 0 deg,   Ligament laxity exam:  2+ valgus laxity   Patellar apprehension negative.  Popliteal cyst negative  Patellar crepitation absent.  Meniscal irritability not applicable  Pulses DP present, PT present.  Motor normal 5/5 strength in all tested muscle groups.   Sensory normal.    Left knee radiographs show near complete medial narrowing with osteophytes and sclerosis, Kellgren stage III          Assessment:       Encounter Diagnosis   Name Primary?    Primary osteoarthritis of left knee Yes        Based on the decreasing  range of motion, there is evidence of clinical progression.  The patient has significant pain and functional impairment that is no longer controlled nonsurgically.  The only intervention likely to be of significant benefit is arthroplasty          Plan:       Arron was seen today for follow-up.    Diagnoses and all orders for this visit:    Primary osteoarthritis of left knee          I explained my diagnostic impression and the reasoning behind it in detail, using layman's terms.      Treatment options were discussed. The surgical process of left knee replacement was discussed in detail with the patient including a detailed discussion of the procedure itself (including visual model, x-ray review, and literature review).  I explained that patellar resurfacing is an optional part of the procedure, and that I would make an intraoperative decision as to whether or not it is necessary.  The typical perioperative and post-operative course was discussed and perioperative risks were discussed to the patient's satisfaction.  Risks and complications discussed included but were not limited to the risks of anesthetic complications, infection, bleeding, wound healing complications, stiffness, aseptic loosening, instability, limb length inequality, neurologic dysfunction including numbness and weakness, additional surgery,  DVT, pulmonary embolism, perioperative medical risks (cardiac, pulmonary, renal, neurologic), and death and the patient elects to proceed.

## 2024-03-06 ENCOUNTER — TELEPHONE (OUTPATIENT)
Dept: ORTHOPEDICS | Facility: CLINIC | Age: 54
End: 2024-03-06
Payer: MEDICARE

## 2024-03-12 DIAGNOSIS — M17.12 PRIMARY OSTEOARTHRITIS OF LEFT KNEE: Primary | ICD-10-CM

## 2024-03-12 RX ORDER — SODIUM CHLORIDE 0.9 % (FLUSH) 0.9 %
3 SYRINGE (ML) INJECTION EVERY 8 HOURS
OUTPATIENT
Start: 2024-03-12

## 2024-03-12 RX ORDER — CEFAZOLIN SODIUM 2 G/50ML
2 SOLUTION INTRAVENOUS
OUTPATIENT
Start: 2024-03-12

## 2024-03-12 RX ORDER — NAPROXEN 500 MG/1
500 TABLET ORAL
OUTPATIENT
Start: 2024-03-12 | End: 2024-03-12

## 2024-03-12 RX ORDER — MUPIROCIN 20 MG/G
OINTMENT TOPICAL
OUTPATIENT
Start: 2024-03-12

## 2024-03-12 RX ORDER — ACETAMINOPHEN 500 MG
1000 TABLET ORAL
OUTPATIENT
Start: 2024-03-12 | End: 2024-03-12

## 2024-05-17 NOTE — PROGRESS NOTES
Subjective:      Patient ID: Arron Ron is a 53 y.o. female.    Chief Complaint: Left knee pain     HPI: Arron Ron is a 53 y.o. female who presents to the clinic today for a patient optimization visit in preparation for a left total knee arthroplasty to be performed by Dr. Sauceda on 06/10/2024. She has a significant history of left knee pain.  Pain is worse with activity and weight bearing.  Patient has experienced interference of ADLs due to increased pain and decreased range of motion. Patient has failed non-operative treatment including NSAIDs, activity modification, weight loss, and corticosteroid injections. Arron Ron ambulates without an assistive device.. She was last seen and treated in the clinic on 3/5/2024. There has been no significant change in medical status since last visit.    Ambulating: unassisted  Diabetic:  Yes (most recent Hemoglobin A1C: 5.8) currently on Mounjaro.   Smoking:  She has never smoked.  History of DVT/PE: Negative      PMHx significant for:         - DM type 2 (on Mounjaro)        - RODRI on CPAP        - Aortic Arthrosclerosis        - HTN        - HLD        - CKD        - GERD        - Asthma        - Bilateral lower extremity edema      PAST MEDICAL HISTORY:    Past Medical History:   Diagnosis Date    Acid reflux     Anemia     Back pain     was under therapy    Chronic back pain     Constipation     Elevated cholesterol     Encounter for blood transfusion 1987    Endometriosis     Hyperlipidemia     Hypertension     Nausea & vomiting     Sleep apnea     Tendonitis of wrist, right      PAST SURGICAL HISTORY:    Past Surgical History:   Procedure Laterality Date    BREAST BIOPSY      left , benign    CHOLECYSTECTOMY      COLONOSCOPY  2017        COLONOSCOPY N/A 4/29/2021    Procedure: COLONOSCOPY;  Surgeon: Norbert Garcia MD;  Location: [facility];  Service: Colon and Rectal;  Laterality: N/A;    DILATION AND CURETTAGE OF UTERUS       ENDOMETRIAL ABLATION      ESOPHAGEAL MANOMETRY WITH MEASUREMENT OF IMPEDANCE N/A 7/13/2022    Procedure: MANOMETRY, ESOPHAGUS, WITH IMPEDANCE MEASUREMENT;  Surgeon: Alan Gomez MD;  Location: [facility] (4TH FLR);  Service: Endoscopy;  Laterality: N/A;  Rapid not vaccinated  instructions mailed    ESOPHAGOGASTRODUODENOSCOPY N/A 4/1/2022    Procedure: EGD (ESOPHAGOGASTRODUODENOSCOPY);  Surgeon: Diego Spear MD;  Location: [facility] (4TH FLR);  Service: Endoscopy;  Laterality: N/A;  rapid covid test arrival is 12pm -   instructions handed -     ESOPHAGOGASTRODUODENOSCOPY N/A 7/20/2022    Procedure: ESOPHAGOGASTRODUODENOSCOPY (EGD);  Surgeon: Diego Spear MD;  Location: [facility] (2ND FLR);  Service: Endoscopy;  Laterality: N/A;  Rapid-not vaccinated  instructions mailed    [facility]      GASTRIC BYPASS  02/18/2020    HYSTERECTOMY  10/1/15    [facility]- bleeding/ endometriosis    OOPHORECTOMY      TUBAL LIGATION      UPPER GASTROINTESTINAL ENDOSCOPY  [address] [place] [address]      FAMILY HISTORY:    Family History   Problem Relation Name Age of Onset    Hypertension Mother      Heart attack Mother      Hypertension Brother      Heart disease Maternal Grandmother      Hypertension Maternal Grandmother      Diabetes Maternal Aunt      Breast cancer Neg Hx      Colon cancer Neg Hx      Ovarian cancer Neg Hx      Esophageal cancer Neg Hx       SOCIAL HISTORY:    Social History     Occupational History    Not on file   Tobacco Use    Smoking status: Never    Smokeless tobacco: Never   Substance and Sexual Activity    Alcohol use: Yes     Comment: rare    Drug use: No    Sexual activity: Yes     Partners: Male     Birth control/protection: Surgical, See Surgical Hx     Comment: --BTL      MEDICATIONS:   Current Outpatient Medications:     albuterol (PROAIR HFA) 90 mcg/actuation inhaler, Inhale 2 puffs into the lungs every 6 (six) hours as needed for Wheezing. Rescue, Disp: 18 g, Rfl: 3    ALLERGY RELIEF, CETIRIZINE, 10 mg tablet,  Take 1 tablet (10 mg total) by mouth once daily., Disp: 30 tablet, Rfl: 11    ALPRAZolam (XANAX) 2 MG Tab, Take 2 mg by mouth every evening. , Disp: , Rfl:     amLODIPine (NORVASC) 10 MG tablet, Take 1 tablet (10 mg total) by mouth once daily., Disp: 90 tablet, Rfl: 1    aspirin (ECOTRIN) 81 MG EC tablet, Take 81 mg by mouth every Mon, Wed, Fri. , Disp: , Rfl:     atorvastatin (LIPITOR) 20 MG tablet, TAKE ONE TABLET BY MOUTH ONCE A DAY IN THE EVENING, Disp: , Rfl:     budesonide-formoterol 80-4.5 mcg (SYMBICORT) 80-4.5 mcg/actuation HFAA, Inhale 2 puffs into the lungs every 12 (twelve) hours as needed (cough, wheeze, shortness of breath). If requiring symbicort use >2-3x weekly then start 2 puffs 2x daily, Disp: 10.2 g, Rfl: 11    chlorhexidine (PERIDEX) 0.12 % solution, 15 mLs 2 (two) times daily., Disp: , Rfl:     cyclobenzaprine (FLEXERIL) 10 MG tablet, TAKE ONE TABLET BY MOUTH THREE TIMES DAILY AS NEEDED FOR MUSCLE SPASMS, Disp: 60 tablet, Rfl: 0    diclofenac sodium (VOLTAREN) 1 % Gel, APPLY 2 GRAMS TOPICALLY TO THE AFFECTED AREA TWICE DAILY AS DIRECTED, Disp: 100 g, Rfl: 0    dorzolamide (TRUSOPT) 2 % ophthalmic solution, Place 1 drop into the right eye 2 (two) times daily., Disp: , Rfl:     doxazosin (CARDURA) 1 MG tablet, Take 1 mg by mouth once daily., Disp: , Rfl:     ergocalciferol, vitamin D2, (VITAMIN D ORAL), Take 2,000 Units by mouth once daily. , Disp: , Rfl:     fish oil-omega-3 fatty acids 300-1,000 mg capsule, Take by mouth once daily., Disp: , Rfl:     fluticasone propionate (FLONASE) 50 mcg/actuation nasal spray, 1 spray (50 mcg total) by Each Nostril route once daily., Disp: 16 g, Rfl: 3    gabapentin (NEURONTIN) 100 MG capsule, TAKE ONE CAPSULE BY MOUTH ONCE A DAY IN THE EVENING, Disp: 30 capsule, Rfl: 11    KENALOG 40 mg/mL injection, , Disp: , Rfl:     latanoprost 0.005 % ophthalmic solution, 1 DROP TO BOTH EYES ONCE A DAY, Disp: , Rfl:     lubiprostone (AMITIZA) 24 MCG Cap, Take 1 capsule  (24 mcg total) by mouth 2 (two) times daily., Disp: 30 capsule, Rfl: 3    MOUNJARO 15 mg/0.5 mL PnIj, INJECT 1 PEN SUBCUTANEOUSLY ONCE A WEEK, Disp: , Rfl:     NASCOBAL 500 mcg/spray nasal spray, SMARTSIG:Both Nares, Disp: , Rfl:     nortriptyline (PAMELOR) 25 MG capsule, Take 1 capsule (25 mg total) by mouth every evening., Disp: 90 capsule, Rfl: 1    pantoprazole (PROTONIX) 40 MG tablet, TAKE ONE TABLET BY MOUTH TWICE DAILY, Disp: 180 tablet, Rfl: 1    RESTASIS 0.05 % ophthalmic emulsion, , Disp: , Rfl:     spironolactone (ALDACTONE) 50 MG tablet, spironolactone 50 mg tablet, Disp: , Rfl:     timolol maleate 0.5% (TIMOPTIC) 0.5 % Drop, Place 1 drop into both eyes 2 (two) times daily., Disp: , Rfl:     torsemide (DEMADEX) 20 MG Tab, Take 10 mg by mouth as needed., Disp: , Rfl:     traMADoL (ULTRAM) 50 mg tablet, Take 50 mg by mouth every 4 to 6 hours as needed., Disp: , Rfl:     valsartan (DIOVAN) 160 MG tablet, Take 160 mg by mouth., Disp: , Rfl:     ondansetron (ZOFRAN) 4 MG tablet, Take 1 tablet (4 mg total) by mouth every 8 (eight) hours as needed for Nausea., Disp: 30 tablet, Rfl: 0    ALLERGIES:   Review of patient's allergies indicates:  No Known Allergies    Review of Systems:  Constitution: Negative for chills, fever and night sweats.   HENT: Negative for congestion and headaches.    Eyes: Negative for blurred vision or vision loss.  Cardiovascular: Negative for chest pain and syncope.   Respiratory: Negative for cough and shortness of breath.    Endocrine: Negative for polydipsia, polyphagia and polyuria.   Hematologic/Lymphatic: Negative for bleeding problem. Does not bruise/bleed easily.   Skin: Negative for dry skin, itching and rash.   Musculoskeletal: See HPI.   Gastrointestinal: Negative for abdominal pain and bowel incontinence.   Genitourinary: Negative for bladder incontinence and nocturia.   Neurological: Negative for disturbances in coordination, loss of balance and seizures.    Psychiatric/Behavioral: Negative for depression. The patient does not have insomnia.    Allergic/Immunologic: Negative for hives and persistent infections.        Objective:      There were no vitals filed for this visit.    PHYSICAL EXAM:  General: Alert & oriented x3, well-developed and well-nourished, in no acute distress, sitting comfortably in the exam room.  Skin: Warm and dry. Capillary refill less than 2 seconds.   Head: Normocephalic and atraumatic.   Eyes: Sclera appear normal.   Nose: No deformities seen.   Ears: No deformities seen.   Neck: No tracheal deviation present.   Pulmonary/Chest: Breathing unlabored. Breath sounds normal.  Cardiovascular: Normal rate and regular rhythm.   Abdominal: Soft and nondistended.  Neurological: Alert and oriented to person, place, and time.   Psychiatric: Mood is pleasant and affect appropriate.     LEFT KNEE        Body habitus is obese.         The patient walks with a limp.        Hip irritability:  negative.         The skin over the knee is intact.        Knee effusion:  trace         Tenderness:  no local tenderness.        Range of Motion:  Flexion 90°, Extension 0°        Ligament exam:  2+ valgus laxity         Patellar apprehension:  negative.        Popliteal cyst:  negative.        Patellar crepitation:  absent.        Pulses DP present, PT present.        Motor:  normal 5/5 strength in all tested muscle groups.         Sensory:  normal.    Lab Review:         CBC from 01/19/2024 reviewed: stable        CMP from 01/19/2024 reviewed: stable        Hemoglobin A1C from 01/19/2024 reviewed: 5.8    Imaging:         Dr. Sauceda previous imaging read: Near complete medial narrowing with osteophytes and sclerosis. Findings consistent with Kellgren-Bala stage III.         Assessment:       1. Primary osteoarthritis of left knee    2. Preop examination    3. Elective surgery    4. Type 2 diabetes mellitus with unspecified complications         Plan:       Orders  Placed This Encounter    WALKER FOR HOME USE    CBC Auto Differential    Comprehensive Metabolic Panel    Hemoglobin A1C    Ambulatory referral/consult to Home Health    EKG 12-lead    ondansetron (ZOFRAN) 4 MG tablet     Left total knee arthroplasty scheduled for 06/10/2024.    Patient has been cleared by pulmonology (Amisha Cuevas, NP) to proceed with total knee arthroplasty.  Per pulmonology: Ms. Ron is at low risk for pulmonary complications related to left TKA.  She was advised to take Symbicort 2 puffs on the morning of surgery and pack Symbicort inhaler/CPAP machine with her in case of admission.  She may benefit from nebulizer treatments perioperatively and CPAP postoperatively.    Patient has been cleared by cardiology (Dr. Blair Mays at [facility]) to proceed with total knee arthroplasty.  Per cardiology:  Ms. Ron is an intermediate risk for left total knee arthroplasty.  Cardiac clearance and EKG (dated 05/14/2024) scanned into media tab.      Discharge planning was discussed and patient plans to go home the same day of surgery if possible.    Post-op Home Health and DME has been ordered including standard walker.     During today's Patient Optimization Visit all consultant notes, medications, imaging, EKG, and lab work has been reviewed. Discharge planning was discussed and Home Health has been ordered. Any additional testing or consults needed for medical clearance has been ordered. Pre, mandeep, and post operative procedures and expectations discussed. All questions were answered. Arron Ron will contact us if there are any questions, concerns, or changes in medical status prior to surgery.    30 minutes were spent on this encounter. This includes face to face time and non-face to face time preparing to see the patient (eg, review of tests), obtaining and/or reviewing separately obtained history, documenting clinical information in the electronic or  other health record, independently interpreting results and communicating results to the patient/family/caregiver, or care coordinator.     Walker Justification: The mobility limitation cannot be sufficiently resolved by the use of a cane.   Patient's functional mobility deficit can be sufficiently resolved with the use of a (Rolling Walker or Walker).  Patient's mobility limitation significantly impairs their ability to participate in one of more activities of daily living.  The use of a (RW or Walker) will significantly improve the patient's ability to participate in MRADLS and the patient will use it on regular basis in the home.     All of the patient's questions were answered and the patient will contact us if they have any questions or concerns in the interim.      Landy Zamorano PA-C  [facility]  Orthopedic Surgery    Medical Dictation software was used during the dictation of portions or the entirety of this medical record.  Phonetic or grammatic errors may exist due to the use of this software. For clarification, refer to the author of the document.

## 2024-05-21 ENCOUNTER — OFFICE VISIT (OUTPATIENT)
Dept: ORTHOPEDICS | Facility: CLINIC | Age: 54
End: 2024-05-21
Payer: MEDICARE

## 2024-05-21 DIAGNOSIS — E11.8 TYPE 2 DIABETES MELLITUS WITH UNSPECIFIED COMPLICATIONS: ICD-10-CM

## 2024-05-21 DIAGNOSIS — Z41.9 ELECTIVE SURGERY: ICD-10-CM

## 2024-05-21 DIAGNOSIS — M17.12 PRIMARY OSTEOARTHRITIS OF LEFT KNEE: Primary | ICD-10-CM

## 2024-05-21 DIAGNOSIS — Z01.818 PREOP EXAMINATION: ICD-10-CM

## 2024-05-21 PROCEDURE — 1159F MED LIST DOCD IN RCRD: CPT | Mod: HCNC,CPTII,S$GLB,

## 2024-05-21 PROCEDURE — 99999 PR PBB SHADOW E&M-EST. PATIENT-LVL II: CPT | Mod: PBBFAC,HCNC,,

## 2024-05-21 PROCEDURE — 3044F HG A1C LEVEL LT 7.0%: CPT | Mod: HCNC,CPTII,S$GLB,

## 2024-05-21 PROCEDURE — 99214 OFFICE O/P EST MOD 30 MIN: CPT | Mod: HCNC,S$GLB,,

## 2024-05-21 PROCEDURE — 4010F ACE/ARB THERAPY RXD/TAKEN: CPT | Mod: HCNC,CPTII,S$GLB,

## 2024-05-21 PROCEDURE — 1160F RVW MEDS BY RX/DR IN RCRD: CPT | Mod: HCNC,CPTII,S$GLB,

## 2024-05-21 RX ORDER — ONDANSETRON 4 MG/1
4 TABLET, FILM COATED ORAL EVERY 8 HOURS PRN
Qty: 30 TABLET | Refills: 0 | Status: SHIPPED | OUTPATIENT
Start: 2024-05-21

## 2024-05-27 ENCOUNTER — LAB VISIT (OUTPATIENT)
Dept: LAB | Facility: HOSPITAL | Age: 54
End: 2024-05-27
Payer: MEDICARE

## 2024-05-27 DIAGNOSIS — Z41.9 ELECTIVE SURGERY: ICD-10-CM

## 2024-05-27 DIAGNOSIS — M17.12 PRIMARY OSTEOARTHRITIS OF LEFT KNEE: ICD-10-CM

## 2024-05-27 DIAGNOSIS — Z01.818 PREOP EXAMINATION: ICD-10-CM

## 2024-05-27 DIAGNOSIS — E78.49 OTHER HYPERLIPIDEMIA: ICD-10-CM

## 2024-05-27 DIAGNOSIS — E11.8 TYPE 2 DIABETES MELLITUS WITH UNSPECIFIED COMPLICATIONS: ICD-10-CM

## 2024-05-27 LAB
ALBUMIN SERPL BCP-MCNC: 3.8 G/DL (ref 3.5–5.2)
ALP SERPL-CCNC: 80 U/L (ref 55–135)
ALT SERPL W/O P-5'-P-CCNC: 10 U/L (ref 10–44)
ANION GAP SERPL CALC-SCNC: 6 MMOL/L (ref 8–16)
AST SERPL-CCNC: 14 U/L (ref 10–40)
BASOPHILS # BLD AUTO: 0.07 K/UL (ref 0–0.2)
BASOPHILS NFR BLD: 1.3 % (ref 0–1.9)
BILIRUB SERPL-MCNC: 0.3 MG/DL (ref 0.1–1)
BUN SERPL-MCNC: 19 MG/DL (ref 6–20)
CALCIUM SERPL-MCNC: 9.6 MG/DL (ref 8.7–10.5)
CHLORIDE SERPL-SCNC: 108 MMOL/L (ref 95–110)
CO2 SERPL-SCNC: 26 MMOL/L (ref 23–29)
CREAT SERPL-MCNC: 1.2 MG/DL (ref 0.5–1.4)
DIFFERENTIAL METHOD BLD: ABNORMAL
EOSINOPHIL # BLD AUTO: 0.1 K/UL (ref 0–0.5)
EOSINOPHIL NFR BLD: 2 % (ref 0–8)
ERYTHROCYTE [DISTWIDTH] IN BLOOD BY AUTOMATED COUNT: 14.4 % (ref 11.5–14.5)
EST. GFR  (NO RACE VARIABLE): 54 ML/MIN/1.73 M^2
ESTIMATED AVG GLUCOSE: 111 MG/DL (ref 68–131)
GLUCOSE SERPL-MCNC: 81 MG/DL (ref 70–110)
HBA1C MFR BLD: 5.5 % (ref 4–5.6)
HCT VFR BLD AUTO: 34.4 % (ref 37–48.5)
HGB BLD-MCNC: 11.5 G/DL (ref 12–16)
IMM GRANULOCYTES # BLD AUTO: 0.01 K/UL (ref 0–0.04)
IMM GRANULOCYTES NFR BLD AUTO: 0.2 % (ref 0–0.5)
LYMPHOCYTES # BLD AUTO: 2.4 K/UL (ref 1–4.8)
LYMPHOCYTES NFR BLD: 44.1 % (ref 18–48)
MCH RBC QN AUTO: 29.5 PG (ref 27–31)
MCHC RBC AUTO-ENTMCNC: 33.4 G/DL (ref 32–36)
MCV RBC AUTO: 88 FL (ref 82–98)
MONOCYTES # BLD AUTO: 0.5 K/UL (ref 0.3–1)
MONOCYTES NFR BLD: 8.6 % (ref 4–15)
NEUTROPHILS # BLD AUTO: 2.4 K/UL (ref 1.8–7.7)
NEUTROPHILS NFR BLD: 43.8 % (ref 38–73)
NRBC BLD-RTO: 0 /100 WBC
PLATELET # BLD AUTO: 253 K/UL (ref 150–450)
PMV BLD AUTO: 11.1 FL (ref 9.2–12.9)
POTASSIUM SERPL-SCNC: 3.9 MMOL/L (ref 3.5–5.1)
PROT SERPL-MCNC: 7.3 G/DL (ref 6–8.4)
RBC # BLD AUTO: 3.9 M/UL (ref 4–5.4)
SODIUM SERPL-SCNC: 140 MMOL/L (ref 136–145)
TSH SERPL DL<=0.005 MIU/L-ACNC: 2.88 UIU/ML (ref 0.4–4)
WBC # BLD AUTO: 5.38 K/UL (ref 3.9–12.7)

## 2024-05-27 PROCEDURE — 84443 ASSAY THYROID STIM HORMONE: CPT | Mod: HCNC | Performed by: INTERNAL MEDICINE

## 2024-05-27 PROCEDURE — 85025 COMPLETE CBC W/AUTO DIFF WBC: CPT | Mod: HCNC

## 2024-05-27 PROCEDURE — 80053 COMPREHEN METABOLIC PANEL: CPT | Mod: HCNC

## 2024-05-27 PROCEDURE — 83036 HEMOGLOBIN GLYCOSYLATED A1C: CPT | Mod: HCNC

## 2024-05-27 PROCEDURE — 36415 COLL VENOUS BLD VENIPUNCTURE: CPT | Mod: HCNC | Performed by: INTERNAL MEDICINE

## 2024-05-29 ENCOUNTER — TELEPHONE (OUTPATIENT)
Dept: INTERNAL MEDICINE | Facility: CLINIC | Age: 54
End: 2024-05-29
Payer: MEDICARE

## 2024-05-29 DIAGNOSIS — D64.9 NORMOCYTIC ANEMIA: Primary | ICD-10-CM

## 2024-05-29 NOTE — TELEPHONE ENCOUNTER
----- Message from Yumiko Gan sent at 2024  1:19 PM CDT -----  Contact: Pt  Arron Ron  MRN: [id number]  : 1970  PCP: Mahi Rojas  Home Phone      [phone]  Work Phone      Not on file.  Mobile          [phone]      MESSAGE:     Pt would like iron pills sent in [employer] and would like a a new appt scheduled due to missing appt today.         Please advise   [phone]

## 2024-05-29 NOTE — TELEPHONE ENCOUNTER
Pt missed appt today, but would like iron tablets sent to pharmacy for her. States she is anemic and she hasn't been feeling well even at appt in January (her numbers are right around the same as January). Pt is scheduled to have sx on 6/10 and states she also needed to see you for sx clearance, but she states she's been cleared. Pt is scheduled to see y ou 6/7/2024.

## 2024-05-29 NOTE — TELEPHONE ENCOUNTER
Since I didn't get to see her today I went ahead and ordered the iron labs. She can do them anytime before her visit in June and we can discuss treatment then.

## 2024-05-31 ENCOUNTER — LAB VISIT (OUTPATIENT)
Dept: LAB | Facility: HOSPITAL | Age: 54
End: 2024-05-31
Attending: INTERNAL MEDICINE
Payer: MEDICARE

## 2024-05-31 DIAGNOSIS — D64.9 NORMOCYTIC ANEMIA: ICD-10-CM

## 2024-05-31 LAB
FERRITIN SERPL-MCNC: 177 NG/ML (ref 20–300)
IRON SERPL-MCNC: 59 UG/DL (ref 30–160)
SATURATED IRON: 21 % (ref 20–50)
TOTAL IRON BINDING CAPACITY: 275 UG/DL (ref 250–450)
TRANSFERRIN SERPL-MCNC: 186 MG/DL (ref 200–375)

## 2024-05-31 PROCEDURE — 36415 COLL VENOUS BLD VENIPUNCTURE: CPT | Mod: HCNC | Performed by: INTERNAL MEDICINE

## 2024-05-31 PROCEDURE — 83540 ASSAY OF IRON: CPT | Mod: HCNC | Performed by: INTERNAL MEDICINE

## 2024-05-31 PROCEDURE — 82728 ASSAY OF FERRITIN: CPT | Mod: HCNC | Performed by: INTERNAL MEDICINE

## 2024-06-06 PROBLEM — J44.9 COPD (CHRONIC OBSTRUCTIVE PULMONARY DISEASE): Status: ACTIVE | Noted: 2021-04-08

## 2024-06-06 PROBLEM — E11.8 TYPE 2 DIABETES MELLITUS WITH UNSPECIFIED COMPLICATIONS: Status: ACTIVE | Noted: 2024-06-06

## 2024-06-07 ENCOUNTER — TELEPHONE (OUTPATIENT)
Dept: ORTHOPEDICS | Facility: CLINIC | Age: 54
End: 2024-06-07
Payer: MEDICARE

## 2024-06-07 ENCOUNTER — OFFICE VISIT (OUTPATIENT)
Dept: INTERNAL MEDICINE | Facility: CLINIC | Age: 54
End: 2024-06-07
Payer: MEDICARE

## 2024-06-07 VITALS
HEART RATE: 87 BPM | WEIGHT: 228.38 LBS | HEIGHT: 66 IN | RESPIRATION RATE: 18 BRPM | OXYGEN SATURATION: 98 % | SYSTOLIC BLOOD PRESSURE: 108 MMHG | DIASTOLIC BLOOD PRESSURE: 70 MMHG | BODY MASS INDEX: 36.7 KG/M2

## 2024-06-07 DIAGNOSIS — I70.0 AORTIC ATHEROSCLEROSIS: ICD-10-CM

## 2024-06-07 DIAGNOSIS — N18.31 TYPE 2 DIABETES MELLITUS WITH STAGE 3A CHRONIC KIDNEY DISEASE, WITHOUT LONG-TERM CURRENT USE OF INSULIN: ICD-10-CM

## 2024-06-07 DIAGNOSIS — G47.33 OSA ON CPAP: ICD-10-CM

## 2024-06-07 DIAGNOSIS — E78.49 OTHER HYPERLIPIDEMIA: ICD-10-CM

## 2024-06-07 DIAGNOSIS — D64.9 NORMOCHROMIC ANEMIA: ICD-10-CM

## 2024-06-07 DIAGNOSIS — J45.20 MILD INTERMITTENT ASTHMA WITHOUT COMPLICATION: ICD-10-CM

## 2024-06-07 DIAGNOSIS — E11.22 TYPE 2 DIABETES MELLITUS WITH STAGE 3A CHRONIC KIDNEY DISEASE, WITHOUT LONG-TERM CURRENT USE OF INSULIN: ICD-10-CM

## 2024-06-07 DIAGNOSIS — M17.12 PRIMARY OSTEOARTHRITIS OF LEFT KNEE: ICD-10-CM

## 2024-06-07 DIAGNOSIS — N18.31 CHRONIC KIDNEY DISEASE, STAGE 3A: ICD-10-CM

## 2024-06-07 DIAGNOSIS — I10 PRIMARY HYPERTENSION: Primary | ICD-10-CM

## 2024-06-07 DIAGNOSIS — E66.09 CLASS 1 OBESITY DUE TO EXCESS CALORIES WITH SERIOUS COMORBIDITY AND BODY MASS INDEX (BMI) OF 33.0 TO 33.9 IN ADULT: ICD-10-CM

## 2024-06-07 PROBLEM — J45.50 SEVERE PERSISTENT ASTHMA WITHOUT COMPLICATION: Status: RESOLVED | Noted: 2023-10-09 | Resolved: 2024-06-07

## 2024-06-07 PROBLEM — N18.32 STAGE 3B CHRONIC KIDNEY DISEASE: Status: RESOLVED | Noted: 2024-01-26 | Resolved: 2024-06-07

## 2024-06-07 PROBLEM — J44.9 COPD (CHRONIC OBSTRUCTIVE PULMONARY DISEASE): Status: RESOLVED | Noted: 2021-04-08 | Resolved: 2024-06-07

## 2024-06-07 PROBLEM — R73.01 IFG (IMPAIRED FASTING GLUCOSE): Status: RESOLVED | Noted: 2023-01-27 | Resolved: 2024-06-07

## 2024-06-07 PROCEDURE — 99999 PR PBB SHADOW E&M-EST. PATIENT-LVL V: CPT | Mod: PBBFAC,HCNC,, | Performed by: INTERNAL MEDICINE

## 2024-06-07 PROCEDURE — 3008F BODY MASS INDEX DOCD: CPT | Mod: HCNC,CPTII,S$GLB, | Performed by: INTERNAL MEDICINE

## 2024-06-07 PROCEDURE — 3078F DIAST BP <80 MM HG: CPT | Mod: HCNC,CPTII,S$GLB, | Performed by: INTERNAL MEDICINE

## 2024-06-07 PROCEDURE — 1160F RVW MEDS BY RX/DR IN RCRD: CPT | Mod: HCNC,CPTII,S$GLB, | Performed by: INTERNAL MEDICINE

## 2024-06-07 PROCEDURE — 3074F SYST BP LT 130 MM HG: CPT | Mod: HCNC,CPTII,S$GLB, | Performed by: INTERNAL MEDICINE

## 2024-06-07 PROCEDURE — 4010F ACE/ARB THERAPY RXD/TAKEN: CPT | Mod: HCNC,CPTII,S$GLB, | Performed by: INTERNAL MEDICINE

## 2024-06-07 PROCEDURE — 3044F HG A1C LEVEL LT 7.0%: CPT | Mod: HCNC,CPTII,S$GLB, | Performed by: INTERNAL MEDICINE

## 2024-06-07 PROCEDURE — 1159F MED LIST DOCD IN RCRD: CPT | Mod: HCNC,CPTII,S$GLB, | Performed by: INTERNAL MEDICINE

## 2024-06-07 PROCEDURE — G2211 COMPLEX E/M VISIT ADD ON: HCPCS | Mod: HCNC,S$GLB,, | Performed by: INTERNAL MEDICINE

## 2024-06-07 PROCEDURE — 99214 OFFICE O/P EST MOD 30 MIN: CPT | Mod: HCNC,S$GLB,, | Performed by: INTERNAL MEDICINE

## 2024-06-07 RX ORDER — AMLODIPINE BESYLATE 5 MG/1
5 TABLET ORAL DAILY
Qty: 90 TABLET | Refills: 3 | Status: SHIPPED | OUTPATIENT
Start: 2024-06-07 | End: 2025-06-07

## 2024-06-07 RX ORDER — PREDNISOLONE ACETATE 10 MG/ML
SUSPENSION/ DROPS OPHTHALMIC
COMMUNITY
Start: 2024-03-20

## 2024-06-07 NOTE — PROGRESS NOTES
Subjective:       Patient ID: Arron Ron is a 53 y.o. female.    Chief Complaint: Dizziness and Knee Pain (Left)      HPI:  Patient is known to me and presents for follow up HTN, obesity, chronic low back and knee pain, depression/anxiety, HLD, asthma.  Labs from 5/31/24 personally reviewed, interpreted and discussed today     A1C 5.5%, stable   LDL 99, at goal  GFR 54, improving  Plt 251, normal  Fe% 21, normal; TIBC 275, normal; ferritin 177, normal    CKD 3: saw Dr. Villalta since I saw her last. She reports drinking plenty of water.   Stopped NSAIDs, stopped torsemide, decreased valsartan. GFR recovered from 28 to 54.   Right renal cyst was also found on US per her report.      HTN: on amlodipine 10mg, aldactone, valsartan, doxazosin; also sees Dr. Mays ; BP is controlled today however note lower end of normal in setting of intermittent dizziness and fatigue; + weight loss. Does not check regularly at home. Denies chest pains, SOB. GFR improved off so much diurectic previously     Depression/anxiety: on xanax PRN prescribed by Dr. Tello. Not on SSRI. Sx reported as controlled     HLD: On atorvastatin; last LDL controlled.. Denies medication side effects      Asthma: diagnosed by Dr. Ocampo who started the Breo and then switched to symbicort PRN. She denies  wheezing or cough today. Has now established with pulm at [facility]         Chronic back and knee pain: She was following Dr. Tello who was prescribing pain medication but she self stopped taking her pain medications due to concern for side effects. Still using Flexeril. Her knee pain is getting BETTER WITH WEIGHT LOSS!!!  Last MRI and xray reviewed and consistent with arthritis. She saw ortho and scheduled for knee replacement surgery. With her improvement in pain she thinks she wants to cancel surgery.     RODRI; using CPAP every night.      She was referred to Dr. Camacho. She is s/p braiatric surgery (gastric sleeve [id number]).  She is feeling  "frustrated because she regained her weight did not decrease very much since last visit. she reports she has established with bariatric medicine at [facility]--now on Mounjaro. She has had significant weight loss since last visit. Has some constipatin but otherwise tolerating well.      GERD: She is following with GI. Had EGD. On pantoprazole BID. Decreased appetite due to chronic nausea.  Considering bypass surgery as sleeve causing so many GI complications.    Per GI note 11/2022: "She might need PPI b.i.d. indefinitely given having esophagitis on PPI daily."  GI also recently added nortriptyline 25mg for GI symptoms      Chronic constipation: on amitiza per GI. She notes her sx are worse right now and discussed possibly due to GLP-1        Past Medical History:   Diagnosis Date    Acid reflux     Anemia     Anxiety     Arthritis     Cataract     bilateral    Chronic back pain     Constipation     COPD (chronic obstructive pulmonary disease)     Edema     Encounter for blood transfusion 1987    no reaction    Endometriosis     Glaucoma     Hyperlipidemia     Hypertension     Nausea & vomiting     Neuropathy     Palpitations     Sleep apnea     cpap nightly    Tendonitis of wrist, right        Family History   Problem Relation Name Age of Onset    Hypertension Mother      Heart attack Mother      Hypertension Brother      Heart disease Maternal Grandmother      Hypertension Maternal Grandmother      Diabetes Maternal Aunt      Breast cancer Neg Hx      Colon cancer Neg Hx      Ovarian cancer Neg Hx      Esophageal cancer Neg Hx         Social History     Socioeconomic History    Marital status: Single   Tobacco Use    Smoking status: Never    Smokeless tobacco: Never   Substance and Sexual Activity    Alcohol use: Yes     Comment: rare    Drug use: Never    Sexual activity: Not Currently     Partners: Male     Birth control/protection: Surgical, See Surgical Hx     Comment: --BTL     Social Determinants of Health "     Financial Resource Strain: Low Risk  (9/13/2023)    Overall Financial Resource Strain (CARDIA)     Difficulty of Paying Living Expenses: Not hard at all   Food Insecurity: No Food Insecurity (9/13/2023)    Hunger Vital Sign     Worried About Running Out of Food in the Last Year: Never true     Ran Out of Food in the Last Year: Never true   Transportation Needs: No Transportation Needs (9/13/2023)    PRAPARE - Transportation     Lack of Transportation (Medical): No     Lack of Transportation (Non-Medical): No   Physical Activity: Inactive (9/13/2023)    Exercise Vital Sign     Days of Exercise per Week: 0 days     Minutes of Exercise per Session: 0 min   Stress: Stress Concern Present (9/13/2023)    [employer] [place] [employer] - Occupational Stress Questionnaire     Feeling of Stress : To some extent   Housing Stability: Low Risk  (9/13/2023)    Housing Stability Vital Sign     Unable to Pay for Housing in the Last Year: No     Number of Places Lived in the Last Year: 1     Unstable Housing in the Last Year: No       Review of Systems   Constitutional:  Positive for fatigue. Negative for activity change, fever and unexpected weight change.   HENT:  Negative for congestion, ear pain, hearing loss, rhinorrhea and sore throat.    Eyes:  Negative for pain, redness and visual disturbance.   Respiratory:  Negative for cough, shortness of breath and wheezing.    Cardiovascular:  Negative for chest pain, palpitations and leg swelling.   Gastrointestinal:  Negative for abdominal pain, constipation, diarrhea, nausea and vomiting.   Genitourinary:  Negative for dysuria, frequency and urgency.   Musculoskeletal:  Negative for back pain, joint swelling and neck pain.   Skin:  Negative for color change, rash and wound.   Neurological:  Positive for dizziness. Negative for tremors, weakness, light-headedness and headaches.         Objective:      Physical Exam  Vitals reviewed.   Constitutional:       General: She  is not in acute distress.     Appearance: She is well-developed. She is obese.   HENT:      Head: Normocephalic and atraumatic.      Right Ear: External ear normal.      Left Ear: External ear normal.      Nose: Nose normal.   Eyes:      General:         Right eye: No discharge.         Left eye: No discharge.      Conjunctiva/sclera: Conjunctivae normal.   Neck:      Thyroid: No thyromegaly.   Cardiovascular:      Rate and Rhythm: Normal rate and regular rhythm.   Pulmonary:      Effort: Pulmonary effort is normal. No respiratory distress.   Abdominal:      General: There is no distension.      Palpations: Abdomen is soft.      Tenderness: There is no abdominal tenderness.   Skin:     General: Skin is warm and dry.   Neurological:      Mental Status: She is alert and oriented to person, place, and time.   Psychiatric:         Behavior: Behavior normal.         Thought Content: Thought content normal.         Assessment:       1. Primary hypertension    2. Other hyperlipidemia    3. Aortic atherosclerosis    4. Chronic kidney disease, stage 3a    5. Type 2 diabetes mellitus with stage 3a chronic kidney disease, without long-term current use of insulin    6. RODRI on CPAP    7. Mild intermittent asthma without complication    8. Class 1 obesity due to excess calories with serious comorbidity and body mass index (BMI) of 33.0 to 33.9 in adult    9. Normochromic anemia    10. Primary osteoarthritis of left knee        Plan:       1. Primary hypertension  Chronic controlled  Lower end of normal BP with weight loss and intermittent dizziness/fatigue  REDUCE amlodipine from 10 to 5mg  Cont other meds same dose for now  Sees me 4 weeks for f/u: continue to adjust BP meds as needed    -     amLODIPine (NORVASC) 5 MG tablet; Take 1 tablet (5 mg total) by mouth once daily.  Dispense: 90 tablet; Refill: 3    2. Other hyperlipidemia  Chronic controlled  Cont meds same dose  Lipid panel next visit    3. Aortic  atherosclerosis  Chronic stable  Cont statin    4. Chronic kidney disease, stage 3a  Chronic improving  Stay hydrated  Avoid nephrotoxic agents  Cont nephro follow up as planned  -     Microalbumin/Creatinine Ratio, Urine; Future; Expected date: 06/07/2024    5. Type 2 diabetes mellitus with stage 3a chronic kidney disease, without long-term current use of insulin  Chronic stable  Cont meds same dose  ADA diet  Check sugars and keep log  Overview:  Noted by TONIE KARIMI MD  last documented on [id number]      6. RODRI on CPAP  Chronic stable  Cont CPAP nightly  Cont weigh tloss    7. Mild intermittent asthma without complication  Chornic stable  Cont inhaler  Cont pulm follow up as planned    8. Class 1 obesity due to excess calories with serious comorbidity and body mass index (BMI) of 33.0 to 33.9 in adult  Chronc iproving  Weight today not accurate----home weight 205lb  Repeat next visit  No swelling    9. Normochromic anemia  Chronic stable  Fe normal  She is concerned about her persistent anemia  C-scope completed  Referred to heme ([facility]) to discuss further work up if necessary  -     Ambulatory referral/consult to Hematology / Oncology; Future; Expected date: 06/14/2024    10. Primary osteoarthritis of left knee  Chronic improving  Weight loss resolved pain  She is considering cancelling surgery--if pain free reasonable from IM perspective to hold off. Asked her to discuss with surgeon       RTC 4 weeks routine and f/u HTN and PRN

## 2024-06-07 NOTE — TELEPHONE ENCOUNTER
----- Message from Jeannette Martinez sent at 6/7/2024 10:22 AM CDT -----  Type:  Reschedule Procedure     Who Called:pt     Does the patient know what this is regarding?:reschedule procedure   Would the patient rather a call back or a response via [facility]? Call   Best Call Back Number:[phone]  Additional Information:

## 2024-06-07 NOTE — TELEPHONE ENCOUNTER
Patient has informed surgery team she will need to postpone surgery for now. She will reach out to staff when she is ready to reschedule.

## 2024-06-11 NOTE — TELEPHONE ENCOUNTER
If the patient finds her symptoms are tolerable, it is reasonable for her to postpone surgery.  I would recommend that she come in for a checkup in 3-6 months

## 2024-06-30 DIAGNOSIS — K21.9 GASTROESOPHAGEAL REFLUX DISEASE WITHOUT ESOPHAGITIS: ICD-10-CM

## 2024-06-30 NOTE — TELEPHONE ENCOUNTER
No care due was identified.  [facility] Embedded Care Due Messages. Reference number: [id number].   6/30/2024 8:01:36 AM CDT

## 2024-06-30 NOTE — TELEPHONE ENCOUNTER
Refill Routing Note   Medication(s) are not appropriate for processing by [facility] for the following reason(s):        Outside of protocol: Pantoprazole dosage outside [facility] protocol    [facility] action(s):  Route               Appointments  past 12m or future 3m with PCP    Date Provider   Last Visit   6/7/2024 Mahi Rojas MD   Next Visit   7/26/2024 Mahi Rojas MD   ED visits in past 90 days: 0        Note composed:12:37 PM 06/30/2024

## 2024-07-01 RX ORDER — PANTOPRAZOLE SODIUM 40 MG/1
40 TABLET, DELAYED RELEASE ORAL 2 TIMES DAILY
Qty: 180 TABLET | Refills: 3 | Status: SHIPPED | OUTPATIENT
Start: 2024-07-01

## 2024-07-19 ENCOUNTER — LAB VISIT (OUTPATIENT)
Dept: LAB | Facility: HOSPITAL | Age: 54
End: 2024-07-19
Attending: INTERNAL MEDICINE
Payer: MEDICARE

## 2024-07-19 DIAGNOSIS — K59.04 CHRONIC IDIOPATHIC CONSTIPATION: ICD-10-CM

## 2024-07-19 DIAGNOSIS — R73.01 IFG (IMPAIRED FASTING GLUCOSE): ICD-10-CM

## 2024-07-19 DIAGNOSIS — I10 PRIMARY HYPERTENSION: ICD-10-CM

## 2024-07-19 DIAGNOSIS — E78.2 MIXED HYPERLIPIDEMIA: ICD-10-CM

## 2024-07-19 DIAGNOSIS — I70.0 AORTIC ATHEROSCLEROSIS: ICD-10-CM

## 2024-07-19 DIAGNOSIS — E66.01 CLASS 2 SEVERE OBESITY DUE TO EXCESS CALORIES WITH SERIOUS COMORBIDITY AND BODY MASS INDEX (BMI) OF 37.0 TO 37.9 IN ADULT: ICD-10-CM

## 2024-07-19 DIAGNOSIS — N18.31 CHRONIC KIDNEY DISEASE, STAGE 3A: ICD-10-CM

## 2024-07-19 LAB
ALBUMIN SERPL BCP-MCNC: 4 G/DL (ref 3.5–5.2)
ALBUMIN/CREAT UR: 11.9 UG/MG (ref 0–30)
ALP SERPL-CCNC: 87 U/L (ref 55–135)
ALT SERPL W/O P-5'-P-CCNC: 14 U/L (ref 10–44)
ANION GAP SERPL CALC-SCNC: 9 MMOL/L (ref 8–16)
AST SERPL-CCNC: 17 U/L (ref 10–40)
BASOPHILS # BLD AUTO: 0.08 K/UL (ref 0–0.2)
BASOPHILS NFR BLD: 1.5 % (ref 0–1.9)
BILIRUB SERPL-MCNC: 0.5 MG/DL (ref 0.1–1)
BUN SERPL-MCNC: 19 MG/DL (ref 6–20)
CALCIUM SERPL-MCNC: 9.7 MG/DL (ref 8.7–10.5)
CHLORIDE SERPL-SCNC: 105 MMOL/L (ref 95–110)
CHOLEST SERPL-MCNC: 165 MG/DL (ref 120–199)
CHOLEST/HDLC SERPL: 3.4 {RATIO} (ref 2–5)
CO2 SERPL-SCNC: 26 MMOL/L (ref 23–29)
CREAT SERPL-MCNC: 1.3 MG/DL (ref 0.5–1.4)
CREAT UR-MCNC: 59 MG/DL (ref 15–325)
DIFFERENTIAL METHOD BLD: ABNORMAL
EOSINOPHIL # BLD AUTO: 0.1 K/UL (ref 0–0.5)
EOSINOPHIL NFR BLD: 2.6 % (ref 0–8)
ERYTHROCYTE [DISTWIDTH] IN BLOOD BY AUTOMATED COUNT: 14.7 % (ref 11.5–14.5)
EST. GFR  (NO RACE VARIABLE): 49 ML/MIN/1.73 M^2
ESTIMATED AVG GLUCOSE: 111 MG/DL (ref 68–131)
GLUCOSE SERPL-MCNC: 73 MG/DL (ref 70–110)
HBA1C MFR BLD: 5.5 % (ref 4–5.6)
HCT VFR BLD AUTO: 36.7 % (ref 37–48.5)
HDLC SERPL-MCNC: 48 MG/DL (ref 40–75)
HDLC SERPL: 29.1 % (ref 20–50)
HGB BLD-MCNC: 12 G/DL (ref 12–16)
IMM GRANULOCYTES # BLD AUTO: 0.01 K/UL (ref 0–0.04)
IMM GRANULOCYTES NFR BLD AUTO: 0.2 % (ref 0–0.5)
LDLC SERPL CALC-MCNC: 107.8 MG/DL (ref 63–159)
LYMPHOCYTES # BLD AUTO: 2 K/UL (ref 1–4.8)
LYMPHOCYTES NFR BLD: 36.5 % (ref 18–48)
MCH RBC QN AUTO: 29 PG (ref 27–31)
MCHC RBC AUTO-ENTMCNC: 32.7 G/DL (ref 32–36)
MCV RBC AUTO: 89 FL (ref 82–98)
MICROALBUMIN UR DL<=1MG/L-MCNC: 7 UG/ML
MONOCYTES # BLD AUTO: 0.4 K/UL (ref 0.3–1)
MONOCYTES NFR BLD: 7.7 % (ref 4–15)
NEUTROPHILS # BLD AUTO: 2.8 K/UL (ref 1.8–7.7)
NEUTROPHILS NFR BLD: 51.5 % (ref 38–73)
NONHDLC SERPL-MCNC: 117 MG/DL
NRBC BLD-RTO: 0 /100 WBC
PLATELET # BLD AUTO: 306 K/UL (ref 150–450)
PMV BLD AUTO: 10.9 FL (ref 9.2–12.9)
POTASSIUM SERPL-SCNC: 4.3 MMOL/L (ref 3.5–5.1)
PROT SERPL-MCNC: 7.4 G/DL (ref 6–8.4)
RBC # BLD AUTO: 4.14 M/UL (ref 4–5.4)
SODIUM SERPL-SCNC: 140 MMOL/L (ref 136–145)
TRIGL SERPL-MCNC: 46 MG/DL (ref 30–150)
TSH SERPL DL<=0.005 MIU/L-ACNC: 2.21 UIU/ML (ref 0.4–4)
WBC # BLD AUTO: 5.34 K/UL (ref 3.9–12.7)

## 2024-07-19 PROCEDURE — 80053 COMPREHEN METABOLIC PANEL: CPT | Mod: HCNC | Performed by: INTERNAL MEDICINE

## 2024-07-19 PROCEDURE — 82043 UR ALBUMIN QUANTITATIVE: CPT | Mod: HCNC | Performed by: INTERNAL MEDICINE

## 2024-07-19 PROCEDURE — 82570 ASSAY OF URINE CREATININE: CPT | Mod: HCNC | Performed by: INTERNAL MEDICINE

## 2024-07-19 PROCEDURE — 36415 COLL VENOUS BLD VENIPUNCTURE: CPT | Mod: HCNC | Performed by: INTERNAL MEDICINE

## 2024-07-19 PROCEDURE — 80061 LIPID PANEL: CPT | Mod: HCNC | Performed by: INTERNAL MEDICINE

## 2024-07-19 PROCEDURE — 84443 ASSAY THYROID STIM HORMONE: CPT | Mod: HCNC | Performed by: INTERNAL MEDICINE

## 2024-07-19 PROCEDURE — 85025 COMPLETE CBC W/AUTO DIFF WBC: CPT | Mod: HCNC | Performed by: INTERNAL MEDICINE

## 2024-07-19 PROCEDURE — 83036 HEMOGLOBIN GLYCOSYLATED A1C: CPT | Mod: HCNC | Performed by: INTERNAL MEDICINE

## 2024-07-26 ENCOUNTER — OFFICE VISIT (OUTPATIENT)
Dept: INTERNAL MEDICINE | Facility: CLINIC | Age: 54
End: 2024-07-26
Payer: MEDICARE

## 2024-07-26 ENCOUNTER — LAB VISIT (OUTPATIENT)
Dept: LAB | Facility: HOSPITAL | Age: 54
End: 2024-07-26
Attending: INTERNAL MEDICINE
Payer: MEDICARE

## 2024-07-26 VITALS
RESPIRATION RATE: 16 BRPM | HEART RATE: 94 BPM | BODY MASS INDEX: 36.24 KG/M2 | OXYGEN SATURATION: 95 % | WEIGHT: 225.5 LBS | DIASTOLIC BLOOD PRESSURE: 72 MMHG | HEIGHT: 66 IN | SYSTOLIC BLOOD PRESSURE: 118 MMHG

## 2024-07-26 DIAGNOSIS — I70.0 AORTIC ATHEROSCLEROSIS: ICD-10-CM

## 2024-07-26 DIAGNOSIS — G47.33 OSA ON CPAP: ICD-10-CM

## 2024-07-26 DIAGNOSIS — E11.22 TYPE 2 DIABETES MELLITUS WITH STAGE 3A CHRONIC KIDNEY DISEASE, WITHOUT LONG-TERM CURRENT USE OF INSULIN: ICD-10-CM

## 2024-07-26 DIAGNOSIS — I10 PRIMARY HYPERTENSION: ICD-10-CM

## 2024-07-26 DIAGNOSIS — E78.49 OTHER HYPERLIPIDEMIA: ICD-10-CM

## 2024-07-26 DIAGNOSIS — E66.01 CLASS 2 SEVERE OBESITY DUE TO EXCESS CALORIES WITH SERIOUS COMORBIDITY AND BODY MASS INDEX (BMI) OF 36.0 TO 36.9 IN ADULT: ICD-10-CM

## 2024-07-26 DIAGNOSIS — I10 PRIMARY HYPERTENSION: Primary | ICD-10-CM

## 2024-07-26 DIAGNOSIS — J45.20 MILD INTERMITTENT ASTHMA WITHOUT COMPLICATION: ICD-10-CM

## 2024-07-26 DIAGNOSIS — N18.31 TYPE 2 DIABETES MELLITUS WITH STAGE 3A CHRONIC KIDNEY DISEASE, WITHOUT LONG-TERM CURRENT USE OF INSULIN: ICD-10-CM

## 2024-07-26 LAB
FERRITIN SERPL-MCNC: 176 NG/ML (ref 20–300)
IRON SERPL-MCNC: 58 UG/DL (ref 30–160)
SATURATED IRON: 20 % (ref 20–50)
TOTAL IRON BINDING CAPACITY: 286 UG/DL (ref 250–450)
TRANSFERRIN SERPL-MCNC: 193 MG/DL (ref 200–375)

## 2024-07-26 PROCEDURE — 82728 ASSAY OF FERRITIN: CPT | Mod: HCNC | Performed by: INTERNAL MEDICINE

## 2024-07-26 PROCEDURE — 99999 PR PBB SHADOW E&M-EST. PATIENT-LVL V: CPT | Mod: PBBFAC,HCNC,, | Performed by: INTERNAL MEDICINE

## 2024-07-26 PROCEDURE — 83540 ASSAY OF IRON: CPT | Mod: HCNC | Performed by: INTERNAL MEDICINE

## 2024-07-26 PROCEDURE — 36415 COLL VENOUS BLD VENIPUNCTURE: CPT | Mod: HCNC | Performed by: INTERNAL MEDICINE

## 2024-07-26 NOTE — PROGRESS NOTES
Subjective:       Patient ID: Arron Ron is a 53 y.o. female.    Chief Complaint: Follow-up and Headache      HPI:  Patient is known to me and presents for follow up HTN, obesity, chronic low back and knee pain, depression/anxiety, HLD, asthma.  Labs from 7/19/24 personally reviewed, interpreted and discussed today     A1C 5.5%, stable   , stable  GFR 49, stable last few checks  Plt 306, normal  Microalb 7/2024 normal     CKD 3: saw Dr. Villalta since I saw her last. She reports drinking plenty of water.   Stopped NSAIDs, stopped torsemide, decreased valsartan. GFR recovered from 28 to 49.   Right renal cyst was also found on US per her report.      HTN: on amlodipine 10mg, aldactone 25mg (weaned from 50mg), valsartan, doxazosin; also sees Dr. Mays ; BP is controlled today however note lower end of normal + weight loss. Does not check regularly at home. Denies chest pains, SOB. GFR improved off so much diurectic previously     Depression/anxiety: on xanax PRN prescribed by Dr. Tello. Not on SSRI. Sx reported as controlled     HLD: On atorvastatin; last LDL controlled.. Denies medication side effects      Asthma: diagnosed by Dr. Ocampo who started the Breo and then switched to symbicort PRN. She denies  wheezing or cough today. Has now established with pulm at [facility]         Chronic back and knee pain: She was following Dr. Tello who was prescribing pain medication but she self stopped taking her pain medications due to concern for side effects. Still using Flexeril. Her knee pain is getting BETTER WITH WEIGHT LOSS!!!  Last MRI and xray reviewed and consistent with arthritis. She saw ortho and scheduled for knee replacement surgery. With her improvement in pain she thinks she wants to cancel surgery.     RODRI; using CPAP every night.      She was referred to Dr. Camacho. She is s/p braiatric surgery (gastric sleeve [id number]).  She is feeling frustrated because she regained her weight did not decrease  "very much since last visit. she reports she has established with bariatric medicine at [facility]--now on Mounjaro. She has had significant weight loss since last visit. Has some constipatin but otherwise tolerating well.      GERD: She is following with GI. Had EGD. On pantoprazole BID. Decreased appetite due to chronic nausea.  Considering bypass surgery as sleeve causing so many GI complications.    Per GI note 11/2022: "She might need PPI b.i.d. indefinitely given having esophagitis on PPI daily."  GI also recently added nortriptyline 25mg for GI symptoms      Chronic constipation: on amitiza per GI. She notes her sx are worse right now and discussed possibly due to GLP-1       Past Medical History:   Diagnosis Date    Acid reflux     Anemia     Anxiety     Arthritis     Cataract     bilateral    Chronic back pain     Constipation     COPD (chronic obstructive pulmonary disease)     Edema     Encounter for blood transfusion 1987    no reaction    Endometriosis     Glaucoma     Hyperlipidemia     Hypertension     Nausea & vomiting     Neuropathy     Palpitations     Sleep apnea     cpap nightly    Tendonitis of wrist, right        Family History   Problem Relation Name Age of Onset    Hypertension Mother      Heart attack Mother      Hypertension Brother      Heart disease Maternal Grandmother      Hypertension Maternal Grandmother      Diabetes Maternal Aunt      Breast cancer Neg Hx      Colon cancer Neg Hx      Ovarian cancer Neg Hx      Esophageal cancer Neg Hx         Social History     Socioeconomic History    Marital status: Single   Tobacco Use    Smoking status: Never    Smokeless tobacco: Never   Substance and Sexual Activity    Alcohol use: Yes     Comment: rare    Drug use: Never    Sexual activity: Not Currently     Partners: Male     Birth control/protection: Surgical, See Surgical Hx     Comment: --BTL     Social Determinants of Health     Financial Resource Strain: High Risk (7/26/2024)    " Overall Financial Resource Strain (CARDIA)     Difficulty of Paying Living Expenses: Hard   Food Insecurity: Food Insecurity Present (7/26/2024)    Hunger Vital Sign     Worried About Running Out of Food in the Last Year: Sometimes true     Ran Out of Food in the Last Year: Often true   Transportation Needs: No Transportation Needs (9/13/2023)    PRAPARE - Transportation     Lack of Transportation (Medical): No     Lack of Transportation (Non-Medical): No   Physical Activity: Inactive (7/26/2024)    Exercise Vital Sign     Days of Exercise per Week: 2 days     Minutes of Exercise per Session: 0 min   Stress: No Stress Concern Present (7/26/2024)    [employer] [place] [employer] - Occupational Stress Questionnaire     Feeling of Stress : Only a little   Housing Stability: High Risk (7/26/2024)    Housing Stability Vital Sign     Unable to Pay for Housing in the Last Year: Yes       Review of Systems   Constitutional:  Negative for activity change, fatigue, fever and unexpected weight change.   HENT:  Negative for congestion, ear pain, hearing loss, rhinorrhea, sore throat and tinnitus.    Eyes:  Negative for pain, redness and visual disturbance.   Respiratory:  Negative for cough, shortness of breath and wheezing.    Cardiovascular:  Negative for chest pain, palpitations and leg swelling.   Gastrointestinal:  Positive for constipation. Negative for abdominal pain, diarrhea, nausea and vomiting.   Genitourinary:  Negative for dysuria and frequency.   Musculoskeletal:  Positive for arthralgias. Negative for back pain, joint swelling and neck pain.   Skin:  Negative for color change, rash and wound.   Neurological:  Negative for dizziness, tremors, weakness, light-headedness and headaches.         Objective:      Physical Exam  Vitals reviewed.   Constitutional:       General: She is not in acute distress.     Appearance: She is well-developed. She is obese.   HENT:      Head: Normocephalic and atraumatic.       Right Ear: External ear normal.      Left Ear: External ear normal.      Nose: Nose normal.   Eyes:      General:         Right eye: No discharge.         Left eye: No discharge.   Neck:      Thyroid: No thyromegaly.   Cardiovascular:      Rate and Rhythm: Normal rate and regular rhythm.   Pulmonary:      Effort: Pulmonary effort is normal. No respiratory distress.      Breath sounds: Normal breath sounds.   Abdominal:      General: There is no distension.      Palpations: Abdomen is soft.      Tenderness: There is no abdominal tenderness.   Skin:     General: Skin is warm and dry.   Neurological:      Mental Status: She is alert and oriented to person, place, and time.   Psychiatric:         Behavior: Behavior normal.         Thought Content: Thought content normal.         Assessment:       1. Primary hypertension    2. Other hyperlipidemia    3. Aortic atherosclerosis    4. Type 2 diabetes mellitus with stage 3a chronic kidney disease, without long-term current use of insulin    5. Class 2 severe obesity due to excess calories with serious comorbidity and body mass index (BMI) of 36.0 to 36.9 in adult    6. Mild intermittent asthma without complication    7. RODRI on CPAP        Plan:       1. Primary hypertension  Chronic controlled  STOP aldactone due to GFR  Cont other meds same dose    Low Na diet  Exercise, weight loss  Check BP and keep log for next visit    -     CBC Auto Differential; Future; Expected date: 01/22/2025  -     Comprehensive Metabolic Panel; Future; Expected date: 01/22/2025  -     TSH; Future; Expected date: 01/22/2025  -     Lipid Panel; Future; Expected date: 01/22/2025  -     Hemoglobin A1C; Future; Expected date: 01/22/2025  -     Iron and TIBC; Future; Expected date: 07/26/2024  -     Ferritin; Future    2. Other hyperlipidemia  Chronic controlled  Cont statin same dose  -     CBC Auto Differential; Future; Expected date: 01/22/2025  -     Comprehensive Metabolic Panel; Future;  Expected date: 01/22/2025  -     TSH; Future; Expected date: 01/22/2025  -     Lipid Panel; Future; Expected date: 01/22/2025  -     Hemoglobin A1C; Future; Expected date: 01/22/2025  -     Iron and TIBC; Future; Expected date: 07/26/2024  -     Ferritin; Future    3. Aortic atherosclerosis  Chronic stable  Cont sattin  -     CBC Auto Differential; Future; Expected date: 01/22/2025  -     Comprehensive Metabolic Panel; Future; Expected date: 01/22/2025  -     TSH; Future; Expected date: 01/22/2025  -     Lipid Panel; Future; Expected date: 01/22/2025  -     Hemoglobin A1C; Future; Expected date: 01/22/2025  -     Iron and TIBC; Future; Expected date: 07/26/2024  -     Ferritin; Future    4. Type 2 diabetes mellitus with stage 3a chronic kidney disease, without long-term current use of insulin  Chronic controlled  Cont meds same dose  ADA diet  Check sugars and keep log  Overview:  Noted by TONIE KARIMI MD  last documented on [id number]    Orders:  -     CBC Auto Differential; Future; Expected date: 01/22/2025  -     Comprehensive Metabolic Panel; Future; Expected date: 01/22/2025  -     TSH; Future; Expected date: 01/22/2025  -     Lipid Panel; Future; Expected date: 01/22/2025  -     Hemoglobin A1C; Future; Expected date: 01/22/2025  -     Iron and TIBC; Future; Expected date: 07/26/2024  -     Ferritin; Future    5. Class 2 severe obesity due to excess calories with serious comorbidity and body mass index (BMI) of 36.0 to 36.9 in adult  Chornic stable  Cont GLP-1  Diet, exercise discussed  -     CBC Auto Differential; Future; Expected date: 01/22/2025  -     Comprehensive Metabolic Panel; Future; Expected date: 01/22/2025  -     TSH; Future; Expected date: 01/22/2025  -     Lipid Panel; Future; Expected date: 01/22/2025  -     Hemoglobin A1C; Future; Expected date: 01/22/2025  -     Iron and TIBC; Future; Expected date: 07/26/2024  -     Ferritin; Future    6. Mild intermittent asthma without  complication  Chronic controlled  Cont inhalers same dose    7. RODRI on CPAP  Chronic satble  CPAP nightly  Weight loss       RTC 6 months with labs and PRN  See Dr. Villalta between our visits as well to follow GFR off aldactone

## 2024-09-25 DIAGNOSIS — Z00.00 ENCOUNTER FOR MEDICARE ANNUAL WELLNESS EXAM: ICD-10-CM

## 2024-09-26 ENCOUNTER — LAB VISIT (OUTPATIENT)
Dept: LAB | Facility: HOSPITAL | Age: 54
End: 2024-09-26
Attending: INTERNAL MEDICINE
Payer: MEDICARE

## 2024-09-26 DIAGNOSIS — N18.2 CHRONIC KIDNEY DISEASE, STAGE II (MILD): ICD-10-CM

## 2024-09-26 LAB
ALBUMIN SERPL BCP-MCNC: 4 G/DL (ref 3.5–5.2)
ANION GAP SERPL CALC-SCNC: 8 MMOL/L (ref 8–16)
BASOPHILS # BLD AUTO: 0.09 K/UL (ref 0–0.2)
BASOPHILS NFR BLD: 1.8 % (ref 0–1.9)
BUN SERPL-MCNC: 21 MG/DL (ref 6–20)
CALCIUM SERPL-MCNC: 9.6 MG/DL (ref 8.7–10.5)
CHLORIDE SERPL-SCNC: 107 MMOL/L (ref 95–110)
CO2 SERPL-SCNC: 23 MMOL/L (ref 23–29)
CREAT SERPL-MCNC: 1.3 MG/DL (ref 0.5–1.4)
DIFFERENTIAL METHOD BLD: ABNORMAL
EOSINOPHIL # BLD AUTO: 0.1 K/UL (ref 0–0.5)
EOSINOPHIL NFR BLD: 2 % (ref 0–8)
ERYTHROCYTE [DISTWIDTH] IN BLOOD BY AUTOMATED COUNT: 14.8 % (ref 11.5–14.5)
EST. GFR  (NO RACE VARIABLE): 49 ML/MIN/1.73 M^2
GLUCOSE SERPL-MCNC: 78 MG/DL (ref 70–110)
HCT VFR BLD AUTO: 33.8 % (ref 37–48.5)
HGB BLD-MCNC: 11.1 G/DL (ref 12–16)
IMM GRANULOCYTES # BLD AUTO: 0 K/UL (ref 0–0.04)
IMM GRANULOCYTES NFR BLD AUTO: 0 % (ref 0–0.5)
LYMPHOCYTES # BLD AUTO: 2.2 K/UL (ref 1–4.8)
LYMPHOCYTES NFR BLD: 44.5 % (ref 18–48)
MCH RBC QN AUTO: 29.4 PG (ref 27–31)
MCHC RBC AUTO-ENTMCNC: 32.8 G/DL (ref 32–36)
MCV RBC AUTO: 89 FL (ref 82–98)
MONOCYTES # BLD AUTO: 0.4 K/UL (ref 0.3–1)
MONOCYTES NFR BLD: 7.6 % (ref 4–15)
NEUTROPHILS # BLD AUTO: 2.2 K/UL (ref 1.8–7.7)
NEUTROPHILS NFR BLD: 44.1 % (ref 38–73)
NRBC BLD-RTO: 0 /100 WBC
PHOSPHATE SERPL-MCNC: 3.9 MG/DL (ref 2.7–4.5)
PLATELET # BLD AUTO: 258 K/UL (ref 150–450)
PMV BLD AUTO: 11.2 FL (ref 9.2–12.9)
POTASSIUM SERPL-SCNC: 4.5 MMOL/L (ref 3.5–5.1)
PTH-INTACT SERPL-MCNC: 78.3 PG/ML (ref 9–77)
RBC # BLD AUTO: 3.78 M/UL (ref 4–5.4)
SODIUM SERPL-SCNC: 138 MMOL/L (ref 136–145)
WBC # BLD AUTO: 5.01 K/UL (ref 3.9–12.7)

## 2024-09-26 PROCEDURE — 36415 COLL VENOUS BLD VENIPUNCTURE: CPT | Mod: HCNC | Performed by: INTERNAL MEDICINE

## 2024-09-26 PROCEDURE — 80069 RENAL FUNCTION PANEL: CPT | Mod: HCNC | Performed by: INTERNAL MEDICINE

## 2024-09-26 PROCEDURE — 83970 ASSAY OF PARATHORMONE: CPT | Mod: HCNC | Performed by: INTERNAL MEDICINE

## 2024-09-26 PROCEDURE — 85025 COMPLETE CBC W/AUTO DIFF WBC: CPT | Mod: HCNC | Performed by: INTERNAL MEDICINE

## 2024-10-09 ENCOUNTER — NURSE TRIAGE (OUTPATIENT)
Dept: ADMINISTRATIVE | Facility: CLINIC | Age: 54
End: 2024-10-09
Payer: MEDICARE

## 2024-10-09 NOTE — TELEPHONE ENCOUNTER
Pt said someone called her and she is trying to get back in touch with them.Reports that she thinks call is about wellness. Triager unable to see that call was made.     Denies current symptoms.    Advised pt that I will reach out to her MD office and ask them to follow up with patient today, per protocol. Pt advised to call back if she develops any new or worsening symptoms or has any further questions. Pt VU.       Reason for Disposition   Nursing judgment    Protocols used: No Protocol Available - Information Only-A-[place]

## 2024-11-18 DIAGNOSIS — M54.41 CHRONIC BILATERAL LOW BACK PAIN WITH BILATERAL SCIATICA: ICD-10-CM

## 2024-11-18 DIAGNOSIS — M54.42 CHRONIC BILATERAL LOW BACK PAIN WITH BILATERAL SCIATICA: ICD-10-CM

## 2024-11-18 DIAGNOSIS — G89.29 CHRONIC BILATERAL LOW BACK PAIN WITH BILATERAL SCIATICA: ICD-10-CM

## 2024-11-18 RX ORDER — GABAPENTIN 100 MG/1
CAPSULE ORAL
Qty: 30 CAPSULE | Refills: 11 | Status: SHIPPED | OUTPATIENT
Start: 2024-11-18

## 2024-11-18 NOTE — TELEPHONE ENCOUNTER
Unable to retrieve patient chart and identify care due.  [facility] Embedded Care Due Messages. Reference number: [id number].   11/18/2024 8:05:04 AM CST

## 2025-01-26 ENCOUNTER — LAB VISIT (OUTPATIENT)
Dept: LAB | Facility: HOSPITAL | Age: 55
End: 2025-01-26
Attending: INTERNAL MEDICINE
Payer: MEDICARE

## 2025-01-26 DIAGNOSIS — E66.812 CLASS 2 SEVERE OBESITY DUE TO EXCESS CALORIES WITH SERIOUS COMORBIDITY AND BODY MASS INDEX (BMI) OF 36.0 TO 36.9 IN ADULT: ICD-10-CM

## 2025-01-26 DIAGNOSIS — E78.49 OTHER HYPERLIPIDEMIA: ICD-10-CM

## 2025-01-26 DIAGNOSIS — E66.01 CLASS 2 SEVERE OBESITY DUE TO EXCESS CALORIES WITH SERIOUS COMORBIDITY AND BODY MASS INDEX (BMI) OF 36.0 TO 36.9 IN ADULT: ICD-10-CM

## 2025-01-26 DIAGNOSIS — E11.22 TYPE 2 DIABETES MELLITUS WITH STAGE 3A CHRONIC KIDNEY DISEASE, WITHOUT LONG-TERM CURRENT USE OF INSULIN: ICD-10-CM

## 2025-01-26 DIAGNOSIS — I10 PRIMARY HYPERTENSION: ICD-10-CM

## 2025-01-26 DIAGNOSIS — N18.31 TYPE 2 DIABETES MELLITUS WITH STAGE 3A CHRONIC KIDNEY DISEASE, WITHOUT LONG-TERM CURRENT USE OF INSULIN: ICD-10-CM

## 2025-01-26 DIAGNOSIS — I70.0 AORTIC ATHEROSCLEROSIS: ICD-10-CM

## 2025-01-26 LAB
ALBUMIN SERPL BCP-MCNC: 3.9 G/DL (ref 3.5–5.2)
ALP SERPL-CCNC: 91 U/L (ref 40–150)
ALT SERPL W/O P-5'-P-CCNC: 15 U/L (ref 10–44)
ANION GAP SERPL CALC-SCNC: 7 MMOL/L (ref 8–16)
AST SERPL-CCNC: 21 U/L (ref 10–40)
BASOPHILS # BLD AUTO: 0.06 K/UL (ref 0–0.2)
BASOPHILS NFR BLD: 0.9 % (ref 0–1.9)
BILIRUB SERPL-MCNC: 0.5 MG/DL (ref 0.1–1)
BUN SERPL-MCNC: 18 MG/DL (ref 6–20)
CALCIUM SERPL-MCNC: 9 MG/DL (ref 8.7–10.5)
CHLORIDE SERPL-SCNC: 106 MMOL/L (ref 95–110)
CHOLEST SERPL-MCNC: 179 MG/DL (ref 120–199)
CHOLEST/HDLC SERPL: 3.5 {RATIO} (ref 2–5)
CO2 SERPL-SCNC: 25 MMOL/L (ref 23–29)
CREAT SERPL-MCNC: 1.2 MG/DL (ref 0.5–1.4)
DIFFERENTIAL METHOD BLD: ABNORMAL
EOSINOPHIL # BLD AUTO: 0.2 K/UL (ref 0–0.5)
EOSINOPHIL NFR BLD: 2.5 % (ref 0–8)
ERYTHROCYTE [DISTWIDTH] IN BLOOD BY AUTOMATED COUNT: 14.2 % (ref 11.5–14.5)
EST. GFR  (NO RACE VARIABLE): 54 ML/MIN/1.73 M^2
ESTIMATED AVG GLUCOSE: 111 MG/DL (ref 68–131)
GLUCOSE SERPL-MCNC: 80 MG/DL (ref 70–110)
HBA1C MFR BLD: 5.5 % (ref 4–5.6)
HCT VFR BLD AUTO: 35.3 % (ref 37–48.5)
HDLC SERPL-MCNC: 51 MG/DL (ref 40–75)
HDLC SERPL: 28.5 % (ref 20–50)
HGB BLD-MCNC: 11.6 G/DL (ref 12–16)
IMM GRANULOCYTES # BLD AUTO: 0.01 K/UL (ref 0–0.04)
IMM GRANULOCYTES NFR BLD AUTO: 0.2 % (ref 0–0.5)
LDLC SERPL CALC-MCNC: 117.8 MG/DL (ref 63–159)
LYMPHOCYTES # BLD AUTO: 2.7 K/UL (ref 1–4.8)
LYMPHOCYTES NFR BLD: 43.1 % (ref 18–48)
MCH RBC QN AUTO: 28.6 PG (ref 27–31)
MCHC RBC AUTO-ENTMCNC: 32.9 G/DL (ref 32–36)
MCV RBC AUTO: 87 FL (ref 82–98)
MONOCYTES # BLD AUTO: 0.6 K/UL (ref 0.3–1)
MONOCYTES NFR BLD: 9.3 % (ref 4–15)
NEUTROPHILS # BLD AUTO: 2.8 K/UL (ref 1.8–7.7)
NEUTROPHILS NFR BLD: 44 % (ref 38–73)
NONHDLC SERPL-MCNC: 128 MG/DL
NRBC BLD-RTO: 0 /100 WBC
PLATELET # BLD AUTO: 268 K/UL (ref 150–450)
PMV BLD AUTO: 10.9 FL (ref 9.2–12.9)
POTASSIUM SERPL-SCNC: 4 MMOL/L (ref 3.5–5.1)
PROT SERPL-MCNC: 8 G/DL (ref 6–8.4)
RBC # BLD AUTO: 4.05 M/UL (ref 4–5.4)
SODIUM SERPL-SCNC: 138 MMOL/L (ref 136–145)
TRIGL SERPL-MCNC: 51 MG/DL (ref 30–150)
TSH SERPL DL<=0.005 MIU/L-ACNC: 1.47 UIU/ML (ref 0.4–4)
WBC # BLD AUTO: 6.35 K/UL (ref 3.9–12.7)

## 2025-01-26 PROCEDURE — 36415 COLL VENOUS BLD VENIPUNCTURE: CPT | Mod: HCNC | Performed by: INTERNAL MEDICINE

## 2025-01-26 PROCEDURE — 83036 HEMOGLOBIN GLYCOSYLATED A1C: CPT | Mod: HCNC | Performed by: INTERNAL MEDICINE

## 2025-01-26 PROCEDURE — 84443 ASSAY THYROID STIM HORMONE: CPT | Mod: HCNC | Performed by: INTERNAL MEDICINE

## 2025-01-26 PROCEDURE — 85025 COMPLETE CBC W/AUTO DIFF WBC: CPT | Mod: HCNC | Performed by: INTERNAL MEDICINE

## 2025-01-26 PROCEDURE — 80053 COMPREHEN METABOLIC PANEL: CPT | Mod: HCNC | Performed by: INTERNAL MEDICINE

## 2025-01-26 PROCEDURE — 80061 LIPID PANEL: CPT | Mod: HCNC | Performed by: INTERNAL MEDICINE

## 2025-01-27 ENCOUNTER — OFFICE VISIT (OUTPATIENT)
Dept: INTERNAL MEDICINE | Facility: CLINIC | Age: 55
End: 2025-01-27
Payer: MEDICARE

## 2025-01-27 VITALS
SYSTOLIC BLOOD PRESSURE: 104 MMHG | HEIGHT: 66 IN | DIASTOLIC BLOOD PRESSURE: 72 MMHG | HEART RATE: 90 BPM | WEIGHT: 223.13 LBS | BODY MASS INDEX: 35.86 KG/M2 | RESPIRATION RATE: 16 BRPM | OXYGEN SATURATION: 98 %

## 2025-01-27 DIAGNOSIS — E78.49 OTHER HYPERLIPIDEMIA: ICD-10-CM

## 2025-01-27 DIAGNOSIS — N18.31 CHRONIC KIDNEY DISEASE, STAGE 3A: ICD-10-CM

## 2025-01-27 DIAGNOSIS — I10 PRIMARY HYPERTENSION: Primary | ICD-10-CM

## 2025-01-27 DIAGNOSIS — G47.33 OSA ON CPAP: ICD-10-CM

## 2025-01-27 DIAGNOSIS — J45.20 MILD INTERMITTENT ASTHMA WITHOUT COMPLICATION: ICD-10-CM

## 2025-01-27 DIAGNOSIS — N18.31 TYPE 2 DIABETES MELLITUS WITH STAGE 3A CHRONIC KIDNEY DISEASE, WITHOUT LONG-TERM CURRENT USE OF INSULIN: ICD-10-CM

## 2025-01-27 DIAGNOSIS — I70.0 AORTIC ATHEROSCLEROSIS: ICD-10-CM

## 2025-01-27 DIAGNOSIS — Z12.31 ENCOUNTER FOR SCREENING MAMMOGRAM FOR MALIGNANT NEOPLASM OF BREAST: ICD-10-CM

## 2025-01-27 DIAGNOSIS — E66.01 CLASS 2 SEVERE OBESITY DUE TO EXCESS CALORIES WITH SERIOUS COMORBIDITY AND BODY MASS INDEX (BMI) OF 36.0 TO 36.9 IN ADULT: ICD-10-CM

## 2025-01-27 DIAGNOSIS — M17.12 PRIMARY OSTEOARTHRITIS OF LEFT KNEE: ICD-10-CM

## 2025-01-27 DIAGNOSIS — E66.812 CLASS 2 SEVERE OBESITY DUE TO EXCESS CALORIES WITH SERIOUS COMORBIDITY AND BODY MASS INDEX (BMI) OF 36.0 TO 36.9 IN ADULT: ICD-10-CM

## 2025-01-27 DIAGNOSIS — E11.22 TYPE 2 DIABETES MELLITUS WITH STAGE 3A CHRONIC KIDNEY DISEASE, WITHOUT LONG-TERM CURRENT USE OF INSULIN: ICD-10-CM

## 2025-01-27 PROBLEM — Z87.19 HISTORY OF RECTAL BLEEDING: Status: RESOLVED | Noted: 2018-06-11 | Resolved: 2025-01-27

## 2025-01-27 PROCEDURE — 3078F DIAST BP <80 MM HG: CPT | Mod: HCNC,CPTII,S$GLB, | Performed by: INTERNAL MEDICINE

## 2025-01-27 PROCEDURE — 3008F BODY MASS INDEX DOCD: CPT | Mod: HCNC,CPTII,S$GLB, | Performed by: INTERNAL MEDICINE

## 2025-01-27 PROCEDURE — 1159F MED LIST DOCD IN RCRD: CPT | Mod: HCNC,CPTII,S$GLB, | Performed by: INTERNAL MEDICINE

## 2025-01-27 PROCEDURE — 4010F ACE/ARB THERAPY RXD/TAKEN: CPT | Mod: HCNC,CPTII,S$GLB, | Performed by: INTERNAL MEDICINE

## 2025-01-27 PROCEDURE — 1160F RVW MEDS BY RX/DR IN RCRD: CPT | Mod: HCNC,CPTII,S$GLB, | Performed by: INTERNAL MEDICINE

## 2025-01-27 PROCEDURE — 3044F HG A1C LEVEL LT 7.0%: CPT | Mod: HCNC,CPTII,S$GLB, | Performed by: INTERNAL MEDICINE

## 2025-01-27 PROCEDURE — 3074F SYST BP LT 130 MM HG: CPT | Mod: HCNC,CPTII,S$GLB, | Performed by: INTERNAL MEDICINE

## 2025-01-27 PROCEDURE — 99999 PR PBB SHADOW E&M-EST. PATIENT-LVL V: CPT | Mod: PBBFAC,HCNC,, | Performed by: INTERNAL MEDICINE

## 2025-01-27 PROCEDURE — G2211 COMPLEX E/M VISIT ADD ON: HCPCS | Mod: HCNC,S$GLB,, | Performed by: INTERNAL MEDICINE

## 2025-01-27 PROCEDURE — 99214 OFFICE O/P EST MOD 30 MIN: CPT | Mod: HCNC,S$GLB,, | Performed by: INTERNAL MEDICINE

## 2025-01-27 NOTE — PROGRESS NOTES
Subjective:       Patient ID: Arron Ron is a 54 y.o. female.    Chief Complaint: Follow-up      HPI:    Patient is known to me and presents for follow up HTN, obesity, chronic low back and knee pain, depression/anxiety, HLD, asthma.  Labs from 1/26/25 personally reviewed, interpreted and discussed today     A1C 5.5%, stable   , stable  GFR 54, improving  H/H 11.6/35.3--stable  Microalb 7/2024 normal     CKD 3: established Dr. Villalta since I saw her last. She reports drinking plenty of water.   Stopped NSAIDs, stopped torsemide, decreased valsartan. GFR recovered from 28 to 54.   Right renal cyst was also found on US per her report.      HTN: on amlodipine 10mg, valsartan; off aldactone and cardura. BP is controlled today. + weight loss. Does not check regularly at home. Denies chest pains, SOB. GFR improved off so much diurectic previously     Depression/anxiety: on xanax PRN prescribed by Dr. Tello. Not on SSRI. Sx reported as controlled     HLD: On atorvastatin and fish oil; last LDL stable.. Denies medication side effects      Asthma: diagnosed by Dr. Ocampo who started the Breo and then switched to symbicort.. She denies  wheezing or cough today. Has now established with pulm at [facility] --on flonase and singulair with symbicort use.         Chronic back and knee pain: She was following Dr. Tello who was prescribing pain medication but she self stopped taking her pain medications due to concern for side effects. Still using Flexeril. Her knee pain was getting better with weight loss. Last MRI and xray reviewed and consistent with arthritis. She saw ortho and scheduled for knee replacement surgery but never had surgery as she is concerned about the recovery since she lives alone/no family.  Her pain is getting worse again. Has not seen ortho in about 1 year.     RODRI: using CPAP every night.      She was referred to Dr. Camacho. She is s/p braiatric surgery (gastric sleeve [id number]).  She is  "feeling frustrated because she regained her weight did not decrease very much since last visit. she reports she has established with bariatric medicine at [facility]--now on Mounjaro. She has had significant weight loss since last visit. Has some constipation but otherwise tolerating well.      GERD: She is following with GI. Had EGD. On pantoprazole BID. Decreased appetite due to chronic nausea.  Considering bypass surgery as sleeve causing so many GI complications.    Per GI note 11/2022: "She might need PPI b.i.d. indefinitely given having esophagitis on PPI daily."  GI also added nortriptyline 25mg for GI symptoms      Chronic constipation: on amitiza per GI. Note she was started on GLP-1 by bariatric med as well. Sx currently stable     Anemia: established with Dr. Murrell since last visit. She is now  B1 and VitD-K. H/H stable. Iron was normal. Noted in setting of CKD.       Past Medical History:   Diagnosis Date    Acid reflux     Anemia     Anxiety     Arthritis     Cataract     bilateral    Chronic back pain     Constipation     COPD (chronic obstructive pulmonary disease)     Edema     Encounter for blood transfusion 1987    no reaction    Endometriosis     Glaucoma     Hyperlipidemia     Hypertension     Nausea & vomiting     Neuropathy     Palpitations     Sleep apnea     cpap nightly    Tendonitis of wrist, right        Family History   Problem Relation Name Age of Onset    Hypertension Mother      Heart attack Mother      Hypertension Brother      Heart disease Maternal Grandmother      Hypertension Maternal Grandmother      Diabetes Maternal Aunt      Breast cancer Neg Hx      Colon cancer Neg Hx      Ovarian cancer Neg Hx      Esophageal cancer Neg Hx         Social History     Socioeconomic History    Marital status: Single   Tobacco Use    Smoking status: Never    Smokeless tobacco: Never   Substance and Sexual Activity    Alcohol use: Yes     Comment: rare    Drug use: Never    Sexual activity: Not " Currently     Partners: Male     Birth control/protection: Surgical, See Surgical Hx     Comment: --BTL     Social Drivers of Health     Financial Resource Strain: High Risk (7/26/2024)    Overall Financial Resource Strain (CARDIA)     Difficulty of Paying Living Expenses: Hard   Food Insecurity: Food Insecurity Present (7/26/2024)    Hunger Vital Sign     Worried About Running Out of Food in the Last Year: Sometimes true     Ran Out of Food in the Last Year: Often true   Transportation Needs: No Transportation Needs (9/13/2023)    PRAPARE - Transportation     Lack of Transportation (Medical): No     Lack of Transportation (Non-Medical): No   Physical Activity: Unknown (7/26/2024)    Exercise Vital Sign     Days of Exercise per Week: 2 days   Recent Concern: Physical Activity - Inactive (7/26/2024)    Exercise Vital Sign     Days of Exercise per Week: 2 days     Minutes of Exercise per Session: 0 min   Stress: No Stress Concern Present (7/26/2024)    [employer] - Occupational Stress Questionnaire     Feeling of Stress : Only a little   Housing Stability: High Risk (7/26/2024)    Housing Stability Vital Sign     Unable to Pay for Housing in the Last Year: Yes       Review of Systems   Constitutional:  Negative for activity change, fatigue, fever and unexpected weight change.   HENT:  Negative for congestion, ear pain, hearing loss, rhinorrhea and sore throat.    Eyes:  Negative for redness and visual disturbance.   Respiratory:  Negative for cough, shortness of breath and wheezing.    Cardiovascular:  Negative for chest pain, palpitations and leg swelling.   Gastrointestinal:  Negative for abdominal pain, constipation, diarrhea, nausea and vomiting.   Genitourinary:  Negative for dysuria and frequency.   Musculoskeletal:  Positive for arthralgias (knee pain). Negative for back pain, joint swelling and neck pain.   Skin:  Negative for color change, rash and wound.   Neurological:   Negative for dizziness, light-headedness and headaches.         Objective:      Physical Exam  Vitals reviewed.   Constitutional:       General: She is not in acute distress.     Appearance: She is well-developed.   HENT:      Head: Normocephalic and atraumatic.      Right Ear: External ear normal.      Left Ear: External ear normal.      Nose: Nose normal.   Eyes:      General:         Right eye: No discharge.         Left eye: No discharge.      Conjunctiva/sclera: Conjunctivae normal.   Neck:      Thyroid: No thyromegaly.   Cardiovascular:      Rate and Rhythm: Normal rate and regular rhythm.      Heart sounds: No murmur heard.  Pulmonary:      Effort: Pulmonary effort is normal. No respiratory distress.      Breath sounds: Normal breath sounds. No wheezing.   Abdominal:      General: There is no distension.      Palpations: Abdomen is soft.      Tenderness: There is no abdominal tenderness.   Skin:     General: Skin is warm and dry.   Neurological:      Mental Status: She is alert and oriented to person, place, and time.   Psychiatric:         Behavior: Behavior normal.         Thought Content: Thought content normal.         Assessment:       1. Primary hypertension    2. Other hyperlipidemia    3. Aortic atherosclerosis    4. Chronic kidney disease, stage 3a    5. Type 2 diabetes mellitus with stage 3a chronic kidney disease, without long-term current use of insulin    6. RODRI on CPAP    7. Class 2 severe obesity due to excess calories with serious comorbidity and body mass index (BMI) of 36.0 to 36.9 in adult    8. Encounter for screening mammogram for malignant neoplasm of breast    9. Primary osteoarthritis of left knee    10. Mild intermittent asthma without complication        Plan:       1. Primary hypertension  Chronic controlled  Continue medications at same dose  Low Na diet  Exercise, weight loss  Check BP and keep log for next visit    -     CBC Auto Differential; Future; Expected date: 07/26/2025  -      Comprehensive Metabolic Panel; Future; Expected date: 07/26/2025  -     Lipid Panel; Future; Expected date: 07/26/2025  -     Hemoglobin A1C; Future; Expected date: 07/26/2025  -     Microalbumin/Creatinine Ratio, Urine; Future; Expected date: 07/26/2025    2. Other hyperlipidemia  Chornic controlled  Cont statin same dose  -     CBC Auto Differential; Future; Expected date: 07/26/2025  -     Comprehensive Metabolic Panel; Future; Expected date: 07/26/2025  -     Lipid Panel; Future; Expected date: 07/26/2025  -     Hemoglobin A1C; Future; Expected date: 07/26/2025  -     Microalbumin/Creatinine Ratio, Urine; Future; Expected date: 07/26/2025    3. Aortic atherosclerosis  Noted on prior imaging  Cont statin  -     CBC Auto Differential; Future; Expected date: 07/26/2025  -     Comprehensive Metabolic Panel; Future; Expected date: 07/26/2025  -     Lipid Panel; Future; Expected date: 07/26/2025  -     Hemoglobin A1C; Future; Expected date: 07/26/2025  -     Microalbumin/Creatinine Ratio, Urine; Future; Expected date: 07/26/2025    4. Chronic kidney disease, stage 3a  Chronic improving  Follow labs  Avoid nephrotoxic agents  Cont nephro follow up as planned  -     CBC Auto Differential; Future; Expected date: 07/26/2025  -     Comprehensive Metabolic Panel; Future; Expected date: 07/26/2025  -     Lipid Panel; Future; Expected date: 07/26/2025  -     Hemoglobin A1C; Future; Expected date: 07/26/2025  -     Microalbumin/Creatinine Ratio, Urine; Future; Expected date: 07/26/2025    5. Type 2 diabetes mellitus with stage 3a chronic kidney disease, without long-term current use of insulin  Chronic stable  GLP-1 started by bariatric  Diagnosis was added to chart and carried over but A1C has been normal and improved with weight loss  Follow labs  ADA diet  Overview:  Noted by TONIE KARIMI MD  last documented on [id number]    Orders:  -     CBC Auto Differential; Future; Expected date: 07/26/2025  -     Comprehensive  Metabolic Panel; Future; Expected date: 07/26/2025  -     Lipid Panel; Future; Expected date: 07/26/2025  -     Hemoglobin A1C; Future; Expected date: 07/26/2025  -     Microalbumin/Creatinine Ratio, Urine; Future; Expected date: 07/26/2025    6. RODRI on CPAP  Chronic stable  Cont CPAP    7. Class 2 severe obesity due to excess calories with serious comorbidity and body mass index (BMI) of 36.0 to 36.9 in adult  Chronic improving  GLP-1 per outside bariatric clinic  -     CBC Auto Differential; Future; Expected date: 07/26/2025  -     Comprehensive Metabolic Panel; Future; Expected date: 07/26/2025  -     Lipid Panel; Future; Expected date: 07/26/2025  -     Hemoglobin A1C; Future; Expected date: 07/26/2025  -     Microalbumin/Creatinine Ratio, Urine; Future; Expected date: 07/26/2025    8. Encounter for screening mammogram for malignant neoplasm of breast  Scheduled-due Feb 2025  -     Mammo Digital Screening Bilat w/ Michael; Future; Expected date: 02/27/2025    9. Primary osteoarthritis of left knee  Chronic worsening  Return to ortho--injections    10. Mild intermittent asthma without complication  Chronic stable  Cont inhalers  Weight loss  Cont antihistamine  F/u pulm as planned       RTC 6 months with labs and PRN

## 2025-01-29 ENCOUNTER — OFFICE VISIT (OUTPATIENT)
Dept: INTERNAL MEDICINE | Facility: CLINIC | Age: 55
End: 2025-01-29
Payer: MEDICARE

## 2025-01-29 VITALS
SYSTOLIC BLOOD PRESSURE: 120 MMHG | DIASTOLIC BLOOD PRESSURE: 88 MMHG | OXYGEN SATURATION: 99 % | HEART RATE: 82 BPM | HEIGHT: 66 IN | BODY MASS INDEX: 36.03 KG/M2 | RESPIRATION RATE: 18 BRPM | WEIGHT: 224.19 LBS

## 2025-01-29 DIAGNOSIS — E11.22 TYPE 2 DIABETES MELLITUS WITH STAGE 3A CHRONIC KIDNEY DISEASE, WITHOUT LONG-TERM CURRENT USE OF INSULIN: ICD-10-CM

## 2025-01-29 DIAGNOSIS — J45.20 MILD INTERMITTENT ASTHMA WITHOUT COMPLICATION: ICD-10-CM

## 2025-01-29 DIAGNOSIS — I70.0 AORTIC ATHEROSCLEROSIS: ICD-10-CM

## 2025-01-29 DIAGNOSIS — N18.31 TYPE 2 DIABETES MELLITUS WITH STAGE 3A CHRONIC KIDNEY DISEASE, WITHOUT LONG-TERM CURRENT USE OF INSULIN: ICD-10-CM

## 2025-01-29 DIAGNOSIS — E11.40 TYPE 2 DIABETES MELLITUS WITH DIABETIC NEUROPATHY, WITHOUT LONG-TERM CURRENT USE OF INSULIN: ICD-10-CM

## 2025-01-29 DIAGNOSIS — E66.812 CLASS 2 SEVERE OBESITY DUE TO EXCESS CALORIES WITH SERIOUS COMORBIDITY AND BODY MASS INDEX (BMI) OF 36.0 TO 36.9 IN ADULT: ICD-10-CM

## 2025-01-29 DIAGNOSIS — Z00.00 ENCOUNTER FOR PREVENTIVE HEALTH EXAMINATION: Primary | ICD-10-CM

## 2025-01-29 DIAGNOSIS — E66.01 CLASS 2 SEVERE OBESITY DUE TO EXCESS CALORIES WITH SERIOUS COMORBIDITY AND BODY MASS INDEX (BMI) OF 36.0 TO 36.9 IN ADULT: ICD-10-CM

## 2025-01-29 DIAGNOSIS — F33.2 SEVERE RECURRENT MAJOR DEPRESSION WITHOUT PSYCHOTIC FEATURES: ICD-10-CM

## 2025-01-29 DIAGNOSIS — I10 PRIMARY HYPERTENSION: ICD-10-CM

## 2025-01-29 DIAGNOSIS — G47.33 OSA ON CPAP: ICD-10-CM

## 2025-01-29 DIAGNOSIS — E78.49 OTHER HYPERLIPIDEMIA: ICD-10-CM

## 2025-01-29 DIAGNOSIS — N18.31 STAGE 3A CHRONIC KIDNEY DISEASE: ICD-10-CM

## 2025-01-29 DIAGNOSIS — H40.1114 PRIMARY OPEN ANGLE GLAUCOMA (POAG) OF RIGHT EYE, INDETERMINATE STAGE: ICD-10-CM

## 2025-01-29 DIAGNOSIS — Z00.00 ENCOUNTER FOR MEDICARE ANNUAL WELLNESS EXAM: ICD-10-CM

## 2025-01-29 PROCEDURE — 4010F ACE/ARB THERAPY RXD/TAKEN: CPT | Mod: HCNC,CPTII,S$GLB, | Performed by: NURSE PRACTITIONER

## 2025-01-29 PROCEDURE — G9919 SCRN ND POS ND PROV OF REC: HCPCS | Mod: HCNC,CPTII,S$GLB, | Performed by: NURSE PRACTITIONER

## 2025-01-29 PROCEDURE — 1159F MED LIST DOCD IN RCRD: CPT | Mod: HCNC,CPTII,S$GLB, | Performed by: NURSE PRACTITIONER

## 2025-01-29 PROCEDURE — 99999 PR PBB SHADOW E&M-EST. PATIENT-LVL IV: CPT | Mod: PBBFAC,HCNC,, | Performed by: NURSE PRACTITIONER

## 2025-01-29 PROCEDURE — 3079F DIAST BP 80-89 MM HG: CPT | Mod: HCNC,CPTII,S$GLB, | Performed by: NURSE PRACTITIONER

## 2025-01-29 PROCEDURE — G0439 PPPS, SUBSEQ VISIT: HCPCS | Mod: HCNC,S$GLB,, | Performed by: NURSE PRACTITIONER

## 2025-01-29 PROCEDURE — 3074F SYST BP LT 130 MM HG: CPT | Mod: HCNC,CPTII,S$GLB, | Performed by: NURSE PRACTITIONER

## 2025-01-29 PROCEDURE — 3044F HG A1C LEVEL LT 7.0%: CPT | Mod: HCNC,CPTII,S$GLB, | Performed by: NURSE PRACTITIONER

## 2025-01-29 PROCEDURE — 1160F RVW MEDS BY RX/DR IN RCRD: CPT | Mod: HCNC,CPTII,S$GLB, | Performed by: NURSE PRACTITIONER

## 2025-01-29 NOTE — ASSESSMENT & PLAN NOTE
CT renal stone 10/2022> Calcified atheromatous disease affects the aorta and its branch vessels.   On lipitor

## 2025-01-29 NOTE — PROGRESS NOTES
"        Arron Ron presented for a  Medicare AWV and comprehensive Health Risk Assessment today. The following components were reviewed and updated:    Medical history  Family History  Social history  Allergies and Current Medications  Health Risk Assessment  Health Maintenance  Care Team     Patient screened moderate and/or high risk for one or more social determinants of health (SDOH). Patient connected to community resources through the ED Navigator.      ** See Completed Assessments for Annual Wellness Visit within the encounter summary.**         The following assessments were completed:  Living Situation  CAGE  Depression Screening  Timed Get Up and Go  Whisper Test  Cognitive Function Screening  Nutrition Screening  ADL Screening  PAQ Screening      Opioid documentation:      Patient does not have a current opioid prescription.       reviewed and she fills gabapentin 100mg capsules per pcp every 30 days as well as xanax from [facility] 2mg tablets 30 count every 30 days   Vitals:    01/29/25 1559   BP: 120/88   Pulse: 82   Resp: 18   SpO2: 99%   Weight: 101.7 kg (224 lb 3.3 oz)   Height: 5' 6" (1.676 m)     Body mass index is 36.19 kg/m².  Physical Exam  Vitals and nursing note reviewed.   Constitutional:       Appearance: Normal appearance.   HENT:      Head: Normocephalic and atraumatic.   Cardiovascular:      Rate and Rhythm: Normal rate and regular rhythm.   Pulmonary:      Effort: Pulmonary effort is normal. No respiratory distress.      Breath sounds: Normal breath sounds.   Musculoskeletal:         General: No swelling. Normal range of motion.   Skin:     General: Skin is warm and dry.      Capillary Refill: Capillary refill takes less than 2 seconds.   Neurological:      General: No focal deficit present.      Mental Status: She is alert and oriented to person, place, and time.   Psychiatric:         Behavior: Behavior normal.         Thought Content: Thought content normal.           "     Diagnoses and health risks identified today and associated recommendations/orders:   Encounter for preventive health examination        Encounter for Medicare annual wellness exam      She follows with Dr Rojas as her pcp. She last had a set of labs with A1C 1/2025   Colonoscopy 4/2021> Pathology reveals benign (normal ) colonic mucosa.  Recommend follow-up colonoscopy in 5 years   Mammo due next month 2/26/24 last one scheduled 2/27/25  Declines vaccines flu and shongrix      Type 2 diabetes mellitus with diabetic neuropathy, without long-term current use of insulin         Lab Results   Component Value Date    HGBA1C 5.5 01/26/2025     On mounjaro and gabapentin                             Mild intermittent asthma without complication  Follows with [facility]m  Om symbicort and air supra       Primary open angle glaucoma (POAG) of right eye, indeterminate stage    Current Assessment & Plan     Follows with [facility] for eye exams- has bilateral open angle glaucoma moderate stage   Notes in media   On trisopt drops, timoptic drops and pred forte derops          Aortic atherosclerosis - Primary    Current Assessment & Plan     CT renal stone 10/2022> Calcified atheromatous disease affects the aorta and its branch vessels.   On lipitor          Stage 3a chronic kidney disease    Current Assessment & Plan     BMP  Lab Results   Component Value Date     01/26/2025    K 4.0 01/26/2025     01/26/2025    CO2 25 01/26/2025    BUN 18 01/26/2025    CREATININE 1.2 01/26/2025    CALCIUM 9.0 01/26/2025    ANIONGAP 7 (L) 01/26/2025    EGFRNORACEVR 54 (A) 01/26/2025   Follows with Dr Henao         Type 2 diabetes mellitus with stage 3a chronic kidney disease, without long-term current use of insulin    Overview     Lab Results   Component Value Date    HGBA1C 5.5 01/26/2025     BMP  Lab Results   Component Value Date     01/26/2025    K 4.0 01/26/2025     01/26/2025    CO2 25 01/26/2025    BUN 18  01/26/2025    CREATININE 1.2 01/26/2025    CALCIUM 9.0 01/26/2025    ANIONGAP 7 (L) 01/26/2025    EGFRNORACEVR 54 (A) 01/26/2025     On mounjaro 15mg every week          Class 2 severe obesity due to excess calories with serious comorbidity and body mass index (BMI) of 36.0 to 36.9 in adult    Overview     Cont weight loss on mounjaro   Rec diet and exercise          Severe recurrent major depression without psychotic features   She follows with Dr Tello on xanax from [facility] Tello 2mg tablets 30 count every 30 days   Was suppose to follow up for depression meds but never did- she sits in warm tube and listens to her gospel when she feels down and she feels like that is her therapy and it lifts her up        HTN (hypertension)  Well controlled no longer following with Dr Mays- bp 120/88   On amlodipine 5mg daily and diovan 160mg daily      Hyperlipidemia  Well controlled on lipitor 20 mg daily  Lipid checked 1/2025 per pcp      RODRI on CPAP   Current Assessment & Plan    RODRI on CPAP sleep study in media 5/2023 > she reoprt that she tries to wear it but usually only wears every other night                    Provided Arron with a 5-10 year written screening schedule and personal prevention plan. Recommendations were developed using the USPSTF age appropriate recommendations. Education, counseling, and referrals were provided as needed. After Visit Summary printed and given to patient which includes a list of additional screenings\tests needed.    No follow-ups on file.    Pat Ibarra, OLEKSANDR                        I offered to discuss advanced care planning, including how to pick a person who would make decisions for you if you were unable to make them for yourself, called a health care power of , and what kind of decisions you might make such as use of life sustaining treatments such as ventilators and tube feeding when faced with a life limiting illness recorded on a living will that they will need to know.  (How you want to be cared for as you near the end of your natural life)     X Patient is interested in learning more about how to make advanced directives.  I provided them paperwork and offered to discuss this with them. Not  and has 3 children was listening to me when discussing POA and LW but unsure about completing it.If she does fill it out she will bring copies back to office

## 2025-01-29 NOTE — PATIENT INSTRUCTIONS
Counseling and Referral of Other Preventative  (Italic type indicates deductible and co-insurance are waived)    Patient Name: Arron Ron  Today's Date: 1/29/2025    Health Maintenance       Date Due Completion Date    Foot Exam Never done ---    Pneumococcal Vaccines (Age 50+) (1 of 2 - PCV) Never done ---    Shingles Vaccine (1 of 2) Never done ---    Influenza Vaccine (1) 09/01/2024 1/30/2023 (Declined)    Override on 1/30/2023: Declined    COVID-19 Vaccine (1 - 2024-25 season) Never done ---    Mammogram 02/26/2025 2/26/2024    Override on 4/27/2012: Done    Diabetic Eye Exam 03/08/2025 3/8/2024    Diabetes Urine Screening 07/19/2025 7/19/2024    Hemoglobin A1c 07/26/2025 1/26/2025    Lipid Panel 01/26/2026 1/26/2025    High Dose Statin 01/29/2026 1/29/2025    Colorectal Cancer Screening 04/29/2026 4/29/2021    TETANUS VACCINE 08/22/2027 8/22/2017    RSV Vaccine (Age 60+ and Pregnant patients) (1 - 1-dose 75+ series) 08/26/2045 ---        No orders of the defined types were placed in this encounter.    The following information is provided to all patients.  This information is to help you find resources for any of the problems found today that may be affecting your health:                  Living healthy guide: www.[facility].[place].gov      Understanding Diabetes: www.diabetes.org      Eating healthy: www.cdc.gov/healthyweight      CDC home safety checklist: www.cdc.gov/steadi/patient.html      Agency on Aging: www.goea.[place].[facility]      Alcoholics anonymous (AA): www.aa.org      Physical Activity: www.mary anne.nih.gov/mp9udnt      Tobacco use: www.quitwithusla.org

## 2025-01-29 NOTE — ASSESSMENT & PLAN NOTE
BMP  Lab Results   Component Value Date     01/26/2025    K 4.0 01/26/2025     01/26/2025    CO2 25 01/26/2025    BUN 18 01/26/2025    CREATININE 1.2 01/26/2025    CALCIUM 9.0 01/26/2025    ANIONGAP 7 (L) 01/26/2025    EGFRNORACEVR 54 (A) 01/26/2025     Dr Henao

## 2025-01-29 NOTE — ASSESSMENT & PLAN NOTE
Follows with [facility] for eye exams- has bilateral open angle glaucoma moderate stage   Notes in media

## 2025-02-18 ENCOUNTER — OFFICE VISIT (OUTPATIENT)
Dept: ORTHOPEDICS | Facility: CLINIC | Age: 55
End: 2025-02-18
Payer: MEDICARE

## 2025-02-18 VITALS — HEIGHT: 66 IN | BODY MASS INDEX: 36.19 KG/M2

## 2025-02-18 DIAGNOSIS — M17.0 PRIMARY OSTEOARTHRITIS OF BOTH KNEES: Primary | ICD-10-CM

## 2025-02-18 PROCEDURE — 99214 OFFICE O/P EST MOD 30 MIN: CPT | Mod: 25,HCNC,S$GLB, | Performed by: ORTHOPAEDIC SURGERY

## 2025-02-18 RX ORDER — TRIAMCINOLONE ACETONIDE 40 MG/ML
80 INJECTION, SUSPENSION INTRA-ARTICULAR; INTRAMUSCULAR
Status: DISCONTINUED | OUTPATIENT
Start: 2025-02-18 | End: 2025-02-18 | Stop reason: HOSPADM

## 2025-02-18 RX ADMIN — TRIAMCINOLONE ACETONIDE 80 MG: 40 INJECTION, SUSPENSION INTRA-ARTICULAR; INTRAMUSCULAR at 09:02

## 2025-02-18 NOTE — PROCEDURES
Large Joint Aspiration/Injection: bilateral knee    Date/Time: 2/18/2025 9:00 AM    Performed by: Pipe Sauceda MD  Authorized by: Pipe Sauceda MD    Consent Done?:  Yes (Verbal)  Approach:  Anterolateral  Location:  Knee  Laterality:  Bilateral  Site:  Bilateral knee  Medications (Left):  80 mg triamcinolone acetonide 40 mg/mL     After obtaining verbal informed consent both of the patient's knees were prepped aseptically and injected through an inferior lateral approach using 40 mg of triamcinolone and 1 cc of 1% plain Xylocaine.  The patient was warned about postinjection flare and how to manage it with ice, rest and over-the-counter analgesics.  They're advised to contact me for any severe, uncontrolled pain.

## 2025-02-18 NOTE — PROGRESS NOTES
Subjective:      Patient ID: Arron Ron is a 54 y.o. female.    Chief Complaint: Follow-up (B knee)    HPI    Follow-up for osteoarthritis.  The patient reports both knees are getting worse, especially the left.  She uses a cane and reports pain with all community ambulation.  The patient reports that she deferred knee arthroplasty last year because of concerns about support in her home, where she lives alone.          Review of Systems   Constitutional: Negative for fever and weight loss.   HENT:  Negative for congestion.    Eyes:  Negative for visual disturbance.   Cardiovascular:  Negative for chest pain.   Respiratory:  Negative for shortness of breath.    Hematologic/Lymphatic: Negative for bleeding problem. Does not bruise/bleed easily.   Skin:  Negative for poor wound healing.   Musculoskeletal:  Positive for joint pain.   Gastrointestinal:  Negative for abdominal pain.   Genitourinary:  Negative for dysuria.   Neurological:  Negative for seizures.   Psychiatric/Behavioral:  Negative for altered mental status.    Allergic/Immunologic: Negative for persistent infections.         Objective:      Ortho/SPM Exam      Left knee    Alert and oriented, no acute distress  Body habitus is obese  Sclera are clear  No respiratory distress  Hip irritability  negative.   The skin over the knee is intact.  Knee effusion trace  Palpation- nontender  Range of motion- Flexion 90 deg, Extension 0 deg,   Ligament laxity exam:  2+ valgus laxity   Patellar apprehension negative.  Popliteal cyst negative  Patellar crepitation absent.  Meniscal irritability not applicable  Pulses DP present, PT present.  Motor normal 5/5 strength in all tested muscle groups.   Sensory normal.    Right knee  Trace effusion  Range of motion 0-120  1+ valgus laxity      Left knee radiographs previously show advanced medial narrowing, Kellgren stage III.        Assessment:       Encounter Diagnosis   Name Primary?    Primary osteoarthritis  of both knees Yes        The condition is structurally advanced in the left knee and moderate in the right knee.  Nonsurgical measures have been modestly beneficial to date.    Arthroplasty may be the only intervention that is really helpful in the left knee.          Plan:       Arron was seen today for follow-up.    Diagnoses and all orders for this visit:    Primary osteoarthritis of both knees  -     X-ray Knee Ortho Bilateral with Flexion; Future          I explained my diagnostic impression and the reasoning behind it in detail, using layman's terms.  Models and/or pictures were used to help in the explanation.     I explained the role of left total knee replacement in the treatment of this condition.  I reviewed the nature of the operation, the expected clinical course and recovery period.  I also discussed the typical complications that are associated with the procedure.  The option of continued nonsurgical care was also reviewed.  The patient indicates that they wishes to consider before proceeding    Injection was requested for both knees and consent was given    X-ray next visit

## 2025-02-27 ENCOUNTER — HOSPITAL ENCOUNTER (OUTPATIENT)
Dept: RADIOLOGY | Facility: HOSPITAL | Age: 55
Discharge: HOME | End: 2025-02-27
Attending: INTERNAL MEDICINE
Payer: MEDICARE

## 2025-02-27 VITALS — HEIGHT: 66 IN | BODY MASS INDEX: 36 KG/M2 | WEIGHT: 224 LBS

## 2025-02-27 DIAGNOSIS — Z12.31 ENCOUNTER FOR SCREENING MAMMOGRAM FOR MALIGNANT NEOPLASM OF BREAST: ICD-10-CM

## 2025-02-27 PROCEDURE — 77063 BREAST TOMOSYNTHESIS BI: CPT | Mod: 26,HCNC,, | Performed by: RADIOLOGY

## 2025-02-27 PROCEDURE — 77067 SCR MAMMO BI INCL CAD: CPT | Mod: 26,HCNC,, | Performed by: RADIOLOGY

## 2025-02-27 PROCEDURE — 77063 BREAST TOMOSYNTHESIS BI: CPT | Mod: TC,HCNC

## 2025-03-10 ENCOUNTER — RESULTS FOLLOW-UP (OUTPATIENT)
Dept: HEPATOLOGY | Facility: HOSPITAL | Age: 55
End: 2025-03-10

## 2025-03-25 ENCOUNTER — TELEPHONE (OUTPATIENT)
Dept: GASTROENTEROLOGY | Facility: CLINIC | Age: 55
End: 2025-03-25
Payer: MEDICARE

## 2025-03-25 NOTE — TELEPHONE ENCOUNTER
----- Message from Tonja sent at 3/24/2025  3:18 PM CDT -----  Regarding: Appointment  Contact: [phone]  Scheduling Request   Appt Type:  Gastro  Date/Time Preference:04/02 mornings  Treating Provider:Ervin Hyman Caller Name:Arron  Contact Preference:[phone] Comments/notes:

## 2025-03-25 NOTE — TELEPHONE ENCOUNTER
Spoke with patient.  Stated she continues to have trouble with her bowels.  Scheduled to see Dr.James Hyman on 4/28 at 8:30.   Confirmation mailed.  Delicia

## 2025-04-14 ENCOUNTER — HOSPITAL ENCOUNTER (OUTPATIENT)
Dept: RADIOLOGY | Facility: HOSPITAL | Age: 55
Discharge: HOME | End: 2025-04-14
Attending: INTERNAL MEDICINE
Payer: MEDICARE

## 2025-04-14 ENCOUNTER — TELEPHONE (OUTPATIENT)
Dept: INTERNAL MEDICINE | Facility: CLINIC | Age: 55
End: 2025-04-14

## 2025-04-14 ENCOUNTER — OFFICE VISIT (OUTPATIENT)
Dept: INTERNAL MEDICINE | Facility: CLINIC | Age: 55
End: 2025-04-14
Payer: MEDICARE

## 2025-04-14 VITALS
BODY MASS INDEX: 35.22 KG/M2 | WEIGHT: 219.13 LBS | DIASTOLIC BLOOD PRESSURE: 82 MMHG | OXYGEN SATURATION: 97 % | SYSTOLIC BLOOD PRESSURE: 112 MMHG | HEART RATE: 81 BPM | HEIGHT: 66 IN | RESPIRATION RATE: 16 BRPM

## 2025-04-14 DIAGNOSIS — R10.9 ACUTE LEFT FLANK PAIN: Primary | ICD-10-CM

## 2025-04-14 DIAGNOSIS — N22 CALCULUS OF URINARY TRACT IN DISEASES CLASSIFIED ELSEWHERE: ICD-10-CM

## 2025-04-14 PROCEDURE — G2211 COMPLEX E/M VISIT ADD ON: HCPCS | Mod: S$GLB,,, | Performed by: INTERNAL MEDICINE

## 2025-04-14 PROCEDURE — 1160F RVW MEDS BY RX/DR IN RCRD: CPT | Mod: CPTII,S$GLB,, | Performed by: INTERNAL MEDICINE

## 2025-04-14 PROCEDURE — 3008F BODY MASS INDEX DOCD: CPT | Mod: CPTII,S$GLB,, | Performed by: INTERNAL MEDICINE

## 2025-04-14 PROCEDURE — 3044F HG A1C LEVEL LT 7.0%: CPT | Mod: CPTII,S$GLB,, | Performed by: INTERNAL MEDICINE

## 2025-04-14 PROCEDURE — 74176 CT ABD & PELVIS W/O CONTRAST: CPT | Mod: 26,,, | Performed by: RADIOLOGY

## 2025-04-14 PROCEDURE — 99999 PR PBB SHADOW E&M-EST. PATIENT-LVL V: CPT | Mod: PBBFAC,,, | Performed by: INTERNAL MEDICINE

## 2025-04-14 PROCEDURE — 99214 OFFICE O/P EST MOD 30 MIN: CPT | Mod: S$GLB,,, | Performed by: INTERNAL MEDICINE

## 2025-04-14 PROCEDURE — 1159F MED LIST DOCD IN RCRD: CPT | Mod: CPTII,S$GLB,, | Performed by: INTERNAL MEDICINE

## 2025-04-14 PROCEDURE — 3074F SYST BP LT 130 MM HG: CPT | Mod: CPTII,S$GLB,, | Performed by: INTERNAL MEDICINE

## 2025-04-14 PROCEDURE — 74176 CT ABD & PELVIS W/O CONTRAST: CPT | Mod: TC

## 2025-04-14 PROCEDURE — 3079F DIAST BP 80-89 MM HG: CPT | Mod: CPTII,S$GLB,, | Performed by: INTERNAL MEDICINE

## 2025-04-14 PROCEDURE — 4010F ACE/ARB THERAPY RXD/TAKEN: CPT | Mod: CPTII,S$GLB,, | Performed by: INTERNAL MEDICINE

## 2025-04-14 NOTE — PROGRESS NOTES
Subjective:       Patient ID: Arron Ron is a 54 y.o. female.    Chief Complaint: left side pain (Started last wednesday)      HPI:    Patient is known to me and presents with left sided flank pain. Sx started 5 days ago. Getting worse, Come and goes. Pain described as sharp and like it rolls.    + constipation but not worse than normal. Denies UTI sx and hematuria, she went to urgent care and UA was normal.  She presents today wanting a CAT scan because she is very worried about problems with her kidneys specifically that she developed a stone.    Past Medical History:   Diagnosis Date    Acid reflux     Anemia     Anxiety     Arthritis     Asthma     Cataract     bilateral    Chronic back pain     Chronic bronchitis     Constipation     COPD (chronic obstructive pulmonary disease)     Diabetes with neurologic complications     Disorder of kidney and ureter     Edema     Encounter for blood transfusion 1987    no reaction    Endometriosis     Glaucoma     Hyperlipidemia     Hypertension     Nausea & vomiting     Neuropathy     Palpitations     Sleep apnea     cpap nightly    Tendonitis of wrist, right     Trouble in sleeping     Type 2 diabetes mellitus        Family History   Problem Relation Name Age of Onset    Hypertension Mother      Heart attack Mother      Hypertension Brother      Heart disease Maternal Grandmother      Hypertension Maternal Grandmother      Diabetes Maternal Aunt      Breast cancer Neg Hx      Colon cancer Neg Hx      Ovarian cancer Neg Hx      Esophageal cancer Neg Hx         Social History[1]    Review of Systems   Constitutional:  Negative for activity change, fatigue, fever and unexpected weight change.   HENT:  Negative for congestion, ear pain, hearing loss, rhinorrhea and sore throat.    Eyes:  Negative for redness and visual disturbance.   Respiratory:  Negative for cough, shortness of breath and wheezing.    Cardiovascular:  Negative for chest pain, palpitations and  leg swelling.   Gastrointestinal:  Negative for abdominal pain, constipation, diarrhea, nausea and vomiting.   Genitourinary:  Positive for flank pain. Negative for dysuria, frequency, pelvic pain and urgency.   Musculoskeletal:  Negative for back pain, joint swelling and neck pain.   Skin:  Negative for color change, rash and wound.   Neurological:  Negative for dizziness, tremors, weakness, light-headedness and headaches.         Objective:      Physical Exam  Vitals reviewed.   Constitutional:       General: She is not in acute distress.     Appearance: She is well-developed.   HENT:      Head: Normocephalic and atraumatic.      Right Ear: External ear normal.      Left Ear: External ear normal.      Nose: Nose normal.   Eyes:      General:         Right eye: No discharge.         Left eye: No discharge.      Conjunctiva/sclera: Conjunctivae normal.   Neck:      Thyroid: No thyromegaly.   Cardiovascular:      Rate and Rhythm: Normal rate and regular rhythm.   Pulmonary:      Effort: Pulmonary effort is normal. No respiratory distress.   Abdominal:      General: There is no distension.      Palpations: Abdomen is soft.      Tenderness: There is abdominal tenderness. There is no right CVA tenderness or left CVA tenderness. Guarding: Left very lateral.  Just above the iliac crest.  Skin:     General: Skin is warm and dry.   Neurological:      Mental Status: She is alert and oriented to person, place, and time.   Psychiatric:         Behavior: Behavior normal.         Thought Content: Thought content normal.         Assessment:       1. Acute left flank pain    2. Calculus of urinary tract in diseases classified elsewhere        Plan:       1. Acute left flank pain  New problem  Symptoms for 5 days and worsening  Repeat urinalysis  CT renal stone study today  Did discuss my clinical impression is this is more muscular but given worsening symptoms we will complete workup  -     Urinalysis; Future; Expected date:  04/14/2025    2. Calculus of urinary tract in diseases classified elsewhere  New problem  Possible given left-sided flank pain that is worsening over last few days  Repeat urinalysis and CT renal stone study  If negative did discuss this is likely muscular as noted above  -     CT Renal Stone Study ABD Pelvis WO; Future; Expected date: 04/14/2025       Return to clinic p.r.n. pending above workup               [1]   Social History  Socioeconomic History    Marital status: Single   Tobacco Use    Smoking status: Never    Smokeless tobacco: Never   Substance and Sexual Activity    Alcohol use: Yes     Comment: rare    Drug use: Never    Sexual activity: Not Currently     Partners: Male     Birth control/protection: Surgical, See Surgical Hx     Comment: --BTL     Social Drivers of Health     Financial Resource Strain: High Risk (1/29/2025)    Overall Financial Resource Strain (CARDIA)     Difficulty of Paying Living Expenses: Hard   Food Insecurity: Food Insecurity Present (1/29/2025)    Hunger Vital Sign     Worried About Running Out of Food in the Last Year: Sometimes true     Ran Out of Food in the Last Year: Sometimes true   Transportation Needs: Unmet Transportation Needs (1/29/2025)    PRAPARE - Transportation     Lack of Transportation (Medical): Yes     Lack of Transportation (Non-Medical): Yes   Physical Activity: Insufficiently Active (1/29/2025)    Exercise Vital Sign     Days of Exercise per Week: 2 days     Minutes of Exercise per Session: 10 min   Stress: Stress Concern Present (1/29/2025)    [employer] - Occupational Stress Questionnaire     Feeling of Stress : Very much   Housing Stability: High Risk (1/29/2025)    Housing Stability Vital Sign     Unable to Pay for Housing in the Last Year: Yes     Homeless in the Last Year: Yes

## 2025-04-14 NOTE — TELEPHONE ENCOUNTER
----- Message from Yue sent at 4/14/2025 10:27 AM CDT -----  Contact: pt  Arron RonMRN: [id number]: 1970PCP: Laurie Rojas Phone      [phone] Phone      Not on file.Mobile          [phone]: Pt would like to speak to the nurse in regards to the results from her test this morning. She states she was unable to give a urine sample and she saw the results came through her chart. She states she knows she can pass the stone so she would like Dr. Rojas to call something in for her. She states she has finally gotten her kidney function good and doesn't want to play around with it. Please advise [phone]

## 2025-04-14 NOTE — TELEPHONE ENCOUNTER
CT reviewed. The stones are small and do not require any medication to pass. Even though we found stones I'm not convinced they are causing her pain. As discussed, she can use tyelnol as needed. But these stones are not going to block her kidneys and cause damage to her kidneys.

## 2025-04-21 ENCOUNTER — TELEPHONE (OUTPATIENT)
Dept: INTERNAL MEDICINE | Facility: CLINIC | Age: 55
End: 2025-04-21
Payer: MEDICARE

## 2025-04-21 NOTE — TELEPHONE ENCOUNTER
----- Message from Yue sent at 4/21/2025 11:34 AM CDT -----  Contact: pt  Arron RonMRN: [id number]: 1970PCP: Laurie Rojas Phone      [phone] Phone      Not on file.Mobile          [phone]: pt states she was getting her MOUNJARO 15 mg/0.5 mL PnIj filled by another provider. She is asking if Dr. Rojas will take over filling it from now on. Please advise [phone]

## 2025-04-21 NOTE — TELEPHONE ENCOUNTER
Pt was getting mounjaro from another provider and wanted to know if you can take over this prescription. Please advise.

## 2025-04-21 NOTE — TELEPHONE ENCOUNTER
If she is getting a compounded version for weight loss [employer] is not going to cover this medication from me and retail pharmacy for weight loss.   She is not a diabetic.

## 2025-04-28 ENCOUNTER — OFFICE VISIT (OUTPATIENT)
Dept: GASTROENTEROLOGY | Facility: CLINIC | Age: 55
End: 2025-04-28
Payer: MEDICARE

## 2025-04-28 ENCOUNTER — HOSPITAL ENCOUNTER (OUTPATIENT)
Dept: RADIOLOGY | Facility: HOSPITAL | Age: 55
Discharge: HOME | End: 2025-04-28
Attending: INTERNAL MEDICINE
Payer: MEDICARE

## 2025-04-28 ENCOUNTER — PATIENT MESSAGE (OUTPATIENT)
Dept: GASTROENTEROLOGY | Facility: CLINIC | Age: 55
End: 2025-04-28

## 2025-04-28 VITALS
SYSTOLIC BLOOD PRESSURE: 121 MMHG | BODY MASS INDEX: 35.47 KG/M2 | DIASTOLIC BLOOD PRESSURE: 85 MMHG | HEIGHT: 66 IN | HEART RATE: 90 BPM | WEIGHT: 220.69 LBS

## 2025-04-28 DIAGNOSIS — K21.00 GASTROESOPHAGEAL REFLUX DISEASE WITH ESOPHAGITIS WITHOUT HEMORRHAGE: ICD-10-CM

## 2025-04-28 DIAGNOSIS — R10.84 ABDOMINAL PAIN, GENERALIZED: Primary | ICD-10-CM

## 2025-04-28 DIAGNOSIS — Z86.0100 HISTORY OF COLON POLYPS: ICD-10-CM

## 2025-04-28 DIAGNOSIS — K59.04 CHRONIC IDIOPATHIC CONSTIPATION: ICD-10-CM

## 2025-04-28 DIAGNOSIS — I10 PRIMARY HYPERTENSION: ICD-10-CM

## 2025-04-28 PROCEDURE — 3044F HG A1C LEVEL LT 7.0%: CPT | Mod: CPTII,S$GLB,, | Performed by: INTERNAL MEDICINE

## 2025-04-28 PROCEDURE — 3074F SYST BP LT 130 MM HG: CPT | Mod: CPTII,S$GLB,, | Performed by: INTERNAL MEDICINE

## 2025-04-28 PROCEDURE — 74019 RADEX ABDOMEN 2 VIEWS: CPT | Mod: 26,,, | Performed by: RADIOLOGY

## 2025-04-28 PROCEDURE — 1160F RVW MEDS BY RX/DR IN RCRD: CPT | Mod: CPTII,S$GLB,, | Performed by: INTERNAL MEDICINE

## 2025-04-28 PROCEDURE — 3008F BODY MASS INDEX DOCD: CPT | Mod: CPTII,S$GLB,, | Performed by: INTERNAL MEDICINE

## 2025-04-28 PROCEDURE — 99999 PR PBB SHADOW E&M-EST. PATIENT-LVL V: CPT | Mod: PBBFAC,,, | Performed by: INTERNAL MEDICINE

## 2025-04-28 PROCEDURE — 1159F MED LIST DOCD IN RCRD: CPT | Mod: CPTII,S$GLB,, | Performed by: INTERNAL MEDICINE

## 2025-04-28 PROCEDURE — 74019 RADEX ABDOMEN 2 VIEWS: CPT | Mod: TC

## 2025-04-28 PROCEDURE — 99214 OFFICE O/P EST MOD 30 MIN: CPT | Mod: S$GLB,,, | Performed by: INTERNAL MEDICINE

## 2025-04-28 PROCEDURE — 3079F DIAST BP 80-89 MM HG: CPT | Mod: CPTII,S$GLB,, | Performed by: INTERNAL MEDICINE

## 2025-04-28 PROCEDURE — 4010F ACE/ARB THERAPY RXD/TAKEN: CPT | Mod: CPTII,S$GLB,, | Performed by: INTERNAL MEDICINE

## 2025-04-28 RX ORDER — AMLODIPINE BESYLATE 5 MG/1
5 TABLET ORAL
Qty: 90 TABLET | Refills: 3 | OUTPATIENT
Start: 2025-04-28

## 2025-04-28 NOTE — PROGRESS NOTES
Subjective:       Patient ID: Arron Ron is a 54 y.o. female.    Chief Complaint: Gastroesophageal Reflux (Continued problems with bowels)    HPI    Follow-up visit.  Last visit was a year ago.  She has a long history of GI symptoms.  Prior gastric sleeve.  In fact it was asleep with a hiatal hernia repair although the hernia has recurred.  She has proven severe reflux esophagitis but we also did an EGD in 2022 which documented healing of the esophagitis.  Also a long history of constipation.  On my last visit a year ago my impression was IBS C, history polyps, reflux, and since she had failed Linzess before I tried Amitiza 24 mcg also concerned about the possibility of gastroparesis.      She did try the higher dose of the Amitiza.  It was ineffective.    She complains of increased reflux.  With further exploration of her symptoms she describes a uncomfortable substernal and epigastric fullness after eating.  She feels like the food does not digest.  It does not sound like it is dysphagia as she does not have to interrupt or stopped eating.  But as soon as she finishes eating she feels this uncomfortable fullness like there is no digestion.  Sometimes there is pyrosis and regurgitation.  Sometimes there is a belch with the regurgitation.  Sometimes she actually has to vomit for relief.  She has these symptoms despite taking pantoprazole twice a day.  There some nausea associated with it.  She gets partial relief with Tums.  She also has continued constipation.  She can go several days without a bowel movement.  When she does she has to strain to pass a small volume of stool.  Right now she is using Dulcolax intermittently and that gives her good results.    She recently stopped Mounjaro.  Not because of symptoms just because of an insurance change.  She had been off it for several weeks.  She told me before she felt like she had all these symptoms before Mounjaro.  And since stopping Mounjaro the so far  there has been no change or improvement in her symptoms.    Past Medical History:   Diagnosis Date    Acid reflux     Anemia     Anxiety     Arthritis     Asthma     Cataract     bilateral    Chronic back pain     Chronic bronchitis     Constipation     COPD (chronic obstructive pulmonary disease)     Diabetes with neurologic complications     Disorder of kidney and ureter     Edema     Encounter for blood transfusion 1987    no reaction    Endometriosis     Glaucoma     Hyperlipidemia     Hypertension     Nausea & vomiting     Neuropathy     Palpitations     Sleep apnea     cpap nightly    Tendonitis of wrist, right     Trouble in sleeping     Type 2 diabetes mellitus        Review of patient's allergies indicates:  No Known Allergies     Family History   Problem Relation Name Age of Onset    Hypertension Mother      Heart attack Mother      Hypertension Brother      Heart disease Maternal Grandmother      Hypertension Maternal Grandmother      Diabetes Maternal Aunt      Breast cancer Neg Hx      Colon cancer Neg Hx      Ovarian cancer Neg Hx      Esophageal cancer Neg Hx         Social History[1]     Review of Systems    CMP   Lab Results   Component Value Date     01/26/2025     (L) 12/30/2024    K 4.0 01/26/2025    K 4.0 12/30/2024     01/26/2025     12/30/2024    CO2 25 01/26/2025    CO2 29 12/30/2024    GLU 80 01/26/2025    GLU 89 12/30/2024    BUN 18 01/26/2025    BUN 17 12/30/2024    CREATININE 1.2 01/26/2025    CREATININE 1.32 (H) 12/30/2024    CALCIUM 9.0 01/26/2025    CALCIUM 9.5 12/30/2024    PROT 8.0 01/26/2025    PROT 7.8 12/30/2024    ALBUMIN 3.9 01/26/2025    ALBUMIN 4.2 12/30/2024    BILITOT 0.5 01/26/2025    BILITOT 0.5 12/30/2024    ALKPHOS 91 01/26/2025    ALKPHOS 79 12/30/2024    AST 21 01/26/2025    AST 30 12/30/2024    ALT 15 01/26/2025    ALT 17 12/30/2024    ANIONGAP 7 (L) 01/26/2025    ANIONGAP 4 (L) 12/30/2024    and CBC   Lab Results   Component Value Date    WBC  6.35 01/26/2025    WBC 7.00 12/30/2024    HGB 11.6 (L) 01/26/2025    HGB 11.5 (L) 12/30/2024    HCT 35.3 (L) 01/26/2025     01/26/2025     12/30/2024       No results found for this or any previous visit from the past 365 days.             Objective:      Physical Exam    Assessment & Plan:       Abdominal pain, generalized  -     NM Gastric Emptying; Future; Expected date: 04/28/2025    Chronic idiopathic constipation  -     X-Ray Abdomen Flat And Erect; Future; Expected date: 04/28/2025    History of colon polyps    Gastroesophageal reflux disease with esophagitis without hemorrhage     Assessment.  1. Epigastric discomfort, associated with fullness, nausea, occasional vomiting.  I would like to order a gastric emptying scan now.  I am not sure that emptying scans are necessarily calibrated or have a different standardization emptying curve for gastric sleeve anatomy, but nonetheless I think there would be information that may be helpful and we will least be objective.  2. Chronic idiopathic constipation versus IBS C.  I asked her to try Dulcolax to get cleaned out and then MiraLax immediately afterwards and then stay on MiraLax every day explaining the rationale of that.  3. History of colon polyps.  Due for a follow-up colonoscopy in 2026 as her last 1 was 2021 and had a single polyp  4. Gastroesophageal reflux with esophagitis.  I assume since she is compliant with the PPI that her esophagitis is still healed.  We will consider whether or not to repeat EGD    Recommendation  1. Gastric emptying scan  2. Abdominal x-ray now  3. For now tried Dulcolax follow-up daily MiraLax  4. We will consider in the future something like Motegrity which could help both colonic and gastric motility, of course if we can get covered by insurance    This note was created with voice recognition dictation technology.  There may be errors that I did not see, detect or correct.        Ervin Hyman MD          [1]    Social History  Tobacco Use    Smoking status: Never    Smokeless tobacco: Never   Substance Use Topics    Alcohol use: Yes     Comment: rare    Drug use: Never

## 2025-04-28 NOTE — TELEPHONE ENCOUNTER
No care due was identified.  [facility] Embedded Care Due Messages. Reference number: [id number].   4/28/2025 8:20:21 AM CDT

## 2025-04-28 NOTE — TELEPHONE ENCOUNTER
Refill Decision Note   Arron Ron  is requesting a refill authorization.  Brief Assessment and Rationale for Refill:  Quick Discontinue     Medication Therapy Plan: Amlodipine increased to 10 mg on 4/24/25      Comments:     Note composed:9:39 AM 04/28/2025

## 2025-05-05 DIAGNOSIS — I10 PRIMARY HYPERTENSION: ICD-10-CM

## 2025-05-05 LAB
LEFT EYE DM RETINOPATHY: NEGATIVE
RIGHT EYE DM RETINOPATHY: NEGATIVE

## 2025-05-05 RX ORDER — AMLODIPINE BESYLATE 5 MG/1
5 TABLET ORAL
Qty: 90 TABLET | Refills: 3 | OUTPATIENT
Start: 2025-05-05

## 2025-05-05 NOTE — TELEPHONE ENCOUNTER
No care due was identified.  [facility] Embedded Care Due Messages. Reference number: [id number].   5/05/2025 1:21:58 PM CDT

## 2025-05-06 NOTE — TELEPHONE ENCOUNTER
Refill Decision Note   Arron Ron  is requesting a refill authorization.  Brief Assessment and Rationale for Refill:  Quick Discontinue     Medication Therapy Plan:        Comments:     Note composed:9:43 PM 05/05/2025

## 2025-05-13 ENCOUNTER — PATIENT OUTREACH (OUTPATIENT)
Dept: ADMINISTRATIVE | Facility: HOSPITAL | Age: 55
End: 2025-05-13
Payer: MEDICARE

## 2025-05-14 NOTE — TELEPHONE ENCOUNTER
Patient requesting GI referral   oriented to person, place and time , normal sensation , short and long term memory intact

## 2025-05-20 ENCOUNTER — OFFICE VISIT (OUTPATIENT)
Dept: ORTHOPEDICS | Facility: CLINIC | Age: 55
End: 2025-05-20
Payer: MEDICARE

## 2025-05-20 VITALS — HEIGHT: 66 IN | BODY MASS INDEX: 35.62 KG/M2

## 2025-05-20 DIAGNOSIS — M17.0 PRIMARY OSTEOARTHRITIS OF BOTH KNEES: Primary | ICD-10-CM

## 2025-05-20 PROCEDURE — 99999 PR PBB SHADOW E&M-EST. PATIENT-LVL IV: CPT | Mod: PBBFAC,,, | Performed by: ORTHOPAEDIC SURGERY

## 2025-05-20 PROCEDURE — 20610 DRAIN/INJ JOINT/BURSA W/O US: CPT | Mod: RT,S$GLB,, | Performed by: ORTHOPAEDIC SURGERY

## 2025-05-20 PROCEDURE — 99213 OFFICE O/P EST LOW 20 MIN: CPT | Mod: 25,S$GLB,, | Performed by: ORTHOPAEDIC SURGERY

## 2025-05-20 PROCEDURE — 3044F HG A1C LEVEL LT 7.0%: CPT | Mod: CPTII,S$GLB,, | Performed by: ORTHOPAEDIC SURGERY

## 2025-05-20 PROCEDURE — 1159F MED LIST DOCD IN RCRD: CPT | Mod: CPTII,S$GLB,, | Performed by: ORTHOPAEDIC SURGERY

## 2025-05-20 PROCEDURE — 1160F RVW MEDS BY RX/DR IN RCRD: CPT | Mod: CPTII,S$GLB,, | Performed by: ORTHOPAEDIC SURGERY

## 2025-05-20 PROCEDURE — 3008F BODY MASS INDEX DOCD: CPT | Mod: CPTII,S$GLB,, | Performed by: ORTHOPAEDIC SURGERY

## 2025-05-20 PROCEDURE — 4010F ACE/ARB THERAPY RXD/TAKEN: CPT | Mod: CPTII,S$GLB,, | Performed by: ORTHOPAEDIC SURGERY

## 2025-05-20 RX ORDER — TRIAMCINOLONE ACETONIDE 40 MG/ML
40 INJECTION, SUSPENSION INTRA-ARTICULAR; INTRAMUSCULAR
Status: DISCONTINUED | OUTPATIENT
Start: 2025-05-20 | End: 2025-05-20 | Stop reason: HOSPADM

## 2025-05-20 RX ADMIN — TRIAMCINOLONE ACETONIDE 40 MG: 40 INJECTION, SUSPENSION INTRA-ARTICULAR; INTRAMUSCULAR at 10:05

## 2025-05-20 NOTE — PROCEDURES
Large Joint Aspiration/Injection: R knee    Date/Time: 5/20/2025 10:30 AM    Performed by: Pipe Sauceda MD  Authorized by: Pipe Sauceda MD    Location:  Knee  Site:  R knee  Medications:  40 mg triamcinolone acetonide 40 mg/mL     After obtaining verbal informed consent the patient's right knee was prepped aseptically and injected through an inferior lateral approach using 40 mg of triamcinolone and 1 cc of 1% plain Xylocaine.  The patient was warned about postinjection flare and how to manage it with ice, rest and over-the-counter analgesics.  They're advised to contact me for any severe, uncontrolled pain.

## 2025-05-20 NOTE — PROGRESS NOTES
Subjective:      Patient ID: Arron Ron is a 54 y.o. female.    Chief Complaint: Follow-up (BILAT KNEE )    HPI    Follow-up for osteoarthritis.  The patient reports recently her right knee has been much more painful.  The pain is reportedly lateral and posterior.  It is aggravated by prolonged walking.  The patient states that currently her left knee is tolerable.          Review of Systems   Musculoskeletal:  Positive for joint pain.         Objective:      Ortho/SPM Exam          Alert and oriented, no acute distress  Body habitus is obese  Sclera are clear  No respiratory distress  No definite limp    Left knee  Hip irritability  negative.   The skin over the knee is intact.  Knee effusion trace  Palpation- nontender  Range of motion- Flexion 90 deg, Extension 0 deg,   Ligament laxity exam:  2+ valgus laxity   Patellar apprehension negative.  Popliteal cyst negative  Patellar crepitation absent.  Meniscal irritability not applicable  Pulses DP present, PT present.  Motor normal 5/5 strength in all tested muscle groups.   Sensory normal.    Right knee  Trace effusion  Range of motion 0-120  1+ varus laxity  Baker cyst present    Radiographs the left knee show advanced medial narrowing with osteophytes, Kellgren stage III.  Radiographs the right knee show mild to moderate lateral narrowing, Kellgren stage II          Assessment:       Encounter Diagnosis   Name Primary?    Primary osteoarthritis of both knees Yes        Although the left knee is structurally more advanced, the right knee is more symptomatic at this time.  Based on the entire presentation, I recommend nonsurgical care for the present time.  Structural progression over time is likely.          Plan:       Arron was seen today for follow-up.    Diagnoses and all orders for this visit:    Primary osteoarthritis of both knees          I explained my diagnostic impression and the reasoning behind it in detail, using layman's terms.  Models  and/or pictures were used to help in the explanation.    Right knee Injection recommended and consent given    Although the patient may be appropriate for consideration of knee arthroplasty in the future, her previously stated reservations about supported home should be addressed before scheduling.

## 2025-05-26 DIAGNOSIS — K21.9 GASTROESOPHAGEAL REFLUX DISEASE WITHOUT ESOPHAGITIS: ICD-10-CM

## 2025-05-26 NOTE — TELEPHONE ENCOUNTER
Refill Routing Note   Medication(s) are not appropriate for processing by [facility] for the following reason(s):        Outside of protocol    OR action(s):  Route      Medication Therapy Plan: EXCEEDS ORC MAX DAILY DOSAGE PROTOCOL FOR PROTONIX OF 40 MG      Appointments  past 12m or future 3m with PCP    Date Provider   Last Visit   4/14/2025 Mahi Rojas MD   Next Visit   7/9/2025 Mahi Rojas MD   ED visits in past 90 days: 0        Note composed:6:42 PM 05/26/2025

## 2025-05-26 NOTE — TELEPHONE ENCOUNTER
No care due was identified.  [facility] Embedded Care Due Messages. Reference number: [id number].   5/26/2025 8:04:39 AM CDT

## 2025-05-27 RX ORDER — PANTOPRAZOLE SODIUM 40 MG/1
40 TABLET, DELAYED RELEASE ORAL 2 TIMES DAILY
Qty: 180 TABLET | Refills: 3 | Status: SHIPPED | OUTPATIENT
Start: 2025-05-27

## 2025-06-10 ENCOUNTER — HOSPITAL ENCOUNTER (OUTPATIENT)
Dept: RADIOLOGY | Facility: HOSPITAL | Age: 55
Discharge: HOME | End: 2025-06-10
Attending: INTERNAL MEDICINE
Payer: MEDICARE

## 2025-06-10 DIAGNOSIS — R10.84 ABDOMINAL PAIN, GENERALIZED: ICD-10-CM

## 2025-06-10 PROCEDURE — A9541 TC99M SULFUR COLLOID: HCPCS | Performed by: INTERNAL MEDICINE

## 2025-06-10 PROCEDURE — 78264 GASTRIC EMPTYING IMG STUDY: CPT | Mod: TC

## 2025-06-10 PROCEDURE — 78264 GASTRIC EMPTYING IMG STUDY: CPT | Mod: 26,,, | Performed by: RADIOLOGY

## 2025-06-10 RX ORDER — TECHNETIUM TC 99M SULFUR COLLOID 2 MG
1 KIT MISCELLANEOUS
Status: COMPLETED | OUTPATIENT
Start: 2025-06-10 | End: 2025-06-10

## 2025-06-10 RX ADMIN — TECHNETIUM TC 99M SULFUR COLLOID KIT 1 MILLICURIE: KIT at 07:06

## 2025-06-11 ENCOUNTER — PATIENT MESSAGE (OUTPATIENT)
Dept: GASTROENTEROLOGY | Facility: CLINIC | Age: 55
End: 2025-06-11
Payer: MEDICARE

## 2025-06-12 ENCOUNTER — DOCUMENTATION ONLY (OUTPATIENT)
Dept: GASTROENTEROLOGY | Facility: HOSPITAL | Age: 55
End: 2025-06-12
Payer: MEDICARE

## 2025-06-12 NOTE — PROGRESS NOTES
Phone call to patient  The current protocol of MiraLax every day is definitely helped with the bowel movements  She continues with the epigastric fullness and nausea  The gastric emptying scan came back showing 13% retention at 4:00 hr I do not consider this clinically significant.  As mentioned in my note I do not think there has been any standardization for patients with the gas strict sleeve surgery.  I think if there really was a gastroparesis problem this would have been a more prominent figure 4 retention at 4:00 hr  I think this probably falls within dyspepsia and I am going to recommend some over-the-counter remedies that she can try while I am scheduling a future follow-up visit    
normal...

## 2025-07-09 ENCOUNTER — OFFICE VISIT (OUTPATIENT)
Dept: INTERNAL MEDICINE | Facility: CLINIC | Age: 55
End: 2025-07-09
Payer: MEDICARE

## 2025-07-09 VITALS
RESPIRATION RATE: 18 BRPM | DIASTOLIC BLOOD PRESSURE: 72 MMHG | OXYGEN SATURATION: 99 % | SYSTOLIC BLOOD PRESSURE: 114 MMHG | BODY MASS INDEX: 35.72 KG/M2 | HEIGHT: 66 IN | WEIGHT: 222.25 LBS

## 2025-07-09 DIAGNOSIS — E11.22 TYPE 2 DIABETES MELLITUS WITH STAGE 3A CHRONIC KIDNEY DISEASE, WITHOUT LONG-TERM CURRENT USE OF INSULIN: ICD-10-CM

## 2025-07-09 DIAGNOSIS — F33.2 SEVERE RECURRENT MAJOR DEPRESSION WITHOUT PSYCHOTIC FEATURES: ICD-10-CM

## 2025-07-09 DIAGNOSIS — I70.0 AORTIC ATHEROSCLEROSIS: ICD-10-CM

## 2025-07-09 DIAGNOSIS — E66.01 CLASS 2 SEVERE OBESITY DUE TO EXCESS CALORIES WITH SERIOUS COMORBIDITY AND BODY MASS INDEX (BMI) OF 35.0 TO 35.9 IN ADULT: ICD-10-CM

## 2025-07-09 DIAGNOSIS — G47.33 OSA ON CPAP: ICD-10-CM

## 2025-07-09 DIAGNOSIS — J45.20 MILD INTERMITTENT ASTHMA WITHOUT COMPLICATION: ICD-10-CM

## 2025-07-09 DIAGNOSIS — E78.49 OTHER HYPERLIPIDEMIA: ICD-10-CM

## 2025-07-09 DIAGNOSIS — E66.812 CLASS 2 SEVERE OBESITY DUE TO EXCESS CALORIES WITH SERIOUS COMORBIDITY AND BODY MASS INDEX (BMI) OF 35.0 TO 35.9 IN ADULT: ICD-10-CM

## 2025-07-09 DIAGNOSIS — N18.31 TYPE 2 DIABETES MELLITUS WITH STAGE 3A CHRONIC KIDNEY DISEASE, WITHOUT LONG-TERM CURRENT USE OF INSULIN: ICD-10-CM

## 2025-07-09 DIAGNOSIS — I10 PRIMARY HYPERTENSION: Primary | ICD-10-CM

## 2025-07-09 PROCEDURE — 99214 OFFICE O/P EST MOD 30 MIN: CPT | Mod: S$GLB,,, | Performed by: INTERNAL MEDICINE

## 2025-07-09 PROCEDURE — 1160F RVW MEDS BY RX/DR IN RCRD: CPT | Mod: CPTII,S$GLB,, | Performed by: INTERNAL MEDICINE

## 2025-07-09 PROCEDURE — 3008F BODY MASS INDEX DOCD: CPT | Mod: CPTII,S$GLB,, | Performed by: INTERNAL MEDICINE

## 2025-07-09 PROCEDURE — 3074F SYST BP LT 130 MM HG: CPT | Mod: CPTII,S$GLB,, | Performed by: INTERNAL MEDICINE

## 2025-07-09 PROCEDURE — 3044F HG A1C LEVEL LT 7.0%: CPT | Mod: CPTII,S$GLB,, | Performed by: INTERNAL MEDICINE

## 2025-07-09 PROCEDURE — 3061F NEG MICROALBUMINURIA REV: CPT | Mod: CPTII,S$GLB,, | Performed by: INTERNAL MEDICINE

## 2025-07-09 PROCEDURE — 99999 PR PBB SHADOW E&M-EST. PATIENT-LVL III: CPT | Mod: PBBFAC,,, | Performed by: INTERNAL MEDICINE

## 2025-07-09 PROCEDURE — 3066F NEPHROPATHY DOC TX: CPT | Mod: CPTII,S$GLB,, | Performed by: INTERNAL MEDICINE

## 2025-07-09 PROCEDURE — G2211 COMPLEX E/M VISIT ADD ON: HCPCS | Mod: S$GLB,,, | Performed by: INTERNAL MEDICINE

## 2025-07-09 PROCEDURE — 1159F MED LIST DOCD IN RCRD: CPT | Mod: CPTII,S$GLB,, | Performed by: INTERNAL MEDICINE

## 2025-07-09 PROCEDURE — 2023F DILAT RTA XM W/O RTNOPTHY: CPT | Mod: CPTII,S$GLB,, | Performed by: INTERNAL MEDICINE

## 2025-07-09 PROCEDURE — 3078F DIAST BP <80 MM HG: CPT | Mod: CPTII,S$GLB,, | Performed by: INTERNAL MEDICINE

## 2025-07-09 PROCEDURE — 4010F ACE/ARB THERAPY RXD/TAKEN: CPT | Mod: CPTII,S$GLB,, | Performed by: INTERNAL MEDICINE

## 2025-07-09 NOTE — PROGRESS NOTES
Subjective:       Patient ID: Arron Ron is a 54 y.o. female.    Chief Complaint: Follow-up (6 mo f/u)      HPI:  Patient is known to me and presents for follow up HTN, obesity, chronic low back and knee pain, depression/anxiety, HLD, asthma.  Labs from 07/01/2025 personally reviewed, interpreted and discussed today     A1C 5.8%, stable   LDL 52, improving  GFR 47, baseline  H/H 11.6/35.3--stable  Microalb 7/2025 normal     CKD 3: established Dr. Villalta since I saw her last. She reports drinking plenty of water.   Stopped NSAIDs, stopped torsemide, decreased valsartan. GFR recovered from 28 to 54 last visit but appears her new baseline is likely upper 40s.   Right renal cyst was also found on US per her report.   Patient tells me she saw Nephrology recently in her GFR was over 80 but unfortunately I can not review those labs during our visit today.  I did advise her she could reach out to Nephrology with her recent labs for me to compare and make sure that he does not want to make any changes.  However, it seems this is going to be her baseline renal function and discussed that with the patient as well     HTN: on amlodipine 10mg, valsartan; off aldactone and cardura. BP is controlled today. Does not check regularly at home. Denies chest pains, SOB. GFR improved off so much diurectic previously     Depression/anxiety: on xanax PRN prescribed by Dr. Tello. Not on SSRI. Sx reported as controlled     HLD: On atorvastatin and fish oil; last LDL stable.. Denies medication side effects      Asthma: diagnosed by Dr. Ocampo who started the Breo and then switched to symbicort.. She denies  wheezing or cough today. Has now established with pulm at [facility] --on flonase and singulair with symbicort use.         Chronic back and knee pain: She was following Dr. Tello who was prescribing pain medication but she self stopped taking her pain medications due to concern for side effects. Still using Flexeril. Her knee pain  "was getting better with weight loss. Last MRI and xray reviewed and consistent with arthritis. She saw ortho and scheduled for knee replacement surgery but never had surgery as she is concerned about the recovery since she lives alone/no family.  Her pain is stable today remains unresolved.     RODRI: using CPAP every night. She reports her machine is malfunctioning; she will let DME company     She was referred to Dr. Camacho. She is s/p braiatric surgery (gastric sleeve [id number]).  She is feeling frustrated because she regained her weight did not decrease very much since last visit. she reports she has established with bariatric medicine at [facility]--now on Mounjaro. She has had significant weight loss since last visit. Has some constipation but otherwise tolerating well.      GERD: She is following with GI. Had EGD. On pantoprazole BID. Decreased appetite due to chronic nausea.  Considering bypass surgery as sleeve causing so many GI complications.    Per GI note 11/2022: "She might need PPI b.i.d. indefinitely given having esophagitis on PPI daily."  GI also added nortriptyline 25mg for GI symptoms      Chronic constipation: on amitiza per GI. Note she was started on GLP-1 by bariatric med as well. Sx currently stable      Anemia: established with Dr. Murrell since last visit. She is now  B1 and VitD-K. H/H stable. Iron was normal. Noted in setting of CKD.        Past Medical History:   Diagnosis Date    Acid reflux     Anemia     Anxiety     Arthritis     Asthma     Cataract     bilateral    Chronic back pain     Chronic bronchitis     Constipation     COPD (chronic obstructive pulmonary disease)     Diabetes with neurologic complications     Disorder of kidney and ureter     Edema     Encounter for blood transfusion 1987    no reaction    Endometriosis     Glaucoma     Hyperlipidemia     Hypertension     Nausea & vomiting     Neuropathy     Palpitations     Sleep apnea     cpap nightly    Tendonitis of wrist, right  "    Trouble in sleeping     Type 2 diabetes mellitus        Family History   Problem Relation Name Age of Onset    Hypertension Mother      Heart attack Mother      Hypertension Brother      Heart disease Maternal Grandmother      Hypertension Maternal Grandmother      Diabetes Maternal Aunt      Breast cancer Neg Hx      Colon cancer Neg Hx      Ovarian cancer Neg Hx      Esophageal cancer Neg Hx         Social History[1]    Review of Systems   Constitutional:  Negative for activity change, fatigue, fever and unexpected weight change.   HENT:  Negative for congestion, ear pain, hearing loss, rhinorrhea and sore throat.    Eyes:  Negative for redness and visual disturbance.   Respiratory:  Negative for cough, shortness of breath and wheezing.    Cardiovascular:  Negative for chest pain, palpitations and leg swelling.   Gastrointestinal:  Negative for abdominal pain, constipation, diarrhea, nausea and vomiting.   Musculoskeletal:  Positive for arthralgias.   Skin:  Negative for color change, rash and wound.   Neurological:  Negative for dizziness, weakness and headaches.         Objective:      Physical Exam  Vitals reviewed.   Constitutional:       General: She is not in acute distress.     Appearance: She is well-developed. She is obese.   HENT:      Head: Normocephalic and atraumatic.      Right Ear: External ear normal.      Left Ear: External ear normal.      Nose: Nose normal.   Eyes:      General:         Right eye: No discharge.         Left eye: No discharge.      Conjunctiva/sclera: Conjunctivae normal.   Neck:      Thyroid: No thyromegaly.   Cardiovascular:      Rate and Rhythm: Normal rate and regular rhythm.   Pulmonary:      Effort: Pulmonary effort is normal. No respiratory distress.   Skin:     General: Skin is warm and dry.   Neurological:      Mental Status: She is alert and oriented to person, place, and time. Mental status is at baseline.   Psychiatric:         Behavior: Behavior normal.          Thought Content: Thought content normal.         Assessment:       1. Primary hypertension    2. Other hyperlipidemia    3. Aortic atherosclerosis    4. Type 2 diabetes mellitus with stage 3a chronic kidney disease, without long-term current use of insulin    5. Class 2 severe obesity due to excess calories with serious comorbidity and body mass index (BMI) of 35.0 to 35.9 in adult    6. Mild intermittent asthma without complication    7. Severe recurrent major depression without psychotic features    8. RODRI on CPAP        Plan:       1. Primary hypertension  Chronic controlled  Continue medications same dose  Low-sodium diet  Check blood pressure and keep log  -     CBC Auto Differential; Future; Expected date: 01/05/2026  -     Comprehensive Metabolic Panel; Future; Expected date: 01/05/2026  -     Lipid Panel; Future; Expected date: 01/05/2026  -     Hemoglobin A1C; Future; Expected date: 01/05/2026    2. Other hyperlipidemia  Chronic controlled  Continue medications same dose  -     CBC Auto Differential; Future; Expected date: 01/05/2026  -     Comprehensive Metabolic Panel; Future; Expected date: 01/05/2026  -     Lipid Panel; Future; Expected date: 01/05/2026  -     Hemoglobin A1C; Future; Expected date: 01/05/2026    3. Aortic atherosclerosis  Noted on prior imaging  Continue statin  -     CBC Auto Differential; Future; Expected date: 01/05/2026  -     Comprehensive Metabolic Panel; Future; Expected date: 01/05/2026  -     Lipid Panel; Future; Expected date: 01/05/2026  -     Hemoglobin A1C; Future; Expected date: 01/05/2026    4. Type 2 diabetes mellitus with stage 3a chronic kidney disease, without long-term current use of insulin  Chronic stable  Continue medications same dose  On statin  ADA diet  Check sugars and keep log    Orders:  -     CBC Auto Differential; Future; Expected date: 01/05/2026  -     Comprehensive Metabolic Panel; Future; Expected date: 01/05/2026  -     Lipid Panel; Future; Expected  date: 01/05/2026  -     Hemoglobin A1C; Future; Expected date: 01/05/2026    5. Class 2 severe obesity due to excess calories with serious comorbidity and body mass index (BMI) of 35.0 to 35.9 in adult  Chronic stable  Continue healthy diet exercise and weight loss  Continue GLP 1  Overview:  Cont weight loss on mounjaro     Orders:  -     CBC Auto Differential; Future; Expected date: 01/05/2026  -     Comprehensive Metabolic Panel; Future; Expected date: 01/05/2026  -     Lipid Panel; Future; Expected date: 01/05/2026  -     Hemoglobin A1C; Future; Expected date: 01/05/2026    6. Mild intermittent asthma without complication  Chronic stable  Continue current medications  No acute exacerbation today    7. Severe recurrent major depression without psychotic features  Chronic stable  Continue current medications    8. RODRI on CPAP  Chronic stable  Compliant with nightly CPAP use  Continue to focus on weight loss  She states it seems her machine is malfunctioning.  Advised to reach out to [employer] company       Return to clinic 6 months with labs and PRN               [1]   Social History  Socioeconomic History    Marital status: Single   Tobacco Use    Smoking status: Never    Smokeless tobacco: Never   Substance and Sexual Activity    Alcohol use: Yes     Comment: rare    Drug use: Never    Sexual activity: Not Currently     Partners: Male     Birth control/protection: Surgical, See Surgical Hx     Comment: --BTL     Social Drivers of Health     Financial Resource Strain: High Risk (1/29/2025)    Overall Financial Resource Strain (CARDIA)     Difficulty of Paying Living Expenses: Hard   Food Insecurity: Food Insecurity Present (1/29/2025)    Hunger Vital Sign     Worried About Running Out of Food in the Last Year: Sometimes true     Ran Out of Food in the Last Year: Sometimes true   Transportation Needs: Unmet Transportation Needs (1/29/2025)    PRAPARE - Transportation     Lack of Transportation (Medical): Yes      Lack of Transportation (Non-Medical): Yes   Physical Activity: Insufficiently Active (1/29/2025)    Exercise Vital Sign     Days of Exercise per Week: 2 days     Minutes of Exercise per Session: 10 min   Stress: Stress Concern Present (1/29/2025)    Citizen of [place] [employer] - Occupational Stress Questionnaire     Feeling of Stress : Very much   Housing Stability: High Risk (1/29/2025)    Housing Stability Vital Sign     Unable to Pay for Housing in the Last Year: Yes     Homeless in the Last Year: Yes

## 2025-07-15 ENCOUNTER — OFFICE VISIT (OUTPATIENT)
Dept: GASTROENTEROLOGY | Facility: CLINIC | Age: 55
End: 2025-07-15
Payer: MEDICARE

## 2025-07-15 VITALS
SYSTOLIC BLOOD PRESSURE: 116 MMHG | WEIGHT: 219.13 LBS | BODY MASS INDEX: 35.22 KG/M2 | DIASTOLIC BLOOD PRESSURE: 82 MMHG | HEART RATE: 89 BPM | HEIGHT: 66 IN

## 2025-07-15 DIAGNOSIS — K59.04 CHRONIC IDIOPATHIC CONSTIPATION: ICD-10-CM

## 2025-07-15 DIAGNOSIS — K21.00 GASTROESOPHAGEAL REFLUX DISEASE WITH ESOPHAGITIS WITHOUT HEMORRHAGE: Primary | ICD-10-CM

## 2025-07-15 DIAGNOSIS — K30 NON-ULCER DYSPEPSIA: ICD-10-CM

## 2025-07-15 DIAGNOSIS — Z86.0100 HISTORY OF COLON POLYPS: ICD-10-CM

## 2025-07-15 PROCEDURE — 1160F RVW MEDS BY RX/DR IN RCRD: CPT | Mod: CPTII,S$GLB,, | Performed by: INTERNAL MEDICINE

## 2025-07-15 PROCEDURE — 3061F NEG MICROALBUMINURIA REV: CPT | Mod: CPTII,S$GLB,, | Performed by: INTERNAL MEDICINE

## 2025-07-15 PROCEDURE — 3008F BODY MASS INDEX DOCD: CPT | Mod: CPTII,S$GLB,, | Performed by: INTERNAL MEDICINE

## 2025-07-15 PROCEDURE — 3079F DIAST BP 80-89 MM HG: CPT | Mod: CPTII,S$GLB,, | Performed by: INTERNAL MEDICINE

## 2025-07-15 PROCEDURE — 4010F ACE/ARB THERAPY RXD/TAKEN: CPT | Mod: CPTII,S$GLB,, | Performed by: INTERNAL MEDICINE

## 2025-07-15 PROCEDURE — 99213 OFFICE O/P EST LOW 20 MIN: CPT | Mod: S$GLB,,, | Performed by: INTERNAL MEDICINE

## 2025-07-15 PROCEDURE — 3066F NEPHROPATHY DOC TX: CPT | Mod: CPTII,S$GLB,, | Performed by: INTERNAL MEDICINE

## 2025-07-15 PROCEDURE — 1159F MED LIST DOCD IN RCRD: CPT | Mod: CPTII,S$GLB,, | Performed by: INTERNAL MEDICINE

## 2025-07-15 PROCEDURE — 3074F SYST BP LT 130 MM HG: CPT | Mod: CPTII,S$GLB,, | Performed by: INTERNAL MEDICINE

## 2025-07-15 PROCEDURE — 3044F HG A1C LEVEL LT 7.0%: CPT | Mod: CPTII,S$GLB,, | Performed by: INTERNAL MEDICINE

## 2025-07-15 PROCEDURE — 99999 PR PBB SHADOW E&M-EST. PATIENT-LVL III: CPT | Mod: PBBFAC,,, | Performed by: INTERNAL MEDICINE

## 2025-07-15 NOTE — PROGRESS NOTES
Subjective:       Patient ID: Arron Ron is a 54 y.o. female.    Chief Complaint: Follow-up    HPI    Follow-up visit.  Last visit was in April.  History significant for a gastric sleeve and severe reflux afterwards.  She had documented severe reflux esophagitis but on aggressive therapy we also had a documented healing of the esophagitis in 2022.  She does have a 5 cm hiatal hernia.  On her last visit I ordered a gastric emptying scan to see if that could be a contributing factor for her reflux and abdominal discomfort.  The scan showed 13% retention at 4:00 hr, which would be a borderline and not clearly abnormal results, and since that really isn't standardized for patients after sleeve surgery, I do not think that would indicate that she has gastroparesis.  Since her last visit she has been doing better right now for her bowel regimen and her constipation she is taking MiraLax and Dulcolax.  Previously she had failed treatment with both Linzess and Amitiza.  And with his current protocol she is having a bowel movement with a sensation of good evacuation almost every day.    For the reflux she is taking pantoprazole twice a day and then occasional Tums for breakthrough symptoms that are usually triggered by certain foods.    Reviewed her diet habits.  For the morning she might eat oatmeal or eggs.  She will have a small snack for lunch.  Her main meal is evening meal but still restricted amount in terms of size.  She might have salad or vegetables.  She avoids rice and bread..  She has good water intake.  Previously she was taking a leave but she avoids all NSAIDs now    Past Medical History:   Diagnosis Date    Acid reflux     Anemia     Anxiety     Arthritis     Asthma     Cataract     bilateral    Chronic back pain     Chronic bronchitis     Constipation     COPD (chronic obstructive pulmonary disease)     Diabetes with neurologic complications     Disorder of kidney and ureter     Edema      Encounter for blood transfusion 1987    no reaction    Endometriosis     Glaucoma     Hyperlipidemia     Hypertension     Nausea & vomiting     Neuropathy     Palpitations     Sleep apnea     cpap nightly    Tendonitis of wrist, right     Trouble in sleeping     Type 2 diabetes mellitus        Review of patient's allergies indicates:  No Known Allergies     Family History   Problem Relation Name Age of Onset    Hypertension Mother      Heart attack Mother      Hypertension Brother      Heart disease Maternal Grandmother      Hypertension Maternal Grandmother      Diabetes Maternal Aunt      Breast cancer Neg Hx      Colon cancer Neg Hx      Ovarian cancer Neg Hx      Esophageal cancer Neg Hx         Social History[1]     Review of Systems    CMP   Lab Results   Component Value Date     07/01/2025     01/26/2025    K 3.7 07/01/2025    K 4.0 01/26/2025     07/01/2025     01/26/2025    CO2 31 (H) 07/01/2025    CO2 25 01/26/2025    GLU 88 07/01/2025    GLU 80 01/26/2025    BUN 20 (H) 07/01/2025    BUN 18 01/26/2025    CREATININE 1.34 (H) 07/01/2025    CREATININE 1.2 01/26/2025    CALCIUM 9.1 07/01/2025    CALCIUM 9.0 01/26/2025    PROT 7.8 07/01/2025    PROT 8.0 01/26/2025    ALBUMIN 4.4 07/01/2025    ALBUMIN 3.9 01/26/2025    BILITOT 0.3 07/01/2025    BILITOT 0.5 01/26/2025    ALKPHOS 73 07/01/2025    ALKPHOS 91 01/26/2025    AST 33 07/01/2025    AST 21 01/26/2025    ALT 14 07/01/2025    ALT 15 01/26/2025    ANIONGAP 5 (L) 07/01/2025    ANIONGAP 7 (L) 01/26/2025    and CBC   Lab Results   Component Value Date    WBC 4.80 07/01/2025    WBC 6.35 01/26/2025    HGB 11.7 (L) 07/01/2025    HGB 11.6 (L) 01/26/2025    HCT 34.9 (L) 07/01/2025     07/01/2025     01/26/2025       No results found for this or any previous visit from the past 365 days.             Objective:      Physical Exam    Assessment & Plan:       Gastroesophageal reflux disease with esophagitis without  hemorrhage    Chronic idiopathic constipation    History of colon polyps    Non-ulcer dyspepsia     Assessment  1. Gastroesophageal reflux with esophagitis.  Doing well on aggressive treatment right now we discussed diet and habits  2. Chronic idiopathic constipation.  I encouraged her to continue with conservative measures like good water intake and activity every day as well as continue the MiraLax every day combined with Dulcolax.  I asked her to reach out to me on the portal if that no longer becomes effective and we can consider other medications at that time which might include Trulance or Motegrity  3. History of colon polyps she did have a single small polyp in 2021.  I reviewed the pathology and this was not an adenoma.  So she probably does not need to come back in 2026.  We can reassess that next year  4. Nonulcer dyspepsia.  Discussed eating small meals in general    This note was created with voice recognition dictation technology.  There may be errors that I did not see, detect or correct.        Ervin Hyman MD          [1]   Social History  Tobacco Use    Smoking status: Never    Smokeless tobacco: Never   Substance Use Topics    Alcohol use: Yes     Comment: rare    Drug use: Never

## 2025-07-17 ENCOUNTER — OFFICE VISIT (OUTPATIENT)
Dept: INTERNAL MEDICINE | Facility: CLINIC | Age: 55
End: 2025-07-17
Payer: MEDICARE

## 2025-07-17 VITALS
HEIGHT: 66 IN | SYSTOLIC BLOOD PRESSURE: 122 MMHG | WEIGHT: 222 LBS | HEART RATE: 86 BPM | RESPIRATION RATE: 18 BRPM | DIASTOLIC BLOOD PRESSURE: 86 MMHG | OXYGEN SATURATION: 98 % | BODY MASS INDEX: 35.68 KG/M2

## 2025-07-17 DIAGNOSIS — E66.812 CLASS 2 SEVERE OBESITY DUE TO EXCESS CALORIES WITH SERIOUS COMORBIDITY AND BODY MASS INDEX (BMI) OF 35.0 TO 35.9 IN ADULT: ICD-10-CM

## 2025-07-17 DIAGNOSIS — E66.01 CLASS 2 SEVERE OBESITY DUE TO EXCESS CALORIES WITH SERIOUS COMORBIDITY AND BODY MASS INDEX (BMI) OF 35.0 TO 35.9 IN ADULT: ICD-10-CM

## 2025-07-17 DIAGNOSIS — R60.9 EDEMA, UNSPECIFIED TYPE: Primary | ICD-10-CM

## 2025-07-17 PROCEDURE — 1159F MED LIST DOCD IN RCRD: CPT | Mod: CPTII,S$GLB,, | Performed by: NURSE PRACTITIONER

## 2025-07-17 PROCEDURE — 4010F ACE/ARB THERAPY RXD/TAKEN: CPT | Mod: CPTII,S$GLB,, | Performed by: NURSE PRACTITIONER

## 2025-07-17 PROCEDURE — 99999 PR PBB SHADOW E&M-EST. PATIENT-LVL III: CPT | Mod: PBBFAC,,, | Performed by: NURSE PRACTITIONER

## 2025-07-17 PROCEDURE — 3008F BODY MASS INDEX DOCD: CPT | Mod: CPTII,S$GLB,, | Performed by: NURSE PRACTITIONER

## 2025-07-17 PROCEDURE — 99214 OFFICE O/P EST MOD 30 MIN: CPT | Mod: S$GLB,,, | Performed by: NURSE PRACTITIONER

## 2025-07-17 PROCEDURE — 3061F NEG MICROALBUMINURIA REV: CPT | Mod: CPTII,S$GLB,, | Performed by: NURSE PRACTITIONER

## 2025-07-17 PROCEDURE — 3079F DIAST BP 80-89 MM HG: CPT | Mod: CPTII,S$GLB,, | Performed by: NURSE PRACTITIONER

## 2025-07-17 PROCEDURE — 3066F NEPHROPATHY DOC TX: CPT | Mod: CPTII,S$GLB,, | Performed by: NURSE PRACTITIONER

## 2025-07-17 PROCEDURE — 2023F DILAT RTA XM W/O RTNOPTHY: CPT | Mod: CPTII,S$GLB,, | Performed by: NURSE PRACTITIONER

## 2025-07-17 PROCEDURE — 3044F HG A1C LEVEL LT 7.0%: CPT | Mod: CPTII,S$GLB,, | Performed by: NURSE PRACTITIONER

## 2025-07-17 PROCEDURE — 3074F SYST BP LT 130 MM HG: CPT | Mod: CPTII,S$GLB,, | Performed by: NURSE PRACTITIONER

## 2025-07-17 NOTE — PROGRESS NOTES
Subjective:       Patient ID: Arron Ron is a 54 y.o. female.    Chief Complaint: Leg Pain      History of Present Illness    CHIEF COMPLAINT:  Arron presents today for right knee pain and swelling.    RIGHT LEG SYMPTOMS:  She reports right leg symptoms for two weeks, including persistent pain behind the knee. Her right leg is currently larger compared to the left leg. She notes varicose veins near the knee area with severe pain in the affected region. Symptoms began approximately two weeks ago with increasing leg swelling. Pain is present with movement and stretching of the leg.    BRUISING:  She reports multiple bruises under her arms and on her legs without known trauma or injury. Recently resumed asa had labs with pcp earlier this month  Lab Results   Component Value Date    WBC 4.80 07/01/2025    HGB 11.7 (L) 07/01/2025    HCT 34.9 (L) 07/01/2025    MCV 87 07/01/2025     07/01/2025             PREVIOUS IMAGING:  Ultrasound from 2023 showed a Baker's cyst in the left popliteal fossa.    MEDICATIONS:  She recently started aspirin 81 mg every other day.    LABS:  Labs were recently completed with Dr. Rojas .      ROS:  Cardiovascular: +lower extremity edema  Musculoskeletal: +joint pain, +limb pain  Hematologic/Lymphatic: +easy bruising          Objective:      Physical Exam    General: No acute distress. Well-developed. Well-nourished.  Eyes: EOMI. Sclerae anicteric.  HENT: Normocephalic. Atraumatic. Nares patent. Moist oral mucosa.  Ears: Bilateral TMs clear. Bilateral EACs clear.  Cardiovascular: Regular rate. Regular rhythm. No murmurs. No rubs. No gallops. Normal S1, S2.  Respiratory: Normal respiratory effort. Clear to auscultation bilaterally. No rales. No rhonchi. No wheezing.  Abdomen: Soft. Non-tender. Non-distended. Normoactive bowel sounds.  Musculoskeletal: No  obvious deformity. Right leg larger than left.  Extremities: No lower extremity edema.  Neurological: Alert & oriented  x3. No slurred speech. Normal gait.  Psychiatric: Normal mood. Normal affect. Good insight. Good judgment.  Skin: Warm. Dry. No rash. Multiple bruises present on extremities and axillae.  MSK: Knee - Right: Pain behind right knee.        Right leg is larger than the left- neither with pitting edema    Assessment:       1. Edema, unspecified type    2. Class 2 severe obesity due to excess calories with serious comorbidity and body mass index (BMI) of 35.0 to 35.9 in adult        Plan:     Problem List Items Addressed This Visit       Class 2 severe obesity due to excess calories with serious comorbidity and body mass index (BMI) of 35.0 to 35.9 in adult    Overview   Cont weight loss on mounjaro           Other Visit Diagnoses         Edema, unspecified type    -  Primary    Relevant Orders    US Lower Extremity Veins Bilateral          Assessment & Plan    M25.561 Pain in right knee  I83.811 Varicose veins of right lower extremity with pain  R60.0 Localized edema  R22.41 Localized swelling, mass and lump, right lower limb  R23.3 Spontaneous ecchymoses  M71.22 Synovial cyst of popliteal space [Baker], left knee    ## RIGHT KNEE PAIN:  - Arron reports knee pain for about 2 weeks, with pain behind the knee when the foot is pushed.  - Examination revealed the right knee is not quite swollen and not holding any fluid.  - Discussed knee pain and possible causes with the patient.  - Ordered ultrasound of both knees to evaluate leg pain and swelling, particularly in the right leg.    ## VARICOSE VEINS OF RIGHT LOWER EXTREMITY:  - Arron reports open veins and pain in the right leg.  - Discussed varicose veins and potential referral to a vascular specialist ([facility] in [place]) if ultrasound results are negative, as varicosities are present.    ## LOCALIZED SWELLING AND MASS IN RIGHT LOWER LIMB:  - Arron reports the right leg has always been a little bigger, especially after gaining weight.    ## SPONTANEOUS  ECCHYMOSES:  - Arron reports finding bruises on legs and under arms, possibly related to aspirin use.  - Reviewed recent laboratory studies, including platelet count and renal function, in context of reported bruising.  - Discussed the potential link between bruising and aspirin use with the patient.  - Advised to continue aspirin every other day until further evaluation, acknowledging it may contribute to bruising.    ## BAKER'S CYST OF LEFT KNEE:  - Previous ultrasound from 2023 showed Baker's cyst in left popliteal fossa.  - Ordered ultrasound of both knees to follow up on the Baker's cyst.      Pain is consistent with the varicose veins but she thought the cyst was in right knee- old imaging on Dr Mays notes state that it was seen in the left knee, will consider vasc referral after reviewed u.s     This note was generated with the assistance of ambient listening technology. Verbal consent was obtained by the patient and accompanying visitor(s) for the recording of patient appointment to facilitate this note. I attest to having reviewed and edited the generated note for accuracy, though some syntax or spelling errors may persist. Please contact the author of this note for any clarification.

## 2025-07-22 ENCOUNTER — OFFICE VISIT (OUTPATIENT)
Dept: ORTHOPEDICS | Facility: CLINIC | Age: 55
End: 2025-07-22
Payer: MEDICARE

## 2025-07-22 DIAGNOSIS — M17.12 PRIMARY OSTEOARTHRITIS OF LEFT KNEE: ICD-10-CM

## 2025-07-22 DIAGNOSIS — Z96.651 S/P TOTAL KNEE ARTHROPLASTY, RIGHT: Primary | ICD-10-CM

## 2025-07-22 PROCEDURE — 99214 OFFICE O/P EST MOD 30 MIN: CPT | Mod: 25,S$GLB,, | Performed by: ORTHOPAEDIC SURGERY

## 2025-07-22 PROCEDURE — 3066F NEPHROPATHY DOC TX: CPT | Mod: CPTII,S$GLB,, | Performed by: ORTHOPAEDIC SURGERY

## 2025-07-22 PROCEDURE — 3044F HG A1C LEVEL LT 7.0%: CPT | Mod: CPTII,S$GLB,, | Performed by: ORTHOPAEDIC SURGERY

## 2025-07-22 PROCEDURE — 1160F RVW MEDS BY RX/DR IN RCRD: CPT | Mod: CPTII,S$GLB,, | Performed by: ORTHOPAEDIC SURGERY

## 2025-07-22 PROCEDURE — 3061F NEG MICROALBUMINURIA REV: CPT | Mod: CPTII,S$GLB,, | Performed by: ORTHOPAEDIC SURGERY

## 2025-07-22 PROCEDURE — 20610 DRAIN/INJ JOINT/BURSA W/O US: CPT | Mod: 50,S$GLB,, | Performed by: ORTHOPAEDIC SURGERY

## 2025-07-22 PROCEDURE — 4010F ACE/ARB THERAPY RXD/TAKEN: CPT | Mod: CPTII,S$GLB,, | Performed by: ORTHOPAEDIC SURGERY

## 2025-07-22 PROCEDURE — 99999 PR PBB SHADOW E&M-EST. PATIENT-LVL III: CPT | Mod: PBBFAC,,, | Performed by: ORTHOPAEDIC SURGERY

## 2025-07-22 PROCEDURE — 1159F MED LIST DOCD IN RCRD: CPT | Mod: CPTII,S$GLB,, | Performed by: ORTHOPAEDIC SURGERY

## 2025-07-22 RX ORDER — TRIAMCINOLONE ACETONIDE 40 MG/ML
80 INJECTION, SUSPENSION INTRA-ARTICULAR; INTRAMUSCULAR
Status: DISCONTINUED | OUTPATIENT
Start: 2025-07-22 | End: 2025-07-22 | Stop reason: HOSPADM

## 2025-07-22 RX ADMIN — TRIAMCINOLONE ACETONIDE 80 MG: 40 INJECTION, SUSPENSION INTRA-ARTICULAR; INTRAMUSCULAR at 08:07

## 2025-07-22 NOTE — PROCEDURES
Large Joint Aspiration/Injection: bilateral knee    Date/Time: 7/22/2025 8:00 AM    Performed by: Pipe Sauceda MD  Authorized by: Pipe Sauceda MD    Consent Done?:  Yes (Verbal)  Approach:  Anterolateral  Location:  Knee  Laterality:  Bilateral  Site:  Bilateral knee  Medications (Left):  80 mg triamcinolone acetonide 40 mg/mL     After obtaining verbal informed consent both of the patient's knees were prepped aseptically and injected through an inferior lateral approach using 40 mg of triamcinolone and 1 cc of 1% plain Xylocaine.  The patient was warned about postinjection flare and how to manage it with ice, rest and over-the-counter analgesics.  They're advised to contact me for any severe, uncontrolled pain.

## 2025-07-22 NOTE — PROGRESS NOTES
Subjective:      Patient ID: Arron Ron is a 54 y.o. female.    Chief Complaint: Pain of the Left Lower Leg and Pain of the Right Lower Leg    HPI    Follow-up for osteoarthritis.  The patient complains of pain and posterior swelling in both knees.  She reports modest relief with the injection.  The patient has discussed postoperative convalescence with her family and thinks she can proceed with arthroplasty in December.  The patient reports that her right knee is much more painful than her left.          Review of Systems   Constitutional: Negative for fever and weight loss.   HENT:  Negative for congestion.    Eyes:  Negative for visual disturbance.   Cardiovascular:  Negative for chest pain.   Respiratory:  Negative for shortness of breath.    Hematologic/Lymphatic: Negative for bleeding problem. Does not bruise/bleed easily.   Skin:  Negative for poor wound healing.   Musculoskeletal:  Positive for arthritis and joint pain.   Gastrointestinal:  Negative for abdominal pain.   Genitourinary:  Negative for dysuria.   Neurological:  Negative for seizures.   Psychiatric/Behavioral:  Negative for altered mental status.    Allergic/Immunologic: Negative for persistent infections.         Objective:      Ortho/SPM Exam      Alert and oriented, no acute distress  Body habitus is obese  Sclera are clear  No respiratory distress  No definite limp     Left knee  Hip irritability  negative.   The skin over the knee is intact.  Knee effusion trace  Palpation- nontender  Range of motion- Flexion 90 deg, Extension 0 deg,   Ligament laxity exam:  2+ valgus laxity   Patellar apprehension negative.  Popliteal cyst present  Patellar crepitation absent.  Meniscal irritability not applicable  Pulses DP present, PT present.  Motor normal 5/5 strength in all tested muscle groups.   Sensory normal.     Right knee  Trace effusion  Range of motion 0-120  1+ varus laxity  Baker cyst present      Radiographs of both knees show  significant joint space loss with secondary degenerative change        Assessment:       Encounter Diagnoses   Name Primary?    S/P total knee arthroplasty, right Yes    Primary osteoarthritis of left knee         The patient has significant structural degeneration with more pain on the right side.  Palliative measures are not really controlling symptoms.  The only intervention likely to result in long-term benefit is arthroplasty.          Plan:       Arron was seen today for pain and pain.    Diagnoses and all orders for this visit:    S/P total knee arthroplasty, right  -     X-ray Knee Ortho Right (XPD); Future    Primary osteoarthritis of left knee          I explained my diagnostic impression and the reasoning behind it in detail, using layman's terms.  Models and/or pictures were used to help in the explanation.    Palliative injection was requested today for both knees and consent was given.     I explained the role of total knee replacement in the treatment of this condition.  I reviewed the nature of the operation, the expected clinical course and recovery period.  I also discussed the typical complications that are associated with the procedure.  The option of continued nonsurgical care was also reviewed.  The patient indicates that they tentatively wish to proceed with the right side in December.      The patient agrees to carefully discuss postop convalescence with her family with regard to the necessary support she will need.    X-ray next visit

## 2025-07-23 ENCOUNTER — TELEPHONE (OUTPATIENT)
Dept: ORTHOPEDICS | Facility: CLINIC | Age: 55
End: 2025-07-23
Payer: MEDICARE

## 2025-07-29 DIAGNOSIS — M17.11 PRIMARY OSTEOARTHRITIS OF RIGHT KNEE: Primary | ICD-10-CM

## 2025-07-29 RX ORDER — MUPIROCIN 20 MG/G
OINTMENT TOPICAL
OUTPATIENT
Start: 2025-07-29

## 2025-07-29 RX ORDER — NAPROXEN 500 MG/1
500 TABLET ORAL
OUTPATIENT
Start: 2025-07-29 | End: 2025-07-29

## 2025-07-29 RX ORDER — SODIUM CHLORIDE 0.9 % (FLUSH) 0.9 %
3 SYRINGE (ML) INJECTION EVERY 8 HOURS
OUTPATIENT
Start: 2025-07-29

## 2025-07-29 RX ORDER — ACETAMINOPHEN 500 MG
1000 TABLET ORAL
OUTPATIENT
Start: 2025-07-29 | End: 2025-07-29

## 2025-07-29 RX ORDER — CEFAZOLIN SODIUM 2 G/50ML
2 SOLUTION INTRAVENOUS
OUTPATIENT
Start: 2025-07-29